# Patient Record
Sex: MALE | Race: BLACK OR AFRICAN AMERICAN | NOT HISPANIC OR LATINO | Employment: OTHER | URBAN - METROPOLITAN AREA
[De-identification: names, ages, dates, MRNs, and addresses within clinical notes are randomized per-mention and may not be internally consistent; named-entity substitution may affect disease eponyms.]

---

## 2017-01-08 ENCOUNTER — HOSPITAL ENCOUNTER (OUTPATIENT)
Dept: SLEEP MEDICINE | Facility: HOSPITAL | Age: 67
Discharge: HOME | End: 2017-01-08
Attending: INTERNAL MEDICINE
Payer: MEDICARE

## 2017-01-08 PROCEDURE — 95811 POLYSOM 6/>YRS CPAP 4/> PARM: CPT

## 2017-05-20 PROBLEM — J10.1 INFLUENZA A: Status: ACTIVE | Noted: 2017-05-20

## 2017-05-21 PROBLEM — J10.1 INFLUENZA A: Status: RESOLVED | Noted: 2017-05-20 | Resolved: 2017-05-21

## 2017-11-28 PROBLEM — Z00.00 ROUTINE ADULT HEALTH MAINTENANCE: Status: ACTIVE | Noted: 2017-01-01

## 2017-11-30 PROBLEM — R93.89 ABNORMAL CHEST CT: Status: ACTIVE | Noted: 2017-01-01

## 2017-11-30 PROBLEM — G47.33 OSA ON CPAP: Status: ACTIVE | Noted: 2017-01-01

## 2018-01-01 ENCOUNTER — TELEPHONE (OUTPATIENT)
Dept: ADMINISTRATIVE | Facility: HOSPITAL | Age: 68
End: 2018-01-01

## 2018-01-01 ENCOUNTER — HOSPITAL ENCOUNTER (INPATIENT)
Facility: HOSPITAL | Age: 68
LOS: 19 days | Discharge: NURSING, REHAB OR LONG-TERM CARE | DRG: 064 | End: 2018-06-26
Attending: PSYCHIATRY & NEUROLOGY | Admitting: PSYCHIATRY & NEUROLOGY
Payer: MEDICARE

## 2018-01-01 ENCOUNTER — HOSPITAL ENCOUNTER (INPATIENT)
Facility: HOSPITAL | Age: 68
LOS: 8 days | DRG: 064 | End: 2018-07-08
Attending: PSYCHIATRY & NEUROLOGY | Admitting: PSYCHIATRY & NEUROLOGY
Payer: MEDICARE

## 2018-01-01 VITALS
BODY MASS INDEX: 31.75 KG/M2 | SYSTOLIC BLOOD PRESSURE: 98 MMHG | TEMPERATURE: 101 F | OXYGEN SATURATION: 95 % | HEIGHT: 70 IN | DIASTOLIC BLOOD PRESSURE: 57 MMHG | WEIGHT: 221.81 LBS

## 2018-01-01 VITALS
BODY MASS INDEX: 33.82 KG/M2 | TEMPERATURE: 99 F | WEIGHT: 223.13 LBS | RESPIRATION RATE: 17 BRPM | SYSTOLIC BLOOD PRESSURE: 108 MMHG | DIASTOLIC BLOOD PRESSURE: 59 MMHG | HEIGHT: 68 IN | HEART RATE: 87 BPM | OXYGEN SATURATION: 95 %

## 2018-01-01 DIAGNOSIS — I63.411 EMBOLIC STROKE INVOLVING RIGHT MIDDLE CEREBRAL ARTERY: ICD-10-CM

## 2018-01-01 DIAGNOSIS — J96.21 ACUTE ON CHRONIC RESPIRATORY FAILURE WITH HYPOXIA: ICD-10-CM

## 2018-01-01 DIAGNOSIS — I25.810 CORONARY ARTERY DISEASE INVOLVING CORONARY BYPASS GRAFT OF NATIVE HEART WITHOUT ANGINA PECTORIS: ICD-10-CM

## 2018-01-01 DIAGNOSIS — I61.2 NONTRAUMATIC HEMORRHAGE OF RIGHT CEREBRAL HEMISPHERE: ICD-10-CM

## 2018-01-01 DIAGNOSIS — Z85.118 HISTORY OF LUNG CANCER: ICD-10-CM

## 2018-01-01 DIAGNOSIS — I63.9 CEREBROVASCULAR ACCIDENT (CVA), UNSPECIFIED MECHANISM: ICD-10-CM

## 2018-01-01 DIAGNOSIS — T45.515A WARFARIN-INDUCED COAGULOPATHY: ICD-10-CM

## 2018-01-01 DIAGNOSIS — J69.0 ASPIRATION PNEUMONIA OF RIGHT LOWER LOBE, UNSPECIFIED ASPIRATION PNEUMONIA TYPE: ICD-10-CM

## 2018-01-01 DIAGNOSIS — G81.14 LEFT SPASTIC HEMIPARESIS: ICD-10-CM

## 2018-01-01 DIAGNOSIS — R47.1 DYSARTHRIA: ICD-10-CM

## 2018-01-01 DIAGNOSIS — N17.9 AKI (ACUTE KIDNEY INJURY): ICD-10-CM

## 2018-01-01 DIAGNOSIS — I82.A12 DVT OF AXILLARY VEIN, ACUTE LEFT: ICD-10-CM

## 2018-01-01 DIAGNOSIS — D68.32 WARFARIN-INDUCED COAGULOPATHY: ICD-10-CM

## 2018-01-01 DIAGNOSIS — I63.512 ACUTE ISCHEMIC LEFT MCA STROKE: ICD-10-CM

## 2018-01-01 DIAGNOSIS — E87.1 HYPONATREMIA: ICD-10-CM

## 2018-01-01 DIAGNOSIS — E11.9 TYPE 2 DIABETES MELLITUS WITHOUT COMPLICATION, WITH LONG-TERM CURRENT USE OF INSULIN: ICD-10-CM

## 2018-01-01 DIAGNOSIS — G93.40 ACUTE ENCEPHALOPATHY: ICD-10-CM

## 2018-01-01 DIAGNOSIS — E11.65 TYPE 2 DIABETES MELLITUS WITH HYPERGLYCEMIA, WITH LONG-TERM CURRENT USE OF INSULIN: ICD-10-CM

## 2018-01-01 DIAGNOSIS — I25.10 CORONARY ARTERY DISEASE INVOLVING NATIVE CORONARY ARTERY OF NATIVE HEART WITHOUT ANGINA PECTORIS: ICD-10-CM

## 2018-01-01 DIAGNOSIS — R93.89 ABNORMAL CHEST CT: ICD-10-CM

## 2018-01-01 DIAGNOSIS — A41.9 SEPSIS, DUE TO UNSPECIFIED ORGANISM: ICD-10-CM

## 2018-01-01 DIAGNOSIS — I61.9 RIGHT-SIDED NONTRAUMATIC INTRACEREBRAL HEMORRHAGE: ICD-10-CM

## 2018-01-01 DIAGNOSIS — I50.22 CHRONIC SYSTOLIC (CONGESTIVE) HEART FAILURE: ICD-10-CM

## 2018-01-01 DIAGNOSIS — I63.9 STROKE: ICD-10-CM

## 2018-01-01 DIAGNOSIS — I10 ESSENTIAL HYPERTENSION: ICD-10-CM

## 2018-01-01 DIAGNOSIS — E11.9 TYPE 2 DIABETES MELLITUS WITHOUT COMPLICATION, WITHOUT LONG-TERM CURRENT USE OF INSULIN: ICD-10-CM

## 2018-01-01 DIAGNOSIS — I61.1 NONTRAUMATIC CORTICAL HEMORRHAGE OF RIGHT CEREBRAL HEMISPHERE: ICD-10-CM

## 2018-01-01 DIAGNOSIS — R40.2433 GLASGOW COMA SCALE TOTAL SCORE 3-8, AT HOSPITAL ADMISSION: ICD-10-CM

## 2018-01-01 DIAGNOSIS — R94.31 QT PROLONGATION: ICD-10-CM

## 2018-01-01 DIAGNOSIS — G93.5 BRAIN COMPRESSION: ICD-10-CM

## 2018-01-01 DIAGNOSIS — R93.1 DECREASED CARDIAC EJECTION FRACTION: ICD-10-CM

## 2018-01-01 DIAGNOSIS — I61.9: ICD-10-CM

## 2018-01-01 DIAGNOSIS — J44.9 CHRONIC OBSTRUCTIVE PULMONARY DISEASE, UNSPECIFIED COPD TYPE: ICD-10-CM

## 2018-01-01 DIAGNOSIS — Z79.4 TYPE 2 DIABETES MELLITUS WITHOUT COMPLICATION, WITH LONG-TERM CURRENT USE OF INSULIN: ICD-10-CM

## 2018-01-01 DIAGNOSIS — G93.5 BRAIN HERNIATION: ICD-10-CM

## 2018-01-01 DIAGNOSIS — E78.2 MIXED HYPERLIPIDEMIA: ICD-10-CM

## 2018-01-01 DIAGNOSIS — R50.9 FEVER OF UNKNOWN ORIGIN: ICD-10-CM

## 2018-01-01 DIAGNOSIS — I50.22 CHRONIC SYSTOLIC HEART FAILURE: ICD-10-CM

## 2018-01-01 DIAGNOSIS — G47.33 OSA ON CPAP: ICD-10-CM

## 2018-01-01 DIAGNOSIS — I10 HTN (HYPERTENSION): ICD-10-CM

## 2018-01-01 DIAGNOSIS — Z79.4 TYPE 2 DIABETES MELLITUS WITH HYPERGLYCEMIA, WITH LONG-TERM CURRENT USE OF INSULIN: ICD-10-CM

## 2018-01-01 DIAGNOSIS — F05 DELIRIUM DUE TO ANOTHER MEDICAL CONDITION: ICD-10-CM

## 2018-01-01 DIAGNOSIS — R57.8 NEUROGENIC SHOCK: ICD-10-CM

## 2018-01-01 LAB
ABO + RH BLD: NORMAL
ALBUMIN SERPL BCP-MCNC: 2 G/DL
ALBUMIN SERPL BCP-MCNC: 2.1 G/DL
ALBUMIN SERPL BCP-MCNC: 2.2 G/DL
ALBUMIN SERPL BCP-MCNC: 2.3 G/DL
ALBUMIN SERPL BCP-MCNC: 2.4 G/DL
ALBUMIN SERPL BCP-MCNC: 2.5 G/DL
ALBUMIN SERPL BCP-MCNC: 2.6 G/DL
ALBUMIN SERPL BCP-MCNC: 2.8 G/DL
ALBUMIN SERPL BCP-MCNC: 3 G/DL
ALLENS TEST: ABNORMAL
ALP SERPL-CCNC: 102 U/L
ALP SERPL-CCNC: 52 U/L
ALP SERPL-CCNC: 57 U/L
ALP SERPL-CCNC: 63 U/L
ALP SERPL-CCNC: 64 U/L
ALP SERPL-CCNC: 66 U/L
ALP SERPL-CCNC: 66 U/L
ALP SERPL-CCNC: 67 U/L
ALP SERPL-CCNC: 69 U/L
ALP SERPL-CCNC: 69 U/L
ALP SERPL-CCNC: 77 U/L
ALP SERPL-CCNC: 79 U/L
ALP SERPL-CCNC: 80 U/L
ALP SERPL-CCNC: 81 U/L
ALP SERPL-CCNC: 81 U/L
ALP SERPL-CCNC: 82 U/L
ALP SERPL-CCNC: 83 U/L
ALP SERPL-CCNC: 84 U/L
ALP SERPL-CCNC: 84 U/L
ALP SERPL-CCNC: 85 U/L
ALP SERPL-CCNC: 85 U/L
ALP SERPL-CCNC: 86 U/L
ALP SERPL-CCNC: 89 U/L
ALP SERPL-CCNC: 91 U/L
ALP SERPL-CCNC: 94 U/L
ALP SERPL-CCNC: 94 U/L
ALP SERPL-CCNC: 97 U/L
ALP SERPL-CCNC: 99 U/L
ALP SERPL-CCNC: 99 U/L
ALT SERPL W/O P-5'-P-CCNC: 11 U/L
ALT SERPL W/O P-5'-P-CCNC: 12 U/L
ALT SERPL W/O P-5'-P-CCNC: 17 U/L
ALT SERPL W/O P-5'-P-CCNC: 17 U/L
ALT SERPL W/O P-5'-P-CCNC: 18 U/L
ALT SERPL W/O P-5'-P-CCNC: 20 U/L
ALT SERPL W/O P-5'-P-CCNC: 21 U/L
ALT SERPL W/O P-5'-P-CCNC: 22 U/L
ALT SERPL W/O P-5'-P-CCNC: 23 U/L
ALT SERPL W/O P-5'-P-CCNC: 24 U/L
ALT SERPL W/O P-5'-P-CCNC: 24 U/L
ALT SERPL W/O P-5'-P-CCNC: 30 U/L
ALT SERPL W/O P-5'-P-CCNC: 30 U/L
ALT SERPL W/O P-5'-P-CCNC: 31 U/L
ALT SERPL W/O P-5'-P-CCNC: 32 U/L
ALT SERPL W/O P-5'-P-CCNC: 33 U/L
ALT SERPL W/O P-5'-P-CCNC: 33 U/L
ALT SERPL W/O P-5'-P-CCNC: 37 U/L
ALT SERPL W/O P-5'-P-CCNC: 37 U/L
ALT SERPL W/O P-5'-P-CCNC: 38 U/L
ALT SERPL W/O P-5'-P-CCNC: 38 U/L
ALT SERPL W/O P-5'-P-CCNC: 44 U/L
ALT SERPL W/O P-5'-P-CCNC: 44 U/L
ALT SERPL W/O P-5'-P-CCNC: 47 U/L
ALT SERPL W/O P-5'-P-CCNC: 53 U/L
ALT SERPL W/O P-5'-P-CCNC: 55 U/L
ALT SERPL W/O P-5'-P-CCNC: 59 U/L
ALT SERPL W/O P-5'-P-CCNC: 60 U/L
ALT SERPL W/O P-5'-P-CCNC: 9 U/L
ANION GAP SERPL CALC-SCNC: 10 MMOL/L
ANION GAP SERPL CALC-SCNC: 11 MMOL/L
ANION GAP SERPL CALC-SCNC: 13 MMOL/L
ANION GAP SERPL CALC-SCNC: 13 MMOL/L
ANION GAP SERPL CALC-SCNC: 6 MMOL/L
ANION GAP SERPL CALC-SCNC: 7 MMOL/L
ANION GAP SERPL CALC-SCNC: 8 MMOL/L
ANION GAP SERPL CALC-SCNC: 9 MMOL/L
AORTIC VALVE STENOSIS: ABNORMAL
APTT BLDCRRT: 21.4 SEC
APTT BLDCRRT: 23.1 SEC
AST SERPL-CCNC: 10 U/L
AST SERPL-CCNC: 15 U/L
AST SERPL-CCNC: 20 U/L
AST SERPL-CCNC: 21 U/L
AST SERPL-CCNC: 22 U/L
AST SERPL-CCNC: 22 U/L
AST SERPL-CCNC: 23 U/L
AST SERPL-CCNC: 24 U/L
AST SERPL-CCNC: 24 U/L
AST SERPL-CCNC: 26 U/L
AST SERPL-CCNC: 27 U/L
AST SERPL-CCNC: 30 U/L
AST SERPL-CCNC: 33 U/L
AST SERPL-CCNC: 36 U/L
AST SERPL-CCNC: 37 U/L
AST SERPL-CCNC: 41 U/L
AST SERPL-CCNC: 42 U/L
AST SERPL-CCNC: 44 U/L
AST SERPL-CCNC: 54 U/L
AST SERPL-CCNC: 54 U/L
AST SERPL-CCNC: 57 U/L
AST SERPL-CCNC: 60 U/L
AST SERPL-CCNC: 70 U/L
AST SERPL-CCNC: 73 U/L
AST SERPL-CCNC: 84 U/L
AST SERPL-CCNC: 9 U/L
AST SERPL-CCNC: 97 U/L
BACTERIA #/AREA URNS AUTO: NORMAL /HPF
BACTERIA BLD CULT: NORMAL
BACTERIA SPEC AEROBE CULT: NORMAL
BACTERIA UR CULT: NO GROWTH
BASOPHILS # BLD AUTO: 0.01 K/UL
BASOPHILS # BLD AUTO: 0.02 K/UL
BASOPHILS # BLD AUTO: 0.03 K/UL
BASOPHILS # BLD AUTO: 0.04 K/UL
BASOPHILS # BLD AUTO: 0.05 K/UL
BASOPHILS # BLD AUTO: 0.06 K/UL
BASOPHILS # BLD AUTO: 0.06 K/UL
BASOPHILS # BLD AUTO: 0.07 K/UL
BASOPHILS NFR BLD: 0.2 %
BASOPHILS NFR BLD: 0.3 %
BASOPHILS NFR BLD: 0.4 %
BASOPHILS NFR BLD: 0.5 %
BASOPHILS NFR BLD: 0.6 %
BASOPHILS NFR BLD: 0.6 %
BASOPHILS NFR BLD: 0.7 %
BASOPHILS NFR BLD: 0.8 %
BASOPHILS NFR BLD: 0.8 %
BASOPHILS NFR BLD: 0.9 %
BILIRUB SERPL-MCNC: 0.2 MG/DL
BILIRUB SERPL-MCNC: 0.3 MG/DL
BILIRUB SERPL-MCNC: 0.4 MG/DL
BILIRUB SERPL-MCNC: 0.5 MG/DL
BILIRUB SERPL-MCNC: 0.6 MG/DL
BILIRUB SERPL-MCNC: 0.6 MG/DL
BILIRUB UR QL STRIP: NEGATIVE
BLD GP AB SCN CELLS X3 SERPL QL: NORMAL
BNP SERPL-MCNC: 320 PG/ML
BNP SERPL-MCNC: 332 PG/ML
BUN SERPL-MCNC: 11 MG/DL
BUN SERPL-MCNC: 14 MG/DL
BUN SERPL-MCNC: 15 MG/DL
BUN SERPL-MCNC: 15 MG/DL
BUN SERPL-MCNC: 16 MG/DL
BUN SERPL-MCNC: 17 MG/DL
BUN SERPL-MCNC: 18 MG/DL
BUN SERPL-MCNC: 18 MG/DL
BUN SERPL-MCNC: 19 MG/DL
BUN SERPL-MCNC: 21 MG/DL
BUN SERPL-MCNC: 22 MG/DL
BUN SERPL-MCNC: 23 MG/DL
BUN SERPL-MCNC: 25 MG/DL
BUN SERPL-MCNC: 26 MG/DL
BUN SERPL-MCNC: 45 MG/DL
BUN SERPL-MCNC: 50 MG/DL
BUN SERPL-MCNC: 8 MG/DL
CALCIUM SERPL-MCNC: 10 MG/DL
CALCIUM SERPL-MCNC: 10.5 MG/DL
CALCIUM SERPL-MCNC: 8.4 MG/DL
CALCIUM SERPL-MCNC: 8.6 MG/DL
CALCIUM SERPL-MCNC: 8.9 MG/DL
CALCIUM SERPL-MCNC: 9 MG/DL
CALCIUM SERPL-MCNC: 9.1 MG/DL
CALCIUM SERPL-MCNC: 9.1 MG/DL
CALCIUM SERPL-MCNC: 9.2 MG/DL
CALCIUM SERPL-MCNC: 9.3 MG/DL
CALCIUM SERPL-MCNC: 9.4 MG/DL
CALCIUM SERPL-MCNC: 9.5 MG/DL
CALCIUM SERPL-MCNC: 9.5 MG/DL
CALCIUM SERPL-MCNC: 9.6 MG/DL
CALCIUM SERPL-MCNC: 9.7 MG/DL
CALCIUM SERPL-MCNC: 9.8 MG/DL
CALCIUM SERPL-MCNC: 9.9 MG/DL
CHLORIDE SERPL-SCNC: 105 MMOL/L
CHLORIDE SERPL-SCNC: 117 MMOL/L
CHLORIDE SERPL-SCNC: 117 MMOL/L
CHLORIDE SERPL-SCNC: 118 MMOL/L
CHLORIDE SERPL-SCNC: 122 MMOL/L
CHLORIDE SERPL-SCNC: 124 MMOL/L
CHLORIDE SERPL-SCNC: 125 MMOL/L
CHLORIDE SERPL-SCNC: 127 MMOL/L
CHLORIDE SERPL-SCNC: 86 MMOL/L
CHLORIDE SERPL-SCNC: 86 MMOL/L
CHLORIDE SERPL-SCNC: 87 MMOL/L
CHLORIDE SERPL-SCNC: 88 MMOL/L
CHLORIDE SERPL-SCNC: 89 MMOL/L
CHLORIDE SERPL-SCNC: 93 MMOL/L
CHLORIDE SERPL-SCNC: 94 MMOL/L
CHLORIDE SERPL-SCNC: 95 MMOL/L
CHLORIDE SERPL-SCNC: 96 MMOL/L
CHLORIDE SERPL-SCNC: 97 MMOL/L
CHOLEST SERPL-MCNC: 127 MG/DL
CHOLEST/HDLC SERPL: 2.4 {RATIO}
CK MB SERPL-MCNC: 0.8 NG/ML
CK MB SERPL-MCNC: 3.2 NG/ML
CK MB SERPL-RTO: 1.1 %
CK MB SERPL-RTO: 1.8 %
CK SERPL-CCNC: 180 U/L
CK SERPL-CCNC: 71 U/L
CLARITY UR REFRACT.AUTO: ABNORMAL
CLARITY UR REFRACT.AUTO: ABNORMAL
CLARITY UR REFRACT.AUTO: CLEAR
CLARITY UR REFRACT.AUTO: CLEAR
CO2 SERPL-SCNC: 21 MMOL/L
CO2 SERPL-SCNC: 23 MMOL/L
CO2 SERPL-SCNC: 24 MMOL/L
CO2 SERPL-SCNC: 25 MMOL/L
CO2 SERPL-SCNC: 26 MMOL/L
CO2 SERPL-SCNC: 27 MMOL/L
CO2 SERPL-SCNC: 29 MMOL/L
CO2 SERPL-SCNC: 29 MMOL/L
CO2 SERPL-SCNC: 30 MMOL/L
CO2 SERPL-SCNC: 31 MMOL/L
CO2 SERPL-SCNC: 31 MMOL/L
CO2 SERPL-SCNC: 32 MMOL/L
CO2 SERPL-SCNC: 33 MMOL/L
CO2 SERPL-SCNC: 34 MMOL/L
CO2 SERPL-SCNC: 35 MMOL/L
COLOR UR AUTO: YELLOW
CORTIS SERPL-MCNC: 14.3 UG/DL
CREAT SERPL-MCNC: 0.7 MG/DL
CREAT SERPL-MCNC: 0.8 MG/DL
CREAT SERPL-MCNC: 0.9 MG/DL
CREAT SERPL-MCNC: 1 MG/DL
CREAT SERPL-MCNC: 1.1 MG/DL
CREAT SERPL-MCNC: 1.3 MG/DL
CREAT SERPL-MCNC: 1.6 MG/DL
CREAT UR-MCNC: 146 MG/DL
DELSYS: ABNORMAL
DIASTOLIC DYSFUNCTION: YES
DIFFERENTIAL METHOD: ABNORMAL
EOSINOPHIL # BLD AUTO: 0 K/UL
EOSINOPHIL # BLD AUTO: 0.1 K/UL
EOSINOPHIL # BLD AUTO: 0.2 K/UL
EOSINOPHIL # BLD AUTO: 0.3 K/UL
EOSINOPHIL # BLD AUTO: 0.4 K/UL
EOSINOPHIL NFR BLD: 0.2 %
EOSINOPHIL NFR BLD: 0.3 %
EOSINOPHIL NFR BLD: 0.4 %
EOSINOPHIL NFR BLD: 0.4 %
EOSINOPHIL NFR BLD: 0.8 %
EOSINOPHIL NFR BLD: 1.2 %
EOSINOPHIL NFR BLD: 1.4 %
EOSINOPHIL NFR BLD: 1.5 %
EOSINOPHIL NFR BLD: 1.6 %
EOSINOPHIL NFR BLD: 1.7 %
EOSINOPHIL NFR BLD: 1.8 %
EOSINOPHIL NFR BLD: 1.9 %
EOSINOPHIL NFR BLD: 2 %
EOSINOPHIL NFR BLD: 2.1 %
EOSINOPHIL NFR BLD: 2.2 %
EOSINOPHIL NFR BLD: 2.2 %
EOSINOPHIL NFR BLD: 2.3 %
EOSINOPHIL NFR BLD: 2.5 %
EOSINOPHIL NFR BLD: 2.7 %
EOSINOPHIL NFR BLD: 2.7 %
EOSINOPHIL NFR BLD: 2.8 %
EOSINOPHIL NFR BLD: 3.5 %
EOSINOPHIL NFR BLD: 4.8 %
EOSINOPHIL NFR BLD: 6.7 %
ERYTHROCYTE [DISTWIDTH] IN BLOOD BY AUTOMATED COUNT: 12.2 %
ERYTHROCYTE [DISTWIDTH] IN BLOOD BY AUTOMATED COUNT: 12.3 %
ERYTHROCYTE [DISTWIDTH] IN BLOOD BY AUTOMATED COUNT: 12.3 %
ERYTHROCYTE [DISTWIDTH] IN BLOOD BY AUTOMATED COUNT: 12.4 %
ERYTHROCYTE [DISTWIDTH] IN BLOOD BY AUTOMATED COUNT: 12.5 %
ERYTHROCYTE [DISTWIDTH] IN BLOOD BY AUTOMATED COUNT: 12.6 %
ERYTHROCYTE [DISTWIDTH] IN BLOOD BY AUTOMATED COUNT: 12.7 %
ERYTHROCYTE [DISTWIDTH] IN BLOOD BY AUTOMATED COUNT: 12.8 %
ERYTHROCYTE [DISTWIDTH] IN BLOOD BY AUTOMATED COUNT: 12.9 %
ERYTHROCYTE [DISTWIDTH] IN BLOOD BY AUTOMATED COUNT: 13.3 %
ERYTHROCYTE [DISTWIDTH] IN BLOOD BY AUTOMATED COUNT: 13.4 %
ERYTHROCYTE [DISTWIDTH] IN BLOOD BY AUTOMATED COUNT: 13.4 %
ERYTHROCYTE [DISTWIDTH] IN BLOOD BY AUTOMATED COUNT: 13.5 %
ERYTHROCYTE [DISTWIDTH] IN BLOOD BY AUTOMATED COUNT: 13.7 %
ERYTHROCYTE [SEDIMENTATION RATE] IN BLOOD BY WESTERGREN METHOD: 14 MM/H
ERYTHROCYTE [SEDIMENTATION RATE] IN BLOOD BY WESTERGREN METHOD: 14 MM/H
ERYTHROCYTE [SEDIMENTATION RATE] IN BLOOD BY WESTERGREN METHOD: 18 MM/H
EST. GFR  (AFRICAN AMERICAN): 50.8 ML/MIN/1.73 M^2
EST. GFR  (AFRICAN AMERICAN): >60 ML/MIN/1.73 M^2
EST. GFR  (NON AFRICAN AMERICAN): 43.9 ML/MIN/1.73 M^2
EST. GFR  (NON AFRICAN AMERICAN): 56.5 ML/MIN/1.73 M^2
EST. GFR  (NON AFRICAN AMERICAN): >60 ML/MIN/1.73 M^2
ESTIMATED AVG GLUCOSE: 220 MG/DL
ESTIMATED PA SYSTOLIC PRESSURE: 23.23
FACT X PPP CHRO-ACNC: 0.17 IU/ML
FACT X PPP CHRO-ACNC: 0.17 IU/ML
FACT X PPP CHRO-ACNC: 0.29 IU/ML
FACT X PPP CHRO-ACNC: 0.42 IU/ML
FACT X PPP CHRO-ACNC: 0.55 IU/ML
FACT X PPP CHRO-ACNC: 0.6 IU/ML
FACT X PPP CHRO-ACNC: 0.67 IU/ML
FACT X PPP CHRO-ACNC: <0.1 IU/ML
FIO2: 40
FLOW: 2
GLUCOSE SERPL-MCNC: 104 MG/DL
GLUCOSE SERPL-MCNC: 111 MG/DL
GLUCOSE SERPL-MCNC: 117 MG/DL
GLUCOSE SERPL-MCNC: 132 MG/DL
GLUCOSE SERPL-MCNC: 143 MG/DL
GLUCOSE SERPL-MCNC: 164 MG/DL
GLUCOSE SERPL-MCNC: 166 MG/DL
GLUCOSE SERPL-MCNC: 180 MG/DL
GLUCOSE SERPL-MCNC: 183 MG/DL
GLUCOSE SERPL-MCNC: 186 MG/DL
GLUCOSE SERPL-MCNC: 192 MG/DL
GLUCOSE SERPL-MCNC: 200 MG/DL
GLUCOSE SERPL-MCNC: 201 MG/DL
GLUCOSE SERPL-MCNC: 201 MG/DL
GLUCOSE SERPL-MCNC: 208 MG/DL
GLUCOSE SERPL-MCNC: 220 MG/DL
GLUCOSE SERPL-MCNC: 228 MG/DL
GLUCOSE SERPL-MCNC: 231 MG/DL
GLUCOSE SERPL-MCNC: 232 MG/DL
GLUCOSE SERPL-MCNC: 234 MG/DL
GLUCOSE SERPL-MCNC: 242 MG/DL
GLUCOSE SERPL-MCNC: 246 MG/DL
GLUCOSE SERPL-MCNC: 250 MG/DL
GLUCOSE SERPL-MCNC: 251 MG/DL
GLUCOSE SERPL-MCNC: 253 MG/DL
GLUCOSE SERPL-MCNC: 255 MG/DL
GLUCOSE SERPL-MCNC: 263 MG/DL
GLUCOSE SERPL-MCNC: 278 MG/DL
GLUCOSE SERPL-MCNC: 286 MG/DL
GLUCOSE UR QL STRIP: ABNORMAL
GLUCOSE UR QL STRIP: ABNORMAL
GLUCOSE UR QL STRIP: NEGATIVE
GLUCOSE UR QL STRIP: NEGATIVE
GRAM STN SPEC: NORMAL
HBA1C MFR BLD HPLC: 9.3 %
HCO3 UR-SCNC: 26.5 MMOL/L (ref 24–28)
HCO3 UR-SCNC: 27.1 MMOL/L (ref 24–28)
HCO3 UR-SCNC: 29 MMOL/L (ref 24–28)
HCO3 UR-SCNC: 29.1 MMOL/L (ref 24–28)
HCO3 UR-SCNC: 29.1 MMOL/L (ref 24–28)
HCO3 UR-SCNC: 31 MMOL/L (ref 24–28)
HCO3 UR-SCNC: 31.3 MMOL/L (ref 24–28)
HCO3 UR-SCNC: 33 MMOL/L (ref 24–28)
HCO3 UR-SCNC: 34.2 MMOL/L (ref 24–28)
HCO3 UR-SCNC: 37.2 MMOL/L (ref 24–28)
HCO3 UR-SCNC: 38.6 MMOL/L (ref 24–28)
HCO3 UR-SCNC: 41 MMOL/L (ref 24–28)
HCT VFR BLD AUTO: 30 %
HCT VFR BLD AUTO: 30 %
HCT VFR BLD AUTO: 30.3 %
HCT VFR BLD AUTO: 30.5 %
HCT VFR BLD AUTO: 31.1 %
HCT VFR BLD AUTO: 31.5 %
HCT VFR BLD AUTO: 31.5 %
HCT VFR BLD AUTO: 32.1 %
HCT VFR BLD AUTO: 32.5 %
HCT VFR BLD AUTO: 32.5 %
HCT VFR BLD AUTO: 32.6 %
HCT VFR BLD AUTO: 33.5 %
HCT VFR BLD AUTO: 33.8 %
HCT VFR BLD AUTO: 33.9 %
HCT VFR BLD AUTO: 34.1 %
HCT VFR BLD AUTO: 34.3 %
HCT VFR BLD AUTO: 34.4 %
HCT VFR BLD AUTO: 34.4 %
HCT VFR BLD AUTO: 34.6 %
HCT VFR BLD AUTO: 34.9 %
HCT VFR BLD AUTO: 35 %
HCT VFR BLD AUTO: 35.1 %
HCT VFR BLD AUTO: 35.4 %
HCT VFR BLD AUTO: 35.7 %
HCT VFR BLD AUTO: 36 %
HCT VFR BLD AUTO: 36.4 %
HCT VFR BLD AUTO: 36.5 %
HCT VFR BLD AUTO: 37.1 %
HDLC SERPL-MCNC: 53 MG/DL
HDLC SERPL: 41.7 %
HGB BLD-MCNC: 10 G/DL
HGB BLD-MCNC: 10.2 G/DL
HGB BLD-MCNC: 10.3 G/DL
HGB BLD-MCNC: 10.4 G/DL
HGB BLD-MCNC: 10.5 G/DL
HGB BLD-MCNC: 10.8 G/DL
HGB BLD-MCNC: 10.8 G/DL
HGB BLD-MCNC: 11 G/DL
HGB BLD-MCNC: 11 G/DL
HGB BLD-MCNC: 11.1 G/DL
HGB BLD-MCNC: 11.2 G/DL
HGB BLD-MCNC: 11.2 G/DL
HGB BLD-MCNC: 11.3 G/DL
HGB BLD-MCNC: 11.4 G/DL
HGB BLD-MCNC: 11.4 G/DL
HGB BLD-MCNC: 11.8 G/DL
HGB BLD-MCNC: 11.9 G/DL
HGB BLD-MCNC: 11.9 G/DL
HGB BLD-MCNC: 12.1 G/DL
HGB BLD-MCNC: 8.8 G/DL
HGB BLD-MCNC: 9 G/DL
HGB BLD-MCNC: 9.1 G/DL
HGB BLD-MCNC: 9.2 G/DL
HGB BLD-MCNC: 9.7 G/DL
HGB BLD-MCNC: 9.8 G/DL
HGB BLD-MCNC: 9.9 G/DL
HGB UR QL STRIP: ABNORMAL
HGB UR QL STRIP: NEGATIVE
IMM GRANULOCYTES # BLD AUTO: 0.02 K/UL
IMM GRANULOCYTES # BLD AUTO: 0.03 K/UL
IMM GRANULOCYTES # BLD AUTO: 0.04 K/UL
IMM GRANULOCYTES # BLD AUTO: 0.05 K/UL
IMM GRANULOCYTES # BLD AUTO: 0.06 K/UL
IMM GRANULOCYTES # BLD AUTO: 0.07 K/UL
IMM GRANULOCYTES # BLD AUTO: 0.07 K/UL
IMM GRANULOCYTES # BLD AUTO: 0.09 K/UL
IMM GRANULOCYTES # BLD AUTO: 0.1 K/UL
IMM GRANULOCYTES # BLD AUTO: 0.15 K/UL
IMM GRANULOCYTES # BLD AUTO: 0.15 K/UL
IMM GRANULOCYTES # BLD AUTO: 0.16 K/UL
IMM GRANULOCYTES # BLD AUTO: 0.18 K/UL
IMM GRANULOCYTES # BLD AUTO: 0.19 K/UL
IMM GRANULOCYTES # BLD AUTO: 0.22 K/UL
IMM GRANULOCYTES NFR BLD AUTO: 0.3 %
IMM GRANULOCYTES NFR BLD AUTO: 0.4 %
IMM GRANULOCYTES NFR BLD AUTO: 0.5 %
IMM GRANULOCYTES NFR BLD AUTO: 0.6 %
IMM GRANULOCYTES NFR BLD AUTO: 0.7 %
IMM GRANULOCYTES NFR BLD AUTO: 0.8 %
IMM GRANULOCYTES NFR BLD AUTO: 0.9 %
IMM GRANULOCYTES NFR BLD AUTO: 1.3 %
IMM GRANULOCYTES NFR BLD AUTO: 1.4 %
IMM GRANULOCYTES NFR BLD AUTO: 1.7 %
IMM GRANULOCYTES NFR BLD AUTO: 1.8 %
IMM GRANULOCYTES NFR BLD AUTO: 1.9 %
IMM GRANULOCYTES NFR BLD AUTO: 2.3 %
IMM GRANULOCYTES NFR BLD AUTO: 2.6 %
IMM GRANULOCYTES NFR BLD AUTO: 2.6 %
INR PPP: 1
INR PPP: 1.1
INR PPP: 1.2
INR PPP: 1.4
INR PPP: 2.1
INR PPP: 2.3
KETONES UR QL STRIP: NEGATIVE
LDLC SERPL CALC-MCNC: 59.2 MG/DL
LEUKOCYTE ESTERASE UR QL STRIP: ABNORMAL
LEUKOCYTE ESTERASE UR QL STRIP: NEGATIVE
LYMPHOCYTES # BLD AUTO: 0.6 K/UL
LYMPHOCYTES # BLD AUTO: 0.8 K/UL
LYMPHOCYTES # BLD AUTO: 0.8 K/UL
LYMPHOCYTES # BLD AUTO: 0.9 K/UL
LYMPHOCYTES # BLD AUTO: 0.9 K/UL
LYMPHOCYTES # BLD AUTO: 1 K/UL
LYMPHOCYTES # BLD AUTO: 1.1 K/UL
LYMPHOCYTES # BLD AUTO: 1.2 K/UL
LYMPHOCYTES # BLD AUTO: 1.3 K/UL
LYMPHOCYTES # BLD AUTO: 1.3 K/UL
LYMPHOCYTES # BLD AUTO: 1.4 K/UL
LYMPHOCYTES # BLD AUTO: 1.5 K/UL
LYMPHOCYTES # BLD AUTO: 1.5 K/UL
LYMPHOCYTES # BLD AUTO: 1.7 K/UL
LYMPHOCYTES # BLD AUTO: 1.7 K/UL
LYMPHOCYTES # BLD AUTO: 1.8 K/UL
LYMPHOCYTES # BLD AUTO: 1.8 K/UL
LYMPHOCYTES NFR BLD: 11 %
LYMPHOCYTES NFR BLD: 11.3 %
LYMPHOCYTES NFR BLD: 11.8 %
LYMPHOCYTES NFR BLD: 11.9 %
LYMPHOCYTES NFR BLD: 12.5 %
LYMPHOCYTES NFR BLD: 13.2 %
LYMPHOCYTES NFR BLD: 13.9 %
LYMPHOCYTES NFR BLD: 14 %
LYMPHOCYTES NFR BLD: 14.6 %
LYMPHOCYTES NFR BLD: 14.8 %
LYMPHOCYTES NFR BLD: 15.5 %
LYMPHOCYTES NFR BLD: 16.1 %
LYMPHOCYTES NFR BLD: 16.2 %
LYMPHOCYTES NFR BLD: 16.9 %
LYMPHOCYTES NFR BLD: 17.2 %
LYMPHOCYTES NFR BLD: 17.7 %
LYMPHOCYTES NFR BLD: 18.9 %
LYMPHOCYTES NFR BLD: 19 %
LYMPHOCYTES NFR BLD: 20.1 %
LYMPHOCYTES NFR BLD: 20.5 %
LYMPHOCYTES NFR BLD: 20.7 %
LYMPHOCYTES NFR BLD: 21 %
LYMPHOCYTES NFR BLD: 21.3 %
LYMPHOCYTES NFR BLD: 24.7 %
LYMPHOCYTES NFR BLD: 7.5 %
LYMPHOCYTES NFR BLD: 9.4 %
LYMPHOCYTES NFR BLD: 9.4 %
LYMPHOCYTES NFR BLD: 9.8 %
MAGNESIUM SERPL-MCNC: 1.4 MG/DL
MAGNESIUM SERPL-MCNC: 1.6 MG/DL
MAGNESIUM SERPL-MCNC: 1.7 MG/DL
MAGNESIUM SERPL-MCNC: 1.8 MG/DL
MAGNESIUM SERPL-MCNC: 1.9 MG/DL
MAGNESIUM SERPL-MCNC: 2 MG/DL
MAGNESIUM SERPL-MCNC: 2.2 MG/DL
MCH RBC QN AUTO: 29.1 PG
MCH RBC QN AUTO: 29.3 PG
MCH RBC QN AUTO: 29.3 PG
MCH RBC QN AUTO: 29.5 PG
MCH RBC QN AUTO: 29.5 PG
MCH RBC QN AUTO: 29.6 PG
MCH RBC QN AUTO: 29.7 PG
MCH RBC QN AUTO: 29.8 PG
MCH RBC QN AUTO: 29.9 PG
MCH RBC QN AUTO: 30 PG
MCH RBC QN AUTO: 30 PG
MCH RBC QN AUTO: 30.1 PG
MCH RBC QN AUTO: 30.1 PG
MCH RBC QN AUTO: 30.2 PG
MCH RBC QN AUTO: 30.3 PG
MCH RBC QN AUTO: 30.4 PG
MCH RBC QN AUTO: 30.6 PG
MCH RBC QN AUTO: 30.6 PG
MCHC RBC AUTO-ENTMCNC: 29 G/DL
MCHC RBC AUTO-ENTMCNC: 29.8 G/DL
MCHC RBC AUTO-ENTMCNC: 30 G/DL
MCHC RBC AUTO-ENTMCNC: 30.2 G/DL
MCHC RBC AUTO-ENTMCNC: 30.3 G/DL
MCHC RBC AUTO-ENTMCNC: 30.8 G/DL
MCHC RBC AUTO-ENTMCNC: 31 G/DL
MCHC RBC AUTO-ENTMCNC: 31.1 G/DL
MCHC RBC AUTO-ENTMCNC: 31.1 G/DL
MCHC RBC AUTO-ENTMCNC: 31.4 G/DL
MCHC RBC AUTO-ENTMCNC: 31.4 G/DL
MCHC RBC AUTO-ENTMCNC: 31.5 G/DL
MCHC RBC AUTO-ENTMCNC: 31.8 G/DL
MCHC RBC AUTO-ENTMCNC: 31.9 G/DL
MCHC RBC AUTO-ENTMCNC: 32 G/DL
MCHC RBC AUTO-ENTMCNC: 32.1 G/DL
MCHC RBC AUTO-ENTMCNC: 32.1 G/DL
MCHC RBC AUTO-ENTMCNC: 32.3 G/DL
MCHC RBC AUTO-ENTMCNC: 32.3 G/DL
MCHC RBC AUTO-ENTMCNC: 32.5 G/DL
MCHC RBC AUTO-ENTMCNC: 32.6 G/DL
MCHC RBC AUTO-ENTMCNC: 32.8 G/DL
MCHC RBC AUTO-ENTMCNC: 32.8 G/DL
MCHC RBC AUTO-ENTMCNC: 33 G/DL
MCHC RBC AUTO-ENTMCNC: 33.2 G/DL
MCHC RBC AUTO-ENTMCNC: 33.4 G/DL
MCV RBC AUTO: 100 FL
MCV RBC AUTO: 91 FL
MCV RBC AUTO: 92 FL
MCV RBC AUTO: 93 FL
MCV RBC AUTO: 94 FL
MCV RBC AUTO: 95 FL
MCV RBC AUTO: 96 FL
MCV RBC AUTO: 98 FL
MCV RBC AUTO: 99 FL
MICROSCOPIC COMMENT: ABNORMAL
MICROSCOPIC COMMENT: NORMAL
MICROSCOPIC COMMENT: NORMAL
MIN VOL: 9.32
MIN VOL: 9.45
MITRAL VALVE REGURGITATION: ABNORMAL
MODE: ABNORMAL
MONOCYTES # BLD AUTO: 0.6 K/UL
MONOCYTES # BLD AUTO: 0.6 K/UL
MONOCYTES # BLD AUTO: 0.7 K/UL
MONOCYTES # BLD AUTO: 0.8 K/UL
MONOCYTES # BLD AUTO: 0.9 K/UL
MONOCYTES # BLD AUTO: 1 K/UL
MONOCYTES # BLD AUTO: 1.1 K/UL
MONOCYTES # BLD AUTO: 1.2 K/UL
MONOCYTES # BLD AUTO: 1.4 K/UL
MONOCYTES # BLD AUTO: 1.5 K/UL
MONOCYTES NFR BLD: 10.2 %
MONOCYTES NFR BLD: 10.2 %
MONOCYTES NFR BLD: 10.3 %
MONOCYTES NFR BLD: 10.3 %
MONOCYTES NFR BLD: 10.4 %
MONOCYTES NFR BLD: 10.6 %
MONOCYTES NFR BLD: 10.8 %
MONOCYTES NFR BLD: 10.8 %
MONOCYTES NFR BLD: 11.3 %
MONOCYTES NFR BLD: 11.6 %
MONOCYTES NFR BLD: 11.7 %
MONOCYTES NFR BLD: 12.4 %
MONOCYTES NFR BLD: 13.1 %
MONOCYTES NFR BLD: 13.4 %
MONOCYTES NFR BLD: 13.5 %
MONOCYTES NFR BLD: 14 %
MONOCYTES NFR BLD: 14.1 %
MONOCYTES NFR BLD: 14.3 %
MONOCYTES NFR BLD: 14.5 %
MONOCYTES NFR BLD: 16.4 %
MONOCYTES NFR BLD: 8 %
MONOCYTES NFR BLD: 8.3 %
MONOCYTES NFR BLD: 8.6 %
MONOCYTES NFR BLD: 8.8 %
MONOCYTES NFR BLD: 9.2 %
MONOCYTES NFR BLD: 9.5 %
MONOCYTES NFR BLD: 9.5 %
MONOCYTES NFR BLD: 9.6 %
MONOCYTES NFR BLD: 9.6 %
MONOCYTES NFR BLD: 9.9 %
NEUTROPHILS # BLD AUTO: 3.9 K/UL
NEUTROPHILS # BLD AUTO: 4.1 K/UL
NEUTROPHILS # BLD AUTO: 4.3 K/UL
NEUTROPHILS # BLD AUTO: 4.4 K/UL
NEUTROPHILS # BLD AUTO: 4.4 K/UL
NEUTROPHILS # BLD AUTO: 4.5 K/UL
NEUTROPHILS # BLD AUTO: 4.7 K/UL
NEUTROPHILS # BLD AUTO: 5 K/UL
NEUTROPHILS # BLD AUTO: 5 K/UL
NEUTROPHILS # BLD AUTO: 5.2 K/UL
NEUTROPHILS # BLD AUTO: 5.2 K/UL
NEUTROPHILS # BLD AUTO: 5.4 K/UL
NEUTROPHILS # BLD AUTO: 5.6 K/UL
NEUTROPHILS # BLD AUTO: 5.7 K/UL
NEUTROPHILS # BLD AUTO: 5.8 K/UL
NEUTROPHILS # BLD AUTO: 6 K/UL
NEUTROPHILS # BLD AUTO: 6 K/UL
NEUTROPHILS # BLD AUTO: 6.1 K/UL
NEUTROPHILS # BLD AUTO: 6.2 K/UL
NEUTROPHILS # BLD AUTO: 6.2 K/UL
NEUTROPHILS # BLD AUTO: 6.3 K/UL
NEUTROPHILS # BLD AUTO: 6.6 K/UL
NEUTROPHILS # BLD AUTO: 6.8 K/UL
NEUTROPHILS # BLD AUTO: 6.8 K/UL
NEUTROPHILS # BLD AUTO: 7 K/UL
NEUTROPHILS # BLD AUTO: 7.2 K/UL
NEUTROPHILS # BLD AUTO: 7.2 K/UL
NEUTROPHILS # BLD AUTO: 8.1 K/UL
NEUTROPHILS NFR BLD: 59.3 %
NEUTROPHILS NFR BLD: 60.1 %
NEUTROPHILS NFR BLD: 64.5 %
NEUTROPHILS NFR BLD: 64.6 %
NEUTROPHILS NFR BLD: 65.2 %
NEUTROPHILS NFR BLD: 65.8 %
NEUTROPHILS NFR BLD: 66.8 %
NEUTROPHILS NFR BLD: 67.3 %
NEUTROPHILS NFR BLD: 67.3 %
NEUTROPHILS NFR BLD: 67.8 %
NEUTROPHILS NFR BLD: 67.9 %
NEUTROPHILS NFR BLD: 68.5 %
NEUTROPHILS NFR BLD: 69.5 %
NEUTROPHILS NFR BLD: 70.2 %
NEUTROPHILS NFR BLD: 70.9 %
NEUTROPHILS NFR BLD: 70.9 %
NEUTROPHILS NFR BLD: 71 %
NEUTROPHILS NFR BLD: 71.5 %
NEUTROPHILS NFR BLD: 72 %
NEUTROPHILS NFR BLD: 72.1 %
NEUTROPHILS NFR BLD: 73 %
NEUTROPHILS NFR BLD: 73.5 %
NEUTROPHILS NFR BLD: 74.2 %
NEUTROPHILS NFR BLD: 74.2 %
NEUTROPHILS NFR BLD: 74.5 %
NEUTROPHILS NFR BLD: 75 %
NEUTROPHILS NFR BLD: 75.9 %
NEUTROPHILS NFR BLD: 76.5 %
NEUTROPHILS NFR BLD: 76.6 %
NEUTROPHILS NFR BLD: 77.3 %
NITRITE UR QL STRIP: NEGATIVE
NONHDLC SERPL-MCNC: 74 MG/DL
NRBC BLD-RTO: 0 /100 WBC
OSMOLALITY UR: 540 MOSM/KG
OSMOLALITY UR: 572 MOSM/KG
PCO2 BLDA: 37.2 MMHG (ref 35–45)
PCO2 BLDA: 38 MMHG (ref 35–45)
PCO2 BLDA: 39.7 MMHG (ref 35–45)
PCO2 BLDA: 40.4 MMHG (ref 35–45)
PCO2 BLDA: 40.7 MMHG (ref 35–45)
PCO2 BLDA: 41.9 MMHG (ref 35–45)
PCO2 BLDA: 42.1 MMHG (ref 35–45)
PCO2 BLDA: 43.2 MMHG (ref 35–45)
PCO2 BLDA: 43.5 MMHG (ref 35–45)
PCO2 BLDA: 47.1 MMHG (ref 35–45)
PCO2 BLDA: 47.4 MMHG (ref 35–45)
PCO2 BLDA: 61.4 MMHG (ref 35–45)
PEEP: 5
PEEP: 8
PH SMN: 7.43 [PH] (ref 7.35–7.45)
PH SMN: 7.44 [PH] (ref 7.35–7.45)
PH SMN: 7.46 [PH] (ref 7.35–7.45)
PH SMN: 7.46 [PH] (ref 7.35–7.45)
PH SMN: 7.47 [PH] (ref 7.35–7.45)
PH SMN: 7.48 [PH] (ref 7.35–7.45)
PH SMN: 7.48 [PH] (ref 7.35–7.45)
PH SMN: 7.49 [PH] (ref 7.35–7.45)
PH SMN: 7.49 [PH] (ref 7.35–7.45)
PH SMN: 7.51 [PH] (ref 7.35–7.45)
PH SMN: 7.51 [PH] (ref 7.35–7.45)
PH SMN: 7.52 [PH] (ref 7.35–7.45)
PH UR STRIP: 5 [PH] (ref 5–8)
PH UR STRIP: 6 [PH] (ref 5–8)
PH UR STRIP: 7 [PH] (ref 5–8)
PH UR STRIP: 7 [PH] (ref 5–8)
PHOSPHATE SERPL-MCNC: 1.7 MG/DL
PHOSPHATE SERPL-MCNC: 2.6 MG/DL
PHOSPHATE SERPL-MCNC: 2.7 MG/DL
PHOSPHATE SERPL-MCNC: 2.9 MG/DL
PHOSPHATE SERPL-MCNC: 3 MG/DL
PHOSPHATE SERPL-MCNC: 3 MG/DL
PHOSPHATE SERPL-MCNC: 3.1 MG/DL
PHOSPHATE SERPL-MCNC: 3.1 MG/DL
PHOSPHATE SERPL-MCNC: 3.2 MG/DL
PHOSPHATE SERPL-MCNC: 3.3 MG/DL
PHOSPHATE SERPL-MCNC: 3.4 MG/DL
PHOSPHATE SERPL-MCNC: 3.5 MG/DL
PHOSPHATE SERPL-MCNC: 3.6 MG/DL
PHOSPHATE SERPL-MCNC: 3.6 MG/DL
PHOSPHATE SERPL-MCNC: 3.7 MG/DL
PHOSPHATE SERPL-MCNC: 4.1 MG/DL
PHOSPHATE SERPL-MCNC: 4.1 MG/DL
PIP: 32
PIP: 35
PLATELET # BLD AUTO: 153 K/UL
PLATELET # BLD AUTO: 154 K/UL
PLATELET # BLD AUTO: 157 K/UL
PLATELET # BLD AUTO: 193 K/UL
PLATELET # BLD AUTO: 194 K/UL
PLATELET # BLD AUTO: 196 K/UL
PLATELET # BLD AUTO: 199 K/UL
PLATELET # BLD AUTO: 208 K/UL
PLATELET # BLD AUTO: 213 K/UL
PLATELET # BLD AUTO: 217 K/UL
PLATELET # BLD AUTO: 225 K/UL
PLATELET # BLD AUTO: 254 K/UL
PLATELET # BLD AUTO: 268 K/UL
PLATELET # BLD AUTO: 294 K/UL
PLATELET # BLD AUTO: 302 K/UL
PLATELET # BLD AUTO: 319 K/UL
PLATELET # BLD AUTO: 333 K/UL
PLATELET # BLD AUTO: 337 K/UL
PLATELET # BLD AUTO: 341 K/UL
PLATELET # BLD AUTO: 379 K/UL
PLATELET # BLD AUTO: 400 K/UL
PLATELET # BLD AUTO: 401 K/UL
PLATELET # BLD AUTO: 406 K/UL
PLATELET # BLD AUTO: 414 K/UL
PLATELET # BLD AUTO: 416 K/UL
PLATELET # BLD AUTO: 436 K/UL
PLATELET # BLD AUTO: 456 K/UL
PLATELET # BLD AUTO: 461 K/UL
PLATELET # BLD AUTO: 483 K/UL
PLATELET # BLD AUTO: 508 K/UL
PMV BLD AUTO: 10 FL
PMV BLD AUTO: 10.1 FL
PMV BLD AUTO: 10.4 FL
PMV BLD AUTO: 11 FL
PMV BLD AUTO: 8.2 FL
PMV BLD AUTO: 8.4 FL
PMV BLD AUTO: 8.4 FL
PMV BLD AUTO: 8.5 FL
PMV BLD AUTO: 8.5 FL
PMV BLD AUTO: 8.6 FL
PMV BLD AUTO: 8.6 FL
PMV BLD AUTO: 8.7 FL
PMV BLD AUTO: 8.8 FL
PMV BLD AUTO: 8.8 FL
PMV BLD AUTO: 8.9 FL
PMV BLD AUTO: 8.9 FL
PMV BLD AUTO: 9 FL
PMV BLD AUTO: 9 FL
PMV BLD AUTO: 9.1 FL
PMV BLD AUTO: 9.2 FL
PMV BLD AUTO: 9.3 FL
PMV BLD AUTO: 9.4 FL
PMV BLD AUTO: 9.4 FL
PMV BLD AUTO: 9.5 FL
PMV BLD AUTO: 9.5 FL
PMV BLD AUTO: 9.6 FL
PMV BLD AUTO: 9.8 FL
PMV BLD AUTO: 9.8 FL
PO2 BLDA: 101 MMHG (ref 80–100)
PO2 BLDA: 133 MMHG (ref 80–100)
PO2 BLDA: 135 MMHG (ref 80–100)
PO2 BLDA: 144 MMHG (ref 80–100)
PO2 BLDA: 157 MMHG (ref 80–100)
PO2 BLDA: 160 MMHG (ref 80–100)
PO2 BLDA: 162 MMHG (ref 80–100)
PO2 BLDA: 164 MMHG (ref 80–100)
PO2 BLDA: 58 MMHG (ref 80–100)
PO2 BLDA: 81 MMHG (ref 80–100)
PO2 BLDA: 86 MMHG (ref 80–100)
PO2 BLDA: 93 MMHG (ref 80–100)
POC BE: 10 MMOL/L
POC BE: 11 MMOL/L
POC BE: 14 MMOL/L
POC BE: 16 MMOL/L
POC BE: 17 MMOL/L
POC BE: 3 MMOL/L
POC BE: 3 MMOL/L
POC BE: 5 MMOL/L
POC BE: 5 MMOL/L
POC BE: 6 MMOL/L
POC BE: 7 MMOL/L
POC BE: 8 MMOL/L
POC SATURATED O2: 100 % (ref 95–100)
POC SATURATED O2: 91 % (ref 95–100)
POC SATURATED O2: 96 % (ref 95–100)
POC SATURATED O2: 97 % (ref 95–100)
POC SATURATED O2: 98 % (ref 95–100)
POC SATURATED O2: 98 % (ref 95–100)
POC SATURATED O2: 99 % (ref 95–100)
POC TCO2: 28 MMOL/L (ref 23–27)
POC TCO2: 28 MMOL/L (ref 23–27)
POC TCO2: 30 MMOL/L (ref 23–27)
POC TCO2: 32 MMOL/L (ref 23–27)
POC TCO2: 33 MMOL/L (ref 23–27)
POC TCO2: 34 MMOL/L (ref 23–27)
POC TCO2: 35 MMOL/L (ref 23–27)
POC TCO2: 39 MMOL/L (ref 23–27)
POC TCO2: 40 MMOL/L (ref 23–27)
POC TCO2: 43 MMOL/L (ref 23–27)
POCT GLUCOSE: 102 MG/DL (ref 70–110)
POCT GLUCOSE: 113 MG/DL (ref 70–110)
POCT GLUCOSE: 120 MG/DL (ref 70–110)
POCT GLUCOSE: 121 MG/DL (ref 70–110)
POCT GLUCOSE: 122 MG/DL (ref 70–110)
POCT GLUCOSE: 125 MG/DL (ref 70–110)
POCT GLUCOSE: 128 MG/DL (ref 70–110)
POCT GLUCOSE: 130 MG/DL (ref 70–110)
POCT GLUCOSE: 132 MG/DL (ref 70–110)
POCT GLUCOSE: 132 MG/DL (ref 70–110)
POCT GLUCOSE: 135 MG/DL (ref 70–110)
POCT GLUCOSE: 138 MG/DL (ref 70–110)
POCT GLUCOSE: 143 MG/DL (ref 70–110)
POCT GLUCOSE: 149 MG/DL (ref 70–110)
POCT GLUCOSE: 160 MG/DL (ref 70–110)
POCT GLUCOSE: 162 MG/DL (ref 70–110)
POCT GLUCOSE: 162 MG/DL (ref 70–110)
POCT GLUCOSE: 165 MG/DL (ref 70–110)
POCT GLUCOSE: 169 MG/DL (ref 70–110)
POCT GLUCOSE: 169 MG/DL (ref 70–110)
POCT GLUCOSE: 171 MG/DL (ref 70–110)
POCT GLUCOSE: 172 MG/DL (ref 70–110)
POCT GLUCOSE: 173 MG/DL (ref 70–110)
POCT GLUCOSE: 175 MG/DL (ref 70–110)
POCT GLUCOSE: 175 MG/DL (ref 70–110)
POCT GLUCOSE: 179 MG/DL (ref 70–110)
POCT GLUCOSE: 180 MG/DL (ref 70–110)
POCT GLUCOSE: 182 MG/DL (ref 70–110)
POCT GLUCOSE: 183 MG/DL (ref 70–110)
POCT GLUCOSE: 186 MG/DL (ref 70–110)
POCT GLUCOSE: 188 MG/DL (ref 70–110)
POCT GLUCOSE: 188 MG/DL (ref 70–110)
POCT GLUCOSE: 189 MG/DL (ref 70–110)
POCT GLUCOSE: 190 MG/DL (ref 70–110)
POCT GLUCOSE: 191 MG/DL (ref 70–110)
POCT GLUCOSE: 194 MG/DL (ref 70–110)
POCT GLUCOSE: 195 MG/DL (ref 70–110)
POCT GLUCOSE: 198 MG/DL (ref 70–110)
POCT GLUCOSE: 199 MG/DL (ref 70–110)
POCT GLUCOSE: 200 MG/DL (ref 70–110)
POCT GLUCOSE: 202 MG/DL (ref 70–110)
POCT GLUCOSE: 202 MG/DL (ref 70–110)
POCT GLUCOSE: 203 MG/DL (ref 70–110)
POCT GLUCOSE: 203 MG/DL (ref 70–110)
POCT GLUCOSE: 204 MG/DL (ref 70–110)
POCT GLUCOSE: 204 MG/DL (ref 70–110)
POCT GLUCOSE: 205 MG/DL (ref 70–110)
POCT GLUCOSE: 205 MG/DL (ref 70–110)
POCT GLUCOSE: 208 MG/DL (ref 70–110)
POCT GLUCOSE: 209 MG/DL (ref 70–110)
POCT GLUCOSE: 211 MG/DL (ref 70–110)
POCT GLUCOSE: 214 MG/DL (ref 70–110)
POCT GLUCOSE: 215 MG/DL (ref 70–110)
POCT GLUCOSE: 217 MG/DL (ref 70–110)
POCT GLUCOSE: 218 MG/DL (ref 70–110)
POCT GLUCOSE: 220 MG/DL (ref 70–110)
POCT GLUCOSE: 221 MG/DL (ref 70–110)
POCT GLUCOSE: 223 MG/DL (ref 70–110)
POCT GLUCOSE: 224 MG/DL (ref 70–110)
POCT GLUCOSE: 227 MG/DL (ref 70–110)
POCT GLUCOSE: 228 MG/DL (ref 70–110)
POCT GLUCOSE: 231 MG/DL (ref 70–110)
POCT GLUCOSE: 232 MG/DL (ref 70–110)
POCT GLUCOSE: 232 MG/DL (ref 70–110)
POCT GLUCOSE: 233 MG/DL (ref 70–110)
POCT GLUCOSE: 235 MG/DL (ref 70–110)
POCT GLUCOSE: 238 MG/DL (ref 70–110)
POCT GLUCOSE: 238 MG/DL (ref 70–110)
POCT GLUCOSE: 239 MG/DL (ref 70–110)
POCT GLUCOSE: 239 MG/DL (ref 70–110)
POCT GLUCOSE: 240 MG/DL (ref 70–110)
POCT GLUCOSE: 243 MG/DL (ref 70–110)
POCT GLUCOSE: 243 MG/DL (ref 70–110)
POCT GLUCOSE: 245 MG/DL (ref 70–110)
POCT GLUCOSE: 246 MG/DL (ref 70–110)
POCT GLUCOSE: 248 MG/DL (ref 70–110)
POCT GLUCOSE: 251 MG/DL (ref 70–110)
POCT GLUCOSE: 252 MG/DL (ref 70–110)
POCT GLUCOSE: 252 MG/DL (ref 70–110)
POCT GLUCOSE: 253 MG/DL (ref 70–110)
POCT GLUCOSE: 254 MG/DL (ref 70–110)
POCT GLUCOSE: 255 MG/DL (ref 70–110)
POCT GLUCOSE: 255 MG/DL (ref 70–110)
POCT GLUCOSE: 257 MG/DL (ref 70–110)
POCT GLUCOSE: 257 MG/DL (ref 70–110)
POCT GLUCOSE: 258 MG/DL (ref 70–110)
POCT GLUCOSE: 258 MG/DL (ref 70–110)
POCT GLUCOSE: 259 MG/DL (ref 70–110)
POCT GLUCOSE: 259 MG/DL (ref 70–110)
POCT GLUCOSE: 260 MG/DL (ref 70–110)
POCT GLUCOSE: 271 MG/DL (ref 70–110)
POCT GLUCOSE: 272 MG/DL (ref 70–110)
POCT GLUCOSE: 273 MG/DL (ref 70–110)
POCT GLUCOSE: 273 MG/DL (ref 70–110)
POCT GLUCOSE: 275 MG/DL (ref 70–110)
POCT GLUCOSE: 276 MG/DL (ref 70–110)
POCT GLUCOSE: 276 MG/DL (ref 70–110)
POCT GLUCOSE: 283 MG/DL (ref 70–110)
POCT GLUCOSE: 296 MG/DL (ref 70–110)
POCT GLUCOSE: 300 MG/DL (ref 70–110)
POCT GLUCOSE: 308 MG/DL (ref 70–110)
POCT GLUCOSE: 309 MG/DL (ref 70–110)
POCT GLUCOSE: 323 MG/DL (ref 70–110)
POCT GLUCOSE: 329 MG/DL (ref 70–110)
POCT GLUCOSE: 339 MG/DL (ref 70–110)
POCT GLUCOSE: 366 MG/DL (ref 70–110)
POCT GLUCOSE: 98 MG/DL (ref 70–110)
POTASSIUM SERPL-SCNC: 3.4 MMOL/L
POTASSIUM SERPL-SCNC: 3.7 MMOL/L
POTASSIUM SERPL-SCNC: 3.8 MMOL/L
POTASSIUM SERPL-SCNC: 3.9 MMOL/L
POTASSIUM SERPL-SCNC: 4 MMOL/L
POTASSIUM SERPL-SCNC: 4.1 MMOL/L
POTASSIUM SERPL-SCNC: 4.2 MMOL/L
POTASSIUM SERPL-SCNC: 4.3 MMOL/L
POTASSIUM SERPL-SCNC: 4.4 MMOL/L
POTASSIUM SERPL-SCNC: 4.6 MMOL/L
POTASSIUM SERPL-SCNC: 5 MMOL/L
PROCALCITONIN SERPL IA-MCNC: 0.08 NG/ML
PROCALCITONIN SERPL IA-MCNC: 3.03 NG/ML
PROCALCITONIN SERPL IA-MCNC: 3.03 NG/ML
PROT SERPL-MCNC: 5.8 G/DL
PROT SERPL-MCNC: 5.9 G/DL
PROT SERPL-MCNC: 6 G/DL
PROT SERPL-MCNC: 6.1 G/DL
PROT SERPL-MCNC: 6.1 G/DL
PROT SERPL-MCNC: 6.2 G/DL
PROT SERPL-MCNC: 6.3 G/DL
PROT SERPL-MCNC: 6.3 G/DL
PROT SERPL-MCNC: 6.4 G/DL
PROT SERPL-MCNC: 6.5 G/DL
PROT SERPL-MCNC: 6.5 G/DL
PROT SERPL-MCNC: 6.6 G/DL
PROT SERPL-MCNC: 6.6 G/DL
PROT SERPL-MCNC: 6.7 G/DL
PROT SERPL-MCNC: 6.7 G/DL
PROT SERPL-MCNC: 6.8 G/DL
PROT SERPL-MCNC: 6.8 G/DL
PROT SERPL-MCNC: 6.9 G/DL
PROT SERPL-MCNC: 6.9 G/DL
PROT SERPL-MCNC: 7 G/DL
PROT SERPL-MCNC: 7 G/DL
PROT SERPL-MCNC: 7.1 G/DL
PROT SERPL-MCNC: 7.1 G/DL
PROT SERPL-MCNC: 7.6 G/DL
PROT UR QL STRIP: NEGATIVE
PROTHROMBIN TIME: 10.3 SEC
PROTHROMBIN TIME: 10.5 SEC
PROTHROMBIN TIME: 10.6 SEC
PROTHROMBIN TIME: 11 SEC
PROTHROMBIN TIME: 11 SEC
PROTHROMBIN TIME: 11.2 SEC
PROTHROMBIN TIME: 11.5 SEC
PROTHROMBIN TIME: 11.5 SEC
PROTHROMBIN TIME: 11.8 SEC
PROTHROMBIN TIME: 12.2 SEC
PROTHROMBIN TIME: 12.6 SEC
PROTHROMBIN TIME: 13.9 SEC
PROTHROMBIN TIME: 20.5 SEC
PROTHROMBIN TIME: 21.9 SEC
PROVIDER CREDENTIALS: ABNORMAL
PROVIDER NOTIFIED: ABNORMAL
RBC # BLD AUTO: 3.02 M/UL
RBC # BLD AUTO: 3.02 M/UL
RBC # BLD AUTO: 3.06 M/UL
RBC # BLD AUTO: 3.08 M/UL
RBC # BLD AUTO: 3.28 M/UL
RBC # BLD AUTO: 3.28 M/UL
RBC # BLD AUTO: 3.32 M/UL
RBC # BLD AUTO: 3.34 M/UL
RBC # BLD AUTO: 3.34 M/UL
RBC # BLD AUTO: 3.41 M/UL
RBC # BLD AUTO: 3.43 M/UL
RBC # BLD AUTO: 3.46 M/UL
RBC # BLD AUTO: 3.48 M/UL
RBC # BLD AUTO: 3.52 M/UL
RBC # BLD AUTO: 3.6 M/UL
RBC # BLD AUTO: 3.65 M/UL
RBC # BLD AUTO: 3.66 M/UL
RBC # BLD AUTO: 3.67 M/UL
RBC # BLD AUTO: 3.69 M/UL
RBC # BLD AUTO: 3.69 M/UL
RBC # BLD AUTO: 3.71 M/UL
RBC # BLD AUTO: 3.72 M/UL
RBC # BLD AUTO: 3.77 M/UL
RBC # BLD AUTO: 3.79 M/UL
RBC # BLD AUTO: 3.79 M/UL
RBC # BLD AUTO: 3.82 M/UL
RBC # BLD AUTO: 3.95 M/UL
RBC # BLD AUTO: 3.99 M/UL
RBC # BLD AUTO: 4 M/UL
RBC # BLD AUTO: 4.01 M/UL
RBC #/AREA URNS AUTO: 1 /HPF (ref 0–4)
RBC #/AREA URNS AUTO: 4 /HPF (ref 0–4)
RBC #/AREA URNS AUTO: 6 /HPF (ref 0–4)
RETIRED EF AND QEF - SEE NOTES: 15 (ref 55–65)
SAMPLE: ABNORMAL
SITE: ABNORMAL
SODIUM SERPL-SCNC: 128 MMOL/L
SODIUM SERPL-SCNC: 129 MMOL/L
SODIUM SERPL-SCNC: 130 MMOL/L
SODIUM SERPL-SCNC: 131 MMOL/L
SODIUM SERPL-SCNC: 132 MMOL/L
SODIUM SERPL-SCNC: 133 MMOL/L
SODIUM SERPL-SCNC: 133 MMOL/L
SODIUM SERPL-SCNC: 134 MMOL/L
SODIUM SERPL-SCNC: 134 MMOL/L
SODIUM SERPL-SCNC: 135 MMOL/L
SODIUM SERPL-SCNC: 136 MMOL/L
SODIUM SERPL-SCNC: 136 MMOL/L
SODIUM SERPL-SCNC: 138 MMOL/L
SODIUM SERPL-SCNC: 141 MMOL/L
SODIUM SERPL-SCNC: 144 MMOL/L
SODIUM SERPL-SCNC: 148 MMOL/L
SODIUM SERPL-SCNC: 151 MMOL/L
SODIUM SERPL-SCNC: 152 MMOL/L
SODIUM SERPL-SCNC: 152 MMOL/L
SODIUM SERPL-SCNC: 153 MMOL/L
SODIUM SERPL-SCNC: 154 MMOL/L
SODIUM SERPL-SCNC: 155 MMOL/L
SODIUM SERPL-SCNC: 156 MMOL/L
SODIUM SERPL-SCNC: 157 MMOL/L
SODIUM SERPL-SCNC: 158 MMOL/L
SODIUM SERPL-SCNC: 160 MMOL/L
SODIUM SERPL-SCNC: 161 MMOL/L
SODIUM SERPL-SCNC: 162 MMOL/L
SODIUM UR-SCNC: 104 MMOL/L
SODIUM UR-SCNC: 36 MMOL/L
SODIUM UR-SCNC: 97 MMOL/L
SP GR UR STRIP: 1 (ref 1–1.03)
SP GR UR STRIP: 1.02 (ref 1–1.03)
SP02: 100
SP02: 100
SP02: 92
SP02: 99
SQUAMOUS #/AREA URNS AUTO: 0 /HPF
T4 FREE SERPL-MCNC: 0.75 NG/DL
TB INDURATION 48 - 72 HR READ: 0 MM
TIME NOTIFIED: 1001
TRIGL SERPL-MCNC: 74 MG/DL
TROPONIN I SERPL DL<=0.01 NG/ML-MCNC: 0.02 NG/ML
TROPONIN I SERPL DL<=0.01 NG/ML-MCNC: 0.02 NG/ML
TROPONIN I SERPL DL<=0.01 NG/ML-MCNC: 0.03 NG/ML
TROPONIN I SERPL DL<=0.01 NG/ML-MCNC: 0.03 NG/ML
TROPONIN I SERPL DL<=0.01 NG/ML-MCNC: 0.04 NG/ML
TSH SERPL DL<=0.005 MIU/L-ACNC: 0.96 UIU/ML
URN SPEC COLLECT METH UR: ABNORMAL
URN SPEC COLLECT METH UR: NORMAL
UROBILINOGEN UR STRIP-ACNC: NEGATIVE EU/DL
UUN UR-MCNC: >1200 MG/DL
VANCOMYCIN TROUGH SERPL-MCNC: 18.9 UG/ML
VT: 450
VT: 500
WBC # BLD AUTO: 11.42 K/UL
WBC # BLD AUTO: 6.28 K/UL
WBC # BLD AUTO: 6.54 K/UL
WBC # BLD AUTO: 6.62 K/UL
WBC # BLD AUTO: 6.69 K/UL
WBC # BLD AUTO: 6.87 K/UL
WBC # BLD AUTO: 7.12 K/UL
WBC # BLD AUTO: 7.24 K/UL
WBC # BLD AUTO: 7.26 K/UL
WBC # BLD AUTO: 7.28 K/UL
WBC # BLD AUTO: 7.32 K/UL
WBC # BLD AUTO: 7.47 K/UL
WBC # BLD AUTO: 7.61 K/UL
WBC # BLD AUTO: 7.91 K/UL
WBC # BLD AUTO: 8 K/UL
WBC # BLD AUTO: 8.01 K/UL
WBC # BLD AUTO: 8.03 K/UL
WBC # BLD AUTO: 8.03 K/UL
WBC # BLD AUTO: 8.05 K/UL
WBC # BLD AUTO: 8.27 K/UL
WBC # BLD AUTO: 8.36 K/UL
WBC # BLD AUTO: 8.36 K/UL
WBC # BLD AUTO: 8.6 K/UL
WBC # BLD AUTO: 8.79 K/UL
WBC # BLD AUTO: 8.89 K/UL
WBC # BLD AUTO: 9.02 K/UL
WBC # BLD AUTO: 9.08 K/UL
WBC # BLD AUTO: 9.17 K/UL
WBC # BLD AUTO: 9.36 K/UL
WBC # BLD AUTO: 9.83 K/UL
WBC #/AREA URNS AUTO: 1 /HPF (ref 0–5)
WBC #/AREA URNS AUTO: 4 /HPF (ref 0–5)
WBC #/AREA URNS AUTO: >100 /HPF (ref 0–5)
WBC CLUMPS UR QL AUTO: ABNORMAL
YEAST UR QL AUTO: NORMAL

## 2018-01-01 PROCEDURE — 94640 AIRWAY INHALATION TREATMENT: CPT

## 2018-01-01 PROCEDURE — A4216 STERILE WATER/SALINE, 10 ML: HCPCS | Performed by: NURSE PRACTITIONER

## 2018-01-01 PROCEDURE — 85025 COMPLETE CBC W/AUTO DIFF WBC: CPT

## 2018-01-01 PROCEDURE — 84484 ASSAY OF TROPONIN QUANT: CPT

## 2018-01-01 PROCEDURE — 97112 NEUROMUSCULAR REEDUCATION: CPT

## 2018-01-01 PROCEDURE — 92507 TX SP LANG VOICE COMM INDIV: CPT

## 2018-01-01 PROCEDURE — C9132 KCENTRA, PER I.U.: HCPCS | Mod: JG | Performed by: NURSE PRACTITIONER

## 2018-01-01 PROCEDURE — 99238 HOSP IP/OBS DSCHRG MGMT 30/<: CPT | Mod: ,,, | Performed by: NURSE PRACTITIONER

## 2018-01-01 PROCEDURE — 80053 COMPREHEN METABOLIC PANEL: CPT

## 2018-01-01 PROCEDURE — 25000003 PHARM REV CODE 250: Performed by: NURSE PRACTITIONER

## 2018-01-01 PROCEDURE — 99900035 HC TECH TIME PER 15 MIN (STAT)

## 2018-01-01 PROCEDURE — S0077 INJECTION, CLINDAMYCIN PHOSP: HCPCS | Performed by: NURSE PRACTITIONER

## 2018-01-01 PROCEDURE — 36415 COLL VENOUS BLD VENIPUNCTURE: CPT

## 2018-01-01 PROCEDURE — 25000003 PHARM REV CODE 250: Performed by: INTERNAL MEDICINE

## 2018-01-01 PROCEDURE — 94761 N-INVAS EAR/PLS OXIMETRY MLT: CPT

## 2018-01-01 PROCEDURE — 20000000 HC ICU ROOM

## 2018-01-01 PROCEDURE — 20600001 HC STEP DOWN PRIVATE ROOM

## 2018-01-01 PROCEDURE — 92610 EVALUATE SWALLOWING FUNCTION: CPT

## 2018-01-01 PROCEDURE — 25000242 PHARM REV CODE 250 ALT 637 W/ HCPCS: Performed by: NURSE PRACTITIONER

## 2018-01-01 PROCEDURE — 63600175 PHARM REV CODE 636 W HCPCS: Performed by: NURSE PRACTITIONER

## 2018-01-01 PROCEDURE — 25000003 PHARM REV CODE 250: Performed by: PHYSICIAN ASSISTANT

## 2018-01-01 PROCEDURE — 25000003 PHARM REV CODE 250

## 2018-01-01 PROCEDURE — 83036 HEMOGLOBIN GLYCOSYLATED A1C: CPT

## 2018-01-01 PROCEDURE — 83735 ASSAY OF MAGNESIUM: CPT

## 2018-01-01 PROCEDURE — 94760 N-INVAS EAR/PLS OXIMETRY 1: CPT

## 2018-01-01 PROCEDURE — 63600175 PHARM REV CODE 636 W HCPCS: Performed by: RADIOLOGY

## 2018-01-01 PROCEDURE — 63600175 PHARM REV CODE 636 W HCPCS: Performed by: PSYCHIATRY & NEUROLOGY

## 2018-01-01 PROCEDURE — 85610 PROTHROMBIN TIME: CPT

## 2018-01-01 PROCEDURE — 84295 ASSAY OF SERUM SODIUM: CPT

## 2018-01-01 PROCEDURE — 27000221 HC OXYGEN, UP TO 24 HOURS

## 2018-01-01 PROCEDURE — 25000003 PHARM REV CODE 250: Performed by: PSYCHIATRY & NEUROLOGY

## 2018-01-01 PROCEDURE — 85610 PROTHROMBIN TIME: CPT | Mod: 91

## 2018-01-01 PROCEDURE — 81001 URINALYSIS AUTO W/SCOPE: CPT

## 2018-01-01 PROCEDURE — 93306 TTE W/DOPPLER COMPLETE: CPT | Mod: 26,,, | Performed by: INTERNAL MEDICINE

## 2018-01-01 PROCEDURE — 36600 WITHDRAWAL OF ARTERIAL BLOOD: CPT

## 2018-01-01 PROCEDURE — 97110 THERAPEUTIC EXERCISES: CPT

## 2018-01-01 PROCEDURE — 37799 UNLISTED PX VASCULAR SURGERY: CPT

## 2018-01-01 PROCEDURE — 94003 VENT MGMT INPAT SUBQ DAY: CPT

## 2018-01-01 PROCEDURE — G8997 SWALLOW GOAL STATUS: HCPCS | Mod: CM

## 2018-01-01 PROCEDURE — 94664 DEMO&/EVAL PT USE INHALER: CPT

## 2018-01-01 PROCEDURE — 99233 SBSQ HOSP IP/OBS HIGH 50: CPT | Mod: GC,,, | Performed by: PSYCHIATRY & NEUROLOGY

## 2018-01-01 PROCEDURE — 82553 CREATINE MB FRACTION: CPT

## 2018-01-01 PROCEDURE — 99232 SBSQ HOSP IP/OBS MODERATE 35: CPT | Mod: GC,,, | Performed by: INTERNAL MEDICINE

## 2018-01-01 PROCEDURE — 93005 ELECTROCARDIOGRAM TRACING: CPT

## 2018-01-01 PROCEDURE — 99233 SBSQ HOSP IP/OBS HIGH 50: CPT | Mod: ,,, | Performed by: HOSPITALIST

## 2018-01-01 PROCEDURE — 99233 SBSQ HOSP IP/OBS HIGH 50: CPT | Mod: ,,, | Performed by: PSYCHIATRY & NEUROLOGY

## 2018-01-01 PROCEDURE — 84100 ASSAY OF PHOSPHORUS: CPT

## 2018-01-01 PROCEDURE — 82803 BLOOD GASES ANY COMBINATION: CPT

## 2018-01-01 PROCEDURE — 97530 THERAPEUTIC ACTIVITIES: CPT

## 2018-01-01 PROCEDURE — 99291 CRITICAL CARE FIRST HOUR: CPT | Mod: GC,,, | Performed by: PSYCHIATRY & NEUROLOGY

## 2018-01-01 PROCEDURE — 84132 ASSAY OF SERUM POTASSIUM: CPT

## 2018-01-01 PROCEDURE — 94668 MNPJ CHEST WALL SBSQ: CPT

## 2018-01-01 PROCEDURE — 99291 CRITICAL CARE FIRST HOUR: CPT | Mod: ,,, | Performed by: PSYCHIATRY & NEUROLOGY

## 2018-01-01 PROCEDURE — 85520 HEPARIN ASSAY: CPT

## 2018-01-01 PROCEDURE — 27100171 HC OXYGEN HIGH FLOW UP TO 24 HOURS

## 2018-01-01 PROCEDURE — 63600175 PHARM REV CODE 636 W HCPCS: Performed by: PHYSICIAN ASSISTANT

## 2018-01-01 PROCEDURE — 99232 SBSQ HOSP IP/OBS MODERATE 35: CPT | Mod: ,,, | Performed by: NURSE PRACTITIONER

## 2018-01-01 PROCEDURE — 97162 PT EVAL MOD COMPLEX 30 MIN: CPT

## 2018-01-01 PROCEDURE — 99900026 HC AIRWAY MAINTENANCE (STAT)

## 2018-01-01 PROCEDURE — 0D20XUZ CHANGE FEEDING DEVICE IN UPPER INTESTINAL TRACT, EXTERNAL APPROACH: ICD-10-PCS | Performed by: RADIOLOGY

## 2018-01-01 PROCEDURE — 87205 SMEAR GRAM STAIN: CPT

## 2018-01-01 PROCEDURE — 97167 OT EVAL HIGH COMPLEX 60 MIN: CPT

## 2018-01-01 PROCEDURE — 99233 SBSQ HOSP IP/OBS HIGH 50: CPT | Mod: ,,, | Performed by: NURSE PRACTITIONER

## 2018-01-01 PROCEDURE — 95813 EEG EXTND MNTR 61-119 MIN: CPT | Mod: 26,,, | Performed by: PSYCHIATRY & NEUROLOGY

## 2018-01-01 PROCEDURE — 99223 1ST HOSP IP/OBS HIGH 75: CPT | Mod: GC,,, | Performed by: NEUROLOGICAL SURGERY

## 2018-01-01 PROCEDURE — 63600175 PHARM REV CODE 636 W HCPCS: Performed by: INTERNAL MEDICINE

## 2018-01-01 PROCEDURE — 99239 HOSP IP/OBS DSCHRG MGMT >30: CPT | Mod: ,,, | Performed by: PSYCHIATRY & NEUROLOGY

## 2018-01-01 PROCEDURE — 27200966 HC CLOSED SUCTION SYSTEM

## 2018-01-01 PROCEDURE — 99232 SBSQ HOSP IP/OBS MODERATE 35: CPT | Mod: ,,, | Performed by: PSYCHIATRY & NEUROLOGY

## 2018-01-01 PROCEDURE — 87040 BLOOD CULTURE FOR BACTERIA: CPT

## 2018-01-01 PROCEDURE — 36620 INSERTION CATHETER ARTERY: CPT | Mod: ,,, | Performed by: NURSE PRACTITIONER

## 2018-01-01 PROCEDURE — 63600175 PHARM REV CODE 636 W HCPCS: Performed by: STUDENT IN AN ORGANIZED HEALTH CARE EDUCATION/TRAINING PROGRAM

## 2018-01-01 PROCEDURE — 85520 HEPARIN ASSAY: CPT | Mod: 91

## 2018-01-01 PROCEDURE — 84300 ASSAY OF URINE SODIUM: CPT

## 2018-01-01 PROCEDURE — 99222 1ST HOSP IP/OBS MODERATE 55: CPT | Mod: ,,, | Performed by: NURSE PRACTITIONER

## 2018-01-01 PROCEDURE — G8989 SELF CARE D/C STATUS: HCPCS | Mod: CN

## 2018-01-01 PROCEDURE — 99232 SBSQ HOSP IP/OBS MODERATE 35: CPT | Mod: GC,,, | Performed by: NEUROLOGICAL SURGERY

## 2018-01-01 PROCEDURE — 84100 ASSAY OF PHOSPHORUS: CPT | Mod: 91

## 2018-01-01 PROCEDURE — A4217 STERILE WATER/SALINE, 500 ML: HCPCS | Performed by: NURSE PRACTITIONER

## 2018-01-01 PROCEDURE — 25500020 PHARM REV CODE 255: Performed by: PSYCHIATRY & NEUROLOGY

## 2018-01-01 PROCEDURE — 63600175 PHARM REV CODE 636 W HCPCS

## 2018-01-01 PROCEDURE — G8988 SELF CARE GOAL STATUS: HCPCS | Mod: CM

## 2018-01-01 PROCEDURE — 87086 URINE CULTURE/COLONY COUNT: CPT

## 2018-01-01 PROCEDURE — 85730 THROMBOPLASTIN TIME PARTIAL: CPT

## 2018-01-01 PROCEDURE — G8996 SWALLOW CURRENT STATUS: HCPCS | Mod: CN

## 2018-01-01 PROCEDURE — C8929 TTE W OR WO FOL WCON,DOPPLER: HCPCS

## 2018-01-01 PROCEDURE — 86580 TB INTRADERMAL TEST: CPT | Performed by: PSYCHIATRY & NEUROLOGY

## 2018-01-01 PROCEDURE — 94002 VENT MGMT INPAT INIT DAY: CPT

## 2018-01-01 PROCEDURE — 83935 ASSAY OF URINE OSMOLALITY: CPT

## 2018-01-01 PROCEDURE — 92526 ORAL FUNCTION THERAPY: CPT

## 2018-01-01 PROCEDURE — 99291 CRITICAL CARE FIRST HOUR: CPT | Mod: 25,,, | Performed by: NURSE PRACTITIONER

## 2018-01-01 PROCEDURE — 87040 BLOOD CULTURE FOR BACTERIA: CPT | Mod: 59

## 2018-01-01 PROCEDURE — 81003 URINALYSIS AUTO W/O SCOPE: CPT

## 2018-01-01 PROCEDURE — 99223 1ST HOSP IP/OBS HIGH 75: CPT | Mod: AI,,, | Performed by: PSYCHIATRY & NEUROLOGY

## 2018-01-01 PROCEDURE — 63600531 PHARM REV CODE 636 NO ALT 250 W HCPCS: Mod: JG | Performed by: NURSE PRACTITIONER

## 2018-01-01 PROCEDURE — 93010 ELECTROCARDIOGRAM REPORT: CPT | Mod: ,,, | Performed by: INTERNAL MEDICINE

## 2018-01-01 PROCEDURE — 27000646 HC AEROBIKA DEVICE

## 2018-01-01 PROCEDURE — 84295 ASSAY OF SERUM SODIUM: CPT | Mod: 91

## 2018-01-01 PROCEDURE — 99291 CRITICAL CARE FIRST HOUR: CPT | Mod: ,,, | Performed by: NURSE PRACTITIONER

## 2018-01-01 PROCEDURE — 99292 CRITICAL CARE ADDL 30 MIN: CPT | Mod: ,,, | Performed by: PSYCHIATRY & NEUROLOGY

## 2018-01-01 PROCEDURE — 83880 ASSAY OF NATRIURETIC PEPTIDE: CPT

## 2018-01-01 PROCEDURE — 80202 ASSAY OF VANCOMYCIN: CPT

## 2018-01-01 PROCEDURE — 82570 ASSAY OF URINE CREATININE: CPT

## 2018-01-01 PROCEDURE — 5A1955Z RESPIRATORY VENTILATION, GREATER THAN 96 CONSECUTIVE HOURS: ICD-10-PCS | Performed by: NEUROLOGICAL SURGERY

## 2018-01-01 PROCEDURE — 80053 COMPREHEN METABOLIC PANEL: CPT | Mod: 91

## 2018-01-01 PROCEDURE — 36620 INSERTION CATHETER ARTERY: CPT

## 2018-01-01 PROCEDURE — 97803 MED NUTRITION INDIV SUBSEQ: CPT | Performed by: DIETITIAN, REGISTERED

## 2018-01-01 PROCEDURE — 36556 INSERT NON-TUNNEL CV CATH: CPT

## 2018-01-01 PROCEDURE — 0DH63UZ INSERTION OF FEEDING DEVICE INTO STOMACH, PERCUTANEOUS APPROACH: ICD-10-PCS | Performed by: RADIOLOGY

## 2018-01-01 PROCEDURE — 02HV33Z INSERTION OF INFUSION DEVICE INTO SUPERIOR VENA CAVA, PERCUTANEOUS APPROACH: ICD-10-PCS | Performed by: NEUROLOGICAL SURGERY

## 2018-01-01 PROCEDURE — 87070 CULTURE OTHR SPECIMN AEROBIC: CPT

## 2018-01-01 PROCEDURE — 83735 ASSAY OF MAGNESIUM: CPT | Mod: 91

## 2018-01-01 PROCEDURE — 84540 ASSAY OF URINE/UREA-N: CPT

## 2018-01-01 PROCEDURE — 86901 BLOOD TYPING SEROLOGIC RH(D): CPT

## 2018-01-01 PROCEDURE — 97802 MEDICAL NUTRITION INDIV IN: CPT

## 2018-01-01 PROCEDURE — G8987 SELF CARE CURRENT STATUS: HCPCS | Mod: CN

## 2018-01-01 PROCEDURE — 84439 ASSAY OF FREE THYROXINE: CPT

## 2018-01-01 PROCEDURE — 84484 ASSAY OF TROPONIN QUANT: CPT | Mod: 91

## 2018-01-01 PROCEDURE — 82533 TOTAL CORTISOL: CPT

## 2018-01-01 PROCEDURE — 84443 ASSAY THYROID STIM HORMONE: CPT

## 2018-01-01 PROCEDURE — 97166 OT EVAL MOD COMPLEX 45 MIN: CPT

## 2018-01-01 PROCEDURE — 97535 SELF CARE MNGMENT TRAINING: CPT

## 2018-01-01 PROCEDURE — 92523 SPEECH SOUND LANG COMPREHEN: CPT

## 2018-01-01 PROCEDURE — 95813 EEG EXTND MNTR 61-119 MIN: CPT

## 2018-01-01 PROCEDURE — 84145 PROCALCITONIN (PCT): CPT

## 2018-01-01 PROCEDURE — 85025 COMPLETE CBC W/AUTO DIFF WBC: CPT | Mod: 91

## 2018-01-01 PROCEDURE — 25000003 PHARM REV CODE 250: Performed by: STUDENT IN AN ORGANIZED HEALTH CARE EDUCATION/TRAINING PROGRAM

## 2018-01-01 PROCEDURE — 80061 LIPID PANEL: CPT

## 2018-01-01 PROCEDURE — 82550 ASSAY OF CK (CPK): CPT

## 2018-01-01 PROCEDURE — 99222 1ST HOSP IP/OBS MODERATE 55: CPT | Mod: ,,, | Performed by: PSYCHIATRY & NEUROLOGY

## 2018-01-01 PROCEDURE — 99223 1ST HOSP IP/OBS HIGH 75: CPT | Mod: GC,,, | Performed by: INTERNAL MEDICINE

## 2018-01-01 PROCEDURE — 99223 1ST HOSP IP/OBS HIGH 75: CPT | Mod: ,,, | Performed by: PSYCHIATRY & NEUROLOGY

## 2018-01-01 PROCEDURE — 97803 MED NUTRITION INDIV SUBSEQ: CPT

## 2018-01-01 RX ORDER — HALOPERIDOL 5 MG/ML
2 INJECTION INTRAMUSCULAR EVERY 6 HOURS PRN
Status: DISCONTINUED | OUTPATIENT
Start: 2018-01-01 | End: 2018-01-01 | Stop reason: HOSPADM

## 2018-01-01 RX ORDER — MICONAZOLE NITRATE 2 %
POWDER (GRAM) TOPICAL 2 TIMES DAILY
Status: DISCONTINUED | OUTPATIENT
Start: 2018-01-01 | End: 2018-01-01

## 2018-01-01 RX ORDER — NOREPINEPHRINE BITARTRATE/D5W 4MG/250ML
0.02 PLASTIC BAG, INJECTION (ML) INTRAVENOUS CONTINUOUS
Status: DISCONTINUED | OUTPATIENT
Start: 2018-01-01 | End: 2018-01-01

## 2018-01-01 RX ORDER — ACETAMINOPHEN 325 MG/1
650 TABLET ORAL EVERY 6 HOURS PRN
Status: DISCONTINUED | OUTPATIENT
Start: 2018-01-01 | End: 2018-01-01 | Stop reason: HOSPADM

## 2018-01-01 RX ORDER — LOSARTAN POTASSIUM 50 MG/1
50 TABLET ORAL DAILY
Status: DISCONTINUED | OUTPATIENT
Start: 2018-01-01 | End: 2018-01-01

## 2018-01-01 RX ORDER — 3% SODIUM CHLORIDE 3 G/100ML
50 INJECTION, SOLUTION INTRAVENOUS CONTINUOUS
Status: DISCONTINUED | OUTPATIENT
Start: 2018-01-01 | End: 2018-01-01

## 2018-01-01 RX ORDER — CEFAZOLIN SODIUM 1 G/50ML
SOLUTION INTRAVENOUS
Status: COMPLETED | OUTPATIENT
Start: 2018-01-01 | End: 2018-01-01

## 2018-01-01 RX ORDER — RAMELTEON 8 MG/1
8 TABLET ORAL NIGHTLY PRN
Status: DISCONTINUED | OUTPATIENT
Start: 2018-01-01 | End: 2018-01-01

## 2018-01-01 RX ORDER — CARVEDILOL 12.5 MG/1
12.5 TABLET ORAL 2 TIMES DAILY WITH MEALS
Status: DISCONTINUED | OUTPATIENT
Start: 2018-01-01 | End: 2018-01-01

## 2018-01-01 RX ORDER — POTASSIUM CHLORIDE 20 MEQ/15ML
40 SOLUTION ORAL
Status: DISCONTINUED | OUTPATIENT
Start: 2018-01-01 | End: 2018-01-01

## 2018-01-01 RX ORDER — GLUCAGON 1 MG
KIT INJECTION CODE/TRAUMA/SEDATION MEDICATION
Status: COMPLETED | OUTPATIENT
Start: 2018-01-01 | End: 2018-01-01

## 2018-01-01 RX ORDER — VANCOMYCIN 2 GRAM/500 ML IN 0.9 % SODIUM CHLORIDE INTRAVENOUS
20
Status: DISCONTINUED | OUTPATIENT
Start: 2018-01-01 | End: 2018-01-01

## 2018-01-01 RX ORDER — VANCOMYCIN HYDROCHLORIDE
15
Status: DISCONTINUED | OUTPATIENT
Start: 2018-01-01 | End: 2018-01-01

## 2018-01-01 RX ORDER — INSULIN ASPART 100 [IU]/ML
5 INJECTION, SOLUTION INTRAVENOUS; SUBCUTANEOUS EVERY 4 HOURS
Status: DISCONTINUED | OUTPATIENT
Start: 2018-01-01 | End: 2018-01-01

## 2018-01-01 RX ORDER — PHENYLEPHRINE HCL IN 0.9% NACL 1 MG/10 ML
SYRINGE (ML) INTRAVENOUS
Status: COMPLETED
Start: 2018-01-01 | End: 2018-01-01

## 2018-01-01 RX ORDER — POLYETHYLENE GLYCOL 3350 17 G/17G
17 POWDER, FOR SOLUTION ORAL DAILY PRN
Status: DISCONTINUED | OUTPATIENT
Start: 2018-01-01 | End: 2018-01-01

## 2018-01-01 RX ORDER — ROSUVASTATIN CALCIUM 20 MG/1
40 TABLET, COATED ORAL NIGHTLY
Status: DISCONTINUED | OUTPATIENT
Start: 2018-01-01 | End: 2018-01-01

## 2018-01-01 RX ORDER — RISPERIDONE 1 MG/ML
0.5 SOLUTION ORAL ONCE
Status: DISCONTINUED | OUTPATIENT
Start: 2018-01-01 | End: 2018-01-01

## 2018-01-01 RX ORDER — LANOLIN ALCOHOL/MO/W.PET/CERES
800 CREAM (GRAM) TOPICAL
Status: DISCONTINUED | OUTPATIENT
Start: 2018-01-01 | End: 2018-01-01

## 2018-01-01 RX ORDER — MIDODRINE HYDROCHLORIDE 5 MG/1
10 TABLET ORAL EVERY 6 HOURS
Status: DISCONTINUED | OUTPATIENT
Start: 2018-01-01 | End: 2018-01-01

## 2018-01-01 RX ORDER — GLYCERIN 1 G/1
1 SUPPOSITORY RECTAL DAILY
Status: DISCONTINUED | OUTPATIENT
Start: 2018-01-01 | End: 2018-01-01

## 2018-01-01 RX ORDER — SODIUM,POTASSIUM PHOSPHATES 280-250MG
2 POWDER IN PACKET (EA) ORAL
Status: DISCONTINUED | OUTPATIENT
Start: 2018-01-01 | End: 2018-01-01

## 2018-01-01 RX ORDER — HYDROCODONE BITARTRATE AND ACETAMINOPHEN 5; 325 MG/1; MG/1
1 TABLET ORAL EVERY 4 HOURS PRN
Status: DISCONTINUED | OUTPATIENT
Start: 2018-01-01 | End: 2018-01-01

## 2018-01-01 RX ORDER — CEFTRIAXONE 1 G/1
1 INJECTION, POWDER, FOR SOLUTION INTRAMUSCULAR; INTRAVENOUS
Status: DISCONTINUED | OUTPATIENT
Start: 2018-01-01 | End: 2018-01-01

## 2018-01-01 RX ORDER — FENTANYL CITRATE 50 UG/ML
INJECTION, SOLUTION INTRAMUSCULAR; INTRAVENOUS
Status: COMPLETED
Start: 2018-01-01 | End: 2018-01-01

## 2018-01-01 RX ORDER — FUROSEMIDE 40 MG/1
40 TABLET ORAL DAILY
Status: DISCONTINUED | OUTPATIENT
Start: 2018-01-01 | End: 2018-01-01

## 2018-01-01 RX ORDER — CITALOPRAM 10 MG/1
10 TABLET ORAL DAILY
Status: DISCONTINUED | OUTPATIENT
Start: 2018-01-01 | End: 2018-01-01 | Stop reason: HOSPADM

## 2018-01-01 RX ORDER — ACETAMINOPHEN 10 MG/ML
1000 INJECTION, SOLUTION INTRAVENOUS ONCE
Status: COMPLETED | OUTPATIENT
Start: 2018-01-01 | End: 2018-01-01

## 2018-01-01 RX ORDER — MODAFINIL 100 MG/1
100 TABLET ORAL DAILY
Status: DISCONTINUED | OUTPATIENT
Start: 2018-01-01 | End: 2018-01-01

## 2018-01-01 RX ORDER — ACETAMINOPHEN 650 MG/1
650 SUPPOSITORY RECTAL ONCE
Status: COMPLETED | OUTPATIENT
Start: 2018-01-01 | End: 2018-01-01

## 2018-01-01 RX ORDER — RISPERIDONE 1 MG/ML
0.5 SOLUTION ORAL NIGHTLY PRN
Qty: 30 ML | Refills: 11
Start: 2018-01-01 | End: 2019-06-26

## 2018-01-01 RX ORDER — RISPERIDONE 1 MG/ML
1 SOLUTION ORAL NIGHTLY
Qty: 30 ML | Refills: 11
Start: 2018-01-01 | End: 2018-01-01

## 2018-01-01 RX ORDER — LABETALOL HYDROCHLORIDE 5 MG/ML
10 INJECTION, SOLUTION INTRAVENOUS EVERY 4 HOURS PRN
Status: DISCONTINUED | OUTPATIENT
Start: 2018-01-01 | End: 2018-01-01

## 2018-01-01 RX ORDER — TIOTROPIUM BROMIDE 18 UG/1
1 CAPSULE ORAL; RESPIRATORY (INHALATION) DAILY
Status: DISCONTINUED | OUTPATIENT
Start: 2018-01-01 | End: 2018-01-01

## 2018-01-01 RX ORDER — MANNITOL 250 MG/ML
100 INJECTION, SOLUTION INTRAVENOUS ONCE
Status: COMPLETED | OUTPATIENT
Start: 2018-01-01 | End: 2018-01-01

## 2018-01-01 RX ORDER — NAPROXEN SODIUM 220 MG/1
81 TABLET, FILM COATED ORAL DAILY
Status: DISCONTINUED | OUTPATIENT
Start: 2018-01-01 | End: 2018-01-01

## 2018-01-01 RX ORDER — NAPROXEN SODIUM 220 MG/1
81 TABLET, FILM COATED ORAL DAILY
Refills: 0
Start: 2018-01-01

## 2018-01-01 RX ORDER — DOPAMINE HYDROCHLORIDE 160 MG/100ML
5 INJECTION, SOLUTION INTRAVENOUS CONTINUOUS
Status: DISCONTINUED | OUTPATIENT
Start: 2018-01-01 | End: 2018-01-01

## 2018-01-01 RX ORDER — ACETAMINOPHEN 650 MG/20.3ML
650 LIQUID ORAL EVERY 6 HOURS PRN
Status: DISCONTINUED | OUTPATIENT
Start: 2018-01-01 | End: 2018-01-01

## 2018-01-01 RX ORDER — ENOXAPARIN SODIUM 100 MG/ML
1 INJECTION SUBCUTANEOUS EVERY 12 HOURS
Start: 2018-01-01

## 2018-01-01 RX ORDER — ONDANSETRON 8 MG/1
8 TABLET, ORALLY DISINTEGRATING ORAL EVERY 8 HOURS PRN
Status: DISCONTINUED | OUTPATIENT
Start: 2018-01-01 | End: 2018-01-01 | Stop reason: HOSPADM

## 2018-01-01 RX ORDER — INSULIN ASPART 100 [IU]/ML
5 INJECTION, SOLUTION INTRAVENOUS; SUBCUTANEOUS
Status: DISCONTINUED | OUTPATIENT
Start: 2018-01-01 | End: 2018-01-01

## 2018-01-01 RX ORDER — CLINDAMYCIN PHOSPHATE 600 MG/50ML
600 INJECTION, SOLUTION INTRAVENOUS
Status: DISCONTINUED | OUTPATIENT
Start: 2018-01-01 | End: 2018-01-01

## 2018-01-01 RX ORDER — ACETAMINOPHEN 650 MG/1
650 SUPPOSITORY RECTAL EVERY 6 HOURS PRN
Status: DISCONTINUED | OUTPATIENT
Start: 2018-01-01 | End: 2018-01-01

## 2018-01-01 RX ORDER — CITALOPRAM 10 MG/1
10 TABLET ORAL DAILY
Status: DISCONTINUED | OUTPATIENT
Start: 2018-01-01 | End: 2018-01-01

## 2018-01-01 RX ORDER — SENNOSIDES 8.6 MG/1
8.6 TABLET ORAL DAILY
Status: DISCONTINUED | OUTPATIENT
Start: 2018-01-01 | End: 2018-01-01

## 2018-01-01 RX ORDER — ENOXAPARIN SODIUM 100 MG/ML
40 INJECTION SUBCUTANEOUS EVERY 24 HOURS
Status: DISCONTINUED | OUTPATIENT
Start: 2018-01-01 | End: 2018-01-01

## 2018-01-01 RX ORDER — PHENYLEPHRINE HCL IN 0.9% NACL 1 MG/10 ML
SYRINGE (ML) INTRAVENOUS
Status: DISPENSED
Start: 2018-01-01 | End: 2018-01-01

## 2018-01-01 RX ORDER — FAMOTIDINE 20 MG/1
20 TABLET, FILM COATED ORAL 2 TIMES DAILY
Status: DISCONTINUED | OUTPATIENT
Start: 2018-01-01 | End: 2018-01-01

## 2018-01-01 RX ORDER — NICARDIPINE HYDROCHLORIDE 0.2 MG/ML
INJECTION INTRAVENOUS
Status: DISPENSED
Start: 2018-01-01 | End: 2018-01-01

## 2018-01-01 RX ORDER — PROPOFOL 10 MG/ML
INJECTION, EMULSION INTRAVENOUS
Status: DISPENSED
Start: 2018-01-01 | End: 2018-01-01

## 2018-01-01 RX ORDER — INSULIN ASPART 100 [IU]/ML
1-10 INJECTION, SOLUTION INTRAVENOUS; SUBCUTANEOUS EVERY 6 HOURS PRN
Status: DISCONTINUED | OUTPATIENT
Start: 2018-01-01 | End: 2018-01-01 | Stop reason: HOSPADM

## 2018-01-01 RX ORDER — 3% SODIUM CHLORIDE 3 G/100ML
300 INJECTION, SOLUTION INTRAVENOUS EVERY 6 HOURS PRN
Status: DISCONTINUED | OUTPATIENT
Start: 2018-01-01 | End: 2018-01-01

## 2018-01-01 RX ORDER — ASPIRIN 81 MG/1
81 TABLET ORAL DAILY
Status: DISCONTINUED | OUTPATIENT
Start: 2018-01-01 | End: 2018-01-01

## 2018-01-01 RX ORDER — METOPROLOL TARTRATE 25 MG/1
25 TABLET, FILM COATED ORAL 2 TIMES DAILY
Qty: 60 TABLET | Refills: 11
Start: 2018-01-01 | End: 2019-06-26

## 2018-01-01 RX ORDER — HEPARIN SODIUM,PORCINE/D5W 25000/250
17 INTRAVENOUS SOLUTION INTRAVENOUS CONTINUOUS
Status: DISPENSED | OUTPATIENT
Start: 2018-01-01 | End: 2018-01-01

## 2018-01-01 RX ORDER — SODIUM CHLORIDE 9 MG/ML
INJECTION, SOLUTION INTRAVENOUS CONTINUOUS
Status: DISCONTINUED | OUTPATIENT
Start: 2018-01-01 | End: 2018-01-01

## 2018-01-01 RX ORDER — MODAFINIL 100 MG/1
200 TABLET ORAL DAILY
Status: DISCONTINUED | OUTPATIENT
Start: 2018-01-01 | End: 2018-01-01

## 2018-01-01 RX ORDER — ROSUVASTATIN CALCIUM 10 MG/1
40 TABLET, COATED ORAL NIGHTLY
Status: DISCONTINUED | OUTPATIENT
Start: 2018-01-01 | End: 2018-01-01 | Stop reason: HOSPADM

## 2018-01-01 RX ORDER — NICARDIPINE HYDROCHLORIDE 0.2 MG/ML
2.5 INJECTION INTRAVENOUS CONTINUOUS
Status: DISCONTINUED | OUTPATIENT
Start: 2018-01-01 | End: 2018-01-01

## 2018-01-01 RX ORDER — ROSUVASTATIN CALCIUM 40 MG/1
40 TABLET, COATED ORAL NIGHTLY
Qty: 90 TABLET | Refills: 3
Start: 2018-01-01 | End: 2019-06-26

## 2018-01-01 RX ORDER — INSULIN ASPART 100 [IU]/ML
10 INJECTION, SOLUTION INTRAVENOUS; SUBCUTANEOUS EVERY 6 HOURS
Status: DISCONTINUED | OUTPATIENT
Start: 2018-01-01 | End: 2018-01-01 | Stop reason: HOSPADM

## 2018-01-01 RX ORDER — GLUCAGON 1 MG
1 KIT INJECTION
Status: DISCONTINUED | OUTPATIENT
Start: 2018-01-01 | End: 2018-01-01 | Stop reason: HOSPADM

## 2018-01-01 RX ORDER — HYDROCODONE BITARTRATE AND ACETAMINOPHEN 5; 325 MG/1; MG/1
1 TABLET ORAL EVERY 8 HOURS PRN
Status: DISCONTINUED | OUTPATIENT
Start: 2018-01-01 | End: 2018-01-01 | Stop reason: HOSPADM

## 2018-01-01 RX ORDER — SODIUM CHLORIDE 0.9 % (FLUSH) 0.9 %
3 SYRINGE (ML) INJECTION EVERY 8 HOURS
Status: DISCONTINUED | OUTPATIENT
Start: 2018-01-01 | End: 2018-01-01 | Stop reason: HOSPADM

## 2018-01-01 RX ORDER — 3% SODIUM CHLORIDE 3 G/100ML
300 INJECTION, SOLUTION INTRAVENOUS
Status: DISCONTINUED | OUTPATIENT
Start: 2018-01-01 | End: 2018-01-01

## 2018-01-01 RX ORDER — HEPARIN SODIUM 5000 [USP'U]/ML
5000 INJECTION, SOLUTION INTRAVENOUS; SUBCUTANEOUS EVERY 8 HOURS
Status: DISCONTINUED | OUTPATIENT
Start: 2018-01-01 | End: 2018-01-01

## 2018-01-01 RX ORDER — MANNITOL 250 MG/ML
50 INJECTION, SOLUTION INTRAVENOUS ONCE
Status: DISCONTINUED | OUTPATIENT
Start: 2018-01-01 | End: 2018-01-01

## 2018-01-01 RX ORDER — GLUCAGON 1 MG
1 KIT INJECTION
Status: DISCONTINUED | OUTPATIENT
Start: 2018-01-01 | End: 2018-01-01

## 2018-01-01 RX ORDER — POLYETHYLENE GLYCOL 3350 17 G/17G
17 POWDER, FOR SOLUTION ORAL DAILY
Status: DISCONTINUED | OUTPATIENT
Start: 2018-01-01 | End: 2018-01-01

## 2018-01-01 RX ORDER — INSULIN ASPART 100 [IU]/ML
2 INJECTION, SOLUTION INTRAVENOUS; SUBCUTANEOUS EVERY 4 HOURS
Status: DISCONTINUED | OUTPATIENT
Start: 2018-01-01 | End: 2018-01-01

## 2018-01-01 RX ORDER — IPRATROPIUM BROMIDE AND ALBUTEROL SULFATE 2.5; .5 MG/3ML; MG/3ML
3 SOLUTION RESPIRATORY (INHALATION) EVERY 6 HOURS
Status: DISCONTINUED | OUTPATIENT
Start: 2018-01-01 | End: 2018-01-01 | Stop reason: HOSPADM

## 2018-01-01 RX ORDER — FUROSEMIDE 10 MG/ML
40 INJECTION INTRAMUSCULAR; INTRAVENOUS ONCE
Status: COMPLETED | OUTPATIENT
Start: 2018-01-01 | End: 2018-01-01

## 2018-01-01 RX ORDER — WARFARIN SODIUM 5 MG/1
5 TABLET ORAL DAILY
Status: DISCONTINUED | OUTPATIENT
Start: 2018-01-01 | End: 2018-01-01

## 2018-01-01 RX ORDER — ONDANSETRON 2 MG/ML
4 INJECTION INTRAMUSCULAR; INTRAVENOUS EVERY 6 HOURS PRN
Status: DISCONTINUED | OUTPATIENT
Start: 2018-01-01 | End: 2018-01-01

## 2018-01-01 RX ORDER — HEPARIN SODIUM,PORCINE/D5W 25000/250
17 INTRAVENOUS SOLUTION INTRAVENOUS CONTINUOUS
Status: DISCONTINUED | OUTPATIENT
Start: 2018-01-01 | End: 2018-01-01

## 2018-01-01 RX ORDER — MICONAZOLE NITRATE 2 %
POWDER (GRAM) TOPICAL 2 TIMES DAILY PRN
Refills: 0
Start: 2018-01-01

## 2018-01-01 RX ORDER — LORAZEPAM 2 MG/ML
0.5 INJECTION INTRAMUSCULAR EVERY 30 MIN PRN
Status: DISCONTINUED | OUTPATIENT
Start: 2018-01-01 | End: 2018-01-01

## 2018-01-01 RX ORDER — POTASSIUM CHLORIDE 20 MEQ/15ML
60 SOLUTION ORAL
Status: DISCONTINUED | OUTPATIENT
Start: 2018-01-01 | End: 2018-01-01

## 2018-01-01 RX ORDER — IPRATROPIUM BROMIDE AND ALBUTEROL SULFATE 2.5; .5 MG/3ML; MG/3ML
3 SOLUTION RESPIRATORY (INHALATION) EVERY 6 HOURS PRN
Status: DISCONTINUED | OUTPATIENT
Start: 2018-01-01 | End: 2018-01-01

## 2018-01-01 RX ORDER — HALOPERIDOL 5 MG/ML
2 INJECTION INTRAMUSCULAR EVERY 6 HOURS PRN
Start: 2018-01-01 | End: 2018-01-01 | Stop reason: HOSPADM

## 2018-01-01 RX ORDER — MANNITOL 250 MG/ML
INJECTION, SOLUTION INTRAVENOUS
Status: COMPLETED
Start: 2018-01-01 | End: 2018-01-01

## 2018-01-01 RX ORDER — LABETALOL HYDROCHLORIDE 5 MG/ML
10 INJECTION, SOLUTION INTRAVENOUS EVERY 6 HOURS PRN
Status: DISCONTINUED | OUTPATIENT
Start: 2018-01-01 | End: 2018-01-01 | Stop reason: HOSPADM

## 2018-01-01 RX ORDER — RISPERIDONE 1 MG/ML
1 SOLUTION ORAL NIGHTLY
Status: DISCONTINUED | OUTPATIENT
Start: 2018-01-01 | End: 2018-01-01

## 2018-01-01 RX ORDER — INSULIN ASPART 100 [IU]/ML
3 INJECTION, SOLUTION INTRAVENOUS; SUBCUTANEOUS EVERY 4 HOURS
Status: DISCONTINUED | OUTPATIENT
Start: 2018-01-01 | End: 2018-01-01

## 2018-01-01 RX ORDER — MICONAZOLE NITRATE 2 %
POWDER (GRAM) TOPICAL 2 TIMES DAILY PRN
Status: DISCONTINUED | OUTPATIENT
Start: 2018-01-01 | End: 2018-01-01 | Stop reason: HOSPADM

## 2018-01-01 RX ORDER — SCOLOPAMINE TRANSDERMAL SYSTEM 1 MG/1
1 PATCH, EXTENDED RELEASE TRANSDERMAL
Status: DISCONTINUED | OUTPATIENT
Start: 2018-01-01 | End: 2018-01-01

## 2018-01-01 RX ORDER — INSULIN ASPART 100 [IU]/ML
1-10 INJECTION, SOLUTION INTRAVENOUS; SUBCUTANEOUS EVERY 4 HOURS PRN
Status: DISCONTINUED | OUTPATIENT
Start: 2018-01-01 | End: 2018-01-01

## 2018-01-01 RX ORDER — INSULIN ASPART 100 [IU]/ML
1-10 INJECTION, SOLUTION INTRAVENOUS; SUBCUTANEOUS EVERY 6 HOURS PRN
Status: DISCONTINUED | OUTPATIENT
Start: 2018-01-01 | End: 2018-01-01

## 2018-01-01 RX ORDER — AMOXICILLIN 250 MG
2 CAPSULE ORAL 2 TIMES DAILY
Status: DISCONTINUED | OUTPATIENT
Start: 2018-01-01 | End: 2018-01-01 | Stop reason: HOSPADM

## 2018-01-01 RX ORDER — METOPROLOL TARTRATE 25 MG/1
25 TABLET, FILM COATED ORAL 2 TIMES DAILY
Status: DISCONTINUED | OUTPATIENT
Start: 2018-01-01 | End: 2018-01-01 | Stop reason: HOSPADM

## 2018-01-01 RX ORDER — AMOXICILLIN 250 MG
1 CAPSULE ORAL DAILY
Status: DISCONTINUED | OUTPATIENT
Start: 2018-01-01 | End: 2018-01-01

## 2018-01-01 RX ORDER — ERGOCALCIFEROL 1.25 MG/1
50000 CAPSULE ORAL
Qty: 8 CAPSULE | Refills: 3 | Status: SHIPPED | OUTPATIENT
Start: 2018-01-01

## 2018-01-01 RX ORDER — NOREPINEPHRINE BITARTRATE/D5W 4MG/250ML
0.02 PLASTIC BAG, INJECTION (ML) INTRAVENOUS CONTINUOUS
Status: DISCONTINUED | OUTPATIENT
Start: 2018-01-01 | End: 2018-01-01 | Stop reason: SDUPTHER

## 2018-01-01 RX ORDER — WARFARIN 7.5 MG/1
7.5 TABLET ORAL DAILY
Qty: 30 TABLET | Refills: 11
Start: 2018-01-01 | End: 2019-06-26

## 2018-01-01 RX ORDER — ACETAMINOPHEN 325 MG/1
650 TABLET ORAL EVERY 6 HOURS PRN
Status: DISCONTINUED | OUTPATIENT
Start: 2018-01-01 | End: 2018-01-01

## 2018-01-01 RX ORDER — LEVETIRACETAM 100 MG/ML
500 SOLUTION ORAL 2 TIMES DAILY
Status: DISCONTINUED | OUTPATIENT
Start: 2018-01-01 | End: 2018-01-01

## 2018-01-01 RX ORDER — LOSARTAN POTASSIUM 25 MG/1
12.5 TABLET ORAL DAILY
Qty: 45 TABLET | Refills: 3
Start: 2018-01-01 | End: 2019-06-27

## 2018-01-01 RX ORDER — ASPIRIN 81 MG/1
81 TABLET ORAL DAILY
Status: DISCONTINUED | OUTPATIENT
Start: 2018-01-01 | End: 2018-01-01 | Stop reason: HOSPADM

## 2018-01-01 RX ORDER — HALOPERIDOL 2 MG/ML
1 SOLUTION ORAL NIGHTLY
Status: DISCONTINUED | OUTPATIENT
Start: 2018-01-01 | End: 2018-01-01

## 2018-01-01 RX ORDER — MANNITOL 250 MG/ML
50 INJECTION, SOLUTION INTRAVENOUS ONCE
Status: COMPLETED | OUTPATIENT
Start: 2018-01-01 | End: 2018-01-01

## 2018-01-01 RX ORDER — BISACODYL 10 MG
10 SUPPOSITORY, RECTAL RECTAL ONCE
Status: DISCONTINUED | OUTPATIENT
Start: 2018-01-01 | End: 2018-01-01

## 2018-01-01 RX ORDER — LACTULOSE 10 G/15ML
20 SOLUTION ORAL EVERY 6 HOURS PRN
Status: DISCONTINUED | OUTPATIENT
Start: 2018-01-01 | End: 2018-01-01 | Stop reason: HOSPADM

## 2018-01-01 RX ORDER — RISPERIDONE 1 MG/ML
1 SOLUTION ORAL NIGHTLY
Status: DISCONTINUED | OUTPATIENT
Start: 2018-01-01 | End: 2018-01-01 | Stop reason: HOSPADM

## 2018-01-01 RX ORDER — INSULIN ASPART 100 [IU]/ML
5 INJECTION, SOLUTION INTRAVENOUS; SUBCUTANEOUS EVERY 6 HOURS
Status: DISCONTINUED | OUTPATIENT
Start: 2018-01-01 | End: 2018-01-01

## 2018-01-01 RX ORDER — IPRATROPIUM BROMIDE AND ALBUTEROL SULFATE 2.5; .5 MG/3ML; MG/3ML
3 SOLUTION RESPIRATORY (INHALATION) EVERY 6 HOURS
Qty: 1 BOX | Refills: 0
Start: 2018-01-01 | End: 2019-06-26

## 2018-01-01 RX ORDER — AMOXICILLIN 250 MG
2 CAPSULE ORAL 2 TIMES DAILY
Start: 2018-01-01

## 2018-01-01 RX ORDER — IPRATROPIUM BROMIDE AND ALBUTEROL SULFATE 2.5; .5 MG/3ML; MG/3ML
3 SOLUTION RESPIRATORY (INHALATION) EVERY 6 HOURS
Status: DISCONTINUED | OUTPATIENT
Start: 2018-01-01 | End: 2018-01-01

## 2018-01-01 RX ORDER — CHLORHEXIDINE GLUCONATE ORAL RINSE 1.2 MG/ML
15 SOLUTION DENTAL 4 TIMES DAILY
Status: DISCONTINUED | OUTPATIENT
Start: 2018-01-01 | End: 2018-01-01

## 2018-01-01 RX ORDER — INSULIN LISPRO 100 [IU]/ML
10 INJECTION, SOLUTION INTRAVENOUS; SUBCUTANEOUS EVERY 6 HOURS
Start: 2018-01-01 | End: 2019-06-26

## 2018-01-01 RX ORDER — CITALOPRAM 10 MG/1
10 TABLET ORAL DAILY
Qty: 30 TABLET | Refills: 11
Start: 2018-01-01 | End: 2019-06-27

## 2018-01-01 RX ORDER — SODIUM CHLORIDE 0.9 % (FLUSH) 0.9 %
3 SYRINGE (ML) INJECTION EVERY 8 HOURS
Status: DISCONTINUED | OUTPATIENT
Start: 2018-01-01 | End: 2018-01-01

## 2018-01-01 RX ORDER — ENOXAPARIN SODIUM 100 MG/ML
1 INJECTION SUBCUTANEOUS
Status: DISCONTINUED | OUTPATIENT
Start: 2018-01-01 | End: 2018-01-01 | Stop reason: HOSPADM

## 2018-01-01 RX ORDER — FLUTICASONE FUROATE AND VILANTEROL 100; 25 UG/1; UG/1
1 POWDER RESPIRATORY (INHALATION) DAILY
Status: DISCONTINUED | OUTPATIENT
Start: 2018-01-01 | End: 2018-01-01

## 2018-01-01 RX ORDER — ROSUVASTATIN CALCIUM 10 MG/1
40 TABLET, COATED ORAL NIGHTLY
Status: DISCONTINUED | OUTPATIENT
Start: 2018-01-01 | End: 2018-01-01

## 2018-01-01 RX ORDER — CLOPIDOGREL BISULFATE 75 MG/1
75 TABLET ORAL DAILY
Status: DISCONTINUED | OUTPATIENT
Start: 2018-01-01 | End: 2018-01-01

## 2018-01-01 RX ADMIN — IPRATROPIUM BROMIDE AND ALBUTEROL SULFATE 3 ML: .5; 3 SOLUTION RESPIRATORY (INHALATION) at 12:06

## 2018-01-01 RX ADMIN — INSULIN ASPART 2 UNITS: 100 INJECTION, SOLUTION INTRAVENOUS; SUBCUTANEOUS at 05:07

## 2018-01-01 RX ADMIN — HYDROCODONE BITARTRATE AND ACETAMINOPHEN 1 TABLET: 5; 325 TABLET ORAL at 12:06

## 2018-01-01 RX ADMIN — Medication 3 ML: at 05:07

## 2018-01-01 RX ADMIN — ACETAMINOPHEN 650 MG: 325 TABLET, FILM COATED ORAL at 01:06

## 2018-01-01 RX ADMIN — LOSARTAN POTASSIUM 50 MG: 50 TABLET, FILM COATED ORAL at 09:06

## 2018-01-01 RX ADMIN — MANNITOL 50 G: 250 INJECTION, SOLUTION INTRAVENOUS at 07:07

## 2018-01-01 RX ADMIN — APIXABAN 5 MG: 5 TABLET, FILM COATED ORAL at 12:06

## 2018-01-01 RX ADMIN — METOPROLOL TARTRATE 25 MG: 25 TABLET ORAL at 10:06

## 2018-01-01 RX ADMIN — INSULIN DETEMIR 13 UNITS: 100 INJECTION, SOLUTION SUBCUTANEOUS at 09:06

## 2018-01-01 RX ADMIN — CHLORHEXIDINE GLUCONATE 0.12% ORAL RINSE 15 ML: 1.2 LIQUID ORAL at 12:07

## 2018-01-01 RX ADMIN — PIPERACILLIN SODIUM AND TAZOBACTAM SODIUM 4.5 G: 4; .5 INJECTION, POWDER, LYOPHILIZED, FOR SOLUTION INTRAVENOUS at 02:06

## 2018-01-01 RX ADMIN — LEVETIRACETAM 500 MG: 500 SOLUTION ORAL at 09:07

## 2018-01-01 RX ADMIN — Medication 3 ML: at 09:06

## 2018-01-01 RX ADMIN — INSULIN ASPART 2 UNITS: 100 INJECTION, SOLUTION INTRAVENOUS; SUBCUTANEOUS at 11:06

## 2018-01-01 RX ADMIN — SODIUM CHLORIDE 75 ML/HR: 3 INJECTION, SOLUTION INTRAVENOUS at 05:07

## 2018-01-01 RX ADMIN — ACETAMINOPHEN 650 MG: 325 TABLET, FILM COATED ORAL at 06:06

## 2018-01-01 RX ADMIN — IPRATROPIUM BROMIDE AND ALBUTEROL SULFATE 3 ML: .5; 3 SOLUTION RESPIRATORY (INHALATION) at 08:06

## 2018-01-01 RX ADMIN — VANCOMYCIN HYDROCHLORIDE 1500 MG: 10 INJECTION, POWDER, LYOPHILIZED, FOR SOLUTION INTRAVENOUS at 12:06

## 2018-01-01 RX ADMIN — INSULIN DETEMIR 5 UNITS: 100 INJECTION, SOLUTION SUBCUTANEOUS at 09:06

## 2018-01-01 RX ADMIN — POTASSIUM CHLORIDE 40 MEQ: 20 SOLUTION ORAL at 05:07

## 2018-01-01 RX ADMIN — APIXABAN 5 MG: 5 TABLET, FILM COATED ORAL at 09:06

## 2018-01-01 RX ADMIN — INSULIN ASPART 4 UNITS: 100 INJECTION, SOLUTION INTRAVENOUS; SUBCUTANEOUS at 02:07

## 2018-01-01 RX ADMIN — Medication 3 ML: at 09:07

## 2018-01-01 RX ADMIN — IPRATROPIUM BROMIDE AND ALBUTEROL SULFATE 3 ML: .5; 3 SOLUTION RESPIRATORY (INHALATION) at 06:06

## 2018-01-01 RX ADMIN — CITALOPRAM HYDROBROMIDE 10 MG: 10 TABLET ORAL at 08:06

## 2018-01-01 RX ADMIN — INSULIN ASPART 5 UNITS: 100 INJECTION, SOLUTION INTRAVENOUS; SUBCUTANEOUS at 06:06

## 2018-01-01 RX ADMIN — Medication 3 ML: at 02:06

## 2018-01-01 RX ADMIN — ROSUVASTATIN CALCIUM 40 MG: 10 TABLET, FILM COATED ORAL at 09:06

## 2018-01-01 RX ADMIN — SODIUM CHLORIDE 75 ML/HR: 3 INJECTION, SOLUTION INTRAVENOUS at 08:06

## 2018-01-01 RX ADMIN — CLINDAMYCIN IN 5 PERCENT DEXTROSE 600 MG: 12 INJECTION, SOLUTION INTRAVENOUS at 09:06

## 2018-01-01 RX ADMIN — Medication 3 ML: at 06:06

## 2018-01-01 RX ADMIN — Medication 0.06 MCG/KG/MIN: at 10:07

## 2018-01-01 RX ADMIN — IPRATROPIUM BROMIDE AND ALBUTEROL SULFATE 3 ML: .5; 3 SOLUTION RESPIRATORY (INHALATION) at 01:06

## 2018-01-01 RX ADMIN — MICONAZOLE NITRATE: 20 POWDER TOPICAL at 08:07

## 2018-01-01 RX ADMIN — POTASSIUM CHLORIDE 40 MEQ: 20 SOLUTION ORAL at 12:07

## 2018-01-01 RX ADMIN — POTASSIUM CHLORIDE 40 MEQ: 20 SOLUTION ORAL at 06:07

## 2018-01-01 RX ADMIN — FAMOTIDINE 20 MG: 20 TABLET ORAL at 08:07

## 2018-01-01 RX ADMIN — Medication 3 ML: at 04:06

## 2018-01-01 RX ADMIN — INSULIN ASPART 2 UNITS: 100 INJECTION, SOLUTION INTRAVENOUS; SUBCUTANEOUS at 01:07

## 2018-01-01 RX ADMIN — INSULIN ASPART 5 UNITS: 100 INJECTION, SOLUTION INTRAVENOUS; SUBCUTANEOUS at 11:07

## 2018-01-01 RX ADMIN — SENNOSIDES AND DOCUSATE SODIUM 2 TABLET: 8.6; 5 TABLET ORAL at 10:06

## 2018-01-01 RX ADMIN — ENOXAPARIN SODIUM 40 MG: 100 INJECTION SUBCUTANEOUS at 04:07

## 2018-01-01 RX ADMIN — INSULIN ASPART 2 UNITS: 100 INJECTION, SOLUTION INTRAVENOUS; SUBCUTANEOUS at 02:07

## 2018-01-01 RX ADMIN — SENNOSIDES AND DOCUSATE SODIUM 2 TABLET: 8.6; 5 TABLET ORAL at 09:06

## 2018-01-01 RX ADMIN — LEVETIRACETAM 500 MG: 500 SOLUTION ORAL at 08:07

## 2018-01-01 RX ADMIN — FUROSEMIDE 40 MG: 10 INJECTION, SOLUTION INTRAMUSCULAR; INTRAVENOUS at 12:07

## 2018-01-01 RX ADMIN — INSULIN ASPART 10 UNITS: 100 INJECTION, SOLUTION INTRAVENOUS; SUBCUTANEOUS at 06:06

## 2018-01-01 RX ADMIN — INSULIN ASPART 10 UNITS: 100 INJECTION, SOLUTION INTRAVENOUS; SUBCUTANEOUS at 05:06

## 2018-01-01 RX ADMIN — MICONAZOLE NITRATE: 20 POWDER TOPICAL at 09:07

## 2018-01-01 RX ADMIN — INSULIN ASPART 2 UNITS: 100 INJECTION, SOLUTION INTRAVENOUS; SUBCUTANEOUS at 11:07

## 2018-01-01 RX ADMIN — IPRATROPIUM BROMIDE AND ALBUTEROL SULFATE 3 ML: .5; 3 SOLUTION RESPIRATORY (INHALATION) at 08:07

## 2018-01-01 RX ADMIN — RISPERIDONE 1 MG: 1 SOLUTION ORAL at 09:06

## 2018-01-01 RX ADMIN — PHYTONADIONE 10 MG: 10 INJECTION, EMULSION INTRAMUSCULAR; INTRAVENOUS; SUBCUTANEOUS at 12:07

## 2018-01-01 RX ADMIN — CHLORHEXIDINE GLUCONATE 0.12% ORAL RINSE 15 ML: 1.2 LIQUID ORAL at 09:07

## 2018-01-01 RX ADMIN — CLOPIDOGREL 75 MG: 75 TABLET, FILM COATED ORAL at 09:06

## 2018-01-01 RX ADMIN — ASPIRIN 81 MG CHEWABLE TABLET 81 MG: 81 TABLET CHEWABLE at 09:07

## 2018-01-01 RX ADMIN — ACETAMINOPHEN 650 MG: 325 TABLET, FILM COATED ORAL at 09:07

## 2018-01-01 RX ADMIN — Medication 3 ML: at 05:06

## 2018-01-01 RX ADMIN — SENNA 8.6 MG: 8.6 TABLET, COATED ORAL at 09:06

## 2018-01-01 RX ADMIN — INSULIN ASPART 8 UNITS: 100 INJECTION, SOLUTION INTRAVENOUS; SUBCUTANEOUS at 05:07

## 2018-01-01 RX ADMIN — CEFTRIAXONE SODIUM 1 G: 1 INJECTION, POWDER, FOR SOLUTION INTRAMUSCULAR; INTRAVENOUS at 05:06

## 2018-01-01 RX ADMIN — CHLORHEXIDINE GLUCONATE 0.12% ORAL RINSE 15 ML: 1.2 LIQUID ORAL at 04:07

## 2018-01-01 RX ADMIN — INSULIN ASPART 2 UNITS: 100 INJECTION, SOLUTION INTRAVENOUS; SUBCUTANEOUS at 10:06

## 2018-01-01 RX ADMIN — FAMOTIDINE 20 MG: 20 TABLET ORAL at 09:07

## 2018-01-01 RX ADMIN — IPRATROPIUM BROMIDE AND ALBUTEROL SULFATE 3 ML: .5; 3 SOLUTION RESPIRATORY (INHALATION) at 07:06

## 2018-01-01 RX ADMIN — INSULIN ASPART 6 UNITS: 100 INJECTION, SOLUTION INTRAVENOUS; SUBCUTANEOUS at 01:06

## 2018-01-01 RX ADMIN — Medication 3 ML: at 07:06

## 2018-01-01 RX ADMIN — CITALOPRAM HYDROBROMIDE 10 MG: 10 TABLET ORAL at 09:06

## 2018-01-01 RX ADMIN — INSULIN ASPART 2 UNITS: 100 INJECTION, SOLUTION INTRAVENOUS; SUBCUTANEOUS at 09:07

## 2018-01-01 RX ADMIN — PIPERACILLIN SODIUM AND TAZOBACTAM SODIUM 4.5 G: 4; .5 INJECTION, POWDER, LYOPHILIZED, FOR SOLUTION INTRAVENOUS at 06:06

## 2018-01-01 RX ADMIN — INSULIN DETEMIR 10 UNITS: 100 INJECTION, SOLUTION SUBCUTANEOUS at 08:07

## 2018-01-01 RX ADMIN — INSULIN ASPART 3 UNITS: 100 INJECTION, SOLUTION INTRAVENOUS; SUBCUTANEOUS at 02:07

## 2018-01-01 RX ADMIN — HYDROCODONE BITARTRATE AND ACETAMINOPHEN 1 TABLET: 5; 325 TABLET ORAL at 11:06

## 2018-01-01 RX ADMIN — INSULIN ASPART 4 UNITS: 100 INJECTION, SOLUTION INTRAVENOUS; SUBCUTANEOUS at 05:06

## 2018-01-01 RX ADMIN — ROSUVASTATIN CALCIUM 40 MG: 20 TABLET, FILM COATED ORAL at 10:06

## 2018-01-01 RX ADMIN — Medication 3 ML: at 10:06

## 2018-01-01 RX ADMIN — INSULIN ASPART 6 UNITS: 100 INJECTION, SOLUTION INTRAVENOUS; SUBCUTANEOUS at 05:06

## 2018-01-01 RX ADMIN — ACETAMINOPHEN 650 MG: 325 TABLET, FILM COATED ORAL at 03:07

## 2018-01-01 RX ADMIN — ASPIRIN 81 MG CHEWABLE TABLET 81 MG: 81 TABLET CHEWABLE at 10:07

## 2018-01-01 RX ADMIN — INSULIN ASPART 10 UNITS: 100 INJECTION, SOLUTION INTRAVENOUS; SUBCUTANEOUS at 12:06

## 2018-01-01 RX ADMIN — CHLORHEXIDINE GLUCONATE 0.12% ORAL RINSE 15 ML: 1.2 LIQUID ORAL at 08:07

## 2018-01-01 RX ADMIN — HEPARIN SODIUM 17 UNITS/KG/HR: 10000 INJECTION, SOLUTION INTRAVENOUS at 06:06

## 2018-01-01 RX ADMIN — CARVEDILOL 12.5 MG: 12.5 TABLET, FILM COATED ORAL at 04:06

## 2018-01-01 RX ADMIN — INSULIN ASPART 4 UNITS: 100 INJECTION, SOLUTION INTRAVENOUS; SUBCUTANEOUS at 05:07

## 2018-01-01 RX ADMIN — AMPICILLIN AND SULBACTAM 3 G: 2; 1 INJECTION, POWDER, FOR SOLUTION INTRAVENOUS at 03:06

## 2018-01-01 RX ADMIN — HYDROCODONE BITARTRATE AND ACETAMINOPHEN 1 TABLET: 5; 325 TABLET ORAL at 01:06

## 2018-01-01 RX ADMIN — INSULIN ASPART 2 UNITS: 100 INJECTION, SOLUTION INTRAVENOUS; SUBCUTANEOUS at 06:06

## 2018-01-01 RX ADMIN — POLYETHYLENE GLYCOL 3350 17 G: 17 POWDER, FOR SOLUTION ORAL at 01:06

## 2018-01-01 RX ADMIN — AMPICILLIN AND SULBACTAM 3 G: 2; 1 INJECTION, POWDER, FOR SOLUTION INTRAVENOUS at 10:06

## 2018-01-01 RX ADMIN — Medication 5 UNITS: at 12:06

## 2018-01-01 RX ADMIN — MICONAZOLE NITRATE: 20 POWDER TOPICAL at 04:06

## 2018-01-01 RX ADMIN — CHLORHEXIDINE GLUCONATE 0.12% ORAL RINSE 15 ML: 1.2 LIQUID ORAL at 01:07

## 2018-01-01 RX ADMIN — ASPIRIN 81 MG: 81 TABLET, COATED ORAL at 09:06

## 2018-01-01 RX ADMIN — CEFAZOLIN SODIUM 1 G: 1 SOLUTION INTRAVENOUS at 02:06

## 2018-01-01 RX ADMIN — POTASSIUM CHLORIDE 40 MEQ: 20 SOLUTION ORAL at 03:07

## 2018-01-01 RX ADMIN — INSULIN ASPART 10 UNITS: 100 INJECTION, SOLUTION INTRAVENOUS; SUBCUTANEOUS at 07:07

## 2018-01-01 RX ADMIN — MODAFINIL 200 MG: 100 TABLET ORAL at 08:06

## 2018-01-01 RX ADMIN — VANCOMYCIN HYDROCHLORIDE 1500 MG: 10 INJECTION, POWDER, LYOPHILIZED, FOR SOLUTION INTRAVENOUS at 11:06

## 2018-01-01 RX ADMIN — POTASSIUM & SODIUM PHOSPHATES POWDER PACK 280-160-250 MG 2 PACKET: 280-160-250 PACK at 08:07

## 2018-01-01 RX ADMIN — HEPARIN SODIUM 5000 UNITS: 5000 INJECTION, SOLUTION INTRAVENOUS; SUBCUTANEOUS at 10:06

## 2018-01-01 RX ADMIN — INSULIN DETEMIR 10 UNITS: 100 INJECTION, SOLUTION SUBCUTANEOUS at 09:07

## 2018-01-01 RX ADMIN — ASPIRIN 81 MG CHEWABLE TABLET 81 MG: 81 TABLET CHEWABLE at 08:06

## 2018-01-01 RX ADMIN — Medication 3 ML: at 02:07

## 2018-01-01 RX ADMIN — STANDARDIZED SENNA CONCENTRATE AND DOCUSATE SODIUM 1 TABLET: 8.6; 5 TABLET, FILM COATED ORAL at 08:07

## 2018-01-01 RX ADMIN — FUROSEMIDE 40 MG: 40 TABLET ORAL at 08:06

## 2018-01-01 RX ADMIN — Medication 0.16 MCG/KG/MIN: at 08:07

## 2018-01-01 RX ADMIN — ACETAMINOPHEN 650 MG: 325 TABLET, FILM COATED ORAL at 07:06

## 2018-01-01 RX ADMIN — LEVETIRACETAM 500 MG: 5 INJECTION INTRAVENOUS at 08:07

## 2018-01-01 RX ADMIN — PIPERACILLIN SODIUM AND TAZOBACTAM SODIUM 4.5 G: 4; .5 INJECTION, POWDER, LYOPHILIZED, FOR SOLUTION INTRAVENOUS at 08:06

## 2018-01-01 RX ADMIN — INSULIN ASPART 2 UNITS: 100 INJECTION, SOLUTION INTRAVENOUS; SUBCUTANEOUS at 05:06

## 2018-01-01 RX ADMIN — ASPIRIN 81 MG CHEWABLE TABLET 81 MG: 81 TABLET CHEWABLE at 09:06

## 2018-01-01 RX ADMIN — Medication 0.07 MCG/KG/MIN: at 02:07

## 2018-01-01 RX ADMIN — RAMELTEON 8 MG: 8 TABLET, FILM COATED ORAL at 10:06

## 2018-01-01 RX ADMIN — Medication 3 ML: at 01:07

## 2018-01-01 RX ADMIN — SODIUM CHLORIDE: 234 INJECTION INTRAMUSCULAR; INTRAVENOUS; SUBCUTANEOUS at 08:07

## 2018-01-01 RX ADMIN — MANNITOL 50 G: 12.5 INJECTION, SOLUTION INTRAVENOUS at 07:07

## 2018-01-01 RX ADMIN — NOREPINEPHRINE BITARTRATE 0.02 MCG/KG/MIN: 1 INJECTION, SOLUTION, CONCENTRATE INTRAVENOUS at 11:07

## 2018-01-01 RX ADMIN — INSULIN ASPART 3 UNITS: 100 INJECTION, SOLUTION INTRAVENOUS; SUBCUTANEOUS at 12:07

## 2018-01-01 RX ADMIN — SODIUM CHLORIDE: 0.9 INJECTION, SOLUTION INTRAVENOUS at 09:07

## 2018-01-01 RX ADMIN — INSULIN ASPART 3 UNITS: 100 INJECTION, SOLUTION INTRAVENOUS; SUBCUTANEOUS at 05:07

## 2018-01-01 RX ADMIN — INSULIN ASPART 6 UNITS: 100 INJECTION, SOLUTION INTRAVENOUS; SUBCUTANEOUS at 12:06

## 2018-01-01 RX ADMIN — ACETAMINOPHEN 650 MG: 325 TABLET, FILM COATED ORAL at 08:07

## 2018-01-01 RX ADMIN — Medication 800 MG: at 06:07

## 2018-01-01 RX ADMIN — Medication 0.08 MCG/KG/MIN: at 05:07

## 2018-01-01 RX ADMIN — LOSARTAN POTASSIUM 50 MG: 50 TABLET, FILM COATED ORAL at 08:06

## 2018-01-01 RX ADMIN — HYDROCODONE BITARTRATE AND ACETAMINOPHEN 1 TABLET: 5; 325 TABLET ORAL at 10:06

## 2018-01-01 RX ADMIN — CARVEDILOL 12.5 MG: 12.5 TABLET, FILM COATED ORAL at 09:06

## 2018-01-01 RX ADMIN — INSULIN DETEMIR 13 UNITS: 100 INJECTION, SOLUTION SUBCUTANEOUS at 08:06

## 2018-01-01 RX ADMIN — ACETAMINOPHEN 650 MG: 650 SUPPOSITORY RECTAL at 10:06

## 2018-01-01 RX ADMIN — POTASSIUM & SODIUM PHOSPHATES POWDER PACK 280-160-250 MG 2 PACKET: 280-160-250 PACK at 02:07

## 2018-01-01 RX ADMIN — INSULIN ASPART 4 UNITS: 100 INJECTION, SOLUTION INTRAVENOUS; SUBCUTANEOUS at 09:06

## 2018-01-01 RX ADMIN — INSULIN ASPART 4 UNITS: 100 INJECTION, SOLUTION INTRAVENOUS; SUBCUTANEOUS at 06:06

## 2018-01-01 RX ADMIN — LORAZEPAM 0.5 MG: 2 INJECTION INTRAMUSCULAR; INTRAVENOUS at 02:07

## 2018-01-01 RX ADMIN — IPRATROPIUM BROMIDE AND ALBUTEROL SULFATE 3 ML: .5; 3 SOLUTION RESPIRATORY (INHALATION) at 07:07

## 2018-01-01 RX ADMIN — LEVETIRACETAM 500 MG: 500 SOLUTION ORAL at 10:07

## 2018-01-01 RX ADMIN — ACETAMINOPHEN 650 MG: 650 SUPPOSITORY RECTAL at 11:06

## 2018-01-01 RX ADMIN — SENNA 8.6 MG: 8.6 TABLET, COATED ORAL at 08:06

## 2018-01-01 RX ADMIN — ROSUVASTATIN CALCIUM 40 MG: 20 TABLET, FILM COATED ORAL at 09:07

## 2018-01-01 RX ADMIN — INSULIN ASPART 3 UNITS: 100 INJECTION, SOLUTION INTRAVENOUS; SUBCUTANEOUS at 04:06

## 2018-01-01 RX ADMIN — IPRATROPIUM BROMIDE AND ALBUTEROL SULFATE 3 ML: .5; 3 SOLUTION RESPIRATORY (INHALATION) at 12:07

## 2018-01-01 RX ADMIN — ACETAMINOPHEN 650 MG: 325 TABLET, FILM COATED ORAL at 02:07

## 2018-01-01 RX ADMIN — MODAFINIL 200 MG: 100 TABLET ORAL at 09:06

## 2018-01-01 RX ADMIN — Medication 0.1 MCG/KG/MIN: at 05:07

## 2018-01-01 RX ADMIN — AMPICILLIN AND SULBACTAM 3 G: 2; 1 INJECTION, POWDER, FOR SOLUTION INTRAVENOUS at 12:06

## 2018-01-01 RX ADMIN — CHLORHEXIDINE GLUCONATE 0.12% ORAL RINSE 15 ML: 1.2 LIQUID ORAL at 05:07

## 2018-01-01 RX ADMIN — INSULIN ASPART 2 UNITS: 100 INJECTION, SOLUTION INTRAVENOUS; SUBCUTANEOUS at 10:07

## 2018-01-01 RX ADMIN — IPRATROPIUM BROMIDE AND ALBUTEROL SULFATE 3 ML: .5; 3 SOLUTION RESPIRATORY (INHALATION) at 01:07

## 2018-01-01 RX ADMIN — INSULIN ASPART 10 UNITS: 100 INJECTION, SOLUTION INTRAVENOUS; SUBCUTANEOUS at 10:06

## 2018-01-01 RX ADMIN — ACETAMINOPHEN 650 MG: 325 TABLET, FILM COATED ORAL at 07:07

## 2018-01-01 RX ADMIN — SCOPOLAMINE 1 PATCH: 1 PATCH, EXTENDED RELEASE TRANSDERMAL at 02:07

## 2018-01-01 RX ADMIN — IPRATROPIUM BROMIDE AND ALBUTEROL SULFATE 3 ML: .5; 3 SOLUTION RESPIRATORY (INHALATION) at 11:06

## 2018-01-01 RX ADMIN — ROSUVASTATIN CALCIUM 40 MG: 20 TABLET, FILM COATED ORAL at 12:06

## 2018-01-01 RX ADMIN — CEFTRIAXONE SODIUM 1 G: 1 INJECTION, POWDER, FOR SOLUTION INTRAMUSCULAR; INTRAVENOUS at 06:06

## 2018-01-01 RX ADMIN — APIXABAN 5 MG: 5 TABLET, FILM COATED ORAL at 07:06

## 2018-01-01 RX ADMIN — MAGNESIUM SULFATE HEPTAHYDRATE 1 G: 500 INJECTION, SOLUTION INTRAMUSCULAR; INTRAVENOUS at 10:06

## 2018-01-01 RX ADMIN — SODIUM CHLORIDE 75 ML/HR: 3 INJECTION, SOLUTION INTRAVENOUS at 10:07

## 2018-01-01 RX ADMIN — ENOXAPARIN SODIUM 40 MG: 100 INJECTION SUBCUTANEOUS at 05:07

## 2018-01-01 RX ADMIN — Medication 0.2 MCG/KG/MIN: at 09:07

## 2018-01-01 RX ADMIN — FUROSEMIDE 40 MG: 40 TABLET ORAL at 09:06

## 2018-01-01 RX ADMIN — CHLORHEXIDINE GLUCONATE 0.12% ORAL RINSE 15 ML: 1.2 LIQUID ORAL at 02:07

## 2018-01-01 RX ADMIN — Medication 10 ML: at 10:06

## 2018-01-01 RX ADMIN — INSULIN ASPART 3 UNITS: 100 INJECTION, SOLUTION INTRAVENOUS; SUBCUTANEOUS at 11:06

## 2018-01-01 RX ADMIN — ROSUVASTATIN CALCIUM 40 MG: 10 TABLET, FILM COATED ORAL at 10:06

## 2018-01-01 RX ADMIN — CLINDAMYCIN IN 5 PERCENT DEXTROSE 600 MG: 12 INJECTION, SOLUTION INTRAVENOUS at 06:06

## 2018-01-01 RX ADMIN — DOPAMINE HYDROCHLORIDE 5 MCG/KG/MIN: 160 INJECTION, SOLUTION INTRAVENOUS at 04:07

## 2018-01-01 RX ADMIN — POLYETHYLENE GLYCOL 3350 17 G: 17 POWDER, FOR SOLUTION ORAL at 11:07

## 2018-01-01 RX ADMIN — STANDARDIZED SENNA CONCENTRATE AND DOCUSATE SODIUM 1 TABLET: 8.6; 5 TABLET, FILM COATED ORAL at 10:07

## 2018-01-01 RX ADMIN — POTASSIUM CHLORIDE 40 MEQ: 20 SOLUTION ORAL at 11:07

## 2018-01-01 RX ADMIN — SODIUM CHLORIDE 1000 ML: 0.9 INJECTION, SOLUTION INTRAVENOUS at 09:06

## 2018-01-01 RX ADMIN — CARVEDILOL 12.5 MG: 12.5 TABLET, FILM COATED ORAL at 05:06

## 2018-01-01 RX ADMIN — INSULIN ASPART 3 UNITS: 100 INJECTION, SOLUTION INTRAVENOUS; SUBCUTANEOUS at 09:06

## 2018-01-01 RX ADMIN — APIXABAN 5 MG: 5 TABLET, FILM COATED ORAL at 10:06

## 2018-01-01 RX ADMIN — IPRATROPIUM BROMIDE AND ALBUTEROL SULFATE 3 ML: .5; 3 SOLUTION RESPIRATORY (INHALATION) at 05:06

## 2018-01-01 RX ADMIN — Medication 3 ML: at 10:07

## 2018-01-01 RX ADMIN — LACTULOSE 20 G: 20 SOLUTION ORAL at 04:06

## 2018-01-01 RX ADMIN — INSULIN ASPART 6 UNITS: 100 INJECTION, SOLUTION INTRAVENOUS; SUBCUTANEOUS at 06:06

## 2018-01-01 RX ADMIN — HEPARIN SODIUM 17 UNITS/KG/HR: 10000 INJECTION, SOLUTION INTRAVENOUS at 09:06

## 2018-01-01 RX ADMIN — INSULIN ASPART 1 UNITS: 100 INJECTION, SOLUTION INTRAVENOUS; SUBCUTANEOUS at 12:06

## 2018-01-01 RX ADMIN — INSULIN ASPART 5 UNITS: 100 INJECTION, SOLUTION INTRAVENOUS; SUBCUTANEOUS at 10:06

## 2018-01-01 RX ADMIN — Medication 1 MG: at 11:07

## 2018-01-01 RX ADMIN — POLYETHYLENE GLYCOL 3350 17 G: 17 POWDER, FOR SOLUTION ORAL at 09:07

## 2018-01-01 RX ADMIN — Medication 3 ML: at 06:07

## 2018-01-01 RX ADMIN — INSULIN ASPART 4 UNITS: 100 INJECTION, SOLUTION INTRAVENOUS; SUBCUTANEOUS at 12:06

## 2018-01-01 RX ADMIN — AMPICILLIN AND SULBACTAM 3 G: 2; 1 INJECTION, POWDER, FOR SOLUTION INTRAVENOUS at 05:06

## 2018-01-01 RX ADMIN — FENTANYL CITRATE 50 MCG: 50 INJECTION INTRAMUSCULAR; INTRAVENOUS at 09:06

## 2018-01-01 RX ADMIN — INSULIN ASPART 3 UNITS: 100 INJECTION, SOLUTION INTRAVENOUS; SUBCUTANEOUS at 03:07

## 2018-01-01 RX ADMIN — SODIUM CHLORIDE 250 ML: 9 INJECTION, SOLUTION INTRAVENOUS at 11:06

## 2018-01-01 RX ADMIN — IPRATROPIUM BROMIDE AND ALBUTEROL SULFATE 3 ML: .5; 3 SOLUTION RESPIRATORY (INHALATION) at 02:06

## 2018-01-01 RX ADMIN — HEPARIN SODIUM 17 UNITS/KG/HR: 10000 INJECTION, SOLUTION INTRAVENOUS at 12:06

## 2018-01-01 RX ADMIN — IOHEXOL 100 ML: 350 INJECTION, SOLUTION INTRAVENOUS at 03:06

## 2018-01-01 RX ADMIN — ASPIRIN 81 MG CHEWABLE TABLET 81 MG: 81 TABLET CHEWABLE at 08:07

## 2018-01-01 RX ADMIN — HYDROCODONE BITARTRATE AND ACETAMINOPHEN 1 TABLET: 5; 325 TABLET ORAL at 03:06

## 2018-01-01 RX ADMIN — PIPERACILLIN SODIUM AND TAZOBACTAM SODIUM 4.5 G: 4; .5 INJECTION, POWDER, LYOPHILIZED, FOR SOLUTION INTRAVENOUS at 04:06

## 2018-01-01 RX ADMIN — INSULIN DETEMIR 10 UNITS: 100 INJECTION, SOLUTION SUBCUTANEOUS at 12:07

## 2018-01-01 RX ADMIN — Medication 800 MG: at 04:07

## 2018-01-01 RX ADMIN — Medication 3 ML: at 03:06

## 2018-01-01 RX ADMIN — INSULIN ASPART 4 UNITS: 100 INJECTION, SOLUTION INTRAVENOUS; SUBCUTANEOUS at 02:06

## 2018-01-01 RX ADMIN — SENNOSIDES AND DOCUSATE SODIUM 2 TABLET: 8.6; 5 TABLET ORAL at 08:06

## 2018-01-01 RX ADMIN — INSULIN ASPART 1 UNITS: 100 INJECTION, SOLUTION INTRAVENOUS; SUBCUTANEOUS at 11:06

## 2018-01-01 RX ADMIN — LOSARTAN POTASSIUM 12.5 MG: 25 TABLET, FILM COATED ORAL at 10:06

## 2018-01-01 RX ADMIN — INSULIN ASPART 8 UNITS: 100 INJECTION, SOLUTION INTRAVENOUS; SUBCUTANEOUS at 11:07

## 2018-01-01 RX ADMIN — ACETAMINOPHEN 650 MG: 325 TABLET, FILM COATED ORAL at 10:07

## 2018-01-01 RX ADMIN — INSULIN ASPART 8 UNITS: 100 INJECTION, SOLUTION INTRAVENOUS; SUBCUTANEOUS at 01:06

## 2018-01-01 RX ADMIN — CEFTRIAXONE SODIUM 1 G: 1 INJECTION, POWDER, FOR SOLUTION INTRAMUSCULAR; INTRAVENOUS at 04:06

## 2018-01-01 RX ADMIN — MORPHINE SULFATE 0.5 MG/HR: 10 INJECTION INTRAMUSCULAR; INTRAVENOUS; SUBCUTANEOUS at 01:07

## 2018-01-01 RX ADMIN — SODIUM PHOSPHATE, DIBASIC AND SODIUM PHOSPHATE, MONOBASIC 1 ENEMA: 7; 19 ENEMA RECTAL at 06:06

## 2018-01-01 RX ADMIN — ENOXAPARIN SODIUM 100 MG: 100 INJECTION SUBCUTANEOUS at 06:06

## 2018-01-01 RX ADMIN — INSULIN ASPART 5 UNITS: 100 INJECTION, SOLUTION INTRAVENOUS; SUBCUTANEOUS at 12:06

## 2018-01-01 RX ADMIN — HEPARIN SODIUM 5000 UNITS: 5000 INJECTION, SOLUTION INTRAVENOUS; SUBCUTANEOUS at 06:06

## 2018-01-01 RX ADMIN — POTASSIUM & SODIUM PHOSPHATES POWDER PACK 280-160-250 MG 2 PACKET: 280-160-250 PACK at 05:07

## 2018-01-01 RX ADMIN — LEVETIRACETAM 500 MG: 5 INJECTION INTRAVENOUS at 09:07

## 2018-01-01 RX ADMIN — POLYETHYLENE GLYCOL 3350 17 G: 17 POWDER, FOR SOLUTION ORAL at 08:07

## 2018-01-01 RX ADMIN — INSULIN ASPART 6 UNITS: 100 INJECTION, SOLUTION INTRAVENOUS; SUBCUTANEOUS at 12:07

## 2018-01-01 RX ADMIN — Medication 3 ML: at 01:06

## 2018-01-01 RX ADMIN — INSULIN ASPART 3 UNITS: 100 INJECTION, SOLUTION INTRAVENOUS; SUBCUTANEOUS at 06:07

## 2018-01-01 RX ADMIN — MANNITOL 100 G: 12.5 INJECTION, SOLUTION INTRAVENOUS at 08:07

## 2018-01-01 RX ADMIN — INSULIN ASPART 2 UNITS: 100 INJECTION, SOLUTION INTRAVENOUS; SUBCUTANEOUS at 04:07

## 2018-01-01 RX ADMIN — AMPICILLIN AND SULBACTAM 3 G: 2; 1 INJECTION, POWDER, FOR SOLUTION INTRAVENOUS at 09:06

## 2018-01-01 RX ADMIN — INSULIN DETEMIR 5 UNITS: 100 INJECTION, SOLUTION SUBCUTANEOUS at 11:06

## 2018-01-01 RX ADMIN — CARVEDILOL 12.5 MG: 12.5 TABLET, FILM COATED ORAL at 06:06

## 2018-01-01 RX ADMIN — ACETAMINOPHEN 650 MG: 650 SOLUTION ORAL at 03:06

## 2018-01-01 RX ADMIN — GLUCAGON HYDROCHLORIDE 1 MG: KIT at 02:06

## 2018-01-01 RX ADMIN — INSULIN ASPART 2 UNITS: 100 INJECTION, SOLUTION INTRAVENOUS; SUBCUTANEOUS at 06:07

## 2018-01-01 RX ADMIN — CARVEDILOL 12.5 MG: 12.5 TABLET, FILM COATED ORAL at 07:06

## 2018-01-01 RX ADMIN — Medication 0.15 MCG/KG/MIN: at 03:07

## 2018-01-01 RX ADMIN — ASPIRIN 81 MG: 81 TABLET, COATED ORAL at 10:06

## 2018-01-01 RX ADMIN — IPRATROPIUM BROMIDE AND ALBUTEROL SULFATE 3 ML: .5; 3 SOLUTION RESPIRATORY (INHALATION) at 10:06

## 2018-01-01 RX ADMIN — HYDROCODONE BITARTRATE AND ACETAMINOPHEN 1 TABLET: 5; 325 TABLET ORAL at 06:06

## 2018-01-01 RX ADMIN — INSULIN ASPART 4 UNITS: 100 INJECTION, SOLUTION INTRAVENOUS; SUBCUTANEOUS at 10:07

## 2018-01-01 RX ADMIN — CLINDAMYCIN IN 5 PERCENT DEXTROSE 600 MG: 12 INJECTION, SOLUTION INTRAVENOUS at 01:06

## 2018-01-01 RX ADMIN — ACETAMINOPHEN 650 MG: 650 SOLUTION ORAL at 11:06

## 2018-01-01 RX ADMIN — HYDROCODONE BITARTRATE AND ACETAMINOPHEN 1 TABLET: 5; 325 TABLET ORAL at 02:06

## 2018-01-01 RX ADMIN — HEPARIN SODIUM 5000 UNITS: 5000 INJECTION, SOLUTION INTRAVENOUS; SUBCUTANEOUS at 02:06

## 2018-01-01 RX ADMIN — AMPICILLIN AND SULBACTAM 3 G: 2; 1 INJECTION, POWDER, FOR SOLUTION INTRAVENOUS at 04:06

## 2018-01-01 RX ADMIN — INSULIN ASPART 3 UNITS: 100 INJECTION, SOLUTION INTRAVENOUS; SUBCUTANEOUS at 10:07

## 2018-01-01 RX ADMIN — FUROSEMIDE 40 MG: 40 TABLET ORAL at 10:06

## 2018-01-01 RX ADMIN — INSULIN ASPART 6 UNITS: 100 INJECTION, SOLUTION INTRAVENOUS; SUBCUTANEOUS at 06:07

## 2018-01-01 RX ADMIN — FAMOTIDINE 20 MG: 20 TABLET ORAL at 09:06

## 2018-01-01 RX ADMIN — INSULIN ASPART 8 UNITS: 100 INJECTION, SOLUTION INTRAVENOUS; SUBCUTANEOUS at 05:06

## 2018-01-01 RX ADMIN — PIPERACILLIN SODIUM AND TAZOBACTAM SODIUM 4.5 G: 4; .5 INJECTION, POWDER, LYOPHILIZED, FOR SOLUTION INTRAVENOUS at 01:06

## 2018-01-01 RX ADMIN — MICONAZOLE NITRATE: 20 POWDER TOPICAL at 12:07

## 2018-01-01 RX ADMIN — INSULIN ASPART 6 UNITS: 100 INJECTION, SOLUTION INTRAVENOUS; SUBCUTANEOUS at 09:07

## 2018-01-01 RX ADMIN — CHLORHEXIDINE GLUCONATE 0.12% ORAL RINSE 15 ML: 1.2 LIQUID ORAL at 09:06

## 2018-01-01 RX ADMIN — FAMOTIDINE 20 MG: 20 TABLET ORAL at 10:07

## 2018-01-01 RX ADMIN — INSULIN ASPART 2 UNITS: 100 INJECTION, SOLUTION INTRAVENOUS; SUBCUTANEOUS at 12:06

## 2018-01-01 RX ADMIN — SODIUM CHLORIDE 75 ML/HR: 3 INJECTION, SOLUTION INTRAVENOUS at 12:07

## 2018-01-01 RX ADMIN — POLYETHYLENE GLYCOL 3350 17 G: 17 POWDER, FOR SOLUTION ORAL at 10:07

## 2018-01-01 RX ADMIN — CITALOPRAM HYDROBROMIDE 10 MG: 10 TABLET ORAL at 07:06

## 2018-01-01 RX ADMIN — MANNITOL 50 G: 12.5 INJECTION, SOLUTION INTRAVENOUS at 06:07

## 2018-01-01 RX ADMIN — ACETAMINOPHEN 650 MG: 325 TABLET, FILM COATED ORAL at 10:06

## 2018-01-01 RX ADMIN — SODIUM CHLORIDE: 234 INJECTION INTRAMUSCULAR; INTRAVENOUS; SUBCUTANEOUS at 03:07

## 2018-01-01 RX ADMIN — POTASSIUM & SODIUM PHOSPHATES POWDER PACK 280-160-250 MG 2 PACKET: 280-160-250 PACK at 09:07

## 2018-01-01 RX ADMIN — ACETAMINOPHEN 650 MG: 650 SOLUTION ORAL at 12:06

## 2018-01-01 RX ADMIN — ACETAMINOPHEN 650 MG: 325 TABLET, FILM COATED ORAL at 03:06

## 2018-01-01 RX ADMIN — IPRATROPIUM BROMIDE AND ALBUTEROL SULFATE 3 ML: .5; 3 SOLUTION RESPIRATORY (INHALATION) at 09:06

## 2018-01-01 RX ADMIN — CLINDAMYCIN IN 5 PERCENT DEXTROSE 600 MG: 12 INJECTION, SOLUTION INTRAVENOUS at 04:06

## 2018-01-01 RX ADMIN — ACETAMINOPHEN 650 MG: 650 SOLUTION ORAL at 09:06

## 2018-01-01 RX ADMIN — APIXABAN 5 MG: 5 TABLET, FILM COATED ORAL at 08:06

## 2018-01-01 RX ADMIN — SENNOSIDES AND DOCUSATE SODIUM 1 TABLET: 8.6; 5 TABLET ORAL at 12:07

## 2018-01-01 RX ADMIN — LOSARTAN POTASSIUM 12.5 MG: 25 TABLET, FILM COATED ORAL at 12:06

## 2018-01-01 RX ADMIN — HYDROCODONE BITARTRATE AND ACETAMINOPHEN 1 TABLET: 5; 325 TABLET ORAL at 09:06

## 2018-01-01 RX ADMIN — INSULIN DETEMIR 13 UNITS: 100 INJECTION, SOLUTION SUBCUTANEOUS at 10:06

## 2018-01-01 RX ADMIN — WARFARIN SODIUM 7.5 MG: 2.5 TABLET ORAL at 05:06

## 2018-01-01 RX ADMIN — HYDROCODONE BITARTRATE AND ACETAMINOPHEN 1 TABLET: 5; 325 TABLET ORAL at 08:06

## 2018-01-01 RX ADMIN — INSULIN ASPART 5 UNITS: 100 INJECTION, SOLUTION INTRAVENOUS; SUBCUTANEOUS at 05:06

## 2018-01-01 RX ADMIN — SENNOSIDES AND DOCUSATE SODIUM 2 TABLET: 8.6; 5 TABLET ORAL at 12:06

## 2018-01-01 RX ADMIN — HEPARIN SODIUM 5000 UNITS: 5000 INJECTION, SOLUTION INTRAVENOUS; SUBCUTANEOUS at 05:06

## 2018-01-01 RX ADMIN — SENNA 8.6 MG: 8.6 TABLET, COATED ORAL at 12:06

## 2018-01-01 RX ADMIN — INSULIN ASPART 1 UNITS: 100 INJECTION, SOLUTION INTRAVENOUS; SUBCUTANEOUS at 11:07

## 2018-01-01 RX ADMIN — INSULIN ASPART 6 UNITS: 100 INJECTION, SOLUTION INTRAVENOUS; SUBCUTANEOUS at 05:07

## 2018-01-01 RX ADMIN — RISPERIDONE 1 MG: 1 SOLUTION ORAL at 10:06

## 2018-01-01 RX ADMIN — ACETAMINOPHEN 650 MG: 650 SOLUTION ORAL at 01:06

## 2018-01-01 RX ADMIN — Medication 3 ML: at 11:06

## 2018-01-01 RX ADMIN — IOHEXOL 20 ML: 300 INJECTION, SOLUTION INTRAVENOUS at 03:06

## 2018-01-01 RX ADMIN — CITALOPRAM HYDROBROMIDE 10 MG: 10 TABLET ORAL at 10:06

## 2018-01-01 RX ADMIN — ACETAMINOPHEN 650 MG: 650 SUPPOSITORY RECTAL at 04:06

## 2018-01-01 RX ADMIN — Medication 0.02 MCG/KG/MIN: at 12:07

## 2018-01-01 RX ADMIN — CLOPIDOGREL 75 MG: 75 TABLET, FILM COATED ORAL at 05:06

## 2018-01-01 RX ADMIN — CHLORHEXIDINE GLUCONATE 0.12% ORAL RINSE 15 ML: 1.2 LIQUID ORAL at 10:07

## 2018-01-01 RX ADMIN — MAGNESIUM OXIDE TAB 400 MG (241.3 MG ELEMENTAL MG) 800 MG: 400 (241.3 MG) TAB at 06:07

## 2018-01-01 RX ADMIN — SODIUM CHLORIDE: 234 INJECTION INTRAMUSCULAR; INTRAVENOUS; SUBCUTANEOUS at 10:07

## 2018-01-01 RX ADMIN — SODIUM CHLORIDE 300 ML/HR: 3 INJECTION, SOLUTION INTRAVENOUS at 02:07

## 2018-01-01 RX ADMIN — INSULIN ASPART 1 UNITS: 100 INJECTION, SOLUTION INTRAVENOUS; SUBCUTANEOUS at 12:07

## 2018-01-01 RX ADMIN — SENNOSIDES AND DOCUSATE SODIUM 1 TABLET: 8.6; 5 TABLET ORAL at 08:07

## 2018-01-01 RX ADMIN — CLINDAMYCIN IN 5 PERCENT DEXTROSE 600 MG: 12 INJECTION, SOLUTION INTRAVENOUS at 03:06

## 2018-01-01 RX ADMIN — SODIUM CHLORIDE: 234 INJECTION INTRAMUSCULAR; INTRAVENOUS; SUBCUTANEOUS at 01:07

## 2018-01-01 RX ADMIN — ACETAMINOPHEN 1000 MG: 10 INJECTION, SOLUTION INTRAVENOUS at 08:06

## 2018-01-01 RX ADMIN — ASPIRIN 81 MG CHEWABLE TABLET 81 MG: 81 TABLET CHEWABLE at 12:06

## 2018-01-01 RX ADMIN — GLYCERIN 1 SUPPOSITORY: 2 SUPPOSITORY RECTAL at 06:06

## 2018-01-01 RX ADMIN — ROSUVASTATIN CALCIUM 40 MG: 10 TABLET, FILM COATED ORAL at 08:06

## 2018-01-01 RX ADMIN — INSULIN ASPART 3 UNITS: 100 INJECTION, SOLUTION INTRAVENOUS; SUBCUTANEOUS at 09:07

## 2018-01-01 RX ADMIN — INSULIN ASPART 6 UNITS: 100 INJECTION, SOLUTION INTRAVENOUS; SUBCUTANEOUS at 11:06

## 2018-01-01 RX ADMIN — INSULIN DETEMIR 7 UNITS: 100 INJECTION, SOLUTION SUBCUTANEOUS at 11:06

## 2018-01-01 RX ADMIN — INSULIN ASPART 4 UNITS: 100 INJECTION, SOLUTION INTRAVENOUS; SUBCUTANEOUS at 11:06

## 2018-01-01 RX ADMIN — Medication 0.04 MCG/KG/MIN: at 02:07

## 2018-01-01 RX ADMIN — ASPIRIN 81 MG CHEWABLE TABLET 81 MG: 81 TABLET CHEWABLE at 04:07

## 2018-01-01 RX ADMIN — Medication 0.08 MCG/KG/MIN: at 12:07

## 2018-01-01 RX ADMIN — INSULIN DETEMIR 13 UNITS: 100 INJECTION, SOLUTION SUBCUTANEOUS at 12:06

## 2018-01-01 RX ADMIN — SODIUM CHLORIDE: 0.9 INJECTION, SOLUTION INTRAVENOUS at 05:06

## 2018-01-01 RX ADMIN — VANCOMYCIN HYDROCHLORIDE 1500 MG: 10 INJECTION, POWDER, LYOPHILIZED, FOR SOLUTION INTRAVENOUS at 10:06

## 2018-01-01 RX ADMIN — HYDROCODONE BITARTRATE AND ACETAMINOPHEN 1 TABLET: 5; 325 TABLET ORAL at 04:06

## 2018-01-01 RX ADMIN — PIPERACILLIN SODIUM AND TAZOBACTAM SODIUM 4.5 G: 4; .5 INJECTION, POWDER, LYOPHILIZED, FOR SOLUTION INTRAVENOUS at 09:06

## 2018-01-01 RX ADMIN — INSULIN ASPART 4 UNITS: 100 INJECTION, SOLUTION INTRAVENOUS; SUBCUTANEOUS at 07:06

## 2018-01-01 RX ADMIN — ACETAMINOPHEN 650 MG: 325 TABLET ORAL at 04:06

## 2018-01-01 RX ADMIN — STANDARDIZED SENNA CONCENTRATE AND DOCUSATE SODIUM 1 TABLET: 8.6; 5 TABLET, FILM COATED ORAL at 09:07

## 2018-01-01 RX ADMIN — ONDANSETRON 8 MG: 8 TABLET, ORALLY DISINTEGRATING ORAL at 09:06

## 2018-01-01 RX ADMIN — SENNOSIDES AND DOCUSATE SODIUM 2 TABLET: 8.6; 5 TABLET ORAL at 07:06

## 2018-01-01 RX ADMIN — LEVETIRACETAM 500 MG: 5 INJECTION INTRAVENOUS at 06:06

## 2018-01-01 RX ADMIN — SODIUM CHLORIDE 75 ML/HR: 3 INJECTION, SOLUTION INTRAVENOUS at 03:07

## 2018-01-01 RX ADMIN — Medication 2250 UNITS: at 12:07

## 2018-01-01 RX ADMIN — HEPARIN SODIUM 17 UNITS/KG/HR: 10000 INJECTION, SOLUTION INTRAVENOUS at 03:06

## 2018-01-01 RX ADMIN — ACETAMINOPHEN 650 MG: 325 TABLET ORAL at 05:06

## 2018-01-01 RX ADMIN — RAMELTEON 8 MG: 8 TABLET, FILM COATED ORAL at 01:06

## 2018-01-01 RX ADMIN — MODAFINIL 100 MG: 100 TABLET ORAL at 09:06

## 2018-01-01 RX ADMIN — HEPARIN SODIUM AND DEXTROSE 21 UNITS/KG/HR: 10000; 5 INJECTION INTRAVENOUS at 05:06

## 2018-01-01 RX ADMIN — IOHEXOL 10 ML: 300 INJECTION, SOLUTION INTRAVENOUS at 09:06

## 2018-01-01 RX ADMIN — HEPARIN SODIUM AND DEXTROSE 17 UNITS/KG/HR: 10000; 5 INJECTION INTRAVENOUS at 05:06

## 2018-03-05 PROBLEM — Z00.00 ROUTINE ADULT HEALTH MAINTENANCE: Status: RESOLVED | Noted: 2017-01-01 | Resolved: 2018-01-01

## 2018-06-04 PROBLEM — G45.9 TRANSIENT CEREBRAL ISCHEMIA: Status: ACTIVE | Noted: 2018-01-01

## 2018-06-04 PROBLEM — R29.90 EPISODE OF TRANSIENT NEUROLOGIC SYMPTOMS: Status: ACTIVE | Noted: 2018-01-01

## 2018-06-05 PROBLEM — E83.42 HYPOMAGNESEMIA: Status: ACTIVE | Noted: 2018-01-01

## 2018-06-05 PROBLEM — I63.9 CVA (CEREBRAL VASCULAR ACCIDENT): Status: ACTIVE | Noted: 2018-01-01

## 2018-06-06 PROBLEM — I63.9 STROKE: Status: ACTIVE | Noted: 2018-01-01

## 2018-06-06 PROBLEM — E83.39 HYPOPHOSPHATEMIA: Status: ACTIVE | Noted: 2018-01-01

## 2018-06-06 PROBLEM — E83.42 HYPOMAGNESEMIA: Status: RESOLVED | Noted: 2018-01-01 | Resolved: 2018-01-01

## 2018-06-06 PROBLEM — R41.82 ALTERED MENTAL STATUS: Status: ACTIVE | Noted: 2018-01-01

## 2018-06-07 PROBLEM — I63.511 ACUTE RIGHT ARTERIAL ISCHEMIC STROKE, MCA (MIDDLE CEREBRAL ARTERY): Status: ACTIVE | Noted: 2018-01-01

## 2018-06-07 PROBLEM — G81.14 LEFT SPASTIC HEMIPARESIS: Status: ACTIVE | Noted: 2018-01-01

## 2018-06-07 PROBLEM — I63.411 EMBOLIC STROKE INVOLVING RIGHT MIDDLE CEREBRAL ARTERY: Status: ACTIVE | Noted: 2018-01-01

## 2018-06-07 PROBLEM — I63.9 STROKE: Status: ACTIVE | Noted: 2018-01-01

## 2018-06-07 PROBLEM — R29.90 EPISODE OF TRANSIENT NEUROLOGIC SYMPTOMS: Status: RESOLVED | Noted: 2018-01-01 | Resolved: 2018-01-01

## 2018-06-07 PROBLEM — G93.6 CYTOTOXIC CEREBRAL EDEMA: Status: ACTIVE | Noted: 2018-01-01

## 2018-06-07 NOTE — PLAN OF CARE
Cm gave the patient's wife a list of rehab facilities. Cm asked for two to three choices. Cm will continue to follow.

## 2018-06-07 NOTE — PT/OT/SLP EVAL
Speech Language Pathology Evaluation  Cognitive/Bedside Swallow    Patient Name:  Mendez Guthrie   MRN:  [id number]  Admitting Diagnosis: Embolic stroke involving right middle cerebral artery    Recommendations:                  General Recommendations:  Dysphagia therapy, Speech/language therapy and Cognitive-linguistic therapy  Diet recommendations:  NPO, NPO   Aspiration Precautions: Alternate means of nutrition/hydration and Strict aspiration precautions   General Precautions: Standard, aspiration, fall, NPO  Communication strategies:  provide increased time to answer and loud vocal intensity, pt is [place]    History:     Past Medical History:   Diagnosis Date    Arthritis     Asthma     COPD (chronic obstructive pulmonary disease)     Coronary artery disease     Diabetes mellitus     Embolic stroke involving right middle cerebral artery 6/6/2018    High cholesterol     Hypertension     Long term current use of antithrombotics/antiplatelets     Lung cancer     Stroke 6/6/2018       Past Surgical History:   Procedure Laterality Date    cardiac bypass      CARDIAC SURGERY  05/04/2016    PORTACATH PLACEMENT  2012    cancer treatment       Social History: Patient lives with spouse, retired, driving, indpt.    Prior Intubation HX:  None this admission    Modified Barium Swallow: none this admission    Chest X-Rays: 6/7: Continued abnormal chest radiograph as described above, with an extensive pulmonary parenchymal opacity in the right upper lung zone.  However, there has been no significant detrimental interval change in the appearance of the chest since 06/05/2018, and in fact the appearance of the chest radiograph is stable when compared to examinations dating back over 2 years.    Prior diet: reg/thin, puree/thin following admit to [facility].      Subjective     Pt seen bedside with family present.  Family reports pt verbal and tolerating puree/thin prior to onset of new symptoms yesterday (facial droop,  "lethargy, confusion, L arm weakness per fam report).  Family reports pt has not spoken true words since onset of symptoms.       Pain/Comfort:  · Pain Rating 1: 0/10 (No response to pain questions, NAD)  · Pain Rating Post-Intervention 1: 0/10    Objective:     Cognitive Status:    Arousal/Alertness Inconsistent responses    Perseveration Present-verbal bilabial CV syllables        Receptive Language:   Comprehension:   impaired  Questions Simple yes/no 0%  Commands  One step x3 in function  Object identifications 0% in Fo 2     Pragmatics:    poor eye contact, reaching out with R arm    Expressive Language:  Verbal:    impaired, pt with repetitive bilabial CV syllables in resposne to all stim  Automatic Speech  Counting 0%, Pt counting on fingers though generalized vocalizations only, "wa,wa,wa..."  Repetition Words 0%, vowels "ah" only x2  Naming Confrontation 0%   Pt demonstrated use of object on 2/2 trials, did not identify when named, did not name verbally when prompted despite max cues.      Motor Speech:  cont to assess, limited verbalizations    Voice:   WFL    Visual-Spatial:  Max cues to open eyes, localization to SLP on L noted x1, cont to assess    Reading:   tba     Written Expression:   tba, right handed    Oral Musculature Evaluation  · Oral Musculature: unable to assess due to poor participation/comprehension, facial asymmetry present  · Dentition: present and adequate  · Secretion Management: problems swallowing secretions, left corner drooling, oral holding  · Mucosal Quality: adequate  · Oral Labial Strength and Mobility: impaired retraction  · Volitional Cough: max cues, fair   · Volitional Swallow: not elicited  · Voice Prior to PO Intake: clear, crackling/wet breath sounds    Bedside Swallow Eval:   Consistencies Assessed:  · Thin liquids ice chip x1  · Puree 1/2 tspn x1, 3/4 tspn x2     Oral Phase:   · Anterior loss  · Decreased closure around utensil  · Drooling  · Pocketing   Left puree, " entire final presentation suctioned from oral cavity  · Lingual residue  · Slow oral transit time   · Ice chip removed from lingual surface    Pharyngeal Phase:   · decreased hyolaryngeal excursion to palpation  · delayed swallow initation  · multiple spontaneous swallows    Compensatory Strategies  · None    Treatment: Education provided re: role of SLP, results of evaluation, cont npo, aspiration risk and SLP POC.  Family verbalized understanding and agreement.  No response by pt.  Pt's nurse alerted re: results and recs. White board updated.      Assessment:     Mendez Guthrie is a 68 y.o. male with an SLP diagnosis of Aphasia, Dysphagia and Cognitive-Linguistic Impairment.    Goals:    SLP Goals        Problem: SLP Goal    Goal Priority Disciplines Outcome   SLP Goal     SLP Ongoing (interventions implemented as appropriate)   Description:  Speech Language Pathology Goals  Goals expected to be met by 6/14  1. Pt will participate in ongoing assessment of swallow.   2. Pt will answer simple y/n questions with 60% accy given repeats.  3. Pt will follow simple commands with 60% accy given mod A.  4. Pt will complete auto speech tasks with 50% accy with max A.                          Plan:     · Patient to be seen:  5 x/week   · Plan of Care expires:  07/07/18  · Plan of Care reviewed with:  patient, spouse, family   · SLP Follow-Up:  Yes       Discharge recommendations:  Discharge Facility/Level Of Care Needs: rehabilitation facility (pending PT/OT )   Barriers to Discharge:  Level of Skilled Assistance Needed      Time Tracking:     SLP Treatment Date:   06/07/18  Speech Start Time:  0735  Speech Stop Time:  0757     Speech Total Time (min):  22 min    Billable Minutes: Eval 10  and Eval Swallow and Oral Function 12    MADDI Webb, CCC-SLP  06/07/2018

## 2018-06-07 NOTE — SUBJECTIVE & OBJECTIVE
Past Medical History:   Diagnosis Date    Arthritis     Asthma     COPD (chronic obstructive pulmonary disease)     Coronary artery disease     Diabetes mellitus     High cholesterol     Hypertension     Long term current use of antithrombotics/antiplatelets     Lung cancer     Stroke 6/6/2018     Past Surgical History:   Procedure Laterality Date    cardiac bypass      CARDIAC SURGERY  05/04/2016    PORTACATH PLACEMENT  2012    cancer treatment     Family History   Problem Relation Age of Onset    Cancer Father     Diabetes Father     Diabetes Mother     Pneumonia Sister     No Known Problems Brother     Pneumonia Sister     No Known Problems Sister     No Known Problems Brother     Diabetes Brother     No Known Problems Daughter     No Known Problems Daughter     No Known Problems Daughter     No Known Problems Daughter     Diabetes Son     No Known Problems Son     No Known Problems Son     No Known Problems Son      Social History   Substance Use Topics    Smoking status: Former Smoker     Packs/day: 1.00     Years: 39.00     Types: Cigarettes     Start date: 1/23/1972     Quit date: 5/23/2011    Smokeless tobacco: Never Used    Alcohol use 0.0 oz/week      Comment: occasionally     Review of patient's allergies indicates:   Allergen Reactions    Lisinopril Swelling    Norvasc [amlodipine] Swelling       Medications: I have reviewed the current medication administration record.    Prescriptions Prior to Admission   Medication Sig Dispense Refill Last Dose    aspirin 81 MG Chew Take 81 mg by mouth once daily.   Unknown    blood sugar diagnostic Strp Inject 1 strip as directed 3 (three) times daily with meals. Dispense One Touch Ultra Strips Dx:E11.9 100 strip 3 Unknown    carvedilol (COREG) 12.5 MG tablet Take 12.5 mg by mouth 2 (two) times daily with meals.   Unknown    chlorthalidone (HYGROTEN) 25 MG Tab Take 25 mg by mouth once daily.   Unknown    clopidogrel  (PLAVIX) 75 mg tablet Take 75 mg by mouth once daily.   Unknown    diazePAM (VALIUM) 5 MG tablet Take 1 tablet (5 mg total) by mouth every 6 (six) hours as needed for Anxiety. 1 tablet 0 Taking    docusate sodium (COLACE) 100 MG capsule Take 100 mg by mouth 2 (two) times daily as needed.    Unknown    doxazosin (CARDURA) 2 MG tablet Take 1 tablet (2 mg total) by mouth every evening. 30 tablet 11 Taking    fluticasone-salmeterol 250-50 mcg/dose (ADVAIR DISKUS) 250-50 mcg/dose diskus inhaler Inhale 1 puff into the lungs 2 (two) times daily. Controller 1 each 3 Unknown    hydrocodone-acetaminophen 5-325mg (NORCO) 5-325 mg per tablet Take 1 tablet by mouth every 4 (four) hours as needed for Pain. 18 tablet 0 Unknown    insulin aspart U-100 (NOVOLOG) 100 unit/mL InPn pen Inject 10 Units into the skin before dinner. 9 mL 3 Unknown    insulin detemir U-100 (LEVEMIR FLEXTOUCH U-100 INSULN) 100 unit/mL (3 mL) SubQ InPn pen Inject 15 Units into the skin every evening. Dispense with insulin pen needles 13.5 mL 3 Unknown    linagliptin (TRADJENTA) 5 mg Tab tablet Take 1 tablet (5 mg total) by mouth once daily. 90 tablet 3 Unknown    losartan (COZAAR) 50 MG tablet Take 50 mg by mouth once daily.   1 Unknown    metFORMIN (GLUCOPHAGE) 1000 MG tablet 'TAKE 1 TABLET BY MOUTH TWICE A DAY WITH MEALS 60 tablet 11 Unknown    nitroGLYCERIN (NITROSTAT) 0.4 MG SL tablet Place 1 tablet (0.4 mg total) under the tongue every 5 (five) minutes as needed for Chest pain. 10 tablet 1 Taking    PROAIR HFA 90 mcg/actuation inhaler    Unknown    rosuvastatin (CRESTOR) 40 MG Tab Take 1 tablet (40 mg total) by mouth every evening. 30 tablet 6 Unknown    SPIRIVA WITH HANDIHALER 18 mcg inhalation capsule inhale the contents of one capsule in the handihaler once daily 90 capsule 3 Unknown    traMADol (ULTRAM) 50 mg tablet take 1 tablet by mouth every 6 hours if needed 90 tablet 2 Unknown    VITAMIN D2 50,000 unit capsule TAKE 1 CAPSULE BY  MOUTH EVERY 7 DAYS 8 capsule 3 Unknown       Review of Systems   Constitutional: Positive for fever. Negative for chills.   HENT: Negative for ear discharge and ear pain.    Eyes: Negative for pain and itching.   Respiratory: Positive for cough. Negative for shortness of breath.    Gastrointestinal: Negative for abdominal distention and abdominal pain.   Genitourinary: Negative for difficulty urinating and dysuria.   Musculoskeletal: Positive for arthralgias. Negative for back pain.   Skin: Negative for rash and wound.   Neurological: Positive for facial asymmetry, speech difficulty and weakness.   Psychiatric/Behavioral: Positive for confusion.     Objective:     Vital Signs (Most Recent):  Temp: 99.1 °F (37.3 °C) (06/07/18 0330)  Pulse: 107 (06/07/18 0330)  Resp: 18 (06/07/18 0330)  BP: 115/70 (06/07/18 0330)  SpO2: (!) 92 % (06/07/18 0330)    Vital Signs Range (Last 24H):  Temp:  [99.1 °F (37.3 °C)-101.5 °F (38.6 °C)]   Pulse:  [105-118]   Resp:  [14-22]   BP: (106-168)/(59-86)   SpO2:  [92 %-97 %]     Physical Exam   Constitutional: He appears well-developed and well-nourished.   HENT:   Head: Normocephalic and atraumatic.   Eyes: EOM are normal. Pupils are equal, round, and reactive to light.   Neck: Normal range of motion.   Cardiovascular: Normal rate.    Pulmonary/Chest: Effort normal.   Abdominal: Soft.   Neurological:   Left sided weakness  Facial droop   Skin: Skin is warm and dry.   Nursing note and vitals reviewed.      Neurological Exam:   LOC: drowsy  Attention Span: poor  Language: No aphasia  Articulation: Dysarthria  Orientation: Not oriented to place, Not oriented to time  Visual Fields: Full  EOM (CN III, IV, VI): Full/intact  Pupils (CN II, III): PERRL  Facial Sensation (CN V): Normal  Facial Movement (CN VII): Lower facial weakness on the Left  Gag Reflex: present  Reflexes: flexor plantar responses bilaterally  Motor: Arm left  Plegia 0/5  Leg left  Paresis: 2/5  Arm right  Normal 5/5  Leg  right Normal 5/5  Cebellar: No evidence of appendicular or axial ataxia  Sensation: decrease sensation on the left  Tone: Flaccid  LUE  and LLE      Laboratory:  CMP: No results for input(s): GLUCOSE, CALCIUM, ALBUMIN, PROT, NA, K, CO2, CL, BUN, CREATININE, ALKPHOS, ALT, AST, BILITOT in the last 24 hours.  CBC:   Recent Labs  Lab 06/06/18  0508   WBC 6.42   RBC 4.07*   HGB 12.2*   HCT 37.5*      MCV 92   MCH 30.0   MCHC 32.5     Lipid Panel:   Recent Labs  Lab 06/04/18  2106   CHOL 178   LDLCALC 81.2   HDL 52   TRIG 224*     Coagulation:   Recent Labs  Lab 06/04/18 2106   INR 1.0   APTT 26.1     Hgb A1C:   Recent Labs  Lab 06/05/18  0042   HGBA1C 9.4*     TSH:   Recent Labs  Lab 06/04/18 2106   TSH 2.274       Diagnostic Results:      Brain imaging:  CT head w/o contrast 6-4-18 results:  Probable lacunar infarcts deep white matter adjacent to left frontal horn and adjacent to the left head of caudate likely chronic,     CT head w/o contrast 6-6-18 results:  Large right MCA distribution infarct measuring approximately 7 x 3 cm including the right perisylvian region and extending to the frontal and parietal operculum with associated localized mass effect.    Vessel Imaging:  MANJINDER Carotid US 6-6-18 results:  Homogeneous plaque is noted throughout the common carotid and internal carotid arteries bilaterally.  No definite visible focal stenosis is detected although visibility and velocity assessment is limited due to the patient's inability to cooperate during scanning.    Cardiac Evaluation:   EKG 6-4-18 results:  Sinus tachycardia with occasional Premature ventricular complexes  Biatrial enlargement  Rightward axis  Voltage criteria for left ventricular hypertrophy  T wave abnormality, consider inferolateral ischemia  When compared with ECG of 08-MAY-2018 14:21,  Premature ventricular complexes are now Present  Premature atrial complexes are no longer Present  Confirmed by Lemuel Barnhart MD ([id number]) on 6/5/2018  9:18:03 AM

## 2018-06-07 NOTE — SUBJECTIVE & OBJECTIVE
Past Medical History:   Diagnosis Date    Arthritis     Asthma     COPD (chronic obstructive pulmonary disease)     Coronary artery disease     Diabetes mellitus     Embolic stroke involving right middle cerebral artery 6/6/2018    High cholesterol     Hypertension     Long term current use of antithrombotics/antiplatelets     Lung cancer     Stroke 6/6/2018     Past Surgical History:   Procedure Laterality Date    cardiac bypass      CARDIAC SURGERY  05/04/2016    PORTACATH PLACEMENT  2012    cancer treatment     Review of patient's allergies indicates:   Allergen Reactions    Lisinopril Swelling    Norvasc [amlodipine] Swelling       Scheduled Medications:    [START ON 6/8/2018] aspirin  81 mg Per NG tube Daily    carvedilol  12.5 mg Per NG tube BID WM    [START ON 6/8/2018] citalopram  10 mg Per NG tube Daily    clopidogrel  75 mg Oral Daily    fluticasone-vilanterol  1 puff Inhalation Daily    heparin (porcine)  5,000 Units Subcutaneous Q8H    [START ON 6/8/2018] losartan  50 mg Per NG tube Daily    rosuvastatin  40 mg Per NG tube QHS    sodium chloride 0.9%  3 mL Intravenous Q8H    tiotropium  1 capsule Inhalation Daily       PRN Medications: acetaminophen, albuterol-ipratropium, dextrose 50%, glucagon (human recombinant), insulin aspart U-100, labetalol, ondansetron, polyethylene glycol, sodium chloride 0.9%    Family History     Problem Relation (Age of Onset)    Cancer Father    Diabetes Father, Mother, Brother, Son    No Known Problems Brother, Sister, Brother, Daughter, Daughter, Daughter, Daughter, Son, Son, Son    Pneumonia Sister, Sister        Social History Main Topics    Smoking status: Former Smoker     Packs/day: 1.00     Years: 39.00     Types: Cigarettes     Start date: 1/23/1972     Quit date: 5/23/2011    Smokeless tobacco: Never Used    Alcohol use 0.0 oz/week      Comment: occasionally    Drug use: No    Sexual activity: Not Currently     Review of Systems    Reason unable to perform ROS: aphasia.     Objective:     Vital Signs (Most Recent):  Temp: 100.2 °F (37.9 °C) (06/07/18 1350)  Pulse: 109 (06/07/18 1350)  Resp: 17 (06/07/18 1350)  BP: 122/80 (06/07/18 1350)  SpO2: (!) 94 % (06/07/18 1350)    Vital Signs (24h Range):  Temp:  [98.9 °F (37.2 °C)-100.2 °F (37.9 °C)] 100.2 °F (37.9 °C)  Pulse:  [100-118] 109  Resp:  [16-22] 17  SpO2:  [92 %-96 %] 94 %  BP: (106-135)/(59-89) 122/80     Body mass index is 34.53 kg/m².    Physical Exam   Constitutional: He appears well-developed and well-nourished.   obese   HENT:   Head: Normocephalic and atraumatic.   Eyes: EOM are normal. Pupils are equal, round, and reactive to light.   Neck: Normal range of motion.   Cardiovascular: Normal rate and regular rhythm.    Pulmonary/Chest: Effort normal. No respiratory distress.   Abdominal: Soft. There is no tenderness.   Musculoskeletal: Normal range of motion. He exhibits no deformity.   Neurological: He is alert.   + aphasia  -  Follows commands partially  - spontaneously move RUE/RLE   0/5 for LUE/LLE muscle strength    Skin: Skin is warm and dry.   Psychiatric: Cognition and memory are impaired. He is noncommunicative.     NEUROLOGICAL EXAMINATION:     CRANIAL NERVES     CN III, IV, VI   Pupils are equal, round, and reactive to light.  Extraocular motions are normal.       Diagnostic Results:   Labs: Reviewed  ECG: Reviewed  CT: Reviewed

## 2018-06-07 NOTE — CONSULTS
Inpatient consult to Physical Medicine Rehab  Consult performed by: PARUL KHAN  Consult ordered by: URVASHI SCRUGGS  Reason for consult: assess rehab needs        Reviewed patient history and current admission.  Rehab team following.  Full consult to follow.    AUREA Amezquita, FNP-C  Physical Medicine & Rehabilitation   06/07/2018  Spectralink: [id number]

## 2018-06-07 NOTE — H&P
[facility]  Vascular Neurology  Comprehensive Stroke Center  History & Physical    Consults  Assessment/Plan:     Patient is a 68 y.o. year old male with:    * Acute right arterial ischemic stroke, MCA (middle cerebral artery)    69 y/o male with R MCA infarct with left sided weakness, facial droop, dysarthria and confusion    Antithrombotics: DAPT    Statins: Crestor 40 mg daily    Aggressive risk factor modification: HTN, DM, HLD, Diet, Exercise, Obesity, CAD     Rehab efforts: PT/OT/SLP to evaluate and treat, PM&R consult     Diagnostics ordered/pending: MRA head to assess vasculature, MRA neck/arch to assess vasculature, MRI head without contrast to assess brain parenchyma, TTE to assess cardiac function/status     VTE prophylaxis: Heparin 5000 units SQ every 8 hours, SCD's    BP parameters: Infarct: No intervention, SBP <220            T2DM (type 2 diabetes mellitus)    Stroke risk factor  HgB A1C 9.4  SSI        COPD (chronic obstructive pulmonary disease)    Stroke risk factor  Home meds        HTN (hypertension)    Stroke risk factor  SBP <220  Home meds        CAD (coronary artery disease)    Stoke risk factor  Home meds        Left spastic hemiparesis    Due to stroke  Aggressive therapy        Cytotoxic cerebral edema    As seen on cerebral imaging            STROKE DOCUMENTATION          NIH Scale:  1a. Level Of Consciousness: 2-->Not alert: requires repeated stimulation to attend, or is obtunded and requires strong or painful stimulation to make movements (not stereotyped)  1b. LOC Questions: 2-->Answers neither question correctly  1c. LOC Commands: 2-->Performs neither task correctly  2. Best Gaze: 0-->Normal  3. Visual: 0-->No visual loss  4. Facial Palsy: 1-->Minor paralysis (flattened nasolabial fold, asymmetry on smiling)  5a. Motor Arm, Left: 4-->No movement  5b. Motor Arm, Right: 0-->No drift: limb holds 90 (or 45) degrees for full 10 secs  6a. Motor Leg, Left: 4-->No  "movement  6b. Motor Leg, Right: 3-->No effort against gravity: leg falls to bed immediately  7. Limb Ataxia: 0-->Absent  8. Sensory: 1-->Mild-to-moderate sensory loss: patient feels pinprick is less sharp or is dull on the affected side: or there is a loss of superficial pain with pinprick, but patient is aware of being touched  9. Best Language: 1-->Mild-to-moderate aphasia: some obvious loss of fluency or facility of comprehension, without significant limitation on ideas expressed or form of expression. Reduction of speech and/or comprehension, however, makes conversation. . . (see row details)  10. Dysarthria: 1-->Mild-to-moderate dysarthria: patient slurs at least some words and, at worst, can be understood with some difficulty  11. Extinction and Inattention (formerly Neglect): 0-->No abnormality  Total (NIH Stroke Scale): 21     Modified [place] Score: 0  [place] Coma Scale:10   ABCD2 Score:    [id number] Score:   HAS -BLED Score:   ICH Score:   Hunt & De Paz Classification:      Thrombolysis Candidate? No, transfer form [facility]       Interventional Revascularization Candidate?   Is the patient eligible for mechanical endovascular reperfusion (SERAFIN)?  No transfer form [facility]    Hemorrhagic change of an Ischemic Stroke: Does this patient have an ischemic stroke with hemorrhagic changes? No         Subjective:     History of Present Illness:  69 y/o male who Family reports at 8:15 PM on 6-4-18,  he became confused, had right sided weakness in both upper and lower extremity where he almost fell, drooling out of right side of mouth, and right sided facial droop.  On arrival to ED at [facility], pt. Is oriented only to person and place, has difficulty following commands, repetitive speech, refusing all medical care or interventions, trying to leave hospital.  He is unable to give any history and continually says "I'm fine."  Family states he drank beer last night, and one beer this morning.  He stated he felt bad this " morning, but otherwise was his normal self. Denies any recent illness, head trauma, h/o CVA or ICH, seizure activity, recent hospitalizations.  Family reports complete compliance with all medications including ASA and Plavix.  While in ED, patient's mentation slowly improved, but later worsened to where he was only AAO x 1.  Family did report that patient has no history of dementia, but did have cough and fever on 6/1/18 as well as HA's for 1 week prior to admission.  Telestroke consulted and suspicion of TIA; will be admitted for further evaluation and treatment.  CT with old microvascular ischemic changes. Attempted carotid US and MRI, however patient could not tolerate. Unable to perform LP, low suspicion for meningitis as patient was initially stable without antibiotics. Patient developed a fever 6/6/18 and was started on vanc/rocephin/ampicillin/acyclovir. Patient had some dysphagia at lunch 6/6/18, and a repeat CT was ordered after he was reported to have worsening facial droop. Repeat CT showed large infarct in R MCA division with possible mass effect. Telestroke was contacted and on re-review of the case, given his evolving neurological symptoms and presence of new massive stroke over two days, patient was transferred for higher level of care  Risk factors are HTN, HLP, CAD, DM, COPD on Plavix        Past Medical History:   Diagnosis Date    Arthritis     Asthma     COPD (chronic obstructive pulmonary disease)     Coronary artery disease     Diabetes mellitus     High cholesterol     Hypertension     Long term current use of antithrombotics/antiplatelets     Lung cancer     Stroke 6/6/2018     Past Surgical History:   Procedure Laterality Date    cardiac bypass      CARDIAC SURGERY  05/04/2016    PORTACATH PLACEMENT  2012    cancer treatment     Family History   Problem Relation Age of Onset    Cancer Father     Diabetes Father     Diabetes Mother     Pneumonia Sister     No Known Problems  Brother     Pneumonia Sister     No Known Problems Sister     No Known Problems Brother     Diabetes Brother     No Known Problems Daughter     No Known Problems Daughter     No Known Problems Daughter     No Known Problems Daughter     Diabetes Son     No Known Problems Son     No Known Problems Son     No Known Problems Son      Social History   Substance Use Topics    Smoking status: Former Smoker     Packs/day: 1.00     Years: 39.00     Types: Cigarettes     Start date: 1/23/1972     Quit date: 5/23/2011    Smokeless tobacco: Never Used    Alcohol use 0.0 oz/week      Comment: occasionally     Review of patient's allergies indicates:   Allergen Reactions    Lisinopril Swelling    Norvasc [amlodipine] Swelling       Medications: I have reviewed the current medication administration record.    Prescriptions Prior to Admission   Medication Sig Dispense Refill Last Dose    aspirin 81 MG Chew Take 81 mg by mouth once daily.   Unknown    blood sugar diagnostic Strp Inject 1 strip as directed 3 (three) times daily with meals. Dispense One Touch Ultra Strips Dx:E11.9 100 strip 3 Unknown    carvedilol (COREG) 12.5 MG tablet Take 12.5 mg by mouth 2 (two) times daily with meals.   Unknown    chlorthalidone (HYGROTEN) 25 MG Tab Take 25 mg by mouth once daily.   Unknown    clopidogrel (PLAVIX) 75 mg tablet Take 75 mg by mouth once daily.   Unknown    diazePAM (VALIUM) 5 MG tablet Take 1 tablet (5 mg total) by mouth every 6 (six) hours as needed for Anxiety. 1 tablet 0 Taking    docusate sodium (COLACE) 100 MG capsule Take 100 mg by mouth 2 (two) times daily as needed.    Unknown    doxazosin (CARDURA) 2 MG tablet Take 1 tablet (2 mg total) by mouth every evening. 30 tablet 11 Taking    fluticasone-salmeterol 250-50 mcg/dose (ADVAIR DISKUS) 250-50 mcg/dose diskus inhaler Inhale 1 puff into the lungs 2 (two) times daily. Controller 1 each 3 Unknown    hydrocodone-acetaminophen 5-325mg (NORCO) 5-325  mg per tablet Take 1 tablet by mouth every 4 (four) hours as needed for Pain. 18 tablet 0 Unknown    insulin aspart U-100 (NOVOLOG) 100 unit/mL InPn pen Inject 10 Units into the skin before dinner. 9 mL 3 Unknown    insulin detemir U-100 (LEVEMIR FLEXTOUCH U-100 INSULN) 100 unit/mL (3 mL) SubQ InPn pen Inject 15 Units into the skin every evening. Dispense with insulin pen needles 13.5 mL 3 Unknown    linagliptin (TRADJENTA) 5 mg Tab tablet Take 1 tablet (5 mg total) by mouth once daily. 90 tablet 3 Unknown    losartan (COZAAR) 50 MG tablet Take 50 mg by mouth once daily.   1 Unknown    metFORMIN (GLUCOPHAGE) 1000 MG tablet 'TAKE 1 TABLET BY MOUTH TWICE A DAY WITH MEALS 60 tablet 11 Unknown    nitroGLYCERIN (NITROSTAT) 0.4 MG SL tablet Place 1 tablet (0.4 mg total) under the tongue every 5 (five) minutes as needed for Chest pain. 10 tablet 1 Taking    PROAIR HFA 90 mcg/actuation inhaler    Unknown    rosuvastatin (CRESTOR) 40 MG Tab Take 1 tablet (40 mg total) by mouth every evening. 30 tablet 6 Unknown    SPIRIVA WITH HANDIHALER 18 mcg inhalation capsule inhale the contents of one capsule in the handihaler once daily 90 capsule 3 Unknown    traMADol (ULTRAM) 50 mg tablet take 1 tablet by mouth every 6 hours if needed 90 tablet 2 Unknown    VITAMIN D2 50,000 unit capsule TAKE 1 CAPSULE BY MOUTH EVERY 7 DAYS 8 capsule 3 Unknown       Review of Systems   Constitutional: Positive for fever. Negative for chills.   HENT: Negative for ear discharge and ear pain.    Eyes: Negative for pain and itching.   Respiratory: Positive for cough. Negative for shortness of breath.    Gastrointestinal: Negative for abdominal distention and abdominal pain.   Genitourinary: Negative for difficulty urinating and dysuria.   Musculoskeletal: Positive for arthralgias. Negative for back pain.   Skin: Negative for rash and wound.   Neurological: Positive for facial asymmetry, speech difficulty and weakness.   Psychiatric/Behavioral:  Positive for confusion.     Objective:     Vital Signs (Most Recent):  Temp: 99.1 °F (37.3 °C) (06/07/18 0330)  Pulse: 107 (06/07/18 0330)  Resp: 18 (06/07/18 0330)  BP: 115/70 (06/07/18 0330)  SpO2: (!) 92 % (06/07/18 0330)    Vital Signs Range (Last 24H):  Temp:  [99.1 °F (37.3 °C)-101.5 °F (38.6 °C)]   Pulse:  [105-118]   Resp:  [14-22]   BP: (106-168)/(59-86)   SpO2:  [92 %-97 %]     Physical Exam   Constitutional: He appears well-developed and well-nourished.   HENT:   Head: Normocephalic and atraumatic.   Eyes: EOM are normal. Pupils are equal, round, and reactive to light.   Neck: Normal range of motion.   Cardiovascular: Normal rate.    Pulmonary/Chest: Effort normal.   Abdominal: Soft.   Neurological:   Left sided weakness  Facial droop   Skin: Skin is warm and dry.   Nursing note and vitals reviewed.      Neurological Exam:   LOC: drowsy  Attention Span: poor  Language: No aphasia  Articulation: Dysarthria  Orientation: Not oriented to place, Not oriented to time  Visual Fields: Full  EOM (CN III, IV, VI): Full/intact  Pupils (CN II, III): PERRL  Facial Sensation (CN V): Normal  Facial Movement (CN VII): Lower facial weakness on the Left  Gag Reflex: present  Reflexes: flexor plantar responses bilaterally  Motor: Arm left  Plegia 0/5  Leg left  Paresis: 2/5  Arm right  Normal 5/5  Leg right Normal 5/5  Cebellar: No evidence of appendicular or axial ataxia  Sensation: decrease sensation on the left  Tone: Flaccid  LUE  and LLE      Laboratory:  CMP: No results for input(s): GLUCOSE, CALCIUM, ALBUMIN, PROT, NA, K, CO2, CL, BUN, CREATININE, ALKPHOS, ALT, AST, BILITOT in the last 24 hours.  CBC:   Recent Labs  Lab 06/06/18  0508   WBC 6.42   RBC 4.07*   HGB 12.2*   HCT 37.5*      MCV 92   MCH 30.0   MCHC 32.5     Lipid Panel:   Recent Labs  Lab 06/04/18  2106   CHOL 178   LDLCALC 81.2   HDL 52   TRIG 224*     Coagulation:   Recent Labs  Lab 06/04/18 2106   INR 1.0   APTT 26.1     Hgb A1C:   Recent  Labs  Lab 06/05/18  0042   HGBA1C 9.4*     TSH:   Recent Labs  Lab 06/04/18  2106   TSH 2.274       Diagnostic Results:      Brain imaging:  CT head w/o contrast 6-4-18 results:  Probable lacunar infarcts deep white matter adjacent to left frontal horn and adjacent to the left head of caudate likely chronic,     CT head w/o contrast 6-6-18 results:  Large right MCA distribution infarct measuring approximately 7 x 3 cm including the right perisylvian region and extending to the frontal and parietal operculum with associated localized mass effect.    Vessel Imaging:  MANJINDER Carotid US 6-6-18 results:  Homogeneous plaque is noted throughout the common carotid and internal carotid arteries bilaterally.  No definite visible focal stenosis is detected although visibility and velocity assessment is limited due to the patient's inability to cooperate during scanning.    Cardiac Evaluation:   EKG 6-4-18 results:  Sinus tachycardia with occasional Premature ventricular complexes  Biatrial enlargement  Rightward axis  Voltage criteria for left ventricular hypertrophy  T wave abnormality, consider inferolateral ischemia  When compared with ECG of 08-MAY-2018 14:21,  Premature ventricular complexes are now Present  Premature atrial complexes are no longer Present  Confirmed by Obey RAMESH, Lemuel ([id number]) on 6/5/2018 9:18:03 CHARLES Byrd, NP  [facility]  Department of Vascular Neurology   [facility]

## 2018-06-07 NOTE — ASSESSMENT & PLAN NOTE
See hospital course for functional, cognitive/speech/language, and nutrition/swallow status.      Recommendations  -  Encourage mobility, OOB in chair at least 3 hours per day, and early ambulation as appropriate   -  PT/OT evaluate and treat  -  SLP speech and cognitive evaluate and treat  -  Monitor sleep disturbances and establish consistent sleep-wake cycle  -  Monitor for bowel and bladder dysfunction  -  Monitor for shoulder pain and subluxation  -  Monitor for spasticity  -  Monitor for and prevent skin breakdown and pressure ulcers  · Early mobility, repositioning/weight shifting every 20-30 minutes when sitting, turn patient every 2 hours, proper mattress/overlay and chair cushioning, pressure relief/heel protector boots  -  DVT prophylaxis:  [facility]  -  Reviewed discharge options (IP rehab, SNF, HH therapy, and OP therapy)

## 2018-06-07 NOTE — CONSULTS
[facility]  Physical Medicine & Rehab  Consult Note    Patient Name: Mendez Guthrie  MRN: [id number]  Admission Date: 6/7/2018  Hospital Length of Stay: 0 days  Attending Physician: Jordan Barry MD     Inpatient consult to Physical Medicine & Rehabilitation  Consult performed by: Kassie Arzola NP  Consult requested by:  Jordan Barry MD    Reason for Consult:  assess rehabilitation needs    Consults  Subjective:     Principal Problem: Embolic stroke involving right middle cerebral artery    HPI: Mendez Guthrie is a 68-year-old male with PMHx of HTN, HLP, CAD, COPD, lung CA, & DM.  Patient presented to [facility] ED on 6/4 with aphasia and RSW .  CTH revealed no acute pathology.  A telemedicine consult was placed and completed.  Not a tPA candidate 2/2 resolution of symptoms and suspected TIA.  Hospital course complicated by left sided weakness, facial droop, dysarthria, and confusion.  Transferred to [facility] on 6/7 for further evaluation and management.  Upon admission, CTA pending.  VN following.  Hospital course complicated by L hemiparesis, dysphagia, and aphasia.     Functional History: Per chart review, patient lives in [place] with wife and daughters in a single story home with 1 step to enter.  Prior to admission, (I) with ADLs and mobility.  DME: none.    Hospital Course: 6/7/18: Evaluated by PT.  Bed mobility totalA. NPO with SLP with aphasia and Cognitive-Linguistic Impairment.       Past Medical History:   Diagnosis Date    Arthritis     Asthma     COPD (chronic obstructive pulmonary disease)     Coronary artery disease     Diabetes mellitus     Embolic stroke involving right middle cerebral artery 6/6/2018    High cholesterol     Hypertension     Long term current use of antithrombotics/antiplatelets     Lung cancer     Stroke 6/6/2018     Past Surgical History:   Procedure Laterality Date    cardiac bypass      CARDIAC SURGERY  05/04/2016    PORTACATH PLACEMENT  2012     cancer treatment     Review of patient's allergies indicates:   Allergen Reactions    Lisinopril Swelling    Norvasc [amlodipine] Swelling       Scheduled Medications:    [START ON 6/8/2018] aspirin  81 mg Per NG tube Daily    carvedilol  12.5 mg Per NG tube BID WM    [START ON 6/8/2018] citalopram  10 mg Per NG tube Daily    clopidogrel  75 mg Oral Daily    fluticasone-vilanterol  1 puff Inhalation Daily    heparin (porcine)  5,000 Units Subcutaneous Q8H    [START ON 6/8/2018] losartan  50 mg Per NG tube Daily    rosuvastatin  40 mg Per NG tube QHS    sodium chloride 0.9%  3 mL Intravenous Q8H    tiotropium  1 capsule Inhalation Daily       PRN Medications: acetaminophen, albuterol-ipratropium, dextrose 50%, glucagon (human recombinant), insulin aspart U-100, labetalol, ondansetron, polyethylene glycol, sodium chloride 0.9%    Family History     Problem Relation (Age of Onset)    Cancer Father    Diabetes Father, Mother, Brother, Son    No Known Problems Brother, Sister, Brother, Daughter, Daughter, Daughter, Daughter, Son, Son, Son    Pneumonia Sister, Sister        Social History Main Topics    Smoking status: Former Smoker     Packs/day: 1.00     Years: 39.00     Types: Cigarettes     Start date: 1/23/1972     Quit date: 5/23/2011    Smokeless tobacco: Never Used    Alcohol use 0.0 oz/week      Comment: occasionally    Drug use: No    Sexual activity: Not Currently     Review of Systems   Reason unable to perform ROS: aphasia.     Objective:     Vital Signs (Most Recent):  Temp: 100.2 °F (37.9 °C) (06/07/18 1350)  Pulse: 109 (06/07/18 1350)  Resp: 17 (06/07/18 1350)  BP: 122/80 (06/07/18 1350)  SpO2: (!) 94 % (06/07/18 1350)    Vital Signs (24h Range):  Temp:  [98.9 °F (37.2 °C)-100.2 °F (37.9 °C)] 100.2 °F (37.9 °C)  Pulse:  [100-118] 109  Resp:  [16-22] 17  SpO2:  [92 %-96 %] 94 %  BP: (106-135)/(59-89) 122/80     Body mass index is 34.53 kg/m².    Physical Exam   Constitutional: He appears  well-developed and well-nourished.   obese   HENT:   Head: Normocephalic and atraumatic.   Eyes: EOM are normal. Pupils are equal, round, and reactive to light.   Neck: Normal range of motion.   Cardiovascular: Normal rate and regular rhythm.    Pulmonary/Chest: Effort normal. No respiratory distress.   Abdominal: Soft. There is no tenderness.   Musculoskeletal: Normal range of motion. He exhibits no deformity.   Neurological: He is alert.   + aphasia  -  Follows commands partially  - spontaneously move RUE/RLE   0/5 for LUE/LLE muscle strength    Skin: Skin is warm and dry.   Psychiatric: Cognition and memory are impaired. He is noncommunicative.       Diagnostic Results:   Labs: Reviewed  ECG: Reviewed  CT: Reviewed    Assessment/Plan:     * Embolic stroke involving right middle cerebral artery    See hospital course for functional, cognitive/speech/language, and nutrition/swallow status.      Recommendations  -  Encourage mobility, OOB in chair at least 3 hours per day, and early ambulation as appropriate   -  PT/OT evaluate and treat  -  SLP speech and cognitive evaluate and treat  -  Monitor sleep disturbances and establish consistent sleep-wake cycle  -  Monitor for bowel and bladder dysfunction  -  Monitor for shoulder pain and subluxation  -  Monitor for spasticity  -  Monitor for and prevent skin breakdown and pressure ulcers  · Early mobility, repositioning/weight shifting every 20-30 minutes when sitting, turn patient every 2 hours, proper mattress/overlay and chair cushioning, pressure relief/heel protector boots  -  DVT prophylaxis:  [facility]  -  Reviewed discharge options (IP rehab, SNF, HH therapy, and OP therapy)          Left spastic hemiparesis    -2/2 stroke     Recommendations  -  PT/OT evaluate and treat  -  Initiate stretching program  -  Use splints/casting/bracing to help preserve ROM    -  Functional electrical stimulation  -  Consider pharmaceutical management- (baclofen, diazepam, clonidine,  tizanidine)  -  Follow up in PM&R clinic 2-4 weeks after discharge      CAD (coronary artery disease)    -stroke risk factor        HTN (hypertension)    -stroke risk factor        COPD (chronic obstructive pulmonary disease)    -stroke risk factor        T2DM (type 2 diabetes mellitus)    -stroke risk factor        OT evaluation pending.  Will follow progress and discuss with rehab team for rehab recommendation.      Thank you for your consult.     Kassie Arzola NP  Department of Physical Medicine & Rehab  [facility]

## 2018-06-07 NOTE — PLAN OF CARE
Problem: Physical Therapy Goal  Goal: Physical Therapy Goal  Goals to be met by: 2018     Patient will increase functional independence with mobility by performin. Supine to sit with Moderate Assistance  2. Sit to supine with Moderate Assistance  3. Sit to stand transfer with Maximum Assistance  4. Bed to chair transfer with Maximum Assistance  5. Gait  x 10 feet with Maximum Assistance with or without appropriate AD   6. Sitting at edge of bed x10 minutes with Minimal Assistance  7. Lower extremity exercise program x15 reps per handout, with assistance as needed    Outcome: Ongoing (interventions implemented as appropriate)  Pt evaluation complete; pt goals set.    JOSUÉ COOL, PT  2018

## 2018-06-07 NOTE — PLAN OF CARE
PCP:Valeria Mayen MD    Extended Emergency Contact Information  Primary Emergency Contact: Jeniffer Guthrie  Address: [address]           [place] [address] [facility]  Home Phone: [phone]  Relation: Spouse  Secondary Emergency Contact: [place] F fitz[place]   [place]  Mobile Phone: [phone]  Relation: Daughter    [facility] - [place], [place] - [id number]  [id number]  [place] [address]  Phone: [phone] Fax: [phone]    RONNI ELLIOTT [facility] [place], [place] - [address]  [address]  [place] [address]  Phone: [phone] Fax: [phone]    Payor: MEDICARE / Plan: MEDICARE PART A & B / Product Type: Government /     Patient was independent living with his spouse and two adult daughters prior to hospitalization. Cm awaiting PT/OT/SLP recommendations. Cm will continue to follow.     06/07/18 1000   Discharge Assessment   Assessment Type Discharge Planning Assessment   Confirmed/corrected address and phone number on facesheet? Yes   Assessment information obtained from? Patient;Caregiver   Expected Length of Stay (days) 4   Communicated expected length of stay with patient/caregiver yes   Prior to hospitilization cognitive status: Alert/Oriented   Prior to hospitalization functional status: Independent   Current cognitive status: Unable to Assess   Current Functional Status: Needs Assistance   Fac[facility] From: [facility]   Lives With spouse;child(opal), adult   Able to Return to Prior Arrangements no   Is patient able to care for self after discharge? No   Who are your caregiver(s) and their phone number(s)? Jeniffer Guthrie (spouse) [phone]   Patient's perception of discharge disposition rehab facility   Readmission Within The Last 30 Days no previous admission in last 30 days   Patient currently being followed by outpatient case management? No   Patient currently receives any other outside agency  services? No   Equipment Currently Used at Home none   Do you have any problems affording any of your prescribed medications? No   Is the patient taking medications as prescribed? yes   Does the patient have transportation home? Yes   Transportation Available car;family or friend will provide   Does the patient receive services at the [facility]? No   Discharge Plan A Rehab   Discharge Plan B Skilled Nursing Facility   Patient/Family In Agreement With Plan yes

## 2018-06-07 NOTE — PT/OT/SLP EVAL
Occupational Therapy   Evaluation    Name: Mendez Guthrie  MRN: [id number]  Admitting Diagnosis:  Embolic stroke involving right middle cerebral artery      Recommendations:     Discharge Recommendations: rehabilitation facility  Discharge Equipment Recommendations:   (TBD pending progress)  Barriers to discharge:   None    History:     Occupational Profile:  Living Environment: Pt lives with wife and children in [facility], 2 [place];  Bathroom contains tub/shower combination.  Previous level of function: Pt was (I) with all ADLs or mobility.  Roles and Routines: Pt is retired, enjoys cooking, drives, father, , enjoys being active  Equipment Owned:  none  Assistance upon Discharge: Family able to provide assist upon d/c.     Past Medical History:   Diagnosis Date    Arthritis     Asthma     COPD (chronic obstructive pulmonary disease)     Coronary artery disease     Diabetes mellitus     Embolic stroke involving right middle cerebral artery 6/6/2018    High cholesterol     Hypertension     Long term current use of antithrombotics/antiplatelets     Lung cancer     Stroke 6/6/2018       Past Surgical History:   Procedure Laterality Date    cardiac bypass      CARDIAC SURGERY  05/04/2016    PORTACATH PLACEMENT  2012    cancer treatment       Subjective     Chief Complaint: Weakness in L side of body  Patient/Family stated goals: Resume PLOF  Communicated with: RN prior to session.  Pain/Comfort:  · Pain Rating 1: 0/10  · Pain Rating Post-Intervention 2: 0/10    Patients cultural, spiritual, Christianity conflicts given the current situation:      Objective:     Patient found with: telemetry, Condom Catheter, peripheral IV    General Precautions: Standard, aspiration, fall, NPO   Orthopedic Precautions:N/A   Braces: N/A     Occupational Performance:    Bed Mobility:    · Patient completed Rolling/Turning to Left with  total assistance  · Patient completed Scooting/Bridging with total assistance  · Patient  completed Supine to Sit with total assistance  · Patient completed Sit to Supine with total assistance    Functional Mobility/Transfers:  · Not assessed this date 2* pt with decreased postural control and difficulty following commands.    Activities of Daily Living:  · Grooming: maximal assistance to maintain upright position.  Once balance maintained with assist OT placed cloth in pt's hand and pt immediately elevated to face and performed task on R side.  OT instructed pt to try and perform task again and wash other side too.  With cues pt able to touch L side.      Cognitive/Visual Perceptual:  Cognitive/Psychosocial Skills:    -       Oriented to: Unable to provide responses to questions.   -       Follows Commands/attention: Followed 1-2 commands; not consistent  -       Communication: Expressive aphasia; pt made sounds but no words spoken  -       Memory: Possible deficits  -       Safety awareness/insight to disability: Pt aware of weakness in L side of body  -       Mood/Affect/Coping skills/emotional control: Tearful    Physical Exam:  Postural examination/scapula alignment:   -       Rounded shoulders  -       Head Forward  Skin integrity: Visible skin intact  Edema:  None noted  Sensation: Unable to formally assess  Motor Planning: Deficits present  Dominant hand: Right  Upper Extremity Range of Motion:    -       Right Upper Extremity: WFL; mostly spontaneous movement observed.  Pt did not follow commands consistently  -       Left Upper Extremity: Unable to initiate movement  Upper Extremity Strength:   -       Right Upper Extremity: WFL as seen through observation; unable to perform formal MMT  -       Left Upper Extremity: Deficits: Unable to initiate movement; 0/5   Strength: 4/5 right hand; 0/5 left hand  Fine Motor Coordination: -       Intact for R hand; impaired L hand  Gross motor coordination: Deficits noted  Balance: Sitting- Max A; Unable to assess standing this date    Patient left  supine with all lines intact, call button in reach and PT and family present    [facility] 6 Click:  [facility] Total Score: 8    Treatment & Education:  *Pt sat EOB for ~10 minutes with Max A required to maintain upright position.  *PROM performed on LUE: 1 set x 10 reps; pt tolerated well  *Pt and family educated on role of OT and POC discussed  *Whiteboard updated  Education:    Other:  *Purposeful movement noted below:   *Pt reached for wife with RUE and leaned over to right hug her several times during session while seated EOB.  *Pt lifted LUE with RUE to allow for pillow to be placed under LLE.  *Pt appeared aware of L sided deficits.  He pointed and held his LLE several times during session and became tearful.      Assessment:     Mendez Guthrie is a 68 y.o. male with a medical diagnosis of Embolic stroke involving right middle cerebral artery.  He presents with the following performance deficits affecting function: weakness, gait instability, impaired endurance, impaired balance, impaired self care skills, decreased lower extremity function, decreased upper extremity function, impaired functional mobilty, abnormal tone, impaired cognition, impaired sensation, decreased safety awareness, decreased coordination, impaired fine motor.  Pt emotional during evaluation crying and reaching out for wife with RUE throughout session so encouragement and reassurance provided to help soothe him.  Pt able to initiate movement in RUE; however, no movement detected in LUE this date.  While seated EOB Max A required to maintain upright position.  With that assist level for sitting balance pt performed grooming task utilizing RUE, and with cues pt able to clean L side of face too.  PTA family reports pt was (I) with all ADLs and mobility.  Pt is currently not at [facility] and would benefit from skilled OT services to address problems listed below and increase independence with ADLs.  Rehab is recommended upon d/c from acute care to further  "address deficits and help pt improve overall functional independence.     Rehab Prognosis:  Good; patient would benefit from acute skilled OT services to address these deficits and reach maximum level of function.         Clinical Decision Makin.  OT Mod:  "Pt evaluation falls under moderate complexity for evaluation coding due to identification of 3-5 performance deficits noted as stated above. Eval required Min/Mod assistance to complete on this date and detailed assessment(s) were utilized. Moreover, an expanded review of history and occupational profile obtained with additional review of cognitive, physical and psychosocial hx."     Plan:     Patient to be seen   to address the above listed problems via self-care/home management, therapeutic activities, therapeutic exercises, neuromuscular re-education, cognitive retraining  · Plan of Care Expires:    · Plan of Care Reviewed with: patient, spouse, daughter    This Plan of care has been discussed with the patient who was involved in its development and understands and is in agreement with the identified goals and treatment plan    GOALS:    Occupational Therapy Goals        Problem: Occupational Therapy Goal    Goal Priority Disciplines Outcome Interventions   Occupational Therapy Goal     OT, PT/OT     Description:  Goals to be met by: 2018     Patient will increase functional independence with ADLs by performing:    UE Dressing with Moderate Assistance.  LE Dressing with Maximum Assistance.  Grooming while seated with Contact Guard Assistance.  Toileting from bedside commode with Moderate Assistance for hygiene and clothing management.   Sitting at edge of bed x 15 minutes with Minimal Assistance.  Supine to sit with Moderate Assistance.  Stand pivot transfers with Moderate Assistance.  Toilet transfer to bedside commode with Moderate Assistance.  Pt will initiate movement in LUE.  Pt will follow 50% of one step commands.  Pt will keep eyes open " 100% of session.                      Time Tracking:     OT Date of Treatment: 06/07/18  OT Start Time: 1110  OT Stop Time: 1128  OT Total Time (min): 18 min    Billable Minutes:Evaluation 18    YOSEPH Magana  6/7/2018

## 2018-06-07 NOTE — ASSESSMENT & PLAN NOTE
-2/2 stroke     Recommendations  -  PT/OT evaluate and treat  -  Initiate stretching program  -  Use splints/casting/bracing to help preserve ROM    -  Functional electrical stimulation  -  Consider pharmaceutical management- (baclofen, diazepam, clonidine, tizanidine)  -  Follow up in PM&R clinic 2-4 weeks after discharge

## 2018-06-07 NOTE — PLAN OF CARE
Problem: Occupational Therapy Goal  Goal: Occupational Therapy Goal  Goals to be met by: 6/17/2018     Patient will increase functional independence with ADLs by performing:    UE Dressing with Moderate Assistance.  LE Dressing with Maximum Assistance.  Grooming while seated with Contact Guard Assistance.  Toileting from bedside commode with Moderate Assistance for hygiene and clothing management.   Sitting at edge of bed x 15 minutes with Minimal Assistance.  Supine to sit with Moderate Assistance.  Stand pivot transfers with Moderate Assistance.  Toilet transfer to bedside commode with Moderate Assistance.  Pt will initiate movement in LUE.  Pt will follow 50% of one step commands.  Pt will keep eyes open 100% of session.      OT evaluation complete and POC established.  Rehab is recommended upon d/c from acute care to further address deficits and help pt improve overall functional independence.     YOSEPH Magana  6/7/2018

## 2018-06-07 NOTE — HOSPITAL COURSE
6/7/18: Evaluated by therapy.  Bed mobility totalA and grooming MaxA. NPO with SLP with aphasia and Cognitive-Linguistic Impairment.   6/8/18: SLP notes Aphasia, Dysphagia and Cognitive-Linguistic Impairment.  NGT in place.   6/11/18: Limited participated with therapy 2/2 lethargy.  Bed mobility dependent. NPO with NGT.

## 2018-06-07 NOTE — HPI
Mendez Guthrie is a 68-year-old male with PMHx of HTN, HLP, CAD, COPD, lung CA, & DM.  Patient presented to [facility] ED on 6/4 with aphasia and RSW .  CTH revealed no acute pathology.  A telemedicine consult was placed and completed.  Not a tPA candidate 2/2 resolution of symptoms and suspected TIA.  Hospital course complicated by left sided weakness, facial droop, dysarthria, & confusion.  Transferred [place] [facility] [place]7 for further evaluation and management.  Upon admission, CTA performed with pending results.  Hospital course complicated by L hemiparesis, dysphagia (NGT in place), aphasia, hyponatremia, fever (101.9 with last 24 hours-BCX NGTD and urine cx pending. CXR-opacity in the right upper lung zone unchanged since last xray on 6/4). Woking up fever-Patient unable to sit still for MRI, bubble study, &  LP. No nucchal rigidity-not concerned for meningitis     Functional History: Per chart review, patient lives in [place] with wife and daughters in a single story home with 1 step to enter.  Prior to admission, (I) with ADLs and mobility.  DME: none.

## 2018-06-07 NOTE — PT/OT/SLP EVAL
Physical Therapy Evaluation    Patient Name:  Mendez Guthrie   MRN:  [id number]    Recommendations:     Discharge Recommendations:  rehabilitation facility   Discharge Equipment Recommendations: other (see comments) (TBD)   Barriers to discharge: Decreased caregiver support    Assessment:     Mendez Guthrie is a 68 y.o. male admitted with a medical diagnosis of Embolic stroke involving right middle cerebral artery.  He presents with the following impairments/functional limitations:  weakness, gait instability, impaired endurance, impaired balance, decreased lower extremity function, impaired functional mobilty, decreased coordination, impaired cognition, impaired sensation, decreased safety awareness, decreased upper extremity function, impaired coordination, abnormal tone, impaired self care skills, impaired fine motor. Pt performed bed mobility total A (assist x2) and sat EOB with max A. Pt will benefit from skilled PT to improve deficits and increase overall functional mobility. Pt highly motivated and will benefit from IP Rehab to return to PLOF, decrease caregiver burden and ensure safety upon d/c.     Rehab Prognosis:  Good; patient would benefit from acute skilled PT services to address these deficits and reach maximum level of function.      Recent Surgery: * No surgery found *      Plan:     During this hospitalization, patient to be seen 4 x/week to address the above listed problems via gait training, therapeutic activities, therapeutic exercises, neuromuscular re-education, wheelchair management/training  · Plan of Care Expires:  07/07/18   Plan of Care Reviewed with: patient, spouse, daughter    Subjective     Communicated with RN prior to session.  Patient found supine in bed and family present upon PT entry to room, agreeable to evaluation.      Family comments/goals: pt return to PLOF  Pain/Comfort:  Pain Rating 1: 0/10  Pain Rating Post-Intervention 1: 0/10    Living Environment:  Pt aphasic and  unable to report history, pt wife and daughters reported history. Pt lives with wife and daughters in 1-story house with OWEN. Pt was (I) with ADLs and amb.   Prior to admission, patients level of function was (I).  Patient has the following equipment: none.  DME owned (not currently used): none.  Upon discharge, patient will have assistance from family.    Objective:     Patient found with: telemetry, Condom Catheter, peripheral IV     General Precautions: Standard, aspiration, fall, NPO   Orthopedic Precautions:N/A   Braces: N/A     Exams:  · Cognitive Exam:  Patient is oriented to self, limited command follow  · Gross Motor Coordination:  impaired  · Postural Exam:  Patient presented with the following abnormalities:    · -       Rounded shoulders  · -       Forward head  · Sensation:    · -       Impaired  light/touch L LE  · Skin Integrity/Edema:      · -       Skin integrity: Visible skin intact  · RLE ROM: WFL  · RLE Strength: spontaneous mvt but unable to follow commands for MMT  · LLE ROM: WFL  · LLE Strength: limited mvt; unable to follow commands for MMT    Functional Mobility:  Bed Mobility:     · Scooting: total assistance and of 2 persons  · Supine to Sit: total assistance and of 2 persons  · Sit to Supine: total assistance and of 2 persons    AM-PAC 6 CLICK MOBILITY  Total Score:6     Therapeutic Activities and Exercises:  Pt aphasic, limited command follow, and emotional throughout session.  Pt able to perform automatic task such as reaching and hugging his wife while seated EOB.  Pt sat EOB with max A for post lean, pt able to activate core musculature to lean to R to hug his wife.   Pt positioned with bed in chair position and pt able to cross midline with R UE to grab his L UE.   Pt and family educated on:  -role of PT/POC  -bed positioning and propping L UE    Patient left with bed in chair position with all lines intact, call button in reach, RN notified and family present.    GOALS:    Physical  Therapy Goals        Problem: Physical Therapy Goal    Goal Priority Disciplines Outcome Goal Variances Interventions   Physical Therapy Goal     PT/OT, PT Ongoing (interventions implemented as appropriate)     Description:  Goals to be met by: 2018     Patient will increase functional independence with mobility by performin. Supine to sit with Moderate Assistance  2. Sit to supine with Moderate Assistance  3. Sit to stand transfer with Maximum Assistance  4. Bed to chair transfer with Maximum Assistance  5. Gait  x 10 feet with Maximum Assistance with or without appropriate AD   6. Sitting at edge of bed x10 minutes with Minimal Assistance  7. Lower extremity exercise program x15 reps per handout, with assistance as needed                      History:     Past Medical History:   Diagnosis Date    Arthritis     Asthma     COPD (chronic obstructive pulmonary disease)     Coronary artery disease     Diabetes mellitus     Embolic stroke involving right middle cerebral artery 2018    High cholesterol     Hypertension     Long term current use of antithrombotics/antiplatelets     Lung cancer     Stroke 2018       Past Surgical History:   Procedure Laterality Date    cardiac bypass      CARDIAC SURGERY  2016    PORTACATH PLACEMENT  2012    cancer treatment       Clinical Decision Making:     Decision Making/ Complexity Score   On examination of body system using standardized tests and measures patient presents with 3 or more elements from any of the following: body structures and functions, activity limitations, and/or participation restrictions.  Leading to a clinical presentation that is considered evolving with changing characteristics                              Clinical Decision Making  (Eval Complexity):  Moderate - 08778     Time Tracking:     PT Received On: 18  PT Start Time: 1110     PT Stop Time: 1129  PT Total Time (min): 19 min     Billable Minutes: Evaluation  19      JOSUÉ COOL, PT  06/07/2018

## 2018-06-07 NOTE — HOSPITAL COURSE
6/8 - Urine studies ordered for hyponatremia. EEG results pending. Bilateral upper/lower extremities US ordered for fever. Procal WNL. Infectious workup negative so far. Cardiology consulted for EF 15-20%.   6-9-18 will give lasix 40 mg NG today as per cardiology recs. Discussion with family regarding anticoagulation due to low EF and DVT left axillary vein and brachial vein, discussion regarding feeding as well and likely bernard of patient swallowing is minimal so will need PEG next week as opposed to waiting and family is in agreement so will start heparin drip with no bolus parameters  6-10-18 once EEG read and no seizure activity may start Modafinil to help with wakefulness  6/11 - EEG to be read today and consider Modafinil. IR consulted for PEG placement tomorrow. Hospital medicine ordering urine studies for hyponatremia.   6/12 - PEG to be placed today. Heparin gtt currently held. Will resume once PEG is placed. AC to start tomorrow once PEG is able to be used. Hyponatremia slightly improving. Continue to hold fluids due to hyponatremia thought to be due to SIADH. Modafinil to be started tomorrow when PEG can be used.  6/13 - PEG placed yesterday. Heparin gtt dc'ed and eliquis started. Modafinil started today. Continuing to restrict fluids (enteral water boluses) due to SIADH/hyponatremia and holding off on lasix that cardiology recommended.  6/14/18 - concern for sepsis today - medicine consulted - hypotensive, febrile, procal elevated.   6/15: Hypotensive to 88/36 overnight; good response to 250cc bolus. Hospital medicine adjusting insulin and antibiotics regimen. Wakefulness much improved since starting Modafinil. Mild bleeding noted at NC site.  6/16: Pt hypotensive, requiring another bolus yesterday; BP so far stable since. Modafinil held today as family felt made pt more aggressive; plan for decreased dose tomorrow. Added Lactulose PRN to bowel regimen.  6/17: Pt clinically improved from  infection/respiratory perspective; medicine de-escalated abx regimen. No further hypotension episodes. Reduced-dose Modafinil today; will monitor response.  6/18: Pt pulled out PEG yesterday evening; ford placed to maintain patency. Attempted to place NGT but pt agitated, family refused. PO meds held for now, including Eliquis (per IR request). D/c'd scheduled suppository, pt on Senna.  6/19 - pulled ford from peg site last night - see note. Replaced in IR today. Patient in wrist restraint with abdominal binder. Family at bedside, educated on importance of protection of peg site. Dc modafinil   6/20 - Meds and TF resumed through PEG tube. Restraint still applied to right arm and abdominal binder applied. Unasyn completed.   6/21 - Patient continues to be agitated at times. Right arm restraint continues so patient will not pull out PEG. Psych consulted. They will complete full consult tomorrow. Suggested risperidone 1 mg qhs and decrease narcotic use.   6/22 - Psych to complete consult today. Appreciate recs. Sodium 132, enteral water bolus decreased to 800 mg four times daily. Continue to monitor. Goal is to dc restraint today after psych makes recommendations.  6/23 - agitation improved, hyponatremia noted on labs but normal when corrected for glucose. It was noted that cardiology recommended aspirin and anticoagulation. Discussed with Dr. Gerardo and started patient on asa 81  6/24 - family reports patient had LLE pain, US LE pending, increased insulin, may need nutrition consult for change in tube feed formula if hyperglycemia persists  6/25: Psych adjusted med regimen. Pt to remain on Diabetisource, increased detemir. D/c eliquis, start coumadin for cost barriers. US LE negative.  6/26: INR 1.0; INR-adjusted target Lovenox bridge to Coumadin to continue at SNF. Increased Detemir dosing. Corrected Na WNL. D/c to SNF today.      Please see above for detailed hospital course.     Patient seen by the following  consultants during stay - Cardiology (HFrF, CAD), Pulmonology (Right endobronchial lesion on CT Chest), Interventional Radiology (PEG placement), Psychiatry (acute delerium) and hospital medicine co-management for co-morbidities, infection sources/treatment, and discharge recommendations.     Patient discharged to [facility]; family by phone amenable to plan.     Inpatient acute stroke work-up completed and patient is stable for discharge. Please see all appropriate medication changes, imaging results, and necessary follow-up below.

## 2018-06-07 NOTE — ASSESSMENT & PLAN NOTE
69 y/o male with R MCA infarct with left sided weakness, facial droop, dysarthria and confusion    Antithrombotics: DAPT    Statins: Crestor 40 mg daily    Aggressive risk factor modification: HTN, DM, HLD, Diet, Exercise, Obesity, CAD     Rehab efforts: PT/OT/SLP to evaluate and treat, PM&R consult     Diagnostics ordered/pending: MRA head to assess vasculature, MRA neck/arch to assess vasculature, MRI head without contrast to assess brain parenchyma, TTE to assess cardiac function/status     VTE prophylaxis: Heparin 5000 units SQ every 8 hours, SCD's    BP parameters: Infarct: No intervention, SBP <220

## 2018-06-07 NOTE — PLAN OF CARE
Problem: SLP Goal  Goal: SLP Goal  Speech Language Pathology Goals  Goals expected to be met by 6/14  1. Pt will participate in ongoing assessment of swallow.   2. Pt will answer simple y/n questions with 60% accy given repeats.  3. Pt will follow simple commands with 60% accy given mod A.  4. Pt will complete auto speech tasks with 50% accy with max A.        Outcome: Ongoing (interventions implemented as appropriate)  Evaluation completed.  Rec cont npo with strict aspiration precautions and temporary alternative means of nutrition, hydration and medications. AMDDI Webb, CCC/SLP  6/7/2018

## 2018-06-07 NOTE — HPI
"69 y/o male who Family reports at 8:15 PM on 6-4-18,  he became confused, had right sided weakness in both upper and lower extremity where he almost fell, drooling out of right side of mouth, and right sided facial droop.  On arrival to ED at [facility], pt. Is oriented only to person and place, has difficulty following commands, repetitive speech, refusing all medical care or interventions, trying to leave hospital.  He is unable to give any history and continually says "I'm fine."  Family states he drank beer last night, and one beer this morning.  He stated he felt bad this morning, but otherwise was his normal self. Denies any recent illness, head trauma, h/o CVA or ICH, seizure activity, recent hospitalizations.  Family reports complete compliance with all medications including ASA and Plavix.  While in ED, patient's mentation slowly improved, but later worsened to where he was only AAO x 1.  Family did report that patient has no history of dementia, but did have cough and fever on 6/1/18 as well as HA's for 1 week prior to admission.  Telestroke consulted and suspicion of TIA; will be admitted for further evaluation and treatment.  CT with old microvascular ischemic changes. Attempted carotid US and MRI, however patient could not tolerate. Unable to perform LP, low suspicion for meningitis as patient was initially stable without antibiotics. Patient developed a fever 6/6/18 and was started on vanc/rocephin/ampicillin/acyclovir. Patient had some dysphagia at lunch 6/6/18, and a repeat CT was ordered after he was reported to have worsening facial droop. Repeat CT showed large infarct in R MCA division with possible mass effect. Telestroke was contacted and on re-review of the case, given his evolving neurological symptoms and presence of new massive stroke over two days, patient was transferred for higher level of care  Risk factors are HTN, HLP, CAD, DM, COPD on Plavix  "

## 2018-06-07 NOTE — PROGRESS NOTES
Patient identified via spelling of name and date of birth. Patient denies blood transfusion reaction. Consent obtained for use of contrast. Optison 3ml IVP karen Ford. Saline lock flushed pre and post use under aseptic technique. Optison 3ml IVP used as contrast for 2 d imaging.  Tolerated procedure well.

## 2018-06-08 PROBLEM — R47.1 DYSARTHRIA: Status: ACTIVE | Noted: 2018-01-01

## 2018-06-08 PROBLEM — I63.9 STROKE: Status: RESOLVED | Noted: 2018-01-01 | Resolved: 2018-01-01

## 2018-06-08 PROBLEM — R93.1 DECREASED CARDIAC EJECTION FRACTION: Status: ACTIVE | Noted: 2018-01-01

## 2018-06-08 PROBLEM — E87.1 HYPONATREMIA: Status: ACTIVE | Noted: 2018-01-01

## 2018-06-08 PROBLEM — R50.9 FEVER OF UNKNOWN ORIGIN: Status: ACTIVE | Noted: 2018-01-01

## 2018-06-08 PROBLEM — I50.43 ACUTE ON CHRONIC SYSTOLIC AND DIASTOLIC HEART FAILURE, NYHA CLASS 3: Status: ACTIVE | Noted: 2018-01-01

## 2018-06-08 PROBLEM — I63.411 EMBOLIC STROKE INVOLVING RIGHT MIDDLE CEREBRAL ARTERY: Status: ACTIVE | Noted: 2018-01-01

## 2018-06-08 NOTE — ASSESSMENT & PLAN NOTE
- Pt likely with depressed EF for some time now given akinetic apex and hx of underlying CAD along with progressive symptoms per wife.   - It seems his stroke could have very well been from embolization of an LV thrombus as no other clear etiology. Would rec coumadin and ASA. Can stop plavix  - Cont GDMT with coreg and losartan  - Cont crestor  - Would begin lasix 40mg PO QD to maintain euvolemia  - Will need [facility] as an outpt if he recovers from his stroke and decides he wants to proceed as seems he has been hesitant in the past.

## 2018-06-08 NOTE — PLAN OF CARE
Problem: Physical Therapy Goal  Goal: Physical Therapy Goal  Goals to be met by: 2018     Patient will increase functional independence with mobility by performin. Supine to sit with Moderate Assistance  2. Sit to supine with Moderate Assistance  3. Sit to stand transfer with Maximum Assistance  4. Bed to chair transfer with Maximum Assistance  5. Gait  x 10 feet with Maximum Assistance with or without appropriate AD   6. Sitting at edge of bed x10 minutes with Minimal Assistance  7. Lower extremity exercise program x15 reps per handout, with assistance as needed     Outcome: Ongoing (interventions implemented as appropriate)    Pt would benefit from Rehab upon discharge.  Appropriate transfer level with nursing staff: Bed <> Chair:  Drawsheet to cardiac chair with Total Assistance with 2 people.    Nimisha Andrews PT, DPT  2018  Pager: [phone]

## 2018-06-08 NOTE — ASSESSMENT & PLAN NOTE
Max temp past 24 hours 101.4  WBC WNL  Infectious workup negative so far - blood cultures no growth to date   Procal WNL - 0.08   US upper/lower extremities ordered to rule out DVT

## 2018-06-08 NOTE — PT/OT/SLP PROGRESS
Physical Therapy Treatment    Patient Name:  Mendez Guthrie   MRN:  [id number]  Admitting Diagnosis: Embolic stroke involving right middle cerebral artery  Recent Surgery: * No surgery found *      Recommendations:     Discharge Recommendations:  rehabilitation facility   Discharge Equipment Recommendations: wheelchair   Barriers to discharge: Decreased caregiver support    Plan:     During this hospitalization, patient to be seen 4 x/week to address the listed problems via gait training, therapeutic activities, therapeutic exercises, neuromuscular re-education, wheelchair management/training  · Plan of Care Expires:  07/07/18   Plan of Care Reviewed with: patient, spouse, daughter    This Plan of care has been discussed with the patient who was involved in its development and understands and is in agreement with the identified goals and treatment plan    Subjective     Communicated with RN prior to session.  Patient found supine upon PT entry to room. Pt's wife and daughter present during session.  Pt nonverbal, sleeping throughout session.    Pain/Comfort:  · Pain Rating 1: 0/10  · Pain Rating Post-Intervention 1: 0/10    Objective:     Patient found with: telemetry, peripheral IV, oxygen, NG tube, restraints (RUE wrist restraint)   Mental Status: [place] orientation due to lethargy and follows 0% of verbal commands. Patient is Lethargic during session.    General Precautions: Standard, Cardiac aphasia, aspiration, fall, NPO   Orthopedic Precautions:N/A   Braces: N/A   Respiratory Status: nasal cannula 2L  Vital Signs (Most Recent):    Temp: 99.3 °F (37.4 °C) (06/08/18 0828)  Pulse: 89 (06/08/18 1349)  Resp: 18 (06/08/18 1349)  BP: 113/63 (06/08/18 0828)  SpO2: 98 % (06/08/18 1349)    Functional Mobility:  Bed Mobility:   · Rolling/Turning to Left: total assistance  · Rolling/Turning to Right: total assistance  · Scooting to HOB via supine bridge: dependent via drawsheet x2 people to HOB  · deferred EOB mobility due to  lethargy    Therapeutic Activities & Exercises:   -pt found with soiled linens, tech present to assist with linen change.  Supine: LLE PROM x 10 reps in all planes through available ROM. PT provided scapula support at LUE to facilitate proper rotation and upward movement of scapula to maintain scapulo-humeral rhythm. Exercises performed to develop and maintain pt's  strength, endurance, ROM and flexibility.     Education:  Patient provided with daily orientation and goals of this PT session. Patient and family agreed to participate in session. Patient and family aware of patient's deficits and therapy progression. They were educated to transfer with RN/PCT only; drawsheet to cardiac chair of 2 person. They were provided and educated on proper positioning in supine and in sitting with support of affected LUE extremities in order to increase awareness of extremity and to decrease the effects of immobility, specifically edema and pain. Encouraged patient to perform daily exercises & mobility to increase endurance and decrease effects of bedrest.Time provided for therapeutic counseling and discussion of health disposition. All questions answered to patient's and family's satisfaction, within scope of PT practice; voiced no other concerns. White board updated in patient's room, RN notified of session.    Patient left with bed in chair position, with LUE propped on pillow for scapular support and edema management, head in midline, neutral pelvis & heels floated for skin protection with all lines intact, call button in reach, bed alarm on, RN notified and family present    AM-PAC 6 CLICK MOBILITY  Turning over in bed (including adjusting bedclothes, sheets and blankets)?: 1  Sitting down on and standing up from a chair with arms (e.g., wheelchair, bedside commode, etc.): 1  Moving from lying on back to sitting on the side of the bed?: 1  Moving to and from a bed to a chair (including a wheelchair)?: 1  Need to walk in  hospital room?: 1  Climbing 3-5 steps with a railing?: 1  Total Score: 6     Assessment:     Mendez Guthrie is a 68 y.o. male admitted with a medical diagnosis of Embolic stroke involving right middle cerebral artery.   Patient is making progress towards goals, participating well in this session. Session limited by lethargy. Mendez Napiers deficits effect their ability to safely and independently participate in self-care tasks and functional mobility which increases their caregiver burden. Due to his physical therapy diagnosis of debility and deconditioning, they continue to benefit from acute PT services to address the following limitations: high fall risk, weakness, instability, the need for supervised instruction of exercise. Mendez Napiers deficits effect their roles and responsibilities in which they were able to complete prior to admit. Education was provided to patient & family regarding importance of continued participation in therapy, patient's progress and discharge disposition. Pt would benefit from Rehab upon discharge to improve quality of life and focus on recovery of impairments.     Problem List: weakness, impaired endurance, impaired self care skills, impaired functional mobilty, gait instability, impaired balance, decreased lower extremity function, decreased upper extremity function, decreased safety awareness, abnormal tone, edema. weakness, impaired endurance, impaired self care skills, impaired functional mobilty, gait instability, impaired balance, decreased lower extremity function, decreased upper extremity function, decreased safety awareness, abnormal tone, edema  Rehab Prognosis: good; patient would benefit from acute skilled PT services to address these deficits and reach maximum level of function.      GOALS:    Physical Therapy Goals        Problem: Physical Therapy Goal    Goal Priority Disciplines Outcome Goal Variances Interventions   Physical Therapy Goal     PT/OT, PT  Ongoing (interventions implemented as appropriate)     Description:  Goals to be met by: 2018     Patient will increase functional independence with mobility by performin. Supine to sit with Moderate Assistance  2. Sit to supine with Moderate Assistance  3. Sit to stand transfer with Maximum Assistance  4. Bed to chair transfer with Maximum Assistance  5. Gait  x 10 feet with Maximum Assistance with or without appropriate AD   6. Sitting at edge of bed x10 minutes with Minimal Assistance  7. Lower extremity exercise program x15 reps per handout, with assistance as needed                      Time Tracking:     PT Received On: 18  PT Start Time: 1403     PT Stop Time: 1426  PT Total Time (min): 23 min     Billable Minutes:   · Therapeutic Exercise 23    Treatment Type: Treatment  PT/PTA: PT       Nimisha Andrews PT, DPT  2018  Pager: [phone]

## 2018-06-08 NOTE — ASSESSMENT & PLAN NOTE
-Occlusion right M2 segment of the MCA with heavy atherosclerotic plaque  -febrile with no s/s of meningitis  -patient can not sit still for MRI, bubble study, or LP  -work up in progress    See hospital course for functional, cognitive/speech/language, and nutrition/swallow status.      Recommendations  -  Encourage mobility, OOB in chair at least 3 hours per day, and early ambulation as appropriate   -  PT/OT evaluate and treat  -  SLP speech and cognitive evaluate and treat  -  Monitor sleep disturbances and establish consistent sleep-wake cycle  -  Monitor for bowel and bladder dysfunction  -  Monitor for shoulder pain and subluxation  -  Monitor for spasticity  -  Monitor for and prevent skin breakdown and pressure ulcers  · Early mobility, repositioning/weight shifting every 20-30 minutes when sitting, turn patient every 2 hours, proper mattress/overlay and chair cushioning, pressure relief/heel protector boots  -  DVT prophylaxis:  [facility]  -  Reviewed discharge options (IP rehab, SNF, HH therapy, and OP therapy)

## 2018-06-08 NOTE — PLAN OF CARE
Problem: Patient Care Overview  Goal: Plan of Care Review  Outcome: Ongoing (interventions implemented as appropriate)  POC reviewed with patient and family. Patient has suction at bedside for secretions. Tube feeds were initiated at 0400, after CTA was conducted. Right arm remains in restraint; patient pulled at NG during trial. Patient had low grade fever however thermostat in room was broken and room remained warm throughout the night. Removed blankets, applied cool rag, and gave tylenol with some relief. Educated patient family on proper oral care, and aspiration precautions. Patient remains aphasic but nods appropriately at times. Bed in lowest position, HOB >35, bed alarm on.

## 2018-06-08 NOTE — PLAN OF CARE
SW sent rehab referrals to [facility] and [facility] for review.     ZOIE will follow up.    Estela Cagle LMSW  [facility]  Ext. [phone]

## 2018-06-08 NOTE — ASSESSMENT & PLAN NOTE
Large area of cytotoxic cerebral edema identified when reviewing brain imaging in the territory of the R middle cerebral artery. There is not mass effect associated with it. We will continue to monitor the patients clinical exam for any worsening of symptoms which may indicate expansion of the stroke or the area of the edema resulting in the clinical change. The pattern is suggestive of embolic etiology.

## 2018-06-08 NOTE — ASSESSMENT & PLAN NOTE
67 y/o male with R MCA infarct with left sided weakness, facial droop, dysarthria and confusion. Right sided movement is minimal. Etiology likely atheroembolic. EEG completed and results pending due to patients intermittent drowsiness throughout the day.     Antithrombotics:  DAPT    Statins: Crestor 40 mg daily    Aggressive risk factor modification: HTN, DM, HLD, Diet, Exercise, Obesity, CAD     Rehab efforts: PT/OT/SLP to evaluate and treat, PM&R consult     Diagnostics ordered/pending: EEG    VTE prophylaxis: Heparin 5000 units SQ every 8 hours, SCD's    BP parameters: Infarct: No intervention, SBP <180

## 2018-06-08 NOTE — ASSESSMENT & PLAN NOTE
EF 15-20% - seen on echo completed 6/7  Cardiac history of CABG in 2016  Cardiology consulted - appreciate recs

## 2018-06-08 NOTE — HPI
69 y/o AAM with hx of non small cell lung cancer s/p chemo/radiation 2011, HTN, HLD, COPD, DM, CAD s/p CABGx2 2016 admitted with acute AMS and Left sided weakness found to have R MCA embolic stroke and newly depressed EF 15% with apical akinesis. Family reports he was told last year at [facility] his heart function was weak and he likely needed another angiogram but pt did not want to follow up with cardiology at that time. Last echo we have on file in 2014 with EF 45% and hypokinetic apex. Wife reports he did not worsening SOB with performing ADL's over last year. +orthopena and PND. No Chest pain or palpitations.

## 2018-06-08 NOTE — ASSESSMENT & PLAN NOTE
Stroke risk factor  Duonebs Q6 due to patient not being able to follow commands and RT not being able to administer inhalers

## 2018-06-08 NOTE — PLAN OF CARE
Problem: Patient Care Overview  Goal: Plan of Care Review  Recommendations    1. Rec to increase Diabetisource to 70mL/hr to meet energy & protein needs.   2. Once medically able, rec 2000cal Diabetic, Cardiac diet with texture per SLP.   3. HgA1c 9.4%- RD will educate if/ when appropriate.     RD to monitor   Goals: Meet > 90% EEN/EPN from TFs  Nutrition Goal Status: new

## 2018-06-08 NOTE — PT/OT/SLP PROGRESS
Occupational Therapy   Treatment    Name: Mendez Guthrie  MRN: [id number]  Admitting Diagnosis:  Embolic stroke involving right middle cerebral artery       Recommendations:     Discharge Recommendations:  Rehab  Discharge Equipment Recommendations:   (TBD pending progress)  Barriers to discharge:  None    Subjective     Communicated with: RN prior to session.  Pt agreeable to participate with therapy this date. Wife and daughter present throughout session.   Pain/Comfort:  · Pain Rating 1: 0/10  · Pain Rating Post-Intervention 1: 0/10    Objective:     Patient found with: telemetry, Condom Catheter, peripheral IV    General Precautions: Standard, aphasia, aspiration, fall, NPO   Orthopedic Precautions:N/A   Braces: N/A     Occupational Performance:    Bed Mobility:    · Patient completed Rolling/Turning to Left with  maximal assistance  · Patient completed Supine to Sit with maximal assistance  · Patient completed Sit to Supine with maximal assistance     Sitting EOB:   Pt sat EOB ~15 minutes with first CGA for ~2 minutes however declined to mod A for balance throughout sitting while weight bearing through LUE; Challenging core control, static/dynamic sitting balance, and midline orientation.    Activities of Daily Living:  · Grooming: moderate assistance to wash face using RUE  · UB Dressing: maximal assistance to sal gown like jacket while seated EOB  · LB Dressing: total assistance to sal B socks while supine    Patient left with bed in chair position with all lines intact, call button in reach and RN notified    [facility] 6 Click:  AMPA Total Score: 8    Treatment & Education:  -Pt edu on OT role/POC  -Importance of OOB activity with therapy assistance  -Safety during functional t/f and mobility ( Protecting LUE)  - White board updated  - Multiple self care tasks-- as noted above   - Provided LUE PROM exercises while seated EOB: 1x15 reps in shoulder flexion, elbow flexion/extension, shoulder IR/ER,  supination/pronation, wrist and digit flexion/extensionEducation:  .  - While seated EOB -- mirror in front of patient providing visual feedback; pt demo improvement with overall posture  - Edu family on BUE HEP to complete 3x daily-- family verbalized understanding     Cognition:   Followed 0% of simple one-step commands  Aphasic/ Tearful during session   Eyes closed 75% of session    Assessment:     Mendez Guthrie is a 68 y.o. male with a medical diagnosis of Embolic stroke involving right middle cerebral artery.  He presents with LUE weakness, L visual inattention, aphasia, and cognitive deficits; requiring increased level of skilled assistance with ADL and functional mobility.  Performance deficits affecting function are weakness, impaired endurance, impaired functional mobilty, decreased upper extremity function, impaired self care skills, decreased lower extremity function, impaired balance, decreased safety awareness, abnormal tone, visual deficits, impaired fine motor, edema.  Pt would benefit from rehab following d/c to continue to progress towards goals and improve quality of life.     Rehab Prognosis:  Good; patient would benefit from acute skilled OT services to address these deficits and reach maximum level of function.       Plan:     Patient to be seen 5 x/week to address the above listed problems via self-care/home management, therapeutic activities, therapeutic exercises, neuromuscular re-education  · Plan of Care Expires: 07/06/18  · Plan of Care Reviewed with: patient, spouse, daughter    This Plan of care has been discussed with the patient who was involved in its development and understands and is in agreement with the identified goals and treatment plan    GOALS:    Occupational Therapy Goals        Problem: Occupational Therapy Goal    Goal Priority Disciplines Outcome Interventions   Occupational Therapy Goal     OT, PT/OT Ongoing (interventions implemented as appropriate)    Description:   Goals to be met by: 6/17/2018     Patient will increase functional independence with ADLs by performing:    UE Dressing with Moderate Assistance.  LE Dressing with Maximum Assistance.  Grooming while seated with Contact Guard Assistance.  Toileting from bedside commode with Moderate Assistance for hygiene and clothing management.   Sitting at edge of bed x 15 minutes with Minimal Assistance.  Supine to sit with Moderate Assistance.  Stand pivot transfers with Moderate Assistance.  Toilet transfer to bedside commode with Moderate Assistance.  Pt will initiate movement in LUE.  Pt will follow 50% of one step commands.  Pt will keep eyes open 100% of session.                      Time Tracking:     OT Date of Treatment: 06/08/18  OT Start Time: 0940  OT Stop Time: 1018  OT Total Time (min): 38 min    Billable Minutes:Therapeutic Activity 10  Therapeutic Exercise 12  Neuromuscular Re-education 16    Anita vaughan, YASIR  6/8/2018

## 2018-06-08 NOTE — PT/OT/SLP PROGRESS
"Speech Language Pathology Treatment    Patient Name:  Mendez Guthrie   MRN:  [id number]  Admitting Diagnosis: Stroke    Recommendations:                 General Recommendations:  Dysphagia therapy, Speech/language therapy and Cognitive-linguistic therapy  Diet recommendations:  NPO, Liquid Diet Level: NPO   Aspiration Precautions: Continue alternate means of nutrition and Strict aspiration precautions   General Precautions: Standard, aphasia, aspiration, fall, NPO  Communication strategies:  provide increased time to answer and go to room if call light pushed    Subjective     "He doesn't like that tube." NG tube and R wrist restraint in place    Pain/Comfort:  · Pain Rating 1: 0/10  · Pain Rating Post-Intervention 1: 0/10    Objective:     Has the patient been evaluated by SLP for swallowing?   Yes  Keep patient NPO? Yes   Current Respiratory Status: room air      Pt seen bedside with family present.  Family reports some verbalizations of single words and syllables.  Speech remains repetitive such as, "baby, baby, baby" and " pa, pa, pa".  Pt roused easily with slightly increased eye opening and eye gaze to L today.  Pt shaking his head "no" in response to majority of prompts and questions.  Head nod given model x1 only.  Pt did not repeat simple vowels or bilabials despite max cues.  Pt perseverating on rolled "r" in response to all stimuli.  Weak cough elicited in response to model x2.  No volitional swallow elicited.  Vocal quality was clear.  Pt tolerated oral care well.  Ice chip presented x1.  Max A for appropriate labial seal on spoon.  Increased oral manipulation time with delayed swallow initiation, decreased hyolaryngeal rise, multiple swallows, and immediate overt cough response.  Pt crying and moaning with wet vocal quality following cough response.  Honey thick liquid presented.  Delayed swallow initiated with multiple swallows.  Mild oral stasis.  No overt s/s aspiration.  Poor labial acceptance of 2nd " trial with oral hold of bolus.  No a-p movement, bolus removed via yaunker suction.  Wet vocal quality again appreciated.  Ongoing education on aspiration risk, s/s aspiration, dysphagia and SLP POC provided to pt and family.  No response by pt.  Family verbalized understanding.       Assessment:     Mendez Guthrie is a 68 y.o. male with an SLP diagnosis of Aphasia, Dysphagia and Cognitive-Linguistic Impairment.      Goals:    SLP Goals        Problem: SLP Goal    Goal Priority Disciplines Outcome   SLP Goal     SLP Ongoing (interventions implemented as appropriate)   Description:  Speech Language Pathology Goals  Goals expected to be met by 6/14  1. Pt will participate in ongoing assessment of swallow.   2. Pt will answer simple y/n questions with 60% accy given repeats.  3. Pt will follow simple commands with 60% accy given mod A.  4. Pt will complete auto speech tasks with 50% accy with max A.                          Plan:     · Patient to be seen:  5 x/week   · Plan of Care expires:  07/07/18  · Plan of Care reviewed with:  patient, spouse, daughter   · SLP Follow-Up:  Yes       Discharge recommendations:  rehabilitation facility   Barriers to Discharge:  Level of Skilled Assistance Needed   and pt not safe for po at this time    Time Tracking:     SLP Treatment Date:   06/08/18  Speech Start Time:  0820  Speech Stop Time:  0843     Speech Total Time (min):  23 min    Billable Minutes: Speech Therapy Individual 8 and Treatment Swallowing Dysfunction 15    MADDI Webb, CCC-SLP  06/08/2018

## 2018-06-08 NOTE — SUBJECTIVE & OBJECTIVE
Past Medical History:   Diagnosis Date    Arthritis     Asthma     COPD (chronic obstructive pulmonary disease)     Coronary artery disease     Diabetes mellitus     Embolic stroke involving right middle cerebral artery 6/6/2018    High cholesterol     Hypertension     Long term current use of antithrombotics/antiplatelets     Lung cancer     Stroke 6/6/2018       Past Surgical History:   Procedure Laterality Date    cardiac bypass      CARDIAC SURGERY  05/04/2016    PORTACATH PLACEMENT  2012    cancer treatment       Review of patient's allergies indicates:   Allergen Reactions    Lisinopril Swelling    Norvasc [amlodipine] Swelling       No current facility-administered medications on file prior to encounter.      Current Outpatient Prescriptions on File Prior to Encounter   Medication Sig    aspirin 81 MG Chew Take 81 mg by mouth once daily.    blood sugar diagnostic Strp Inject 1 strip as directed 3 (three) times daily with meals. Dispense One Touch Ultra Strips Dx:E11.9    carvedilol (COREG) 12.5 MG tablet Take 12.5 mg by mouth 2 (two) times daily with meals.    chlorthalidone (HYGROTEN) 25 MG Tab Take 25 mg by mouth once daily.    clopidogrel (PLAVIX) 75 mg tablet Take 75 mg by mouth once daily.    diazePAM (VALIUM) 5 MG tablet Take 1 tablet (5 mg total) by mouth every 6 (six) hours as needed for Anxiety.    docusate sodium (COLACE) 100 MG capsule Take 100 mg by mouth 2 (two) times daily as needed.     doxazosin (CARDURA) 2 MG tablet Take 1 tablet (2 mg total) by mouth every evening.    fluticasone-salmeterol 250-50 mcg/dose (ADVAIR DISKUS) 250-50 mcg/dose diskus inhaler Inhale 1 puff into the lungs 2 (two) times daily. Controller    hydrocodone-acetaminophen 5-325mg (NORCO) 5-325 mg per tablet Take 1 tablet by mouth every 4 (four) hours as needed for Pain.    insulin aspart U-100 (NOVOLOG) 100 unit/mL InPn pen Inject 10 Units into the skin before dinner.    insulin detemir U-100  (LEVEMIR FLEXTOUCH U-100 INSULN) 100 unit/mL (3 mL) SubQ InPn pen Inject 15 Units into the skin every evening. Dispense with insulin pen needles    linagliptin (TRADJENTA) 5 mg Tab tablet Take 1 tablet (5 mg total) by mouth once daily.    losartan (COZAAR) 50 MG tablet Take 50 mg by mouth once daily.     metFORMIN (GLUCOPHAGE) 1000 MG tablet 'TAKE 1 TABLET BY MOUTH TWICE A DAY WITH MEALS    nitroGLYCERIN (NITROSTAT) 0.4 MG SL tablet Place 1 tablet (0.4 mg total) under the tongue every 5 (five) minutes as needed for Chest pain.    PROAIR HFA 90 mcg/actuation inhaler     rosuvastatin (CRESTOR) 40 MG Tab Take 1 tablet (40 mg total) by mouth every evening.    SPIRIVA WITH HANDIHALER 18 mcg inhalation capsule inhale the contents of one capsule in the handihaler once daily    traMADol (ULTRAM) 50 mg tablet take 1 tablet by mouth every 6 hours if needed    VITAMIN D2 50,000 unit capsule TAKE 1 CAPSULE BY MOUTH EVERY 7 DAYS     Family History     Problem Relation (Age of Onset)    Cancer Father    Diabetes Father, Mother, Brother, Son    No Known Problems Brother, Sister, Brother, Daughter, Daughter, Daughter, Daughter, Son, Son, Son    Pneumonia Sister, Sister        Social History Main Topics    Smoking status: Former Smoker     Packs/day: 1.00     Years: 39.00     Types: Cigarettes     Start date: 1/23/1972     Quit date: 5/23/2011    Smokeless tobacco: Never Used    Alcohol use 0.0 oz/week      Comment: occasionally    Drug use: No    Sexual activity: Not Currently     Review of Systems   Unable to perform ROS: mental status change     Objective:     Vital Signs (Most Recent):  Temp: 99.3 °F (37.4 °C) (06/08/18 0828)  Pulse: 89 (06/08/18 1349)  Resp: 18 (06/08/18 1349)  BP: 113/63 (06/08/18 0828)  SpO2: 98 % (06/08/18 1349) Vital Signs (24h Range):  Temp:  [98.9 °F (37.2 °C)-101.9 °F (38.8 °C)] 99.3 °F (37.4 °C)  Pulse:  [] 89  Resp:  [16-18] 18  SpO2:  [91 %-98 %] 98 %  BP: (105-149)/(58-73) 113/63      Weight: 103 kg (227 lb 1.2 oz)  Body mass index is 34.53 kg/m².    SpO2: 98 %  O2 Device (Oxygen Therapy): nasal cannula      Intake/Output Summary (Last 24 hours) at 06/08/18 1453  Last data filed at 06/08/18 0700   Gross per 24 hour   Intake              250 ml   Output              250 ml   Net                0 ml       Lines/Drains/Airways     Drain                 NG/OG Tube 06/07/18 1303 Left nostril 1 day    Male External Urinary Catheter 06/07/18 2213 Medium less than 1 day          Peripheral Intravenous Line                 Peripheral IV - Single Lumen 06/04/18 2105 Left Antecubital 3 days                Physical Exam   Constitutional: He appears well-developed and well-nourished.   HENT:   Head: Normocephalic and atraumatic.   Mouth/Throat: No oropharyngeal exudate.   Eyes: EOM are normal. Pupils are equal, round, and reactive to light.   Neck: Normal range of motion. Neck supple. No JVD present.   Cardiovascular: Normal rate and regular rhythm.  Exam reveals no friction rub.    No murmur heard.  Pulmonary/Chest: Effort normal and breath sounds normal. No respiratory distress.   Abdominal: Soft. Bowel sounds are normal. He exhibits no distension.   Musculoskeletal: He exhibits edema.   Neurological:   Left upper ext an LE weakness. Aphasic   Skin: No rash noted.       Significant Labs: All pertinent lab results from the last 24 hours have been reviewed.    Significant Imaging: EKG: sinus tach with PAC's and LVH, RAD

## 2018-06-08 NOTE — PROGRESS NOTES
[facility]  Physical Medicine & Rehab  Progress Note    Patient Name: Mendez Guthrie  MRN: [id number]  Admission Date: 6/7/2018  Length of Stay: 1 days  Attending Physician: Jordan Barry MD    Subjective:     Principal Problem:Stroke    Hospital Course:   6/7/18: Evaluated by therapy.  Bed mobility totalA and grooming MaxA. NPO with SLP with aphasia and Cognitive-Linguistic Impairment.   6/8/18: SLP notes Aphasia, Dysphagia and Cognitive-Linguistic Impairment.   NGT in place.     Interval History 6/8/2018:  Patient is seen for follow-up rehab evaluation and recommendations: Participating with therapy.     HPI, Past Medical, Family, and Social History remains the same as documented in the initial encounter.    Scheduled Medications:    albuterol-ipratropium  3 mL Nebulization Q6H    aspirin  81 mg Per NG tube Daily    carvedilol  12.5 mg Per NG tube BID WM    citalopram  10 mg Per NG tube Daily    [START ON 6/9/2018] clopidogrel  75 mg Per NG tube Daily    fluticasone-vilanterol  1 puff Inhalation Daily    heparin (porcine)  5,000 Units Subcutaneous Q8H    losartan  50 mg Per NG tube Daily    rosuvastatin  40 mg Per NG tube QHS    senna  8.6 mg Per NG tube Daily    sodium chloride 0.9%  3 mL Intravenous Q8H       Diagnostic Results:   Labs: Reviewed  CT: Reviewed    PRN Medications: acetaminophen, dextrose 50%, glucagon (human recombinant), HYDROcodone-acetaminophen, insulin aspart U-100, labetalol, ondansetron, polyethylene glycol, ramelteon, sodium chloride 0.9%    Review of Systems   Reason unable to perform ROS: aphasia.     Objective:     Vital Signs (Most Recent):  Temp: 99.3 °F (37.4 °C) (06/08/18 0828)  Pulse: 84 (06/08/18 0828)  Resp: 16 (06/08/18 0828)  BP: 113/63 (06/08/18 0828)  SpO2: 95 % (06/08/18 0828)    Vital Signs (24h Range):  Temp:  [98.9 °F (37.2 °C)-101.9 °F (38.8 °C)] 99.3 °F (37.4 °C)  Pulse:  [] 84  Resp:  [16-18] 16  SpO2:  [91 %-98 %] 95 %  BP:  (105-149)/(58-80) 113/63     Physical Exam   Constitutional: He appears well-developed and well-nourished.   Obese  Family at bedside  Restraints in place   HENT:   Head: Normocephalic and atraumatic.   Eyes: EOM are normal. Pupils are equal, round, and reactive to light.   Neck: Normal range of motion.   Cardiovascular: Normal rate and regular rhythm.    Pulmonary/Chest: Effort normal. No respiratory distress.   Abdominal: Soft. There is no tenderness.   NGT in place   Musculoskeletal: Normal range of motion. He exhibits no deformity.   Neurological: He is alert.   + aphasia  -  Follows commands partially  - spontaneously move RUE/RLE   0/5 for LUE/LLE muscle strength    Skin: Skin is warm and dry.   Psychiatric: Cognition and memory are impaired. He is noncommunicative (moans).     Assessment/Plan:      * Stroke    -Occlusion right M2 segment of the MCA with heavy atherosclerotic plaque  -febrile with no s/s of meningitis  -patient can not sit still for MRI, bubble study, or LP  -work up in progress    See hospital course for functional, cognitive/speech/language, and nutrition/swallow status.      Recommendations  -  Encourage mobility, OOB in chair at least 3 hours per day, and early ambulation as appropriate   -  PT/OT evaluate and treat  -  SLP speech and cognitive evaluate and treat  -  Monitor sleep disturbances and establish consistent sleep-wake cycle  -  Monitor for bowel and bladder dysfunction  -  Monitor for shoulder pain and subluxation  -  Monitor for spasticity  -  Monitor for and prevent skin breakdown and pressure ulcers  · Early mobility, repositioning/weight shifting every 20-30 minutes when sitting, turn patient every 2 hours, proper mattress/overlay and chair cushioning, pressure relief/heel protector boots  -  DVT prophylaxis:  [facility]  -  Reviewed discharge options (IP rehab, SNF, HH therapy, and OP therapy)          Left spastic hemiparesis    -2/2 stroke     Recommendations  -  PT/OT evaluate  and treat  -  Initiate stretching program  -  Use splints/casting/bracing to help preserve ROM    -  Functional electrical stimulation  -  Consider pharmaceutical management- (baclofen, diazepam, clonidine, tizanidine)  -  Follow up in PM&R clinic 2-4 weeks after discharge        CAD (coronary artery disease)    -stroke risk factor        HTN (hypertension)    -stroke risk factor        COPD (chronic obstructive pulmonary disease)    -stroke risk factor        T2DM (type 2 diabetes mellitus)    -stroke risk factor        Work up in progress. NGT in place. Participating with therapy and is very low level. Will follow progress over weekend and discuss with rehab team for rehab recommendation.      Kassie Arzola NP  Department of Physical Medicine & Rehab   [facility]

## 2018-06-08 NOTE — CONSULTS
[facility]  Cardiology  Consult Note    Patient Name: Mendez Guthrie  MRN: [id number]  Admission Date: 6/7/2018  Hospital Length of Stay: 1 days  Code Status: Full Code   Attending Provider: Jordan Barry MD   Consulting Provider: Larry Santiago MD  Primary Care Physician: Valeria Mayen MD  Principal Problem:Embolic stroke involving right middle cerebral artery    Patient information was obtained from patient and ER records.     Inpatient consult to Cardiology  Consult performed by: LARRY SANTIAGO  Consult ordered by: YASSINE MUJICA        Subjective:     Chief Complaint:  Depressed EF     HPI:   69 y/o AAM with hx of non small cell lung cancer s/p chemo/radiation 2011, HTN, HLD, COPD, DM, CAD s/p CABGx2 2016 admitted with acute AMS and Left sided weakness found to have R MCA embolic stroke and newly depressed EF 15% with apical akinesis. Family reports he was told last year at [facility] his heart function was weak and he likely needed another angiogram but pt did not want to follow up with cardiology at that time. Last echo we have on file in 2014 with EF 45% and hypokinetic apex. Wife reports he did not worsening SOB with performing ADL's over last year. +orthopena and PND. No Chest pain or palpitations.     Past Medical History:   Diagnosis Date    Arthritis     Asthma     COPD (chronic obstructive pulmonary disease)     Coronary artery disease     Diabetes mellitus     Embolic stroke involving right middle cerebral artery 6/6/2018    High cholesterol     Hypertension     Long term current use of antithrombotics/antiplatelets     Lung cancer     Stroke 6/6/2018       Past Surgical History:   Procedure Laterality Date    cardiac bypass      CARDIAC SURGERY  05/04/2016    PORTACATH PLACEMENT  2012    cancer treatment       Review of patient's allergies indicates:   Allergen Reactions    Lisinopril Swelling    Norvasc [amlodipine] Swelling       No current  facility-administered medications on file prior to encounter.      Current Outpatient Prescriptions on File Prior to Encounter   Medication Sig    aspirin 81 MG Chew Take 81 mg by mouth once daily.    blood sugar diagnostic Strp Inject 1 strip as directed 3 (three) times daily with meals. Dispense One Touch Ultra Strips Dx:E11.9    carvedilol (COREG) 12.5 MG tablet Take 12.5 mg by mouth 2 (two) times daily with meals.    chlorthalidone (HYGROTEN) 25 MG Tab Take 25 mg by mouth once daily.    clopidogrel (PLAVIX) 75 mg tablet Take 75 mg by mouth once daily.    diazePAM (VALIUM) 5 MG tablet Take 1 tablet (5 mg total) by mouth every 6 (six) hours as needed for Anxiety.    docusate sodium (COLACE) 100 MG capsule Take 100 mg by mouth 2 (two) times daily as needed.     doxazosin (CARDURA) 2 MG tablet Take 1 tablet (2 mg total) by mouth every evening.    fluticasone-salmeterol 250-50 mcg/dose (ADVAIR DISKUS) 250-50 mcg/dose diskus inhaler Inhale 1 puff into the lungs 2 (two) times daily. Controller    hydrocodone-acetaminophen 5-325mg (NORCO) 5-325 mg per tablet Take 1 tablet by mouth every 4 (four) hours as needed for Pain.    insulin aspart U-100 (NOVOLOG) 100 unit/mL InPn pen Inject 10 Units into the skin before dinner.    insulin detemir U-100 (LEVEMIR FLEXTOUCH U-100 INSULN) 100 unit/mL (3 mL) SubQ InPn pen Inject 15 Units into the skin every evening. Dispense with insulin pen needles    linagliptin (TRADJENTA) 5 mg Tab tablet Take 1 tablet (5 mg total) by mouth once daily.    losartan (COZAAR) 50 MG tablet Take 50 mg by mouth once daily.     metFORMIN (GLUCOPHAGE) 1000 MG tablet 'TAKE 1 TABLET BY MOUTH TWICE A DAY WITH MEALS    nitroGLYCERIN (NITROSTAT) 0.4 MG SL tablet Place 1 tablet (0.4 mg total) under the tongue every 5 (five) minutes as needed for Chest pain.    PROAIR HFA 90 mcg/actuation inhaler     rosuvastatin (CRESTOR) 40 MG Tab Take 1 tablet (40 mg total) by mouth every evening.     SPIRIVA WITH HANDIHALER 18 mcg inhalation capsule inhale the contents of one capsule in the handihaler once daily    traMADol (ULTRAM) 50 mg tablet take 1 tablet by mouth every 6 hours if needed    VITAMIN D2 50,000 unit capsule TAKE 1 CAPSULE BY MOUTH EVERY 7 DAYS     Family History     Problem Relation (Age of Onset)    Cancer Father    Diabetes Father, Mother, Brother, Son    No Known Problems Brother, Sister, Brother, Daughter, Daughter, Daughter, Daughter, Son, Son, Son    Pneumonia Sister, Sister        Social History Main Topics    Smoking status: Former Smoker     Packs/day: 1.00     Years: 39.00     Types: Cigarettes     Start date: 1/23/1972     Quit date: 5/23/2011    Smokeless tobacco: Never Used    Alcohol use 0.0 oz/week      Comment: occasionally    Drug use: No    Sexual activity: Not Currently     Review of Systems   Unable to perform ROS: mental status change     Objective:     Vital Signs (Most Recent):  Temp: 99.3 °F (37.4 °C) (06/08/18 0828)  Pulse: 89 (06/08/18 1349)  Resp: 18 (06/08/18 1349)  BP: 113/63 (06/08/18 0828)  SpO2: 98 % (06/08/18 1349) Vital Signs (24h Range):  Temp:  [98.9 °F (37.2 °C)-101.9 °F (38.8 °C)] 99.3 °F (37.4 °C)  Pulse:  [] 89  Resp:  [16-18] 18  SpO2:  [91 %-98 %] 98 %  BP: (105-149)/(58-73) 113/63     Weight: 103 kg (227 lb 1.2 oz)  Body mass index is 34.53 kg/m².    SpO2: 98 %  O2 Device (Oxygen Therapy): nasal cannula      Intake/Output Summary (Last 24 hours) at 06/08/18 1453  Last data filed at 06/08/18 0700   Gross per 24 hour   Intake              250 ml   Output              250 ml   Net                0 ml       Lines/Drains/Airways     Drain                 NG/OG Tube 06/07/18 1303 Left nostril 1 day    Male External Urinary Catheter 06/07/18 2213 Medium less than 1 day          Peripheral Intravenous Line                 Peripheral IV - Single Lumen 06/04/18 2105 Left Antecubital 3 days                Physical Exam   Constitutional: He appears  well-developed and well-nourished.   HENT:   Head: Normocephalic and atraumatic.   Mouth/Throat: No oropharyngeal exudate.   Eyes: EOM are normal. Pupils are equal, round, and reactive to light.   Neck: Normal range of motion. Neck supple. No JVD present.   Cardiovascular: Normal rate and regular rhythm.  Exam reveals no friction rub.    No murmur heard.  Pulmonary/Chest: Effort normal and breath sounds normal. No respiratory distress.   Abdominal: Soft. Bowel sounds are normal. He exhibits no distension.   Musculoskeletal: He exhibits edema.   Neurological:   Left upper ext an LE weakness. Aphasic   Skin: No rash noted.       Significant Labs: All pertinent lab results from the last 24 hours have been reviewed.    Significant Imaging: EKG: sinus tach with PAC's and LVH, RAD    Assessment and Plan:     Acute on chronic systolic and diastolic heart failure, NYHA class 3    - Pt likely with depressed EF for some time now given akinetic apex and hx of underlying CAD along with progressive symptoms per wife.   - It seems his stroke could have very well been from embolization of an LV thrombus as no other clear etiology. Would rec coumadin and ASA. Can stop plavix  - Cont GDMT with coreg and losartan  - Cont crestor  - Would begin lasix 40mg PO QD to maintain euvolemia  - Will need [facility] as an outpt if he recovers from his stroke and decides he wants to proceed as seems he has been hesitant in the past.            VTE Risk Mitigation         Ordered     heparin (porcine) injection 5,000 Units  Every 8 hours      06/07/18 0421     IP VTE HIGH RISK PATIENT  Once      06/07/18 0421     Place sequential compression device  Until discontinued      06/07/18 0421          Thank you for your consult. I will sign off. Please contact us if you have any additional questions.    Larry Santiago MD  Cardiology   [facility]

## 2018-06-08 NOTE — PLAN OF CARE
Problem: Patient Care Overview  Goal: Plan of Care Review  Outcome: Ongoing (interventions implemented as appropriate)  Patient will remain free of falls during hospital. Will continue to monitor.

## 2018-06-08 NOTE — SUBJECTIVE & OBJECTIVE
Interval History 6/8/2018:  Patient is seen for follow-up rehab evaluation and recommendations: Participating with therapy.     HPI, Past Medical, Family, and Social History remains the same as documented in the initial encounter.    Scheduled Medications:    albuterol-ipratropium  3 mL Nebulization Q6H    aspirin  81 mg Per NG tube Daily    carvedilol  12.5 mg Per NG tube BID WM    citalopram  10 mg Per NG tube Daily    [START ON 6/9/2018] clopidogrel  75 mg Per NG tube Daily    fluticasone-vilanterol  1 puff Inhalation Daily    heparin (porcine)  5,000 Units Subcutaneous Q8H    losartan  50 mg Per NG tube Daily    rosuvastatin  40 mg Per NG tube QHS    senna  8.6 mg Per NG tube Daily    sodium chloride 0.9%  3 mL Intravenous Q8H       Diagnostic Results:   Labs: Reviewed  CT: Reviewed    PRN Medications: acetaminophen, dextrose 50%, glucagon (human recombinant), HYDROcodone-acetaminophen, insulin aspart U-100, labetalol, ondansetron, polyethylene glycol, ramelteon, sodium chloride 0.9%    Review of Systems   Reason unable to perform ROS: aphasia.     Objective:     Vital Signs (Most Recent):  Temp: 99.3 °F (37.4 °C) (06/08/18 0828)  Pulse: 84 (06/08/18 0828)  Resp: 16 (06/08/18 0828)  BP: 113/63 (06/08/18 0828)  SpO2: 95 % (06/08/18 0828)    Vital Signs (24h Range):  Temp:  [98.9 °F (37.2 °C)-101.9 °F (38.8 °C)] 99.3 °F (37.4 °C)  Pulse:  [] 84  Resp:  [16-18] 16  SpO2:  [91 %-98 %] 95 %  BP: (105-149)/(58-80) 113/63     Physical Exam   Constitutional: He appears well-developed and well-nourished.   Obese  Family at bedside  Restraints in place   HENT:   Head: Normocephalic and atraumatic.   Eyes: EOM are normal. Pupils are equal, round, and reactive to light.   Neck: Normal range of motion.   Cardiovascular: Normal rate and regular rhythm.    Pulmonary/Chest: Effort normal. No respiratory distress.   Abdominal: Soft. There is no tenderness.   NGT in place   Musculoskeletal: Normal range of  motion. He exhibits no deformity.   Neurological: He is alert.   + aphasia  -  Follows commands partially  - spontaneously move RUE/RLE   0/5 for LUE/LLE muscle strength    Skin: Skin is warm and dry.   Psychiatric: Cognition and memory are impaired. He is noncommunicative (moans).     NEUROLOGICAL EXAMINATION:     CRANIAL NERVES     CN III, IV, VI   Pupils are equal, round, and reactive to light.  Extraocular motions are normal.

## 2018-06-08 NOTE — PLAN OF CARE
Problem: SLP Goal  Goal: SLP Goal  Speech Language Pathology Goals  Goals expected to be met by 6/14  1. Pt will participate in ongoing assessment of swallow.   2. Pt will answer simple y/n questions with 60% accy given repeats.  3. Pt will follow simple commands with 60% accy given mod A.  4. Pt will complete auto speech tasks with 50% accy with max A.         Outcome: Ongoing (interventions implemented as appropriate)  Rec remain for npo with strict aspiration precautions at this time. MADDI Webb, CCC/SLP  6/8/2018

## 2018-06-08 NOTE — PLAN OF CARE
Problem: Occupational Therapy Goal  Goal: Occupational Therapy Goal  Goals to be met by: 6/17/2018     Patient will increase functional independence with ADLs by performing:    UE Dressing with Moderate Assistance.  LE Dressing with Maximum Assistance.  Grooming while seated with Contact Guard Assistance.  Toileting from bedside commode with Moderate Assistance for hygiene and clothing management.   Sitting at edge of bed x 15 minutes with Minimal Assistance.  Supine to sit with Moderate Assistance.  Stand pivot transfers with Moderate Assistance.  Toilet transfer to bedside commode with Moderate Assistance.  Pt will initiate movement in LUE.  Pt will follow 50% of one step commands.  Pt will keep eyes open 100% of session.     Outcome: Ongoing (interventions implemented as appropriate)  Continue with POC.   Anita vaughan OT   6/8/2018

## 2018-06-08 NOTE — CONSULTS
"  [facility]  Adult Nutrition  Consult Note    SUMMARY     Recommendations    1. Rec to increase Diabetisource to 70mL/hr to meet energy & protein needs.   2. Once medically able, rec 2000cal Diabetic, Cardiac diet with texture per SLP.   3. HgA1c 9.4%- RD will educate if/ when appropriate.     RD to monitor   Goals: Meet > 90% EEN/EPN from TFs  Nutrition Goal Status: new  Communication of RD Recs:  (POC)    Reason for Assessment    Reason for Assessment: consult  Diagnosis: stroke/CVA  Relevant Medical History: DM2, COPD, HTN, CAD, hx lung cancer, CABG    General Information Comments: Consult for stroke pathway. Diet unable to be started, NG inserted and TF started.     Nutrition Discharge Planning: Unable to determine     Nutrition Risk Screen    Nutrition Risk Screen: no indicators present    Nutrition/Diet History    Typical Food/Fluid Intake: [place]  Do you have any cultural, spiritual, Judaism conflicts, given your current situation?: none reported  Food Allergies: NKFA  Factors Affecting Nutritional Intake: NPO, difficulty/impaired swallowing    Anthropometrics    Temp: 99.3 °F (37.4 °C)  Height Method: Stated  Height: 5' 8" (172.7 cm)  Height (inches): 68 in  Weight Method: Bed Scale  Weight: 103 kg (227 lb 1.2 oz)  Weight (lb): 227.08 lb  Ideal Body Weight (IBW), Male: 154 lb  % Ideal Body Weight, Male (lb): 147.45 lb  BMI (Calculated): 34.6  BMI Grade: 30 - 34.9- obesity - grade I     Lab/Procedures/Meds    Pertinent Labs Reviewed: reviewed  Pertinent Labs Comments: Na 131, Alb 2.8, POCT Gluc 159-296, A1c 9.4  Pertinent Medications Reviewed: reviewed  Pertinent Medications Comments: IVF, Mg sulfate, heparin, statin     Physical Findings/Assessment    Overall Physical Appearance: nourished, overweight  Tubes: nasogastric tube  Skin: intact    Estimated/Assessed Needs    Weight Used For Calorie Calculations: 103 kg (227 lb 1.2 oz)  Energy Calorie Requirements (kcal): 2223  Energy Need " Method: [place]-St Jeor (PAL 1.25)  Protein Requirements: 103-123g (1.0-1.2g/kg)  Weight Used For Protein Calculations: 103 kg (227 lb 1.2 oz)  Fluid Need Method: RDA Method  RDA Method (mL): 2223  CHO Requirement: 45-50%      Nutrition Prescription Ordered    Current Diet Order: NPO   Current Nutrition Support Formula Ordered: Diabetisource AC  Current Nutrition Support Rate Ordered: 30 (ml)  Current Nutrition Support Frequency Ordered: mL/hr     Evaluation of Received Nutrient/Fluid Intake    Enteral Calories (kcal): 864  Enteral Protein (gm): 43  Enteral (Free Water) Fluid (mL): 589  % Kcal Needs: 39  % Protein Needs: 42  Energy Calories Required: not meeting needs  Protein Required: not meeting needs  Comments: LBM 6/8  Tolerance: tolerating    % Intake of Estimated Energy Needs: 25 - 50 %  % Meal Intake: NPO    Nutrition Risk    Level of Risk/Frequency of Follow-up:  (f/u 1x week)     Assessment and Plan    Acute ischemic left MCA stroke    Contributing Nutrition Diagnosis  Inadequate energy intake    Related to (etiology):   Inability to consume adequate nutrition     Signs and Symptoms (as evidenced by):   NPO with EN meeting 39% EEN     Interventions/Recommendations (treatment strategy):  See recs in RD note 6/8    Nutrition Diagnosis Status:   New               Monitor and Evaluation    Food and Nutrient Intake: enteral nutrition intake  Food and Nutrient Adminstration: enteral and parenteral nutrition administration  Physical Activity and Function: nutrition-related ADLs and IADLs  Anthropometric Measurements: weight, body mass index, weight change  Biochemical Data, Medical Tests and Procedures: electrolyte and renal panel, glucose/endocrine profile, lipid profile  Nutrition-Focused Physical Findings: overall appearance     Nutrition Follow-Up    RD Follow-up?: Yes

## 2018-06-08 NOTE — PROGRESS NOTES
[facility]  Vascular Neurology  Comprehensive Stroke Center  Progress Note    Assessment/Plan:     * Embolic stroke involving right middle cerebral artery    69 y/o male with R MCA infarct with left sided weakness, facial droop, dysarthria and confusion. Right sided movement is minimal. Etiology likely atheroembolic. EEG completed and results pending due to patients intermittent drowsiness throughout the day.     Antithrombotics:  DAPT    Statins: Crestor 40 mg daily    Aggressive risk factor modification: HTN, DM, HLD, Diet, Exercise, Obesity, CAD     Rehab efforts: PT/OT/SLP to evaluate and treat, PM&R consult     Diagnostics ordered/pending: EEG    VTE prophylaxis: Heparin 5000 units SQ every 8 hours, SCD's    BP parameters: Infarct: No intervention, SBP <180            Dysarthria    NG tube placed  Aggressive SLP         Fever of unknown origin    Max temp past 24 hours 101.4  WBC WNL  Infectious workup negative so far - blood cultures no growth to date   Procal WNL - 0.08   US upper/lower extremities ordered to rule out DVT        Decreased cardiac ejection fraction    EF 15-20% - seen on echo completed 6/7  Cardiac history of CABG in 2016  Cardiology consulted - appreciate recs         Hyponatremia    Sodium 131  Urine studies ordered   Continue to follow         Cytotoxic cerebral edema    Large area of cytotoxic cerebral edema identified when reviewing brain imaging in the territory of the R middle cerebral artery. There is not mass effect associated with it. We will continue to monitor the patients clinical exam for any worsening of symptoms which may indicate expansion of the stroke or the area of the edema resulting in the clinical change. The pattern is suggestive of embolic etiology.            Left spastic hemiparesis    Due to stroke  Aggressive therapy        CAD (coronary artery disease)    Stoke risk factor  ASA 81 mg  Plavix 75 mg         HTN (hypertension)    Stroke risk  factor  SBP <220  Home meds - coreg and losartan   Ax to lisinopril and Norvasc        COPD (chronic obstructive pulmonary disease)    Stroke risk factor  Duonebs Q6 due to patient not being able to follow commands and RT not being able to administer inhalers             T2DM (type 2 diabetes mellitus)    Stroke risk factor  HgB A1C 9.4  SSI Q6             67 y/o male wth R MCA infarct. Had been at [facility] since 6-4-18 for AMS and weakness that improved and then worsened now with facial droop and left sided weakness. 6-6-18 fever so infectious w/u in process could be PNA as he was coughing with puree food at [facility]. He was placed on ABX Amp/Vanc?valcyclovir for possible meningitis this is stopped as he does not have meningitis. He moved around too much and was clausterphobic for MRI so will attempt MRI here.   6/8 - Urine studies ordered for hyponatremia. EEG results pending. Bilateral upper/lower extremities US ordered for fever. Procal WNL. Infectious workup negative so far. Cardiology consulted for EF 15-20%.     STROKE DOCUMENTATION      NIH Scale:  1a. Level Of Consciousness: 0-->Alert: keenly responsive  1b. LOC Questions: 2-->Answers neither question correctly  1c. LOC Commands: 1-->Performs one task correctly  2. Best Gaze: 0-->Normal  3. Visual: 0-->No visual loss  4. Facial Palsy: 1-->Minor paralysis (flattened nasolabial fold, asymmetry on smiling)  5a. Motor Arm, Left: 4-->No movement  5b. Motor Arm, Right: 0-->No drift: limb holds 90 (or 45) degrees for full 10 secs  6a. Motor Leg, Left: 3-->No effort against gravity: leg falls to bed immediately  6b. Motor Leg, Right: 0-->No drift: leg holds 30 degree position for full 5 secs  7. Limb Ataxia: 0-->Absent  8. Sensory: 1-->Mild-to-moderate sensory loss: patient feels pinprick is less sharp or is dull on the affected side: or there is a loss of superficial pain with pinprick, but patient is aware of being touched  9. Best Language: 2-->Severe aphasia: all  communication is through fragmentary expression: great need for inference, questioning, and guessing by the listener. Range of information that can be exchanged is limited: listener carries burden of. . . (see row details)  10. Dysarthria: 2-->Severe dysarthria: patients speech is so slurred as to be unintelligible in the absence of or out of proportion to any dysphasia, or is mute/anarthric  11. Extinction and Inattention (formerly Neglect): 1-->Visual, tactile, auditory, spatial, or personal inattention or extinction to bilateral simultaneous stimulation in one of the sensory modalities  Total (NIH Stroke Scale): 17       Modified Houghton Score: 0  Beni Coma Scale:    ABCD2 Score:    [id number] Score:   HAS -BLED Score:   ICH Score:   Hunt & De Paz Classification:      Hemorrhagic change of an Ischemic Stroke: Does this patient have an ischemic stroke with hemorrhagic changes? No     Neurologic Chief Complaint: R MCA infarct     Subjective:     Interval History: Patient is seen for follow-up neurological assessment and treatment recommendations:     Urine studies ordered for hyponatremia. EEG results pending. Bilateral upper/lower extremities US ordered for fever. Procal WNL. Infectious workup negative so far. Cardiology consulted for EF 15-20%.     HPI, Past Medical, Family, and Social History remains the same as documented in the initial encounter.     Review of Systems   Constitutional: Positive for fever.   HENT: Positive for trouble swallowing.    Respiratory: Positive for wheezing.    Cardiovascular: Negative for chest pain.   Gastrointestinal: Negative for nausea and vomiting.   Genitourinary: Negative for urgency.   Neurological: Positive for facial asymmetry, speech difficulty and weakness.   Psychiatric/Behavioral: Positive for agitation and confusion.     Scheduled Meds:   albuterol-ipratropium  3 mL Nebulization Q6H    aspirin  81 mg Per NG tube Daily    carvedilol  12.5 mg Per NG tube BID WM     citalopram  10 mg Per NG tube Daily    [START ON 6/9/2018] clopidogrel  75 mg Per NG tube Daily    fluticasone-vilanterol  1 puff Inhalation Daily    heparin (porcine)  5,000 Units Subcutaneous Q8H    losartan  50 mg Per NG tube Daily    rosuvastatin  40 mg Per NG tube QHS    senna  8.6 mg Per NG tube Daily    sodium chloride 0.9%  3 mL Intravenous Q8H     Continuous Infusions:   sodium chloride 0.9%       PRN Meds:acetaminophen, dextrose 50%, glucagon (human recombinant), HYDROcodone-acetaminophen, insulin aspart U-100, labetalol, ondansetron, polyethylene glycol, ramelteon, sodium chloride 0.9%    Objective:     Vital Signs (Most Recent):  Temp: 99.3 °F (37.4 °C) (06/08/18 0828)  Pulse: 90 (06/08/18 1058)  Resp: 16 (06/08/18 0828)  BP: 113/63 (06/08/18 0828)  SpO2: 95 % (06/08/18 0828)  BP Location: Right arm    Vital Signs Range (Last 24H):  Temp:  [98.9 °F (37.2 °C)-101.9 °F (38.8 °C)]   Pulse:  []   Resp:  [16-18]   BP: (105-149)/(58-80)   SpO2:  [91 %-98 %]   BP Location: Right arm    Physical Exam   Constitutional: He appears well-developed.   HENT:   Head: Normocephalic and atraumatic.   NG present    Eyes: EOM are normal. Pupils are equal, round, and reactive to light.   Cardiovascular: Normal rate.    Pulmonary/Chest: Effort normal.   Abdominal: Soft. There is no tenderness.   Musculoskeletal:   LSW (LUE>LLE)   Neurological: He is alert.   Skin: Skin is warm.   Psychiatric:   Agitated    Vitals reviewed.      Neurological Exam:   LOC: alert  Attention Span: poor  Language: Global aphasia  Articulation: Dysarthria  Orientation: Untestable due to severe aphasia   Visual Fields: Full  EOM (CN III, IV, VI): Full/intact  Pupils (CN II, III): PERRL  Facial Movement (CN VII): Lower facial weakness on the Left  Motor: Arm left  0/5  Leg left  Paresis: 3/5  Arm right  Normal 5/5  Leg right Paresis: 4/5  Cebellar: No evidence of appendicular or axial ataxia  Sensation: Intact to light touch,  temperature and vibration    Laboratory:  CMP:   Recent Labs  Lab 06/08/18  0440   CALCIUM 8.4*   ALBUMIN 2.8*   PROT 6.0   *   K 4.1   CO2 27   CL 97   BUN 11   CREATININE 1.1   ALKPHOS 52*   ALT 9*   AST 10   BILITOT 0.6     BMP:   Recent Labs  Lab 06/08/18  0440   *   K 4.1   CL 97   CO2 27   BUN 11   CREATININE 1.1   CALCIUM 8.4*     CBC:   Recent Labs  Lab 06/08/18  0440   WBC 6.54   RBC 3.79*   HGB 11.4*   HCT 35.1*      MCV 93   MCH 30.1   MCHC 32.5     Lipid Panel:   Recent Labs  Lab 06/04/18  2106   CHOL 178   LDLCALC 81.2   HDL 52   TRIG 224*     Coagulation:   Recent Labs  Lab 06/07/18  0540   INR 1.0   APTT 21.4     Platelet Aggregation Study: No results for input(s): PLTAGG, PLTAGINTERP, PLTAGREGLACO, ADPPLTAGGREG in the last 168 hours.  Hgb A1C:   Recent Labs  Lab 06/05/18  0042   HGBA1C 9.4*     TSH:   Recent Labs  Lab 06/04/18 2106   TSH 2.274       Diagnostic Results     Brain Imaging   CT 6/6   Large right MCA distribution infarct measuring approximately 7 x 3 cm including the right perisylvian region and extending to the frontal and parietal operculum with associated localized mass effect.    Vessel Imaging   CTA 6/8  CTA head: Occlusion right M2 segment of the MCA within the proximal sylvian fissure..  Heavy atherosclerotic plaquing of the cavernous and supraclinoid ICAs with mild moderate right and mild left cavernous ICA stenosis.  There is diffuse small caliber right M1 segment of the MCA.  Irregularity and narrowing of the right distal vertebral artery concerning for atherosclerotic disease with mild moderate stenosis.  CTA neck: Atherosclerotic plaquing of the carotid bifurcations and proximal ICAs with less than 50% proximal ICA stenosis by NASCET criteria.  CT head: Evolving large right MCA distribution infarction.  Localized mass effect without significant midline shift or hydrocephalus.  No evidence for hemorrhagic conversion.  Clinical correlation and follow-up  advised.      Cardiac Imaging   Echo 6/7  CONCLUSIONS     1 - Technically difficult study.     2 - Severely depressed left ventricular systolic function (EF 15-20%).     3 - Mildly to moderately depressed right ventricular systolic function .     4 - Impaired LV relaxation, normal LAP (grade 1 diastolic dysfunction).     5 - Mild aortic stenosis, WIL = 1.45 cm2, AVAi = 0.67 cm2/m2, peak velocity = 2.8 m/s, mean gradient = 18 mmHg.     6 - Mild mitral regurgitation.       Colleen Hannah NP  [facility]  Department of Vascular Neurology   [facility]

## 2018-06-08 NOTE — SUBJECTIVE & OBJECTIVE
Neurologic Chief Complaint: R MCA infarct     Subjective:     Interval History: Patient is seen for follow-up neurological assessment and treatment recommendations:     Urine studies ordered for hyponatremia. EEG results pending. Bilateral upper/lower extremities US ordered for fever. Procal WNL. Infectious workup negative so far. Cardiology consulted for EF 15-20%.     HPI, Past Medical, Family, and Social History remains the same as documented in the initial encounter.     Review of Systems   Constitutional: Positive for fever.   HENT: Positive for trouble swallowing.    Respiratory: Positive for wheezing.    Cardiovascular: Negative for chest pain.   Gastrointestinal: Negative for nausea and vomiting.   Genitourinary: Negative for urgency.   Neurological: Positive for facial asymmetry, speech difficulty and weakness.   Psychiatric/Behavioral: Positive for agitation and confusion.     Scheduled Meds:   albuterol-ipratropium  3 mL Nebulization Q6H    aspirin  81 mg Per NG tube Daily    carvedilol  12.5 mg Per NG tube BID WM    citalopram  10 mg Per NG tube Daily    [START ON 6/9/2018] clopidogrel  75 mg Per NG tube Daily    fluticasone-vilanterol  1 puff Inhalation Daily    heparin (porcine)  5,000 Units Subcutaneous Q8H    losartan  50 mg Per NG tube Daily    rosuvastatin  40 mg Per NG tube QHS    senna  8.6 mg Per NG tube Daily    sodium chloride 0.9%  3 mL Intravenous Q8H     Continuous Infusions:   sodium chloride 0.9%       PRN Meds:acetaminophen, dextrose 50%, glucagon (human recombinant), HYDROcodone-acetaminophen, insulin aspart U-100, labetalol, ondansetron, polyethylene glycol, ramelteon, sodium chloride 0.9%    Objective:     Vital Signs (Most Recent):  Temp: 99.3 °F (37.4 °C) (06/08/18 0828)  Pulse: 90 (06/08/18 1058)  Resp: 16 (06/08/18 0828)  BP: 113/63 (06/08/18 0828)  SpO2: 95 % (06/08/18 0828)  BP Location: Right arm    Vital Signs Range (Last 24H):  Temp:  [98.9 °F (37.2 °C)-101.9 °F  (38.8 °C)]   Pulse:  []   Resp:  [16-18]   BP: (105-149)/(58-80)   SpO2:  [91 %-98 %]   BP Location: Right arm    Physical Exam   Constitutional: He appears well-developed.   HENT:   Head: Normocephalic and atraumatic.   NG present    Eyes: EOM are normal. Pupils are equal, round, and reactive to light.   Cardiovascular: Normal rate.    Pulmonary/Chest: Effort normal.   Abdominal: Soft. There is no tenderness.   Musculoskeletal:   LSW (LUE>LLE)   Neurological: He is alert.   Skin: Skin is warm.   Psychiatric:   Agitated    Vitals reviewed.      Neurological Exam:   LOC: alert  Attention Span: poor  Language: Global aphasia  Articulation: Dysarthria  Orientation: Untestable due to severe aphasia   Visual Fields: Full  EOM (CN III, IV, VI): Full/intact  Pupils (CN II, III): PERRL  Facial Movement (CN VII): Lower facial weakness on the Left  Motor: Arm left  0/5  Leg left  Paresis: 3/5  Arm right  Normal 5/5  Leg right Paresis: 4/5  Cebellar: No evidence of appendicular or axial ataxia  Sensation: Intact to light touch, temperature and vibration    Laboratory:  CMP:   Recent Labs  Lab 06/08/18  0440   CALCIUM 8.4*   ALBUMIN 2.8*   PROT 6.0   *   K 4.1   CO2 27   CL 97   BUN 11   CREATININE 1.1   ALKPHOS 52*   ALT 9*   AST 10   BILITOT 0.6     BMP:   Recent Labs  Lab 06/08/18  0440   *   K 4.1   CL 97   CO2 27   BUN 11   CREATININE 1.1   CALCIUM 8.4*     CBC:   Recent Labs  Lab 06/08/18  0440   WBC 6.54   RBC 3.79*   HGB 11.4*   HCT 35.1*      MCV 93   MCH 30.1   MCHC 32.5     Lipid Panel:   Recent Labs  Lab 06/04/18  2106   CHOL 178   LDLCALC 81.2   HDL 52   TRIG 224*     Coagulation:   Recent Labs  Lab 06/07/18  0540   INR 1.0   APTT 21.4     Platelet Aggregation Study: No results for input(s): PLTAGG, PLTAGINTERP, PLTAGREGLACO, ADPPLTAGGREG in the last 168 hours.  Hgb A1C:   Recent Labs  Lab 06/05/18  0042   HGBA1C 9.4*     TSH:   Recent Labs  Lab 06/04/18  2106   TSH 2.274       Diagnostic  Results     Brain Imaging   CT 6/6   Large right MCA distribution infarct measuring approximately 7 x 3 cm including the right perisylvian region and extending to the frontal and parietal operculum with associated localized mass effect.    Vessel Imaging   CTA 6/8  CTA head: Occlusion right M2 segment of the MCA within the proximal sylvian fissure..  Heavy atherosclerotic plaquing of the cavernous and supraclinoid ICAs with mild moderate right and mild left cavernous ICA stenosis.  There is diffuse small caliber right M1 segment of the MCA.  Irregularity and narrowing of the right distal vertebral artery concerning for atherosclerotic disease with mild moderate stenosis.  CTA neck: Atherosclerotic plaquing of the carotid bifurcations and proximal ICAs with less than 50% proximal ICA stenosis by NASCET criteria.  CT head: Evolving large right MCA distribution infarction.  Localized mass effect without significant midline shift or hydrocephalus.  No evidence for hemorrhagic conversion.  Clinical correlation and follow-up advised.      Cardiac Imaging   Echo 6/7  CONCLUSIONS     1 - Technically difficult study.     2 - Severely depressed left ventricular systolic function (EF 15-20%).     3 - Mildly to moderately depressed right ventricular systolic function .     4 - Impaired LV relaxation, normal LAP (grade 1 diastolic dysfunction).     5 - Mild aortic stenosis, WIL = 1.45 cm2, AVAi = 0.67 cm2/m2, peak velocity = 2.8 m/s, mean gradient = 18 mmHg.     6 - Mild mitral regurgitation.

## 2018-06-08 NOTE — ASSESSMENT & PLAN NOTE
Contributing Nutrition Diagnosis  Inadequate energy intake    Related to (etiology):   Inability to consume adequate nutrition     Signs and Symptoms (as evidenced by):   NPO with EN meeting 39% EEN     Interventions/Recommendations (treatment strategy):  See recs in RD note 6/8    Nutrition Diagnosis Status:   New

## 2018-06-09 PROBLEM — I82.A12 DVT OF AXILLARY VEIN, ACUTE LEFT: Status: ACTIVE | Noted: 2018-01-01

## 2018-06-09 NOTE — PROGRESS NOTES
[facility]  Vascular Neurology  Comprehensive Stroke Center  Progress Note    Assessment/Plan:     * Embolic stroke involving right middle cerebral artery    67 y/o male with R MCA infarct with left sided weakness, facial droop, dysarthria and confusion. Right sided movement is minimal. Etiology likely atheroembolic. EEG completed and results pending due to patients intermittent drowsiness throughout the day.     Antithrombotics:  Heparin drip no bolus    Statins: Crestor 40 mg daily    Aggressive risk factor modification: HTN, DM, HLD, Diet, Exercise, Obesity, CAD     Rehab efforts: PT/OT/SLP to evaluate and treat, PM&R consult     Diagnostics ordered/pending: EEG    VTE prophylaxis: Heparin drip,  SCD's    BP parameters: Infarct: No intervention, SBP <180            T2DM (type 2 diabetes mellitus)    Stroke risk factor  HgB A1C 9.4  SSI Q6        COPD (chronic obstructive pulmonary disease)    Stroke risk factor  Duonebs Q6 due to patient not being able to follow commands and RT not being able to administer inhalers             HTN (hypertension)    Stroke risk factor  SBP <220  Home meds - coreg and losartan   Ax to lisinopril and Norvasc        CAD (coronary artery disease)    Stoke risk factor          Left spastic hemiparesis    Due to stroke  Aggressive therapy        Cytotoxic cerebral edema    Large area of cytotoxic cerebral edema identified when reviewing brain imaging in the territory of the R middle cerebral artery. There is not mass effect associated with it. We will continue to monitor the patients clinical exam for any worsening of symptoms which may indicate expansion of the stroke or the area of the edema resulting in the clinical change. The pattern is suggestive of embolic etiology.            DVT of axillary vein, acute left    Heparin drip started  Will need long term anticoagulation          Dysarthria    NG tube placed  Aggressive SLP         Fever of unknown origin     Max temp past 24 hours 101.4  WBC WNL  Infectious workup negative so far - blood cultures no growth to date   Procal WNL - 0.08   US upper/lower extremities has DVT left axillary and brachial vein        Decreased cardiac ejection fraction    EF 15-20% - seen on echo completed 6/7  Cardiac history of CABG in 2016  Cardiology consulted - appreciate recs         Hyponatremia    Sodium 131  Urine studies ordered   Continue to follow              69 y/o male wth R MCA infarct. Had been at [facility] since 6-4-18 for AMS and weakness that improved and then worsened now with facial droop and left sided weakness. 6-6-18 fever so infectious w/u in process could be PNA as he was coughing with puree food at [facility]. He was placed on ABX Amp/Vanc/valcyclovir for possible meningitis this is stopped as he does not have meningitis. He moved around too much and was clausterphobic for MRI so will attempt MRI here.   6/8 - Urine studies ordered for hyponatremia. EEG results pending. Bilateral upper/lower extremities US ordered for fever. Procal WNL. Infectious workup negative so far. Cardiology consulted for EF 15-20%.   6-9-18 will give lasix 40 mg NG today as per cardiology recs. Discussion with family regarding anticoagulation due to low EF and DVT left axillary vein and brachial vein, discussion regarding feeding as well and likely bernard of patient swallowing is minimal so will need PEG next week as opposed to waiting and family is in agreement so will start heparin drip with no bolus parameters    STROKE DOCUMENTATION        NIH Scale:  1a. Level Of Consciousness: 0-->Alert: keenly responsive  1b. LOC Questions: 2-->Answers neither question correctly  1c. LOC Commands: 2-->Performs neither task correctly  2. Best Gaze: 0-->Normal  3. Visual: 0-->No visual loss  4. Facial Palsy: 1-->Minor paralysis (flattened nasolabial fold, asymmetry on smiling)  5a. Motor Arm, Left: 4-->No movement  5b. Motor Arm, Right: 0-->No drift: limb holds  90 (or 45) degrees for full 10 secs  6a. Motor Leg, Left: 3-->No effort against gravity: leg falls to bed immediately  6b. Motor Leg, Right: 0-->No drift: leg holds 30 degree position for full 5 secs  7. Limb Ataxia: 0-->Absent  8. Sensory: 1-->Mild-to-moderate sensory loss: patient feels pinprick is less sharp or is dull on the affected side: or there is a loss of superficial pain with pinprick, but patient is aware of being touched  9. Best Language: 2-->Severe aphasia: all communication is through fragmentary expression: great need for inference, questioning, and guessing by the listener. Range of information that can be exchanged is limited: listener carries burden of. . . (see row details)  10. Dysarthria: 2-->Severe dysarthria: patients speech is so slurred as to be unintelligible in the absence of or out of proportion to any dysphasia, or is mute/anarthric  11. Extinction and Inattention (formerly Neglect): 0-->No abnormality  Total (NIH Stroke Scale): 17       Modified [place] Score: 0  [place] Coma Scale:    ABCD2 Score:    [id number] Score:   HAS -BLED Score:   ICH Score:   Hunt & De Paz Classification:      Hemorrhagic change of an Ischemic Stroke: Does this patient have an ischemic stroke with hemorrhagic changes? No     Neurologic Chief Complaint: R MCA infarct     Subjective:     Interval History: Patient is seen for follow-up neurological assessment and treatment recommendations:   No issues overnight, Patient with DVT left axiallary vein and brachia; vein.   Discussion with family regarding anticoagulation due to low EF and DVT left axillary vein and brachial vein, discussion regarding feeding as well and likely bernard of patient swallowing is minimal so will need PEG next week as opposed to waiting and family is in agreement so will start heparin drip with no bolus parameters    HPI, Past Medical, Family, and Social History remains the same as documented in the initial encounter.     Review of Systems    Constitutional: Positive for fever.   HENT: Positive for trouble swallowing.    Respiratory: Positive for wheezing.    Cardiovascular: Negative for chest pain.   Gastrointestinal: Negative for nausea and vomiting.   Neurological: Positive for facial asymmetry, speech difficulty and weakness.   Psychiatric/Behavioral: Positive for agitation and confusion.     Scheduled Meds:   albuterol-ipratropium  3 mL Nebulization Q6H    carvedilol  12.5 mg Per NG tube BID WM    citalopram  10 mg Per NG tube Daily    furosemide  40 mg Oral Daily    losartan  50 mg Per NG tube Daily    rosuvastatin  40 mg Per NG tube QHS    senna  8.6 mg Per NG tube Daily    sodium chloride 0.9%  3 mL Intravenous Q8H     Continuous Infusions:   heparin (porcine) in D5W      sodium chloride 0.9%       PRN Meds:acetaminophen, dextrose 50%, glucagon (human recombinant), HYDROcodone-acetaminophen, insulin aspart U-100, labetalol, ondansetron, polyethylene glycol, ramelteon, sodium chloride 0.9%    Objective:     Vital Signs (Most Recent):  Temp: 99 °F (37.2 °C) (06/09/18 1209)  Pulse: 84 (06/09/18 1209)  Resp: 17 (06/09/18 1209)  BP: (!) 147/79 (06/09/18 1209)  SpO2: 96 % (06/09/18 1209)  BP Location: Right arm    Vital Signs Range (Last 24H):  Temp:  [97.8 °F (36.6 °C)-100.2 °F (37.9 °C)]   Pulse:  []   Resp:  [16-20]   BP: (107-147)/(68-79)   SpO2:  [94 %-98 %]   BP Location: Right arm    Physical Exam   Constitutional: He appears well-developed.   HENT:   Head: Normocephalic and atraumatic.   NG present    Eyes: EOM are normal. Pupils are equal, round, and reactive to light.   Cardiovascular: Normal rate.    Pulmonary/Chest: Effort normal.   Abdominal: Soft. There is no tenderness.   Musculoskeletal:   LSW (LUE>LLE)   Neurological: He is alert.   Skin: Skin is warm.   Psychiatric:   Agitated    Vitals reviewed.      Neurological Exam:   LOC: alert  Attention Span: poor  Language: Global aphasia  Articulation:  Dysarthria  Orientation: Untestable due to severe aphasia   Visual Fields: Full  EOM (CN III, IV, VI): Full/intact  Pupils (CN II, III): PERRL  Facial Movement (CN VII): Lower facial weakness on the Left  Motor: Arm left  0/5  Leg left  Paresis: 3/5  Arm right  Normal 5/5  Leg right Paresis: 4/5  Cebellar: No evidence of appendicular or axial ataxia  Sensation: Intact to light touch, temperature and vibration    Laboratory:  CMP:     Recent Labs  Lab 06/09/18  0414   CALCIUM 9.2   ALBUMIN 2.8*   PROT 6.1   *   K 4.2   CO2 27   CL 95   BUN 17   CREATININE 1.0   ALKPHOS 63   ALT 12   AST 15   BILITOT 0.3     BMP:     Recent Labs  Lab 06/09/18  0414   *   K 4.2   CL 95   CO2 27   BUN 17   CREATININE 1.0   CALCIUM 9.2     CBC:     Recent Labs  Lab 06/09/18  1358   WBC 7.26   RBC 3.72*   HGB 11.4*   HCT 34.1*      MCV 92   MCH 30.6   MCHC 33.4     Lipid Panel:     Recent Labs  Lab 06/04/18  2106   CHOL 178   LDLCALC 81.2   HDL 52   TRIG 224*     Coagulation:     Recent Labs  Lab 06/07/18  0540   INR 1.0   APTT 21.4     Platelet Aggregation Study: No results for input(s): PLTAGG, PLTAGINTERP, PLTAGREGLACO, ADPPLTAGGREG in the last 168 hours.  Hgb A1C:     Recent Labs  Lab 06/05/18  0042   HGBA1C 9.4*     TSH:     Recent Labs  Lab 06/04/18  2106   TSH 2.274       Diagnostic Results     Brain Imaging   CT 6/6   Large right MCA distribution infarct measuring approximately 7 x 3 cm including the right perisylvian region and extending to the frontal and parietal operculum with associated localized mass effect.    Vessel Imaging   CTA 6/8  CTA head: Occlusion right M2 segment of the MCA within the proximal sylvian fissure..  Heavy atherosclerotic plaquing of the cavernous and supraclinoid ICAs with mild moderate right and mild left cavernous ICA stenosis.  There is diffuse small caliber right M1 segment of the MCA.  Irregularity and narrowing of the right distal vertebral artery concerning for atherosclerotic  disease with mild moderate stenosis.  CTA neck: Atherosclerotic plaquing of the carotid bifurcations and proximal ICAs with less than 50% proximal ICA stenosis by NASCET criteria.  CT head: Evolving large right MCA distribution infarction.  Localized mass effect without significant midline shift or hydrocephalus.  No evidence for hemorrhagic conversion.  Clinical correlation and follow-up advised.      Cardiac Imaging   Echo 6/7  CONCLUSIONS     1 - Technically difficult study.     2 - Severely depressed left ventricular systolic function (EF 15-20%).     3 - Mildly to moderately depressed right ventricular systolic function .     4 - Impaired LV relaxation, normal LAP (grade 1 diastolic dysfunction).     5 - Mild aortic stenosis, WIL = 1.45 cm2, AVAi = 0.67 cm2/m2, peak velocity = 2.8 m/s, mean gradient = 18 mmHg.     6 - Mild mitral regurgitation.       Marlene Byrd, NP  Comprehensive Stroke Center  Department of Vascular Neurology   [facility]

## 2018-06-09 NOTE — ASSESSMENT & PLAN NOTE
69 y/o male with R MCA infarct with left sided weakness, facial droop, dysarthria and confusion. Right sided movement is minimal. Etiology likely atheroembolic. EEG completed and results pending due to patients intermittent drowsiness throughout the day.     Antithrombotics:  Heparin drip no bolus    Statins: Crestor 40 mg daily    Aggressive risk factor modification: HTN, DM, HLD, Diet, Exercise, Obesity, CAD     Rehab efforts: PT/OT/SLP to evaluate and treat, PM&R consult     Diagnostics ordered/pending: EEG    VTE prophylaxis: Heparin drip,  SCD's    BP parameters: Infarct: No intervention, SBP <180

## 2018-06-09 NOTE — SUBJECTIVE & OBJECTIVE
Neurologic Chief Complaint: R MCA infarct     Subjective:     Interval History: Patient is seen for follow-up neurological assessment and treatment recommendations:   No issues overnight, Patient with DVT left axiallary vein and brachia; vein.   Discussion with family regarding anticoagulation due to low EF and DVT left axillary vein and brachial vein, discussion regarding feeding as well and likely bernard of patient swallowing is minimal so will need PEG next week as opposed to waiting and family is in agreement so will start heparin drip with no bolus parameters    HPI, Past Medical, Family, and Social History remains the same as documented in the initial encounter.     Review of Systems   Constitutional: Positive for fever.   HENT: Positive for trouble swallowing.    Respiratory: Positive for wheezing.    Cardiovascular: Negative for chest pain.   Gastrointestinal: Negative for nausea and vomiting.   Neurological: Positive for facial asymmetry, speech difficulty and weakness.   Psychiatric/Behavioral: Positive for agitation and confusion.     Scheduled Meds:   albuterol-ipratropium  3 mL Nebulization Q6H    carvedilol  12.5 mg Per NG tube BID WM    citalopram  10 mg Per NG tube Daily    furosemide  40 mg Oral Daily    losartan  50 mg Per NG tube Daily    rosuvastatin  40 mg Per NG tube QHS    senna  8.6 mg Per NG tube Daily    sodium chloride 0.9%  3 mL Intravenous Q8H     Continuous Infusions:   heparin (porcine) in D5W      sodium chloride 0.9%       PRN Meds:acetaminophen, dextrose 50%, glucagon (human recombinant), HYDROcodone-acetaminophen, insulin aspart U-100, labetalol, ondansetron, polyethylene glycol, ramelteon, sodium chloride 0.9%    Objective:     Vital Signs (Most Recent):  Temp: 99 °F (37.2 °C) (06/09/18 1209)  Pulse: 84 (06/09/18 1209)  Resp: 17 (06/09/18 1209)  BP: (!) 147/79 (06/09/18 1209)  SpO2: 96 % (06/09/18 1209)  BP Location: Right arm    Vital Signs Range (Last 24H):  Temp:  [97.8  °F (36.6 °C)-100.2 °F (37.9 °C)]   Pulse:  []   Resp:  [16-20]   BP: (107-147)/(68-79)   SpO2:  [94 %-98 %]   BP Location: Right arm    Physical Exam   Constitutional: He appears well-developed.   HENT:   Head: Normocephalic and atraumatic.   NG present    Eyes: EOM are normal. Pupils are equal, round, and reactive to light.   Cardiovascular: Normal rate.    Pulmonary/Chest: Effort normal.   Abdominal: Soft. There is no tenderness.   Musculoskeletal:   LSW (LUE>LLE)   Neurological: He is alert.   Skin: Skin is warm.   Psychiatric:   Agitated    Vitals reviewed.      Neurological Exam:   LOC: alert  Attention Span: poor  Language: Global aphasia  Articulation: Dysarthria  Orientation: Untestable due to severe aphasia   Visual Fields: Full  EOM (CN III, IV, VI): Full/intact  Pupils (CN II, III): PERRL  Facial Movement (CN VII): Lower facial weakness on the Left  Motor: Arm left  0/5  Leg left  Paresis: 3/5  Arm right  Normal 5/5  Leg right Paresis: 4/5  Cebellar: No evidence of appendicular or axial ataxia  Sensation: Intact to light touch, temperature and vibration    Laboratory:  CMP:     Recent Labs  Lab 06/09/18  0414   CALCIUM 9.2   ALBUMIN 2.8*   PROT 6.1   *   K 4.2   CO2 27   CL 95   BUN 17   CREATININE 1.0   ALKPHOS 63   ALT 12   AST 15   BILITOT 0.3     BMP:     Recent Labs  Lab 06/09/18  0414   *   K 4.2   CL 95   CO2 27   BUN 17   CREATININE 1.0   CALCIUM 9.2     CBC:     Recent Labs  Lab 06/09/18  1358   WBC 7.26   RBC 3.72*   HGB 11.4*   HCT 34.1*      MCV 92   MCH 30.6   MCHC 33.4     Lipid Panel:     Recent Labs  Lab 06/04/18  2106   CHOL 178   LDLCALC 81.2   HDL 52   TRIG 224*     Coagulation:     Recent Labs  Lab 06/07/18  0540   INR 1.0   APTT 21.4     Platelet Aggregation Study: No results for input(s): PLTAGG, PLTAGINTERP, PLTAGREGLACO, ADPPLTAGGREG in the last 168 hours.  Hgb A1C:     Recent Labs  Lab 06/05/18  0042   HGBA1C 9.4*     TSH:     Recent Labs  Lab  06/04/18  2106   [facility] 2.274       Diagnostic Results     Brain Imaging   CT 6/6   Large right MCA distribution infarct measuring approximately 7 x 3 cm including the right perisylvian region and extending to the frontal and parietal operculum with associated localized mass effect.    Vessel Imaging   CTA 6/8  CTA head: Occlusion right M2 segment of the MCA within the proximal sylvian fissure..  Heavy atherosclerotic plaquing of the cavernous and supraclinoid ICAs with mild moderate right and mild left cavernous ICA stenosis.  There is diffuse small caliber right M1 segment of the MCA.  Irregularity and narrowing of the right distal vertebral artery concerning for atherosclerotic disease with mild moderate stenosis.  CTA neck: Atherosclerotic plaquing of the carotid bifurcations and proximal ICAs with less than 50% proximal ICA stenosis by NASCET criteria.  CT head: Evolving large right MCA distribution infarction.  Localized mass effect without significant midline shift or hydrocephalus.  No evidence for hemorrhagic conversion.  Clinical correlation and follow-up advised.      Cardiac Imaging   Echo 6/7  CONCLUSIONS     1 - Technically difficult study.     2 - Severely depressed left ventricular systolic function (EF 15-20%).     3 - Mildly to moderately depressed right ventricular systolic function .     4 - Impaired LV relaxation, normal LAP (grade 1 diastolic dysfunction).     5 - Mild aortic stenosis, WIL = 1.45 cm2, AVAi = 0.67 cm2/m2, peak velocity = 2.8 m/s, mean gradient = 18 mmHg.     6 - Mild mitral regurgitation.

## 2018-06-09 NOTE — ASSESSMENT & PLAN NOTE
Max temp past 24 hours 101.4  WBC WNL  Infectious workup negative so far - blood cultures no growth to date   Procal WNL - 0.08   US upper/lower extremities has DVT left axillary and brachial vein

## 2018-06-10 PROBLEM — R93.89 ABNORMAL VENTRICULAR WALL MOTION: Status: ACTIVE | Noted: 2018-01-01

## 2018-06-10 PROBLEM — R93.89 ABNORMAL CHEST CT: Status: RESOLVED | Noted: 2017-01-01 | Resolved: 2018-01-01

## 2018-06-10 PROBLEM — R41.82 ALTERED MENTAL STATUS: Status: RESOLVED | Noted: 2018-01-01 | Resolved: 2018-01-01

## 2018-06-10 PROBLEM — E83.39 HYPOPHOSPHATEMIA: Status: RESOLVED | Noted: 2018-01-01 | Resolved: 2018-01-01

## 2018-06-10 PROBLEM — I50.22 CHRONIC SYSTOLIC (CONGESTIVE) HEART FAILURE: Status: ACTIVE | Noted: 2018-01-01

## 2018-06-10 NOTE — ASSESSMENT & PLAN NOTE
Max temp past 24 hours 99.4  WBC WNL  Infectious workup negative so far - blood cultures no growth to date   Procal WNL - 0.08   US upper/lower extremities has DVT left axillary and brachial vein

## 2018-06-10 NOTE — PROGRESS NOTES
[facility]  Vascular Neurology  Comprehensive Stroke Center  Progress Note    Assessment/Plan:     * Embolic stroke involving right middle cerebral artery    69 y/o male with R MCA infarct with left sided weakness, facial droop, dysarthria and confusion. Right sided movement is minimal. Etiology likely atheroembolic. EEG completed and results pending due to patients intermittent drowsiness throughout the day.     Antithrombotics:  Heparin drip no bolus    Statins: Crestor 40 mg daily    Aggressive risk factor modification: HTN, DM, HLD, Diet, Exercise, Obesity, CAD     Rehab efforts: PT/OT/SLP to evaluate and treat, PM&R consult     Diagnostics ordered/pending: EEG    VTE prophylaxis: Heparin drip,  SCD's    BP parameters: Infarct: No intervention, SBP <180            T2DM (type 2 diabetes mellitus)    Stroke risk factor  HgB A1C 9.4  SSI Q6        COPD (chronic obstructive pulmonary disease)    Stroke risk factor  Duonebs Q6 due to patient not being able to follow commands and RT not being able to administer inhalers             Essential hypertension    Stroke risk factor  SBP <220  Home meds - coreg and losartan   Ax to lisinopril and Norvasc        CAD (coronary artery disease)    Stoke risk factor          Left spastic hemiparesis    Due to stroke  Aggressive therapy        Cytotoxic cerebral edema    Large area of cytotoxic cerebral edema identified when reviewing brain imaging in the territory of the R middle cerebral artery. There is not mass effect associated with it. We will continue to monitor the patients clinical exam for any worsening of symptoms which may indicate expansion of the stroke or the area of the edema resulting in the clinical change. The pattern is suggestive of embolic etiology.            DVT of axillary vein, acute left    Heparin drip started  Will need long term anticoagulation          Dysarthria    NG tube placed  Aggressive SLP         Fever of unknown origin     Max temp past 24 hours 99.4  WBC WNL  Infectious workup negative so far - blood cultures no growth to date   Procal WNL - 0.08   US upper/lower extremities has DVT left axillary and brachial vein        Decreased cardiac ejection fraction    EF 15-20% - seen on echo completed 6/7  Cardiac history of CABG in 2016  Cardiology consulted - appreciate recs         Hyponatremia    Sodium 131  Urine studies ordered   Continue to follow              69 y/o male wth R MCA infarct. Had been at [facility] since 6-4-18 for AMS and weakness that improved and then worsened now with facial droop and left sided weakness. 6-6-18 fever so infectious w/u in process could be PNA as he was coughing with puree food at [facility]. He was placed on ABX Amp/Vanc/valcyclovir for possible meningitis this is stopped as he does not have meningitis. He moved around too much and was clausterphobic for MRI so will attempt MRI here.   6/8 - Urine studies ordered for hyponatremia. EEG results pending. Bilateral upper/lower extremities US ordered for fever. Procal WNL. Infectious workup negative so far. Cardiology consulted for EF 15-20%.   6-9-18 will give lasix 40 mg NG today as per cardiology recs. Discussion with family regarding anticoagulation due to low EF and DVT left axillary vein and brachial vein, discussion regarding feeding as well and likely bernard of patient swallowing is minimal so will need PEG next week as opposed to waiting and family is in agreement so will start heparin drip with no bolus parameters  6-10-18 once EEG read and no seizure activity may start Modafinil to help with wakefulness    STROKE DOCUMENTATION        NIH Scale:  1a. Level Of Consciousness: 2-->Not alert: requires repeated stimulation to attend, or is obtunded and requires strong or painful stimulation to make movements (not stereotyped)  1b. LOC Questions: 2-->Answers neither question correctly  1c. LOC Commands: 2-->Performs neither task correctly  2. Best Gaze:  0-->Normal  3. Visual: 0-->No visual loss  4. Facial Palsy: 1-->Minor paralysis (flattened nasolabial fold, asymmetry on smiling)  5a. Motor Arm, Left: 4-->No movement  5b. Motor Arm, Right: 0-->No drift: limb holds 90 (or 45) degrees for full 10 secs  6a. Motor Leg, Left: 3-->No effort against gravity: leg falls to bed immediately  6b. Motor Leg, Right: 0-->No drift: leg holds 30 degree position for full 5 secs  7. Limb Ataxia: 0-->Absent  8. Sensory: 1-->Mild-to-moderate sensory loss: patient feels pinprick is less sharp or is dull on the affected side: or there is a loss of superficial pain with pinprick, but patient is aware of being touched  9. Best Language: 2-->Severe aphasia: all communication is through fragmentary expression: great need for inference, questioning, and guessing by the listener. Range of information that can be exchanged is limited: listener carries burden of. . . (see row details)  10. Dysarthria: 2-->Severe dysarthria: patients speech is so slurred as to be unintelligible in the absence of or out of proportion to any dysphasia, or is mute/anarthric  11. Extinction and Inattention (formerly Neglect): 0-->No abnormality  Total (NIH Stroke Scale): 19       Modified [place] Score: 0  [place] Coma Scale:    ABCD2 Score:    [id number] Score:   HAS -BLED Score:   ICH Score:   Hunt & De Paz Classification:      Hemorrhagic change of an Ischemic Stroke: Does this patient have an ischemic stroke with hemorrhagic changes? No     Neurologic Chief Complaint: R MCA infarct     Subjective:     Interval History: Patient is seen for follow-up neurological assessment and treatment recommendations:   No issues overnight, tolerating heparin drip, more lethargic this am, family in agreement for PEG so need to call IR in am    HPI, Past Medical, Family, and Social History remains the same as documented in the initial encounter.     Review of Systems   Constitutional: Positive for fever.   HENT: Positive for  trouble swallowing.    Respiratory: Positive for wheezing.    Cardiovascular: Negative for chest pain.   Gastrointestinal: Negative for nausea and vomiting.   Neurological: Positive for facial asymmetry, speech difficulty and weakness.   Psychiatric/Behavioral: Positive for agitation and confusion.     Scheduled Meds:   albuterol-ipratropium  3 mL Nebulization Q6H    carvedilol  12.5 mg Per NG tube BID WM    citalopram  10 mg Per NG tube Daily    furosemide  40 mg Oral Daily    losartan  50 mg Per NG tube Daily    rosuvastatin  40 mg Per NG tube QHS    senna  8.6 mg Per NG tube Daily    sodium chloride 0.9%  3 mL Intravenous Q8H     Continuous Infusions:   heparin (porcine) in D5W 17 Units/kg/hr (06/10/18 0609)    sodium chloride 0.9%       PRN Meds:acetaminophen, dextrose 50%, glucagon (human recombinant), HYDROcodone-acetaminophen, insulin aspart U-100, labetalol, ondansetron, polyethylene glycol, ramelteon, sodium chloride 0.9%    Objective:     Vital Signs (Most Recent):  Temp: 98.4 °F (36.9 °C) (06/10/18 0750)  Pulse: 75 (06/10/18 1234)  Resp: 20 (06/10/18 1234)  BP: 132/74 (06/10/18 0750)  SpO2: 95 % (06/10/18 1234)  BP Location: Right arm    Vital Signs Range (Last 24H):  Temp:  [97.9 °F (36.6 °C)-99.8 °F (37.7 °C)]   Pulse:  []   Resp:  [16-20]   BP: (120-152)/(63-76)   SpO2:  [93 %-98 %]   BP Location: Right arm    Physical Exam   Constitutional: He appears well-developed.   HENT:   Head: Normocephalic and atraumatic.   NG present    Eyes: EOM are normal. Pupils are equal, round, and reactive to light.   Cardiovascular: Normal rate.    Pulmonary/Chest: Effort normal.   Abdominal: Soft. There is no tenderness.   Musculoskeletal:   LSW (LUE>LLE)   Neurological: He is alert.   Skin: Skin is warm.   Psychiatric:   Agitated    Vitals reviewed.      Neurological Exam:   LOC: drowsy  Attention Span: very poor  Language: Global aphasia  Articulation: Dysarthria  Orientation: Untestable due to severe  aphasia   Facial Movement (CN VII): Lower facial weakness on the Left  Motor: Arm left  0/5  Leg left  Paresis: 3/5  Arm right  Normal 5/5  Leg right Paresis: 4/5  Cebellar: No evidence of appendicular or axial ataxia  Sensation: Intact to light touch, temperature and vibration    Laboratory:  CMP:     Recent Labs  Lab 06/10/18  0431   CALCIUM 9.2   ALBUMIN 2.6*   PROT 6.3   *   K 4.4   CO2 26   CL 93*   BUN 17   CREATININE 1.0   ALKPHOS 66   ALT 17   AST 27   BILITOT 0.3     BMP:     Recent Labs  Lab 06/10/18  0431   *   K 4.4   CL 93*   CO2 26   BUN 17   CREATININE 1.0   CALCIUM 9.2     CBC:     Recent Labs  Lab 06/10/18  0431   WBC 8.36   RBC 3.79*   HGB 11.3*   HCT 35.0*      MCV 92   MCH 29.8   MCHC 32.3     Lipid Panel:     Recent Labs  Lab 06/04/18 2106   CHOL 178   LDLCALC 81.2   HDL 52   TRIG 224*     Coagulation:     Recent Labs  Lab 06/07/18  0540   INR 1.0   APTT 21.4     Platelet Aggregation Study: No results for input(s): PLTAGG, PLTAGINTERP, PLTAGREGLACO, ADPPLTAGGREG in the last 168 hours.  Hgb A1C:     Recent Labs  Lab 06/05/18  0042   HGBA1C 9.4*     TSH:     Recent Labs  Lab 06/04/18 2106   TSH 2.274       Diagnostic Results     Brain Imaging   CT 6/6   Large right MCA distribution infarct measuring approximately 7 x 3 cm including the right perisylvian region and extending to the frontal and parietal operculum with associated localized mass effect.    Vessel Imaging   CTA 6/8  CTA head: Occlusion right M2 segment of the MCA within the proximal sylvian fissure..  Heavy atherosclerotic plaquing of the cavernous and supraclinoid ICAs with mild moderate right and mild left cavernous ICA stenosis.  There is diffuse small caliber right M1 segment of the MCA.  Irregularity and narrowing of the right distal vertebral artery concerning for atherosclerotic disease with mild moderate stenosis.  CTA neck: Atherosclerotic plaquing of the carotid bifurcations and proximal ICAs with less  than 50% proximal ICA stenosis by NASCET criteria.  CT head: Evolving large right MCA distribution infarction.  Localized mass effect without significant midline shift or hydrocephalus.  No evidence for hemorrhagic conversion.  Clinical correlation and follow-up advised.      Cardiac Imaging   Echo 6/7  CONCLUSIONS     1 - Technically difficult study.     2 - Severely depressed left ventricular systolic function (EF 15-20%).     3 - Mildly to moderately depressed right ventricular systolic function .     4 - Impaired LV relaxation, normal LAP (grade 1 diastolic dysfunction).     5 - Mild aortic stenosis, WIL = 1.45 cm2, AVAi = 0.67 cm2/m2, peak velocity = 2.8 m/s, mean gradient = 18 mmHg.     6 - Mild mitral regurgitation.       Marlene Byrd, NP  Comprehensive Stroke Center  Department of Vascular Neurology   [facility]

## 2018-06-10 NOTE — ASSESSMENT & PLAN NOTE
67 y/o male with R MCA infarct with left sided weakness, facial droop, dysarthria and confusion. Right sided movement is minimal. Etiology likely atheroembolic. EEG completed and results pending due to patients intermittent drowsiness throughout the day.     Antithrombotics:  Heparin drip no bolus    Statins: Crestor 40 mg daily    Aggressive risk factor modification: HTN, DM, HLD, Diet, Exercise, Obesity, CAD     Rehab efforts: PT/OT/SLP to evaluate and treat, PM&R consult     Diagnostics ordered/pending: EEG    VTE prophylaxis: Heparin drip,  SCD's    BP parameters: Infarct: No intervention, SBP <180

## 2018-06-11 NOTE — ASSESSMENT & PLAN NOTE
-Occlusion right M2 segment of the MCA with heavy atherosclerotic plaque  -Etiology likely atheroembolic. EEG completed and results pending due to patients intermittent drowsiness throughout the day.  -LUE axillary DVT-Heparin gtt started   -intermittent fevers during course with no s/s meningitis and infectious workup negative thus far    See hospital course for functional, cognitive/speech/language, and nutrition/swallow status.      Recommendations  -  Encourage mobility, OOB in chair at least 3 hours per day, and early ambulation as appropriate   -  PT/OT evaluate and treat  -  SLP speech and cognitive evaluate and treat  -  Monitor sleep disturbances and establish consistent sleep-wake cycle  -  Monitor for bowel and bladder dysfunction  -  Monitor for shoulder pain and subluxation  -  Monitor for spasticity  -  Monitor for and prevent skin breakdown and pressure ulcers  · Early mobility, repositioning/weight shifting every 20-30 minutes when sitting, turn patient every 2 hours, proper mattress/overlay and chair cushioning, pressure relief/heel protector boots  -  DVT prophylaxis:  [facility]  -  Reviewed discharge options (IP rehab, SNF, HH therapy, and OP therapy)

## 2018-06-11 NOTE — PLAN OF CARE
Problem: SLP Goal  Goal: SLP Goal  Speech Language Pathology Goals  Goals expected to be met by 6/14  1. Pt will participate in ongoing assessment of swallow.   2. Pt will answer simple y/n questions with 60% accy given repeats.  3. Pt will follow simple commands with 60% accy given mod A.  4. Pt will complete auto speech tasks with 50% accy with max A.         Outcome: Ongoing (interventions implemented as appropriate)  Pt ongoing with current goals 2/2 increased lethargy. Goals remain appropriate at this time. ST to continue to follow. Thank you.    ZACHARIAH Cuasey., [facility]  Speech-Language Pathology  Pager: [phone]  6/11/2018

## 2018-06-11 NOTE — PROGRESS NOTES
[facility]  Physical Medicine & Rehab  Progress Note    Patient Name: Mendez Guthrie  MRN: [id number]  Admission Date: 6/7/2018  Length of Stay: 4 days  Attending Physician: Jordan Barry MD    Subjective:     Principal Problem:Embolic stroke involving right middle cerebral artery    Hospital Course:   6/7/18: Evaluated by therapy.  Bed mobility totalA and grooming MaxA. NPO with SLP with aphasia and Cognitive-Linguistic Impairment.   6/8/18: SLP notes Aphasia, Dysphagia and Cognitive-Linguistic Impairment.   NGT in place.   6/11/18: No therapy over weekend.     Interval History 6/11/2018:  Patient is seen for follow-up rehab evaluation and recommendations: No therapy since 6/8.  Very lethargic on exam. Heparin gtt infusing.     HPI, Past Medical, Family, and Social History remains the same as documented in the initial encounter.    Scheduled Medications:    albuterol-ipratropium  3 mL Nebulization Q6H    carvedilol  12.5 mg Per NG tube BID WM    citalopram  10 mg Per NG tube Daily    insulin detemir U-100  5 Units Subcutaneous BID    losartan  50 mg Per NG tube Daily    rosuvastatin  40 mg Per NG tube QHS    senna  8.6 mg Per NG tube Daily    sodium chloride 0.9%  3 mL Intravenous Q8H       Diagnostic Results: Labs: Reviewed    PRN Medications: acetaminophen, dextrose 50%, glucagon (human recombinant), HYDROcodone-acetaminophen, insulin aspart U-100, labetalol, ondansetron, polyethylene glycol, ramelteon, sodium chloride 0.9%    Review of Systems   Reason unable to perform ROS: aphasia.     Objective:     Vital Signs (Most Recent):  Temp: 100.1 °F (37.8 °C) (06/11/18 0843)  Pulse: 99 (06/11/18 0843)  Resp: 18 (06/11/18 0843)  BP: (!) 157/79 (06/11/18 0843)  SpO2: (!) 93 % (06/11/18 0843)    Vital Signs (24h Range):  Temp:  [98.1 °F (36.7 °C)-100.4 °F (38 °C)] 100.1 °F (37.8 °C)  Pulse:  [] 99  Resp:  [16-24] 18  SpO2:  [93 %-96 %] 93 %  BP: (120-157)/(56-79) 157/79     Physical  Exam   Constitutional: He appears well-developed and well-nourished. He appears lethargic. He is sleeping.   Obese  Family at bedside     HENT:   Head: Normocephalic and atraumatic.   Eyes: EOM are normal. Pupils are equal, round, and reactive to light.   Neck: Normal range of motion.   Cardiovascular: Normal rate and regular rhythm.    Pulmonary/Chest: Effort normal. No respiratory distress.   Abdominal: Soft. There is no tenderness.   NGT in place   Musculoskeletal: Normal range of motion. He exhibits no deformity.   Neurological: He appears lethargic.   -patient is very somnolent and responses to vigorous sternal rub  -not alert for muscle strength testing     Skin: Skin is warm and dry.   Psychiatric: Cognition and memory are impaired. He is noncommunicative (moans).     Assessment/Plan:      * Embolic stroke involving right middle cerebral artery    -Occlusion right M2 segment of the MCA with heavy atherosclerotic plaque  -Etiology likely atheroembolic. EEG completed and results pending due to patients intermittent drowsiness throughout the day.  -LUE axillary DVT-Heparin gtt started   -intermittent fevers during course with no s/s meningitis and infectious workup negative thus far    See hospital course for functional, cognitive/speech/language, and nutrition/swallow status.      Recommendations  -  Encourage mobility, OOB in chair at least 3 hours per day, and early ambulation as appropriate   -  PT/OT evaluate and treat  -  SLP speech and cognitive evaluate and treat  -  Monitor sleep disturbances and establish consistent sleep-wake cycle  -  Monitor for bowel and bladder dysfunction  -  Monitor for shoulder pain and subluxation  -  Monitor for spasticity  -  Monitor for and prevent skin breakdown and pressure ulcers  · Early mobility, repositioning/weight shifting every 20-30 minutes when sitting, turn patient every 2 hours, proper mattress/overlay and chair cushioning, pressure relief/heel protector  boots  -  DVT prophylaxis:  [facility]  -  Reviewed discharge options (IP rehab, SNF, HH therapy, and OP therapy)          DVT of axillary vein, acute left    -revealed on US  -Heparin gtt infusing         Dysarthria    -SLP eval and treat          Fever of unknown origin    -workup negative so far  -last temperature documented per RN 99 on 6/11        Hyponatremia    -Na 128 on 6/11        Left spastic hemiparesis    -2/2 stroke     Recommendations  -  PT/OT evaluate and treat  -  Initiate stretching program  -  Use splints/casting/bracing to help preserve ROM    -  Functional electrical stimulation  -  Consider pharmaceutical management- (baclofen, diazepam, clonidine, tizanidine)  -  Follow up in PM&R clinic 2-4 weeks after discharge        CAD (coronary artery disease)    -stroke risk factor        Essential hypertension    -stroke risk factor        COPD (chronic obstructive pulmonary disease)    -stroke risk factor        T2DM (type 2 diabetes mellitus)    -stroke risk factor        Patient very lethargic on exam today. Very low level with therapy with last session on 6/8.  Awaiting OT/PT notes from 6/11.  Heparin gtt infusing. Will follow progress and discuss with rehab team for rehab recommendation.      Kassie Arzola NP  Department of Physical Medicine & Rehab   [facility]

## 2018-06-11 NOTE — ASSESSMENT & PLAN NOTE
EF 15-20% - seen on echo completed 6/7  Cardiac history of CABG in 2016  Cardiology consulted - appreciate recs   Patient to be started on AC after PEG placement; holding lasix due to hyponatremia

## 2018-06-11 NOTE — NURSING
Patient's temp is consistently  elevated d/t patients room being hot. Patients wife continues to cover patient with several blankets and increase ambient temp despite frequent attempts to educated and discourage this. Nurse has to remove up to 3 blankets and decrease thermostat consistently for the past 3 nights.

## 2018-06-11 NOTE — PROCEDURES
DATE OF SERVICE:  06/07/2018    EEG NUMBER:  [id number].    REQUESTED BY:  Jordan Barry M.D.    LOCATION OF SERVICE:  729.    ELECTROENCEPHALOGRAM REPORT    Extended Recording    METHODOLOGY:  Electroencephalographic (EEG) is recorded with electrodes placed   according to the International 10-20 placement system.  Thirty two (32) channels   of digital signal (sampling rate of 512/sec), including T1 and T2, were   simultaneously recorded from the scalp and may include EKG, EMG, and/or eye   monitors.  Recording band pass was 0.1 to 512 Hz.  Digital video recording of   the patient is simultaneously recorded with the EEG.  The patient is instructed   to report clinical symptoms which may occur during the recording session.  EEG   and video recording are stored and archived in digital format.  Activation   procedures, which include photic stimulation, hyperventilation and instructing   patients to perform simple tasks, are done in selected patients.    The EEG is displayed on a monitor screen and can be reviewed using different   montages.  Computer-assisted analysis is employed to detect spike and   electrographic seizure activity.  The entire record is submitted for computer   analysis.  The entire recording is visually reviewed, and the times identified   by computer analysis as being spikes or seizures are reviewed again.    Compressed spectral analysis (CSA) is also performed on the activity recorded   from each individual channel.  This is displayed as a power display of   frequencies from 0 to 30 Hz over time.  The CSA is reviewed looking for   asymmetries in power between homologous areas of the scalp, then compared with   the original EEG recording.    Manifest software was also utilized in the review of this study.  This software   suite analyzes the EEG recording in multiple domains.  Coherence and rhythmicity   are computed to identify EEG sections which may contain organized seizures.    Each channel  undergoes analysis to detect the presence of spike and sharp waves   which have special and morphological characteristics of epileptic activity.  The   routine EEG recording is converted from special into frequency domain.  This is   then displayed comparing homologous areas to identify areas of significant   asymmetry.  Algorithm to identify non-cortically generated artifact is used to   separate artifact from the EEG.    RECORDING TIMES:  Start on 06/07/2018 at 12:22 and end at 14:14.  A total of 1 hour and 49 minutes of EEG recording was obtained.    EEG FINDINGS:  Recording was obtained at the patient's bedside in his hospital   room.  He was lying quietly in bed and breathing on his own.  Background over   the 2 hemispheres was quite asymmetric.  On the left side, there was somewhat   irregular 8 to 9 Hz posterior dominant rhythm seen in the occipital, parietal,   posterior temporal and central region.  There is a superimposed irregular beta   frequencies present.  Over the right hemisphere, the background is of theta,   delta mixture.  The patient appeared to be awake throughout the recording.    There was no significant change in the activity over the 2 hemispheres.  There   was no spike or sharp wave activity seen.  There were no rhythmic buildups to   indicate the presence of subclinical or non-convulsive seizures.  Activation   procedures were not carried out.    IMPRESSION:  Markedly abnormal EEG.  There is delta-theta mixture noted over the   right hemisphere indicating a very marked lateralized cortical dysfunction   likely related to a structural lesion such as hemispheric stroke.  On the left   hemisphere, posterior dominant rhythm in a normal frequency, but there is   somewhat irregular times and some intermixed theta activity indicative of very   mild encephalopathy.  Throughout the recording, there was no evidence for an   epileptic process.      RR/IN  dd: 06/11/2018 16:56:23 (CDT)  td: 06/11/2018  17:34:08 (CDT)  Doc ID   #[id number]  Job ID #[id number]    CC:

## 2018-06-11 NOTE — ASSESSMENT & PLAN NOTE
Sodium 131  Urine osmo and urine sodium labs WNL 6/8  Urine studies ordered by \[facility]\"" medicine 6/11  Continue to follow

## 2018-06-11 NOTE — PROGRESS NOTES
[facility]  Vascular Neurology  Comprehensive Stroke Center  Progress Note    Assessment/Plan:     * Embolic stroke involving right middle cerebral artery    67 y/o male with R MCA infarct with left sided weakness, facial droop, dysarthria and confusion. Right sided movement is minimal. Etiology likely atheroembolic. EEG completed and results pending due to patients intermittent drowsiness throughout the day.     IR consulted for PEG placement. Patient continues to be drowsy on exam. Will consider starting Modafinil after  Results of EEG.    Antithrombotics:  Heparin drip no bolus; will need AC after PEG placement     Statins: Crestor 40 mg daily    Aggressive risk factor modification: HTN, DM, HLD, Diet, Exercise, Obesity, CAD     Rehab efforts: PT/OT/SLP to evaluate and treat, PM&R consult     Diagnostics ordered/pending: EEG pending     VTE prophylaxis: Heparin drip,  SCD's    BP parameters: Infarct: No intervention, SBP <180            DVT of axillary vein, acute left    Heparin drip started  Will need long term anticoagulation after PEG placement           Dysarthria    NG tube placed  Aggressive SLP   Plan for PEG 6/12        Fever of unknown origin    Max temp past 24 hours 100.4  WBC WNL  Infectious workup negative so far - blood cultures no growth to date   Procal WNL - 0.08   US upper/lower extremities has DVT left axillary and brachial vein        Decreased cardiac ejection fraction    EF 15-20% - seen on echo completed 6/7  Cardiac history of CABG in 2016  Cardiology consulted - appreciate recs   Patient to be started on AC after PEG placement; holding lasix due to hyponatremia         Hyponatremia    Sodium 131  Urine osmo and urine sodium labs WNL 6/8  Urine studies ordered by [facility] medicine 6/11  Continue to follow         Cytotoxic cerebral edema    Large area of cytotoxic cerebral edema identified when reviewing brain imaging in the territory of the R middle cerebral artery.  There is not mass effect associated with it. We will continue to monitor the patients clinical exam for any worsening of symptoms which may indicate expansion of the stroke or the area of the edema resulting in the clinical change. The pattern is suggestive of embolic etiology.            Left spastic hemiparesis    Due to stroke  Aggressive therapy        CAD (coronary artery disease)    Stoke risk factor  Heparin gtt currently with AC after PEG placement           Essential hypertension    Stroke risk factor  SBP <180  Home meds - coreg and losartan   Ax to lisinopril and Norvasc        COPD (chronic obstructive pulmonary disease)    Stroke risk factor  Duonebs Q6 due to patient not being able to follow commands and RT not being able to administer inhalers   Currently maintaining O2 sats in the 90's on 1L NC          T2DM (type 2 diabetes mellitus)    Stroke risk factor  HgB A1C 9.4  SSI Q6  Detemir 5u BID started 6/11             67 y/o male wth R MCA infarct. Had been at [facility] since 6-4-18 for AMS and weakness that improved and then worsened now with facial droop and left sided weakness. 6-6-18 fever so infectious w/u in process could be PNA as he was coughing with puree food at [facility]. He was placed on ABX Amp/Vanc/valcyclovir for possible meningitis this is stopped as he does not have meningitis. He moved around too much and was clausterphobic for MRI so will attempt MRI here.   6/8 - Urine studies ordered for hyponatremia. EEG results pending. Bilateral upper/lower extremities US ordered for fever. Procal WNL. Infectious workup negative so far. Cardiology consulted for EF 15-20%.   6-9-18 will give lasix 40 mg NG today as per cardiology recs. Discussion with family regarding anticoagulation due to low EF and DVT left axillary vein and brachial vein, discussion regarding feeding as well and likely bernard of patient swallowing is minimal so will need PEG next week as opposed to waiting and family is in  agreement so will start heparin drip with no bolus parameters  6-10-18 once EEG read and no seizure activity may start Modafinil to help with wakefulness  6/11 - EEG to be read today and consider Modafinil. IR consulted for PEG placement tomorrow. [facility] medicine ordering urine studies for hyponatremia.     STROKE DOCUMENTATION      NIH Scale:  1a. Level Of Consciousness: 2-->Not alert: requires repeated stimulation to attend, or is obtunded and requires strong or painful stimulation to make movements (not stereotyped)  1b. LOC Questions: 2-->Answers neither question correctly  1c. LOC Commands: 2-->Performs neither task correctly  2. Best Gaze: 0-->Normal  3. Visual: 0-->No visual loss  4. Facial Palsy: 1-->Minor paralysis (flattened nasolabial fold, asymmetry on smiling)  5a. Motor Arm, Left: 3-->No effort against gravity: limb falls  5b. Motor Arm, Right: 0-->No drift: limb holds 90 (or 45) degrees for full 10 secs  6a. Motor Leg, Left: 3-->No effort against gravity: leg falls to bed immediately  6b. Motor Leg, Right: 0-->No drift: leg holds 30 degree position for full 5 secs  7. Limb Ataxia: 0-->Absent  8. Sensory: 1-->Mild-to-moderate sensory loss: patient feels pinprick is less sharp or is dull on the affected side: or there is a loss of superficial pain with pinprick, but patient is aware of being touched  9. Best Language: 2-->Severe aphasia: all communication is through fragmentary expression: great need for inference, questioning, and guessing by the listener. Range of information that can be exchanged is limited: listener carries burden of. . . (see row details)  10. Dysarthria: 2-->Severe dysarthria: patients speech is so slurred as to be unintelligible in the absence of or out of proportion to any dysphasia, or is mute/anarthric  11. Extinction and Inattention (formerly Neglect): 0-->No abnormality  Total (NIH Stroke Scale): 18       Modified David Score: 0  [place] Coma Scale:    ABCD2 Score:     [id number] Score:   HAS -BLED Score:   ICH Score:   Hunt & De Paz Classification:      Hemorrhagic change of an Ischemic Stroke: Does this patient have an ischemic stroke with hemorrhagic changes? No     Neurologic Chief Complaint: R MCA infarct     Subjective:     Interval History: Patient is seen for follow-up neurological assessment and treatment recommendations:     EEG to be read today and consider Modafinil. IR consulted for PEG placement tomorrow. Hospital medicine ordering urine studies for hyponatremia.    HPI, Past Medical, Family, and Social History remains the same as documented in the initial encounter.     Review of Systems   Constitutional: Positive for fever.   HENT: Positive for trouble swallowing.    Cardiovascular: Negative for chest pain.   Gastrointestinal: Negative for nausea and vomiting.   Neurological: Positive for facial asymmetry, speech difficulty and weakness.   Psychiatric/Behavioral: Positive for confusion.     Scheduled Meds:   albuterol-ipratropium  3 mL Nebulization Q6H    carvedilol  12.5 mg Per NG tube BID WM    citalopram  10 mg Per NG tube Daily    losartan  50 mg Per NG tube Daily    rosuvastatin  40 mg Per NG tube QHS    senna  8.6 mg Per NG tube Daily    sodium chloride 0.9%  3 mL Intravenous Q8H     Continuous Infusions:   heparin (porcine) in D5W 17 Units/kg/hr (06/10/18 2142)    sodium chloride 0.9%       PRN Meds:acetaminophen, dextrose 50%, glucagon (human recombinant), HYDROcodone-acetaminophen, insulin aspart U-100, labetalol, ondansetron, polyethylene glycol, ramelteon, sodium chloride 0.9%    Objective:     Vital Signs (Most Recent):  Temp: 100.1 °F (37.8 °C) (06/11/18 0843)  Pulse: 99 (06/11/18 0843)  Resp: 18 (06/11/18 0843)  BP: (!) 157/79 (06/11/18 0843)  SpO2: (!) 93 % (06/11/18 0843)  BP Location: Right arm    Vital Signs Range (Last 24H):  Temp:  [98.1 °F (36.7 °C)-100.4 °F (38 °C)]   Pulse:  []   Resp:  [16-24]   BP: (120-157)/(56-79)   SpO2:   [93 %-96 %]   BP Location: Right arm    Physical Exam   Constitutional: He appears well-developed.   HENT:   Head: Normocephalic and atraumatic.   NG present    Eyes: EOM are normal. Pupils are equal, round, and reactive to light.   Cardiovascular: Normal rate.    Pulmonary/Chest: Effort normal.   Abdominal: Soft. There is no tenderness.   Musculoskeletal:   LSW (LUE>LLE)   Neurological: He is alert.   Skin: Skin is warm.   Psychiatric:   Confused    Vitals reviewed.      Neurological Exam:   LOC: drowsy  Attention Span: very poor  Language: Global aphasia  Articulation: Dysarthria  Orientation: Untestable due to severe aphasia   Facial Movement (CN VII): Lower facial weakness on the Left  Motor: Arm left  1/5  Leg left  Paresis: 3/5  Arm right  Normal 5/5  Leg right Paresis: 4/5  Cebellar: No evidence of appendicular or axial ataxia  Sensation: Intact to light touch, temperature and vibration  Anisocoria: Left pupil 3 reactive; Right pupil 2 reactive    Laboratory:  CMP:     Recent Labs  Lab 06/11/18  0418   CALCIUM 9.8   ALBUMIN 2.5*   PROT 6.6   *   K 4.4   CO2 30*   CL 89*   BUN 17   CREATININE 1.0   ALKPHOS 69   ALT 24   AST 60*   BILITOT 0.3     BMP:     Recent Labs  Lab 06/11/18  0418   *   K 4.4   CL 89*   CO2 30*   BUN 17   CREATININE 1.0   CALCIUM 9.8     CBC:     Recent Labs  Lab 06/11/18  0418   WBC 9.36   RBC 3.69*   HGB 11.2*   HCT 33.9*      MCV 92   MCH 30.4   MCHC 33.0     Lipid Panel:     Recent Labs  Lab 06/04/18  2106   CHOL 178   LDLCALC 81.2   HDL 52   TRIG 224*     Coagulation:     Recent Labs  Lab 06/07/18  0540   INR 1.0   APTT 21.4     Platelet Aggregation Study: No results for input(s): PLTAGG, PLTAGINTERP, PLTAGREGLACO, ADPPLTAGGREG in the last 168 hours.  Hgb A1C:     Recent Labs  Lab 06/05/18  0042   HGBA1C 9.4*     TSH:     Recent Labs  Lab 06/04/18  2106   TSH 2.274       Diagnostic Results     Brain Imaging   CT 6/6   Large right MCA distribution infarct measuring  approximately 7 x 3 cm including the right perisylvian region and extending to the frontal and parietal operculum with associated localized mass effect.\    EEG pending     Vessel Imaging   CTA 6/8  CTA head: Occlusion right M2 segment of the MCA within the proximal sylvian fissure..  Heavy atherosclerotic plaquing of the cavernous and supraclinoid ICAs with mild moderate right and mild left cavernous ICA stenosis.  There is diffuse small caliber right M1 segment of the MCA.  Irregularity and narrowing of the right distal vertebral artery concerning for atherosclerotic disease with mild moderate stenosis.  CTA neck: Atherosclerotic plaquing of the carotid bifurcations and proximal ICAs with less than 50% proximal ICA stenosis by NASCET criteria.  CT head: Evolving large right MCA distribution infarction.  Localized mass effect without significant midline shift or hydrocephalus.  No evidence for hemorrhagic conversion.  Clinical correlation and follow-up advised.      Cardiac Imaging   Echo 6/7  CONCLUSIONS     1 - Technically difficult study.     2 - Severely depressed left ventricular systolic function (EF 15-20%).     3 - Mildly to moderately depressed right ventricular systolic function .     4 - Impaired LV relaxation, normal LAP (grade 1 diastolic dysfunction).     5 - Mild aortic stenosis, WIL = 1.45 cm2, AVAi = 0.67 cm2/m2, peak velocity = 2.8 m/s, mean gradient = 18 mmHg.     6 - Mild mitral regurgitation.       Colleen Hannah NP  [facility]  Department of Vascular Neurology   [facility]

## 2018-06-11 NOTE — PT/OT/SLP PROGRESS
Occupational Therapy   Treatment/POC Changed    Name: Mendez Guthrie  MRN: [id number]  Admitting Diagnosis:  Embolic stroke involving right middle cerebral artery       Recommendations:     Discharge Recommendations: nursing facility, skilled  Discharge Equipment Recommendations:   (TBD pending progress)  Barriers to discharge:  None    Subjective     Communicated with: RN prior to session.  Pt obtunded; unable to arouse for participation in therapy session this date despite max verbal/tactile stimuli    Pain/Comfort:  · Pain Rating 1: 0/10  · Pain Rating Post-Intervention 1: 0/10      Objective:     Patient found with: telemetry, NG tube, peripheral IV, oxygen, SCD, pressure relief boots    General Precautions: Standard, aspiration, aphasia, fall, NPO   Orthopedic Precautions:N/A   Braces: N/A     Occupational Performance:    Bed Mobility:    · Patient completed Rolling/Turning to Left with  dependent  · Patient completed Rolling/Turning to Right with dependent     * No EOB activity completed this date 2/2 lethargy    Patient left with bed in chair position with all lines intact, call button in reach and RN notified    [facility] 6 Click:  [facility] Total Score: 6    Treatment & Education:  -Pt edu on OT role/POC  - White board updated  - Reviewed BUE HEP and positioning while in bed with family-- family demo'd understanding  - Techniques for family to implement during the day to promote improved level of arousal ( blind open during the day; bed in chair position, provide auditory/tactile stimuli)  - Provided PROM exercises to BUE while supine: 1x15 reps in shoulder flexion, elbow flexion/extension, shoulder IR/ER, supination/pronation, wrist, and digit flexion/extension. Education:    - Positioning of pt in chair position in bed; BUE elevated with pillows; cervical spine/pelvis in neutral position; pressure relief boots donned    Cognition:   Pt lethargic; eyes closed 100% of session; unable to arouse for participation in  therapy session    Assessment:     Mendez Guthrie is a 68 y.o. male with a medical diagnosis of Embolic stroke involving right middle cerebral artery.  He presents with increase lethargy, limiting treatment session this date.  Performance deficits affecting function are weakness, impaired endurance, impaired functional mobilty, impaired self care skills, decreased upper extremity function, impaired balance, decreased safety awareness, impaired sensation, impaired cognition, impaired fine motor, abnormal tone, edema, decreased lower extremity function, visual deficits.  Therapy recommendations changed to SNF 2/2 fair progression with goals and overall participation in session secondary to lethargy.     Rehab Prognosis:  Fair; patient would benefit from acute skilled OT services to address these deficits and reach maximum level of function.       Plan:     Patient to be seen 5 x/week to address the above listed problems via self-care/home management, therapeutic activities, therapeutic exercises, neuromuscular re-education  · Plan of Care Expires: 07/06/18  · Plan of Care Reviewed with: patient    This Plan of care has been discussed with the patient who was involved in its development and understands and is in agreement with the identified goals and treatment plan    GOALS:    Occupational Therapy Goals        Problem: Occupational Therapy Goal    Goal Priority Disciplines Outcome Interventions   Occupational Therapy Goal     OT, PT/OT Ongoing (interventions implemented as appropriate)    Description:  Goals to be met by: 6/17/2018     Patient will increase functional independence with ADLs by performing:    UE Dressing with Moderate Assistance.  LE Dressing with Maximum Assistance.  Grooming while seated with Contact Guard Assistance.  Toileting from bedside commode with Moderate Assistance for hygiene and clothing management.   Sitting at edge of bed x 15 minutes with Minimal Assistance.  Supine to sit with  Moderate Assistance.  Stand pivot transfers with Moderate Assistance.  Toilet transfer to bedside commode with Moderate Assistance.  Pt will initiate movement in LUE.  Pt will follow 50% of one step commands.  Pt will keep eyes open 100% of session.                      Time Tracking:     OT Date of Treatment: 06/11/18  OT Start Time: 1316  OT Stop Time: 1336  OT Total Time (min): 20 min    Billable Minutes:Therapeutic Exercise 20    Anita vaughan, YASIR  6/11/2018

## 2018-06-11 NOTE — SUBJECTIVE & OBJECTIVE
Interval History 6/11/2018:  Patient is seen for follow-up rehab evaluation and recommendations: Participating with therapy.     HPI, Past Medical, Family, and Social History remains the same as documented in the initial encounter.    Scheduled Medications:    albuterol-ipratropium  3 mL Nebulization Q6H    carvedilol  12.5 mg Per NG tube BID WM    citalopram  10 mg Per NG tube Daily    insulin detemir U-100  5 Units Subcutaneous BID    losartan  50 mg Per NG tube Daily    rosuvastatin  40 mg Per NG tube QHS    senna  8.6 mg Per NG tube Daily    sodium chloride 0.9%  3 mL Intravenous Q8H       Diagnostic Results: Labs: Reviewed    PRN Medications: acetaminophen, dextrose 50%, glucagon (human recombinant), HYDROcodone-acetaminophen, insulin aspart U-100, labetalol, ondansetron, polyethylene glycol, ramelteon, sodium chloride 0.9%    Review of Systems   Reason unable to perform ROS: aphasia.     Objective:     Vital Signs (Most Recent):  Temp: 100.1 °F (37.8 °C) (06/11/18 0843)  Pulse: 99 (06/11/18 0843)  Resp: 18 (06/11/18 0843)  BP: (!) 157/79 (06/11/18 0843)  SpO2: (!) 93 % (06/11/18 0843)    Vital Signs (24h Range):  Temp:  [98.1 °F (36.7 °C)-100.4 °F (38 °C)] 100.1 °F (37.8 °C)  Pulse:  [] 99  Resp:  [16-24] 18  SpO2:  [93 %-96 %] 93 %  BP: (120-157)/(56-79) 157/79     Physical Exam   Constitutional: He appears well-developed and well-nourished. He appears lethargic. He is sleeping.   Obese  Family at bedside     HENT:   Head: Normocephalic and atraumatic.   Eyes: EOM are normal. Pupils are equal, round, and reactive to light.   Neck: Normal range of motion.   Cardiovascular: Normal rate and regular rhythm.    Pulmonary/Chest: Effort normal. No respiratory distress.   Abdominal: Soft. There is no tenderness.   NGT in place   Musculoskeletal: Normal range of motion. He exhibits no deformity.   Neurological: He appears lethargic.   -patient is very somnolent and responses to vigorous sternal rub  -not  alert for muscle strength testing     Skin: Skin is warm and dry.   Psychiatric: Cognition and memory are impaired. He is noncommunicative (moans).     NEUROLOGICAL EXAMINATION:     CRANIAL NERVES     CN III, IV, VI   Pupils are equal, round, and reactive to light.  Extraocular motions are normal.

## 2018-06-11 NOTE — CONSULTS
Radiology Consult    Mendez Guthrie is a 68 y.o. male with a history of R MCA stroke and dysphagia requiring percutaneous gastrostomy tube placement. Additional past medical history includes severely depressed systolic function EF 15%, asthma, COPD, HTN, HDL, DM, obesity.  Past Medical History:   Diagnosis Date    Arthritis     Asthma     COPD (chronic obstructive pulmonary disease)     Coronary artery disease     Diabetes mellitus     Embolic stroke involving right middle cerebral artery 6/6/2018    High cholesterol     Hypertension     Long term current use of antithrombotics/antiplatelets     Lung cancer     Stroke 6/6/2018     Past Surgical History:   Procedure Laterality Date    cardiac bypass      CARDIAC SURGERY  05/04/2016    PORTACATH PLACEMENT  2012    cancer treatment       Discussed with primary team, Colleen Hannah NP on Stroke Team.    Imaging reviewed with Radiology staff, Dr. Kern.     Procedure: Image-guided percutaneous gastrostomy tube placement    Scheduled Meds:    albuterol-ipratropium  3 mL Nebulization Q6H    carvedilol  12.5 mg Per NG tube BID WM    citalopram  10 mg Per NG tube Daily    insulin detemir U-100  5 Units Subcutaneous BID    losartan  50 mg Per NG tube Daily    rosuvastatin  40 mg Per NG tube QHS    senna  8.6 mg Per NG tube Daily    sodium chloride 0.9%  3 mL Intravenous Q8H     Continuous Infusions:    heparin (porcine) in D5W 17 Units/kg/hr (06/10/18 0902)    sodium chloride 0.9%       PRN Meds:acetaminophen, dextrose 50%, glucagon (human recombinant), HYDROcodone-acetaminophen, insulin aspart U-100, labetalol, ondansetron, polyethylene glycol, ramelteon, sodium chloride 0.9%    Allergies:   Review of patient's allergies indicates:   Allergen Reactions    Lisinopril Swelling    Norvasc [amlodipine] Swelling       Labs:    Recent Labs  Lab 06/07/18  0540   INR 1.0       Recent Labs  Lab 06/11/18  0418   WBC 9.36   HGB 11.2*   HCT 33.9*   MCV 92   PLT  268      Recent Labs  Lab 06/11/18  0418   *   *   K 4.4   CL 89*   CO2 30*   BUN 17   CREATININE 1.0   CALCIUM 9.8   MG 1.8   ALT 24   AST 60*   ALBUMIN 2.5*   BILITOT 0.3         Vitals (Most Recent):  Temp: 100.1 °F (37.8 °C) (06/11/18 0843)  Pulse: 99 (06/11/18 0843)  Resp: 18 (06/11/18 0843)  BP: (!) 157/79 (06/11/18 0843)  SpO2: (!) 93 % (06/11/18 0843)    Plan:   1. NPO after midnight.  2. Hold anticoagulants. Hold heparin 4 hours prior to procedure.  3. Follow-up CT abdomen.  4.  Keep NG tube.  5. Percutaneous gastrostomy tube placement scheduled for tomorrow.    Ayden Al MD  Resident  Department of Radiology  Pager: [phone]

## 2018-06-11 NOTE — ASSESSMENT & PLAN NOTE
69 y/o male with R MCA infarct with left sided weakness, facial droop, dysarthria and confusion. Right sided movement is minimal. Etiology likely atheroembolic. EEG completed and results pending due to patients intermittent drowsiness throughout the day.     Antithrombotics:  Heparin drip no bolus; will need AC after PEG placement     Statins: Crestor 40 mg daily    Aggressive risk factor modification: HTN, DM, HLD, Diet, Exercise, Obesity, CAD     Rehab efforts: PT/OT/SLP to evaluate and treat, PM&R consult     Diagnostics ordered/pending: EEG pending     VTE prophylaxis: Heparin drip,  SCD's    BP parameters: Infarct: No intervention, SBP <180

## 2018-06-11 NOTE — PLAN OF CARE
Problem: Patient Care Overview  Goal: Plan of Care Review  Outcome: Ongoing (interventions implemented as appropriate)  POC reviewed with patient. Patient rested comfortably throughout the night. Tolerating tube feedings at goal with no residual. BG consistently high; sliding scale insulin given. Condom cath in place. Heparin drip infusing. Temp remains slightly elevated; wife continues to place several covers on patient and increase ambient temp in room despite frequent reinforcement. Left arm and leg move spontaneously but not on command. Patient remains aphasic but follows some simple commands.

## 2018-06-11 NOTE — PT/OT/SLP PROGRESS
"Speech Language Pathology Treatment    Patient Name:  Mendez Guthrie   MRN:  [id number]  Admitting Diagnosis: Embolic stroke involving right middle cerebral artery    Recommendations:                 General Recommendations:  Dysphagia therapy and Speech/language therapy  Diet recommendations:  NPO, Liquid Diet Level: NPO   Aspiration Precautions: Continue alternate means of nutrition and Strict aspiration precautions   General Precautions: Standard, aspiration, aphasia, fall, NPO  Communication strategies:  go to room if call light pushed    Subjective     SLP reviewed Pt with nurse, nurse reports Pt leaving floor for CT upon first attempt, reports PT with increased lethargy  Upon second attempt, Patient lethargic  SLP unable to elicit vocalizations this service day  Spouse explains, "He was waving and nodding to his grandson yesterday"    Pain/Comfort:  · Pain Rating 1: other (see comments) ([place] 2/2 lethargy)    Objective:     Has the patient been evaluated by SLP for swallowing?   Yes  Keep patient NPO? Yes   Current Respiratory Status: nasal cannula      Pt seen for speech & language tx. Pt found asleep in bed, spouse at bedside. Call light in reach t/o session. SLP attempts to wake Patient via maximum sternal and verbal cues; however, pt easily falls back to sleep.  SLP unable to elicit spontaneous speech, eye contact or simple command following tasks 2/2 lethargy today.  Spouse at bedside reports Pt more lethargic this service day. Spouse asking questions about language strategies and SLP educates Patient and spouse on Aphasia, SLP goals, and compensatory straegies for fx communication. Pt easily falling back to sleep and not able to demonstrate understanding of SLP education. Spouse verbalizes understanding of SLP education.  No further questions. Whiteboard current.  Call light in reach and spouse at bedside as SLP exits room.    Assessment:     Mendez Guthrie is a 68 y.o. male with an SLP diagnosis of " Aphasia and Dysphagia.  He presents with increased lethargy this service day. ST to continue to follow.    Goals:    SLP Goals        Problem: SLP Goal    Goal Priority Disciplines Outcome   SLP Goal     SLP Ongoing (interventions implemented as appropriate)   Description:  Speech Language Pathology Goals  Goals expected to be met by 6/14  1. Pt will participate in ongoing assessment of swallow.   2. Pt will answer simple y/n questions with 60% accy given repeats.  3. Pt will follow simple commands with 60% accy given mod A.  4. Pt will complete auto speech tasks with 50% accy with max A.                          Plan:     · Patient to be seen:  5 x/week   · Plan of Care expires:  07/07/18  · Plan of Care reviewed with:  spouse, patient   · SLP Follow-Up:  Yes       Discharge recommendations:  nursing facility, skilled   Barriers to Discharge:  Level of Skilled Assistance Needed     Time Tracking:     SLP Treatment Date:   06/11/18  Speech Start Time:  1440  Speech Stop Time:  1451     Speech Total Time (min):  11 min    Billable Minutes: Speech Therapy Individual ``    LORENZO Causey, [facility]-SLP  Speech-Language Pathology  Pager: [phone]      06/11/2018

## 2018-06-11 NOTE — PLAN OF CARE
Problem: Occupational Therapy Goal  Goal: Occupational Therapy Goal  Goals to be met by: 6/17/2018     Patient will increase functional independence with ADLs by performing:    UE Dressing with Moderate Assistance.  LE Dressing with Maximum Assistance.  Grooming while seated with Contact Guard Assistance.  Toileting from bedside commode with Moderate Assistance for hygiene and clothing management.   Sitting at edge of bed x 15 minutes with Minimal Assistance.  Supine to sit with Moderate Assistance.  Stand pivot transfers with Moderate Assistance.  Toilet transfer to bedside commode with Moderate Assistance.  Pt will initiate movement in LUE.  Pt will follow 50% of one step commands.  Pt will keep eyes open 100% of session.     Outcome: Ongoing (interventions implemented as appropriate)  Continue with POC.   Anita vaughan OT  6/11/2018

## 2018-06-11 NOTE — ASSESSMENT & PLAN NOTE
Max temp past 24 hours 100.4  WBC WNL  Infectious workup negative so far - blood cultures no growth to date   Procal WNL - 0.08   US upper/lower extremities has DVT left axillary and brachial vein

## 2018-06-11 NOTE — ASSESSMENT & PLAN NOTE
Stroke risk factor  Duonebs Q6 due to patient not being able to follow commands and RT not being able to administer inhalers   Currently maintaining O2 sats in the 90's on 1L NC

## 2018-06-11 NOTE — SUBJECTIVE & OBJECTIVE
Neurologic Chief Complaint: R MCA infarct     Subjective:     Interval History: Patient is seen for follow-up neurological assessment and treatment recommendations:     EEG to be read today and consider Modafinil. IR consulted for PEG placement tomorrow. Hospital medicine ordering urine studies for hyponatremia.    HPI, Past Medical, Family, and Social History remains the same as documented in the initial encounter.     Review of Systems   Constitutional: Positive for fever.   HENT: Positive for trouble swallowing.    Cardiovascular: Negative for chest pain.   Gastrointestinal: Negative for nausea and vomiting.   Neurological: Positive for facial asymmetry, speech difficulty and weakness.   Psychiatric/Behavioral: Positive for confusion.     Scheduled Meds:   albuterol-ipratropium  3 mL Nebulization Q6H    carvedilol  12.5 mg Per NG tube BID WM    citalopram  10 mg Per NG tube Daily    losartan  50 mg Per NG tube Daily    rosuvastatin  40 mg Per NG tube QHS    senna  8.6 mg Per NG tube Daily    sodium chloride 0.9%  3 mL Intravenous Q8H     Continuous Infusions:   heparin (porcine) in D5W 17 Units/kg/hr (06/10/18 2142)    sodium chloride 0.9%       PRN Meds:acetaminophen, dextrose 50%, glucagon (human recombinant), HYDROcodone-acetaminophen, insulin aspart U-100, labetalol, ondansetron, polyethylene glycol, ramelteon, sodium chloride 0.9%    Objective:     Vital Signs (Most Recent):  Temp: 100.1 °F (37.8 °C) (06/11/18 0843)  Pulse: 99 (06/11/18 0843)  Resp: 18 (06/11/18 0843)  BP: (!) 157/79 (06/11/18 0843)  SpO2: (!) 93 % (06/11/18 0843)  BP Location: Right arm    Vital Signs Range (Last 24H):  Temp:  [98.1 °F (36.7 °C)-100.4 °F (38 °C)]   Pulse:  []   Resp:  [16-24]   BP: (120-157)/(56-79)   SpO2:  [93 %-96 %]   BP Location: Right arm    Physical Exam   Constitutional: He appears well-developed.   HENT:   Head: Normocephalic and atraumatic.   NG present    Eyes: EOM are normal. Pupils are equal,  round, and reactive to light.   Cardiovascular: Normal rate.    Pulmonary/Chest: Effort normal.   Abdominal: Soft. There is no tenderness.   Musculoskeletal:   LSW (LUE>LLE)   Neurological: He is alert.   Skin: Skin is warm.   Psychiatric:   Agitated    Vitals reviewed.      Neurological Exam:   LOC: drowsy  Attention Span: very poor  Language: Global aphasia  Articulation: Dysarthria  Orientation: Untestable due to severe aphasia   Facial Movement (CN VII): Lower facial weakness on the Left  Motor: Arm left  1/5  Leg left  Paresis: 3/5  Arm right  Normal 5/5  Leg right Paresis: 4/5  Cebellar: No evidence of appendicular or axial ataxia  Sensation: Intact to light touch, temperature and vibration  Anisocoria: Left pupil 3 reactive; Right pupil 2 reactive    Laboratory:  CMP:     Recent Labs  Lab 06/11/18 0418   CALCIUM 9.8   ALBUMIN 2.5*   PROT 6.6   *   K 4.4   CO2 30*   CL 89*   BUN 17   CREATININE 1.0   ALKPHOS 69   ALT 24   AST 60*   BILITOT 0.3     BMP:     Recent Labs  Lab 06/11/18 0418   *   K 4.4   CL 89*   CO2 30*   BUN 17   CREATININE 1.0   CALCIUM 9.8     CBC:     Recent Labs  Lab 06/11/18 0418   WBC 9.36   RBC 3.69*   HGB 11.2*   HCT 33.9*      MCV 92   MCH 30.4   MCHC 33.0     Lipid Panel:     Recent Labs  Lab 06/04/18  2106   CHOL 178   LDLCALC 81.2   HDL 52   TRIG 224*     Coagulation:     Recent Labs  Lab 06/07/18  0540   INR 1.0   APTT 21.4     Platelet Aggregation Study: No results for input(s): PLTAGG, PLTAGINTERP, PLTAGREGLACO, ADPPLTAGGREG in the last 168 hours.  Hgb A1C:     Recent Labs  Lab 06/05/18  0042   HGBA1C 9.4*     TSH:     Recent Labs  Lab 06/04/18  2106   TSH 2.274       Diagnostic Results     Brain Imaging   CT 6/6   Large right MCA distribution infarct measuring approximately 7 x 3 cm including the right perisylvian region and extending to the frontal and parietal operculum with associated localized mass effect.    Vessel Imaging   CTA 6/8  CTA head: Occlusion  right M2 segment of the MCA within the proximal sylvian fissure..  Heavy atherosclerotic plaquing of the cavernous and supraclinoid ICAs with mild moderate right and mild left cavernous ICA stenosis.  There is diffuse small caliber right M1 segment of the MCA.  Irregularity and narrowing of the right distal vertebral artery concerning for atherosclerotic disease with mild moderate stenosis.  CTA neck: Atherosclerotic plaquing of the carotid bifurcations and proximal ICAs with less than 50% proximal ICA stenosis by NASCET criteria.  CT head: Evolving large right MCA distribution infarction.  Localized mass effect without significant midline shift or hydrocephalus.  No evidence for hemorrhagic conversion.  Clinical correlation and follow-up advised.      Cardiac Imaging   Echo 6/7  CONCLUSIONS     1 - Technically difficult study.     2 - Severely depressed left ventricular systolic function (EF 15-20%).     3 - Mildly to moderately depressed right ventricular systolic function .     4 - Impaired LV relaxation, normal LAP (grade 1 diastolic dysfunction).     5 - Mild aortic stenosis, WIL = 1.45 cm2, AVAi = 0.67 cm2/m2, peak velocity = 2.8 m/s, mean gradient = 18 mmHg.     6 - Mild mitral regurgitation.

## 2018-06-12 NOTE — ASSESSMENT & PLAN NOTE
-Occlusion right M2 segment of the MCA with heavy atherosclerotic plaque  -Etiology likely atheroembolic. EEG completed and results pending due to patients intermittent drowsiness throughout the day.  -LUE axillary DVT-Heparin gtt started   -intermittent fevers during course with no s/s meningitis and infectious workup negative thus far    See hospital course for functional, cognitive/speech/language, and nutrition/swallow status.      Recommendations  -  Encourage mobility, OOB in chair at least 3 hours per day, and early ambulation as appropriate   -  PT/OT evaluate and treat  -  SLP speech and cognitive evaluate and treat  -  Monitor sleep disturbances and establish consistent sleep-wake cycle  -  Monitor for bowel and bladder dysfunction  -  Monitor for shoulder pain and subluxation  -  Monitor for spasticity  -  Monitor for and prevent skin breakdown and pressure ulcers  · Early mobility, repositioning/weight shifting every 20-30 minutes when sitting, turn patient every 2 hours, proper mattress/overlay and chair cushioning, pressure relief/heel protector boots  -  DVT prophylaxis:  [facility]  -  Reviewed discharge options (IP rehab, SNF, HH therapy, and OP therapy)

## 2018-06-12 NOTE — PROGRESS NOTES
[facility]  Vascular Neurology  Comprehensive Stroke Center  Progress Note    Assessment/Plan:     * Embolic stroke involving right middle cerebral artery    67 y/o male with R MCA infarct with left sided weakness, facial droop, dysarthria and confusion. Right sided movement is minimal. Etiology likely atheroembolic vs cardioembolic due to EF of 15%. EEG completed and no seizure activity noted. Patient to be started on Modafinil tomorrow when PEG can be used for medications.     Antithrombotics:  Heparin drip no bolus; will need AC after PEG placement (patient to have PEG placed today - will start AC when PEG can be used for meds and dc heparin gtt)    Statins: Crestor 40 mg daily    Aggressive risk factor modification: HTN, DM, HLD, Diet, Exercise, Obesity, CAD     Rehab efforts: PT/OT/SLP to evaluate and treat, PM&R consult     Diagnostics ordered/pending: NA    VTE prophylaxis: Heparin drip,  SCD's    BP parameters: Infarct: No intervention, SBP <180            DVT of axillary vein, acute left    Heparin drip started  Will need long term anticoagulation after PEG placement (AC to be started tomorrow after PEG tube placement)          Dysarthria    NG tube placed  Aggressive SLP   Plan for PEG 6/12        Fever of unknown origin    Max temp past 24 hours 101.1  WBC WNL  Infectious workup completed and negative   Procal WNL - 0.08   US upper/lower extremities has DVT left axillary and brachial vein        Decreased cardiac ejection fraction    EF 15-20% - seen on echo completed 6/7  Cardiac history of CABG in 2016  Cardiology consulted - appreciate recs   Patient to be started on AC tomorrow when PEG can be used for medications (will dc heparin gtt then); holding lasix due to hyponatremia         Hyponatremia    Sodium 131  Urine osmo and urine sodium labs WNL 6/8  Urine studies ordered by hospital medicine 6/11 - Per hospital medicine 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 There is not mass effect associated with it. We will continue to monitor the patients clinical exam for any worsening of symptoms which may indicate expansion of the stroke or the area of the edema resulting in the clinical change. The pattern is suggestive of embolic etiology.            Left spastic hemiparesis    Due to stroke  Aggressive therapy        CAD (coronary artery disease)    Stoke risk factor  Heparin gtt currently with AC after PEG placement           Essential hypertension    Stroke risk factor  SBP <180  Home meds - coreg and losartan   Ax to lisinopril and Norvasc        COPD (chronic obstructive pulmonary disease)    Stroke risk factor  Duonebs Q6 due to patient not being able to follow commands and RT not being able to administer inhalers   Currently maintaining O2 sats in the 90's on 1L NC          T2DM (type 2 diabetes mellitus)    Stroke risk factor  HgB A1C 9.4  SSI Q6  Detemir 5u BID started 6/11             67 y/o male wth R MCA infarct. Had been at [facility] since 6-4-18 for AMS and weakness that improved and then worsened now with facial droop and left sided weakness. 6-6-18 fever so infectious w/u in process could be PNA as he was coughing with puree food at [facility]. He was placed on ABX Amp/Vanc/valcyclovir for possible meningitis this is stopped as he does not have meningitis. He moved around too much and was clausterphobic for MRI so will attempt MRI here.   6/8 - Urine studies ordered for hyponatremia. EEG results pending. Bilateral upper/lower extremities US ordered for fever. Procal WNL. Infectious workup negative so far. Cardiology consulted for EF 15-20%.   6-9-18 will give lasix 40 mg NG today as per cardiology recs. Discussion with family regarding  anticoagulation due to low EF and DVT left axillary vein and brachial vein, discussion regarding feeding as well and likely bernard of patient swallowing is minimal so will need PEG next week as opposed to waiting and family is in agreement so will start heparin drip with no bolus parameters  6-10-18 once EEG read and no seizure activity may start Modafinil to help with wakefulness  6/11 - EEG to be read today and consider Modafinil. IR consulted for PEG placement tomorrow. Hospital medicine ordering urine studies for hyponatremia.   6/12 - PEG to be placed today. Heparin gtt currently held. Will resume once PEG is placed. AC to start tomorrow once PEG is able to be used. Hyponatremia slightly improving. Continue to hold fluids due to hyponatremia thought to be due to SIADH. Modafinil to be started tomorrow when PEG can be used.    STROKE DOCUMENTATION        NIH Scale:  1a. Level Of Consciousness: 1-->Not alert: but arousable by minor stimulation to obey, answer, or respond  1b. LOC Questions: 2-->Answers neither question correctly  1c. LOC Commands: 2-->Performs neither task correctly  2. Best Gaze: 0-->Normal  3. Visual: 0-->No visual loss  4. Facial Palsy: 1-->Minor paralysis (flattened nasolabial fold, asymmetry on smiling)  5a. Motor Arm, Left: 4-->No movement  5b. Motor Arm, Right: 0-->No drift: limb holds 90 (or 45) degrees for full 10 secs  6a. Motor Leg, Left: 3-->No effort against gravity: leg falls to bed immediately  6b. Motor Leg, Right: 0-->No drift: leg holds 30 degree position for full 5 secs  7. Limb Ataxia: 0-->Absent  8. Sensory: 1-->Mild-to-moderate sensory loss: patient feels pinprick is less sharp or is dull on the affected side: or there is a loss of superficial pain with pinprick, but patient is aware of being touched  9. Best Language: 2-->Severe aphasia: all communication is through fragmentary expression: great need for inference, questioning, and guessing by the listener. Range of  information that can be exchanged is limited: listener carries burden of. . . (see row details)  10. Dysarthria: 2-->Severe dysarthria: patients speech is so slurred as to be unintelligible in the absence of or out of proportion to any dysphasia, or is mute/anarthric  11. Extinction and Inattention (formerly Neglect): 0-->No abnormality  Total (NIH Stroke Scale): 18       Modified Tryon Score: 0  Farber Coma Scale:    ABCD2 Score:    [id number] Score:   HAS -BLED Score:   ICH Score:   Hunt & De Paz Classification:      Hemorrhagic change of an Ischemic Stroke: Does this patient have an ischemic stroke with hemorrhagic changes? No     Neurologic Chief Complaint: R MCA infarct     Subjective:     Interval History: Patient is seen for follow-up neurological assessment and treatment recommendations:     PEG to be placed today. Heparin gtt currently held. Will resume once PEG is placed. AC to start tomorrow once PEG is able to be used. Hyponatremia slightly improving. Continue to hold fluids due to hyponatremia thought to be due to SIADH. Modafinil to be started tomorrow when PEG can be used.    HPI, Past Medical, Family, and Social History remains the same as documented in the initial encounter.     Review of Systems   Constitutional: Positive for fever.   HENT: Positive for trouble swallowing.    Cardiovascular: Negative for chest pain.   Gastrointestinal: Negative for nausea and vomiting.   Neurological: Positive for facial asymmetry, speech difficulty and weakness.   Psychiatric/Behavioral: Positive for confusion.     Scheduled Meds:   acetaminophen  650 mg Rectal Once    albuterol-ipratropium  3 mL Nebulization Q6H    carvedilol  12.5 mg Per NG tube BID WM    citalopram  10 mg Per NG tube Daily    losartan  50 mg Per NG tube Daily    [START ON 6/13/2018] modafinil  200 mg Oral Daily    rosuvastatin  40 mg Per NG tube QHS    senna  8.6 mg Per NG tube Daily    sodium chloride 0.9%  3 mL Intravenous Q8H      Continuous Infusions:   sodium chloride 0.9%       PRN Meds:acetaminophen, dextrose 50%, glucagon (human recombinant), HYDROcodone-acetaminophen, insulin aspart U-100, labetalol, ondansetron, polyethylene glycol, ramelteon, sodium chloride 0.9%    Objective:     Vital Signs (Most Recent):  Temp: 98.9 °F (37.2 °C) (06/12/18 0451)  Pulse: (!) 116 (06/12/18 0750)  Resp: (!) 24 (06/12/18 0750)  BP: 131/70 (06/12/18 0451)  SpO2: 95 % (06/12/18 0750)  BP Location: Right arm    Vital Signs Range (Last 24H):  Temp:  [98 °F (36.7 °C)-99.9 °F (37.7 °C)]   Pulse:  []   Resp:  [18-24]   BP: (118-144)/(63-80)   SpO2:  [89 %-97 %]   BP Location: Right arm    Physical Exam   Constitutional: He appears well-developed.   HENT:   Head: Normocephalic and atraumatic.   NG present    Eyes: EOM are normal. Pupils are equal, round, and reactive to light.   Cardiovascular: Normal rate.    Pulmonary/Chest: Effort normal.   Abdominal: Soft. There is no tenderness.   Musculoskeletal:   LSW (LUE>LLE)   Neurological: He is alert.   Skin: Skin is warm.   Psychiatric:   Agitated    Vitals reviewed.      Neurological Exam:   LOC: drowsy  Attention Span: very poor  Language: Global aphasia  Articulation: Dysarthria  Orientation: Untestable due to severe aphasia   Facial Movement (CN VII): Lower facial weakness on the Left  Motor: Arm left  1/5  Leg left  Paresis: 3/5  Arm right  Normal 5/5  Leg right Paresis: 4/5  Cebellar: No evidence of appendicular or axial ataxia  Sensation: Intact to light touch, temperature and vibration  Anisocoria: Left pupil 3 reactive; Right pupil 2 reactive    Laboratory:  CMP:     Recent Labs  Lab 06/12/18  0447   CALCIUM 9.9   ALBUMIN 2.5*   PROT 7.0   *   K 4.6   CO2 32*   CL 86*   BUN 19   CREATININE 1.0   ALKPHOS 66   ALT 33   AST 84*   BILITOT 0.4     BMP:     Recent Labs  Lab 06/12/18  0447   *   K 4.6   CL 86*   CO2 32*   BUN 19   CREATININE 1.0   CALCIUM 9.9     CBC:     Recent Labs  Lab  06/12/18  0447   WBC 8.89   RBC 3.95*   HGB 11.8*   HCT 36.0*      MCV 91   MCH 29.9   MCHC 32.8     Lipid Panel:   No results for input(s): CHOL, LDLCALC, HDL, TRIG in the last 168 hours.  Coagulation:     Recent Labs  Lab 06/07/18  0540   INR 1.0   APTT 21.4     Platelet Aggregation Study: No results for input(s): PLTAGG, PLTAGINTERP, PLTAGREGLACO, ADPPLTAGGREG in the last 168 hours.  Hgb A1C:   No results for input(s): HGBA1C in the last 168 hours.  TSH:   No results for input(s): TSH in the last 168 hours.    Diagnostic Results     Brain Imaging   CT 6/6   Large right MCA distribution infarct measuring approximately 7 x 3 cm including the right perisylvian region and extending to the frontal and parietal operculum with associated localized mass effect.    Vessel Imaging   CTA 6/8  CTA head: Occlusion right M2 segment of the MCA within the proximal sylvian fissure..  Heavy atherosclerotic plaquing of the cavernous and supraclinoid ICAs with mild moderate right and mild left cavernous ICA stenosis.  There is diffuse small caliber right M1 segment of the MCA.  Irregularity and narrowing of the right distal vertebral artery concerning for atherosclerotic disease with mild moderate stenosis.  CTA neck: Atherosclerotic plaquing of the carotid bifurcations and proximal ICAs with less than 50% proximal ICA stenosis by NASCET criteria.  CT head: Evolving large right MCA distribution infarction.  Localized mass effect without significant midline shift or hydrocephalus.  No evidence for hemorrhagic conversion.  Clinical correlation and follow-up advised.      Cardiac Imaging   Echo 6/7  CONCLUSIONS     1 - Technically difficult study.     2 - Severely depressed left ventricular systolic function (EF 15-20%).     3 - Mildly to moderately depressed right ventricular systolic function .     4 - Impaired LV relaxation, normal LAP (grade 1 diastolic dysfunction).     5 - Mild aortic stenosis, WIL = 1.45 cm2, AVAi = 0.67  cm2/m2, peak velocity = 2.8 m/s, mean gradient = 18 mmHg.     6 - Mild mitral regurgitation.       Colleen Hannah NP  [facility]  Department of Vascular Neurology   [facility]

## 2018-06-12 NOTE — SUBJECTIVE & OBJECTIVE
Interval History 6/12/2018:  Patient is seen for follow-up rehab evaluation and recommendations: Very lethargic on exam.     HPI, Past Medical, Family, and Social History remains the same as documented in the initial encounter.    Scheduled Medications:    acetaminophen  650 mg Rectal Once    albuterol-ipratropium  3 mL Nebulization Q6H    carvedilol  12.5 mg Per NG tube BID WM    citalopram  10 mg Per NG tube Daily    losartan  50 mg Per NG tube Daily    [START ON 6/13/2018] modafinil  200 mg Oral Daily    rosuvastatin  40 mg Per NG tube QHS    senna  8.6 mg Per NG tube Daily    sodium chloride 0.9%  3 mL Intravenous Q8H       Diagnostic Results: Labs: Reviewed    PRN Medications: acetaminophen, dextrose 50%, glucagon (human recombinant), HYDROcodone-acetaminophen, insulin aspart U-100, labetalol, ondansetron, polyethylene glycol, ramelteon, sodium chloride 0.9%    Review of Systems   Reason unable to perform ROS: aphasia/lethargy.     Objective:     Vital Signs (Most Recent):  Temp: 98.9 °F (37.2 °C) (06/12/18 0451)  Pulse: (!) 116 (06/12/18 0750)  Resp: (!) 24 (06/12/18 0750)  BP: 131/70 (06/12/18 0451)  SpO2: 95 % (06/12/18 0750)    Vital Signs (24h Range):  Temp:  [98 °F (36.7 °C)-99.9 °F (37.7 °C)] 98.9 °F (37.2 °C)  Pulse:  [] 116  Resp:  [18-24] 24  SpO2:  [89 %-97 %] 95 %  BP: (118-144)/(63-80) 131/70     Physical Exam   Constitutional: He appears well-developed and well-nourished. He appears lethargic. He is sleeping.   Obese  Family at bedside     HENT:   Head: Normocephalic and atraumatic.   Eyes: EOM are normal. Pupils are equal, round, and reactive to light.   Neck: Normal range of motion.   Cardiovascular: Normal rate and regular rhythm.    Pulmonary/Chest: Effort normal. No respiratory distress.   Abdominal: Soft. There is no tenderness.   NGT in place   Musculoskeletal: Normal range of motion. He exhibits no deformity.   Neurological: He appears lethargic.   -patient is very somnolent  and responses to vigorous sternal rub  -not alert for muscle strength testing     Skin: Skin is warm and dry.   Psychiatric: Cognition and memory are impaired. He is noncommunicative (moans).     NEUROLOGICAL EXAMINATION:     CRANIAL NERVES     CN III, IV, VI   Pupils are equal, round, and reactive to light.  Extraocular motions are normal.

## 2018-06-12 NOTE — ASSESSMENT & PLAN NOTE
Sodium 131  Urine osmo and urine sodium labs WNL 6/8  Urine studies ordered by hospital medicine 6/11 - Per hospital medicine hyponatremia likely due to SIADH, continue holding lasix and continue patient on fluids restriction   Continue to follow

## 2018-06-12 NOTE — ASSESSMENT & PLAN NOTE
Max temp past 24 hours 101.1  WBC WNL  Infectious workup completed and negative   Procal WNL - 0.08   US upper/lower extremities has DVT left axillary and brachial vein

## 2018-06-12 NOTE — PROGRESS NOTES
[facility]  Physical Medicine & Rehab  Progress Note    Patient Name: Mendez Guthrie  MRN: [id number]  Admission Date: 6/7/2018  Length of Stay: 5 days  Attending Physician: Jordan Barry MD    Subjective:     Principal Problem:Embolic stroke involving right middle cerebral artery    Hospital Course:   6/7/18: Evaluated by therapy.  Bed mobility totalA and grooming MaxA. NPO with SLP with aphasia and Cognitive-Linguistic Impairment.   6/8/18: SLP notes Aphasia, Dysphagia and Cognitive-Linguistic Impairment.   NGT in place.   6/11/18: Limited participated with therapy 2/2 lethargy.  Bed mobility dependent. NPO with NGT.     Interval History 6/12/2018:  Patient is seen for follow-up rehab evaluation and recommendations: Very lethargic on exam.     HPI, Past Medical, Family, and Social History remains the same as documented in the initial encounter.    Scheduled Medications:    acetaminophen  650 mg Rectal Once    albuterol-ipratropium  3 mL Nebulization Q6H    carvedilol  12.5 mg Per NG tube BID WM    citalopram  10 mg Per NG tube Daily    losartan  50 mg Per NG tube Daily    [START ON 6/13/2018] modafinil  200 mg Oral Daily    rosuvastatin  40 mg Per NG tube QHS    senna  8.6 mg Per NG tube Daily    sodium chloride 0.9%  3 mL Intravenous Q8H       Diagnostic Results: Labs: Reviewed    PRN Medications: acetaminophen, dextrose 50%, glucagon (human recombinant), HYDROcodone-acetaminophen, insulin aspart U-100, labetalol, ondansetron, polyethylene glycol, ramelteon, sodium chloride 0.9%    Review of Systems   Reason unable to perform ROS: aphasia/lethargy.     Objective:     Vital Signs (Most Recent):  Temp: 98.9 °F (37.2 °C) (06/12/18 0451)  Pulse: (!) 116 (06/12/18 0750)  Resp: (!) 24 (06/12/18 0750)  BP: 131/70 (06/12/18 0451)  SpO2: 95 % (06/12/18 0750)    Vital Signs (24h Range):  Temp:  [98 °F (36.7 °C)-99.9 °F (37.7 °C)] 98.9 °F (37.2 °C)  Pulse:  [] 116  Resp:  [18-24]  24  SpO2:  [89 %-97 %] 95 %  BP: (118-144)/(63-80) 131/70     Physical Exam   Constitutional: He appears well-developed and well-nourished. He appears lethargic. He is sleeping.   Obese  Family at bedside   HENT:   Head: Normocephalic and atraumatic.   Eyes: EOM are normal. Pupils are equal, round, and reactive to light.   Neck: Normal range of motion.   Cardiovascular: Normal rate and regular rhythm.    Pulmonary/Chest: Effort normal. No respiratory distress.   Abdominal: Soft. There is no tenderness.   NGT in place   Musculoskeletal: Normal range of motion. He exhibits no deformity.   Neurological: He appears lethargic.   -patient is very somnolent and responses to vigorous sternal rub  -not alert for muscle strength testing     Skin: Skin is warm and dry.   Psychiatric: Cognition and memory are impaired. He is noncommunicative.    Assessment/Plan:      * Embolic stroke involving right middle cerebral artery    -Occlusion right M2 segment of the MCA with heavy atherosclerotic plaque  -Etiology likely atheroembolic. EEG completed and results pending due to patients intermittent drowsiness throughout the day.  -LUE axillary DVT-Heparin gtt started   -intermittent fevers during course with no s/s meningitis and infectious workup negative thus far    See hospital course for functional, cognitive/speech/language, and nutrition/swallow status.      Recommendations  -  Encourage mobility, OOB in chair at least 3 hours per day, and early ambulation as appropriate   -  PT/OT evaluate and treat  -  SLP speech and cognitive evaluate and treat  -  Monitor sleep disturbances and establish consistent sleep-wake cycle  -  Monitor for bowel and bladder dysfunction  -  Monitor for shoulder pain and subluxation  -  Monitor for spasticity  -  Monitor for and prevent skin breakdown and pressure ulcers  · Early mobility, repositioning/weight shifting every 20-30 minutes when sitting, turn patient every 2 hours, proper mattress/overlay  and chair cushioning, pressure relief/heel protector boots  -  DVT prophylaxis:  [facility]  -  Reviewed discharge options (IP rehab, SNF, HH therapy, and OP therapy)          DVT of axillary vein, acute left    -revealed on US  -Heparin gtt d/c'd          Dysarthria    -SLP eval and treat          Fever of unknown origin    -workup negative so far  -last temperature documented per RN 99 on 6/11        Hyponatremia    -Na 129 on 6/12        Left spastic hemiparesis    -2/2 stroke     Recommendations  -  PT/OT evaluate and treat  -  Initiate stretching program  -  Use splints/casting/bracing to help preserve ROM    -  Functional electrical stimulation  -  Consider pharmaceutical management- (baclofen, diazepam, clonidine, tizanidine)  -  Follow up in PM&R clinic 2-4 weeks after discharge        CAD (coronary artery disease)    -stroke risk factor        Essential hypertension    -stroke risk factor        COPD (chronic obstructive pulmonary disease)    -stroke risk factor        T2DM (type 2 diabetes mellitus)    -stroke risk factor        Recommend Skilled Nursing Facility as he is dependent with therapy and very lethargic with NGT in place.  SNF preference per patient/[facility].  Barriers to SNF admission:  Medical stability. Will sign off.  Please call with questions/concerns or re-consult if situation changes.        Kassie Arzola NP  Department of Physical Medicine & Rehab   [facility]

## 2018-06-12 NOTE — PT/OT/SLP PROGRESS
Speech Language Pathology Treatment    Patient Name:  Mendez Guthrie   MRN:  [id number]  Admitting Diagnosis: Embolic stroke involving right middle cerebral artery    Recommendations:                 General Recommendations:  Dysphagia therapy and Speech/language therapy  Diet recommendations:  NPO, Liquid Diet Level: NPO   Aspiration Precautions: Continue alternate means of nutrition and Standard aspiration precautions   General Precautions: Standard, aspiration, aphasia, fall, NPO  Communication strategies:  go to room if call light pushed    Subjective   Pt having breathing Tx upon ST first attempted to see Pt @0800.     Pt found resting in bed, lethargic upon ST entry to room. Spouse present.    Pain/Comfort:  · Pain Rating 1: 0/10  · Pain Rating Post-Intervention 2: 0/10    Objective:     Has the patient been evaluated by SLP for swallowing?   Yes  Keep patient NPO? No   Current Respiratory Status: nasal cannula      ST presented max auditory, thermal, and tactile stim to Pt - beneficial for rousing Pt though not maintained - eyes opened briefly.  ST presented ice chip to Pt x2 which Pt tolerated with appropriate bolus manipulation and mastication of 1/2 ice chips given min cues, though delayed pharyngeal swallow initiation noted (~5s). Puree trial via 1/4 tsp tolerated with oral stasis despite max cues.  Pt followed basic commands with 50% accuracy provided to Pt during PO trials as well as simple 1 step commands indep. Pt alerted to name called for 1/3 opportunities on the L, and 1/2 opportunities on the R.  Pt noted to alert quicker when named called by spouse. Pt with spontaneous vocalization x1 noted during session with hand gesture to spouse to hold her hand.   ST provided skilled education re: ST POC, goals, diet recommendations, and aspiration precautions. Pt's spouse verbalized understanding, no evidence of learning from Pt.   Whiteboard updated.     Assessment:     Mendez Guthrie is a 68 y.o. male with  an SLP diagnosis of Aphasia and Dysphagia.      Goals:    SLP Goals        Problem: SLP Goal    Goal Priority Disciplines Outcome   SLP Goal     SLP Ongoing (interventions implemented as appropriate)   Description:  Speech Language Pathology Goals  Goals expected to be met by 6/14  1. Pt will participate in ongoing assessment of swallow.   2. Pt will answer simple y/n questions with 60% accy given repeats.  3. Pt will follow simple commands with 60% accy given mod A.  4. Pt will complete auto speech tasks with 50% accy with max A.                          Plan:     · Patient to be seen:  5 x/week   · Plan of Care expires:  07/07/18  · Plan of Care reviewed with:  patient, spouse   · SLP Follow-Up:  Yes       Discharge recommendations:  nursing facility, skilled       Time Tracking:     SLP Treatment Date:   06/12/18  Speech Start Time:  0810  Speech Stop Time:  0830     Speech Total Time (min):  20 min    Billable Minutes: Speech Therapy Individual 10 and Treatment Swallowing Dysfunction 10    Miranda Ashby CCC-SLP  06/12/2018

## 2018-06-12 NOTE — ASSESSMENT & PLAN NOTE
EF 15-20% - seen on echo completed 6/7  Cardiac history of CABG in 2016  Cardiology consulted - appreciate recs   Patient to be started on AC tomorrow when PEG can be used for medications (will dc heparin gtt then); holding lasix due to hyponatremia

## 2018-06-12 NOTE — SUBJECTIVE & OBJECTIVE
Neurologic Chief Complaint: R MCA infarct     Subjective:     Interval History: Patient is seen for follow-up neurological assessment and treatment recommendations:     PEG to be placed today. Heparin gtt currently held. Will resume once PEG is placed. AC to start tomorrow once PEG is able to be used. Hyponatremia slightly improving. Continue to hold fluids due to hyponatremia thought to be due to SIADH. Modafinil to be started tomorrow when PEG can be used.    HPI, Past Medical, Family, and Social History remains the same as documented in the initial encounter.     Review of Systems   Constitutional: Positive for fever.   HENT: Positive for trouble swallowing.    Cardiovascular: Negative for chest pain.   Gastrointestinal: Negative for nausea and vomiting.   Neurological: Positive for facial asymmetry, speech difficulty and weakness.   Psychiatric/Behavioral: Positive for confusion.     Scheduled Meds:   acetaminophen  650 mg Rectal Once    albuterol-ipratropium  3 mL Nebulization Q6H    carvedilol  12.5 mg Per NG tube BID WM    citalopram  10 mg Per NG tube Daily    losartan  50 mg Per NG tube Daily    [START ON 6/13/2018] modafinil  200 mg Oral Daily    rosuvastatin  40 mg Per NG tube QHS    senna  8.6 mg Per NG tube Daily    sodium chloride 0.9%  3 mL Intravenous Q8H     Continuous Infusions:   sodium chloride 0.9%       PRN Meds:acetaminophen, dextrose 50%, glucagon (human recombinant), HYDROcodone-acetaminophen, insulin aspart U-100, labetalol, ondansetron, polyethylene glycol, ramelteon, sodium chloride 0.9%    Objective:     Vital Signs (Most Recent):  Temp: 98.9 °F (37.2 °C) (06/12/18 0451)  Pulse: (!) 116 (06/12/18 0750)  Resp: (!) 24 (06/12/18 0750)  BP: 131/70 (06/12/18 0451)  SpO2: 95 % (06/12/18 0750)  BP Location: Right arm    Vital Signs Range (Last 24H):  Temp:  [98 °F (36.7 °C)-99.9 °F (37.7 °C)]   Pulse:  []   Resp:  [18-24]   BP: (118-144)/(63-80)   SpO2:  [89 %-97 %]   BP  Location: Right arm    Physical Exam   Constitutional: He appears well-developed.   HENT:   Head: Normocephalic and atraumatic.   NG present    Eyes: EOM are normal. Pupils are equal, round, and reactive to light.   Cardiovascular: Normal rate.    Pulmonary/Chest: Effort normal.   Abdominal: Soft. There is no tenderness.   Musculoskeletal:   LSW (LUE>LLE)   Neurological: He is alert.   Skin: Skin is warm.   Psychiatric:   Agitated    Vitals reviewed.      Neurological Exam:   LOC: drowsy  Attention Span: very poor  Language: Global aphasia  Articulation: Dysarthria  Orientation: Untestable due to severe aphasia   Facial Movement (CN VII): Lower facial weakness on the Left  Motor: Arm left  1/5  Leg left  Paresis: 3/5  Arm right  Normal 5/5  Leg right Paresis: 4/5  Cebellar: No evidence of appendicular or axial ataxia  Sensation: Intact to light touch, temperature and vibration  Anisocoria: Left pupil 3 reactive; Right pupil 2 reactive    Laboratory:  CMP:     Recent Labs  Lab 06/12/18  0447   CALCIUM 9.9   ALBUMIN 2.5*   PROT 7.0   *   K 4.6   CO2 32*   CL 86*   BUN 19   CREATININE 1.0   ALKPHOS 66   ALT 33   AST 84*   BILITOT 0.4     BMP:     Recent Labs  Lab 06/12/18  0447   *   K 4.6   CL 86*   CO2 32*   BUN 19   CREATININE 1.0   CALCIUM 9.9     CBC:     Recent Labs  Lab 06/12/18  0447   WBC 8.89   RBC 3.95*   HGB 11.8*   HCT 36.0*      MCV 91   MCH 29.9   MCHC 32.8     Lipid Panel:   No results for input(s): CHOL, LDLCALC, HDL, TRIG in the last 168 hours.  Coagulation:     Recent Labs  Lab 06/07/18  0540   INR 1.0   APTT 21.4     Platelet Aggregation Study: No results for input(s): PLTAGG, PLTAGINTERP, PLTAGREGLACO, ADPPLTAGGREG in the last 168 hours.  Hgb A1C:   No results for input(s): HGBA1C in the last 168 hours.  TSH:   No results for input(s): TSH in the last 168 hours.    Diagnostic Results     Brain Imaging   CT 6/6   Large right MCA distribution infarct measuring approximately 7 x 3  cm including the right perisylvian region and extending to the frontal and parietal operculum with associated localized mass effect.    Vessel Imaging   CTA 6/8  CTA head: Occlusion right M2 segment of the MCA within the proximal sylvian fissure..  Heavy atherosclerotic plaquing of the cavernous and supraclinoid ICAs with mild moderate right and mild left cavernous ICA stenosis.  There is diffuse small caliber right M1 segment of the MCA.  Irregularity and narrowing of the right distal vertebral artery concerning for atherosclerotic disease with mild moderate stenosis.  CTA neck: Atherosclerotic plaquing of the carotid bifurcations and proximal ICAs with less than 50% proximal ICA stenosis by NASCET criteria.  CT head: Evolving large right MCA distribution infarction.  Localized mass effect without significant midline shift or hydrocephalus.  No evidence for hemorrhagic conversion.  Clinical correlation and follow-up advised.      Cardiac Imaging   Echo 6/7  CONCLUSIONS     1 - Technically difficult study.     2 - Severely depressed left ventricular systolic function (EF 15-20%).     3 - Mildly to moderately depressed right ventricular systolic function .     4 - Impaired LV relaxation, normal LAP (grade 1 diastolic dysfunction).     5 - Mild aortic stenosis, WIL = 1.45 cm2, AVAi = 0.67 cm2/m2, peak velocity = 2.8 m/s, mean gradient = 18 mmHg.     6 - Mild mitral regurgitation.

## 2018-06-12 NOTE — PLAN OF CARE
Problem: SLP Goal  Goal: SLP Goal  Speech Language Pathology Goals  Goals expected to be met by 6/14  1. Pt will participate in ongoing assessment of swallow.   2. Pt will answer simple y/n questions with 60% accy given repeats.  3. Pt will follow simple commands with 60% accy given mod A.  4. Pt will complete auto speech tasks with 50% accy with max A.           Pt making progress toward goals.   Miranda Ashby M.S., [facility]-SLP  Speech Language Pathologist  [phone]  06/12/2018

## 2018-06-12 NOTE — ASSESSMENT & PLAN NOTE
Heparin drip started  Will need long term anticoagulation after PEG placement (AC to be started tomorrow after PEG tube placement)

## 2018-06-12 NOTE — PT/OT/SLP PROGRESS
Physical Therapy  Missed Visit    Patient Name:  Mendez Guthrie   MRN:  [id number]  Admitting Diagnosis:  Embolic stroke involving right middle cerebral artery   Recent Surgery: * No surgery found *      Patient not seen today secondary to Unavailable (Comment) (Pt EDMAR at PEG placement). Will follow-up as POC allows.    Nimisha Andrews PT, DPT  6/12/2018  Pager: [phone]

## 2018-06-12 NOTE — H&P
Inpatient Radiology Pre-procedure Note    History of Present Illness:  Mendez Guthrie is a 68 y.o. male who presents for PEG tube placement.  Admission H&P reviewed.  Past Medical History:   Diagnosis Date    Arthritis     Asthma     COPD (chronic obstructive pulmonary disease)     Coronary artery disease     Diabetes mellitus     Embolic stroke involving right middle cerebral artery 6/6/2018    High cholesterol     Hypertension     Long term current use of antithrombotics/antiplatelets     Lung cancer     Stroke 6/6/2018     Past Surgical History:   Procedure Laterality Date    cardiac bypass      CARDIAC SURGERY  05/04/2016    PORTACATH PLACEMENT  2012    cancer treatment       Review of Systems:   As documented in primary team H&P    Home Meds:   Prior to Admission medications    Medication Sig Start Date End Date Taking? Authorizing Provider   aspirin 81 MG Chew Take 81 mg by mouth once daily.    Historical Provider, MD   blood sugar diagnostic Strp Inject 1 strip as directed 3 (three) times daily with meals. Dispense One Touch Ultra Strips Dx:E11.9 3/28/18   Valeria Mayen MD   carvedilol (COREG) 12.5 MG tablet Take 12.5 mg by mouth 2 (two) times daily with meals.    Historical Provider, MD   chlorthalidone (HYGROTEN) 25 MG Tab Take 25 mg by mouth once daily.    Historical Provider, MD   clopidogrel (PLAVIX) 75 mg tablet Take 75 mg by mouth once daily. 5/1/18   Historical Provider, MD   diazePAM (VALIUM) 5 MG tablet Take 1 tablet (5 mg total) by mouth every 6 (six) hours as needed for Anxiety. 10/24/17 5/10/18  Frank Saunders MD   docusate sodium (COLACE) 100 MG capsule Take 100 mg by mouth 2 (two) times daily as needed.     Historical Provider, MD   doxazosin (CARDURA) 2 MG tablet Take 1 tablet (2 mg total) by mouth every evening. 1/28/15 5/10/18  Valeria Mayen MD   fluticasone-salmeterol 250-50 mcg/dose (ADVAIR DISKUS) 250-50 mcg/dose diskus inhaler Inhale 1 puff into the  lungs 2 (two) times daily. Controller 3/28/18   Valeria Mayen MD   hydrocodone-acetaminophen 5-325mg (NORCO) 5-325 mg per tablet Take 1 tablet by mouth every 4 (four) hours as needed for Pain. 11/28/17   Valeria Mayen MD   insulin aspart U-100 (NOVOLOG) 100 unit/mL InPn pen Inject 10 Units into the skin before dinner. 3/28/18 3/28/19  Valeria Mayen MD   insulin detemir U-100 (LEVEMIR FLEXTOUCH U-100 INSULN) 100 unit/mL (3 mL) SubQ InPn pen Inject 15 Units into the skin every evening. Dispense with insulin pen needles 3/28/18 3/28/19  Valeria Mayen MD   linagliptin (TRADJENTA) 5 mg Tab tablet Take 1 tablet (5 mg total) by mouth once daily. 4/3/18 4/3/19  Valeria Mayen MD   losartan (COZAAR) 50 MG tablet Take 50 mg by mouth once daily.  3/7/15   Historical Provider, MD   metFORMIN (GLUCOPHAGE) 1000 MG tablet 'TAKE 1 TABLET BY MOUTH TWICE A DAY WITH MEALS 1/19/18   Valeria Mayen MD   nitroGLYCERIN (NITROSTAT) 0.4 MG SL tablet Place 1 tablet (0.4 mg total) under the tongue every 5 (five) minutes as needed for Chest pain. 8/22/16 5/10/18  Valeria Mayen MD   PROAIR HFA 90 mcg/actuation inhaler  12/26/17   Historical Provider, MD   rosuvastatin (CRESTOR) 40 MG Tab Take 1 tablet (40 mg total) by mouth every evening. 4/3/18   Valeria Mayen MD   SPIRIVA WITH HANDIHALER 18 mcg inhalation capsule inhale the contents of one capsule in the handihaler once daily 3/1/18   Miah Florence MD   traMADol (ULTRAM) 50 mg tablet take 1 tablet by mouth every 6 hours if needed 5/1/18   Valeria Mayen MD   VITAMIN D2 50,000 unit capsule TAKE 1 CAPSULE BY MOUTH EVERY 7 DAYS 1/27/18   Valeria Mayen MD     Scheduled Meds:    albuterol-ipratropium  3 mL Nebulization Q6H    carvedilol  12.5 mg Per NG tube BID WM    citalopram  10 mg Per NG tube Daily    losartan  50 mg Per NG tube Daily    [START ON 6/13/2018] modafinil  200 mg Oral Daily    rosuvastatin   40 mg Per NG tube QHS    senna  8.6 mg Per NG tube Daily    sodium chloride 0.9%  3 mL Intravenous Q8H     Continuous Infusions:    sodium chloride 0.9%       PRN Meds:acetaminophen, dextrose 50%, glucagon (human recombinant), HYDROcodone-acetaminophen, insulin aspart U-100, labetalol, ondansetron, polyethylene glycol, ramelteon, sodium chloride 0.9%  Anticoagulants/Antiplatelets: aspirin and Plavix last doses 6/9/18; heparin last dose 3am today    Allergies:   Review of patient's allergies indicates:   Allergen Reactions    Lisinopril Swelling    Norvasc [amlodipine] Swelling     Sedation Hx: have not been any systemic reactions    Labs:    Recent Labs  Lab 06/07/18  0540   INR 1.0       Recent Labs  Lab 06/12/18  0447   WBC 8.89   HGB 11.8*   HCT 36.0*   MCV 91         Recent Labs  Lab 06/12/18  0447   *   *   K 4.6   CL 86*   CO2 32*   BUN 19   CREATININE 1.0   CALCIUM 9.9   MG 1.7   ALT 33   AST 84*   ALBUMIN 2.5*   BILITOT 0.4         Vitals:  Temp: 97.4 °F (36.3 °C) (06/12/18 1233)  Pulse: 100 (06/12/18 1310)  Resp: (!) 32 (06/12/18 1310)  BP: (!) 149/71 (06/12/18 1233)  SpO2: (!) 92 % (06/12/18 1233)     Physical Exam:  ASA: 2  Mallampati: II    General: no acute distress  Mental Status: not oriented to person place or time  HEENT: normocephalic, atraumatic  Chest: unlabored breathing  Abdomen: nondistended  Extremity: generalized weakness    Plan: PEG tube placement  Sedation Plan: light-to-moderate sedation, versed and fentanyl    Ayden Al MD  Resident  Department of Radiology  Pager: [phone]

## 2018-06-12 NOTE — PLAN OF CARE
Cm spoke to the patient's wife at bedside regarding discharge plan. Cm explained that the patient has not progressed and will not qualify for rehab. Jose Armando gave Mrs. Guthrie a NH SNF list and asked for three choices.    1300  Jose Armando called the patient's wife Jeniffer regarding NH SNF choices. She gave Cm two choices: [facility] and [facility]. Sw notified. Jose Armando will continue to follow.

## 2018-06-12 NOTE — PLAN OF CARE
SW sent SNF referral to [facility] and [facility]. SW will follow up.    Estela Cagle LMSW  [facility]  Ext. [phone]

## 2018-06-12 NOTE — ASSESSMENT & PLAN NOTE
69 y/o male with R MCA infarct with left sided weakness, facial droop, dysarthria and confusion. Right sided movement is minimal. Etiology likely atheroembolic vs cardioembolic due to EF of 15%. EEG completed and no seizure activity noted. Patient to be started on Modafinil tomorrow when PEG can be used for medications.     Antithrombotics:  Heparin drip no bolus; will need AC after PEG placement (patient to have PEG placed today - will start AC when PEG can be used for meds and dc heparin gtt)    Statins: Crestor 40 mg daily    Aggressive risk factor modification: HTN, DM, HLD, Diet, Exercise, Obesity, CAD     Rehab efforts: PT/OT/SLP to evaluate and treat, PM&R consult     Diagnostics ordered/pending: NA    VTE prophylaxis: Heparin drip,  SCD's    BP parameters: Infarct: No intervention, SBP <180

## 2018-06-12 NOTE — PLAN OF CARE
Problem: Patient Care Overview  Goal: Plan of Care Review  Outcome: Ongoing (interventions implemented as appropriate)  POC reviewed with Pt. And spouse at bedside.  Pt. Unable to verbalize understanding.  Questions and concerns d/w spouse.  Please view flowsheet for assessments and vitals.  -Pt.'s NGT came out at beginning of shift.    -Replaced NGT per MD, and KUB done at bedside  -Tube feeds held at midnight for PEG tube placement today  -Heparin gtt to be held at 0400  -Condom Cath in place

## 2018-06-12 NOTE — PLAN OF CARE
Cm discussed the new PT/OT/SLP recommendations for NH SNF. Cm was given two NH SNF choices: the [facility] and [facility] 2[facility]. SW notified. Cm will continue to follow.     06/12/18 1300   Discharge Reassessment   Assessment Type Discharge Planning Reassessment   Provided patient/caregiver education on the expected discharge date and the discharge plan Yes   Do you have any problems affording any of your prescribed medications? No   Discharge Plan A Skilled Nursing Facility   Discharge Plan B New Nursing Home placement - [facility] care facility   Patient choice form signed by patient/caregiver N/A   Can the patient answer the patient profile reliably? No, cognitively impaired   How does the patient rate their overall health at the present time? Fair  (per previous Rn assessment)   Describe the patient's ability to walk at the present time. Major restrictions/daily assistance from another person   How often would a person be available to care for the patient? Often   Number of comorbid conditions (as recorded on the chart) Three   During the past month, has the patient often been bothered by feeling down, depressed or hopeless? No  (per previous Rn assessment)   During the past month, has the patient often been bothered by little interest or pleasure in doing things? No  (per previous Rn assessment)

## 2018-06-13 NOTE — SUBJECTIVE & OBJECTIVE
Neurologic Chief Complaint: R MCA infarct     Subjective:     Interval History: Patient is seen for follow-up neurological assessment and treatment recommendations:     PEG placed yesterday. Heparin gtt dc'ed and eliquis started. Modafinil started today. Continuing to restrict fluids (enteral water boluses) due to SIADH/hyponatremia and holding off on lasix that cardiology recommended.    HPI, Past Medical, Family, and Social History remains the same as documented in the initial encounter.     Review of Systems   Constitutional: Positive for fever.   HENT: Positive for trouble swallowing.    Cardiovascular: Negative for chest pain.   Gastrointestinal: Negative for nausea and vomiting.   Neurological: Positive for facial asymmetry, speech difficulty and weakness.   Psychiatric/Behavioral: Positive for confusion.     Scheduled Meds:   albuterol-ipratropium  3 mL Nebulization Q6H    apixaban  5 mg Per G Tube BID    aspirin  81 mg Oral Daily    carvedilol  12.5 mg Per G Tube BID WM    citalopram  10 mg Per G Tube Daily    insulin detemir U-100  7 Units Subcutaneous BID    losartan  50 mg Per G Tube Daily    modafinil  200 mg Per G Tube Daily    rosuvastatin  40 mg Per G Tube QHS    senna  8.6 mg Per G Tube Daily    sodium chloride 0.9%  3 mL Intravenous Q8H    tuberculin  5 Units Intradermal Once     Continuous Infusions:   sodium chloride 0.9%       PRN Meds:acetaminophen, dextrose 50%, glucagon (human recombinant), HYDROcodone-acetaminophen, insulin aspart U-100, labetalol, ondansetron, polyethylene glycol, sodium chloride 0.9%    Objective:     Vital Signs (Most Recent):  Temp: 99.9 °F (37.7 °C) (06/13/18 0723)  Pulse: (!) 114 (06/13/18 0844)  Resp: 18 (06/13/18 0844)  BP: 124/75 (06/13/18 0723)  SpO2: (!) 93 % (06/13/18 0844)  BP Location: Right arm    Vital Signs Range (Last 24H):  Temp:  [97.4 °F (36.3 °C)-101.6 °F (38.7 °C)]   Pulse:  [100-114]   Resp:  [16-32]   BP: (124-169)/(70-82)   SpO2:  [92  %-98 %]   BP Location: Right arm    Physical Exam   Constitutional: He appears well-developed.   HENT:   Head: Normocephalic and atraumatic.   NG present    Eyes: EOM are normal. Pupils are equal, round, and reactive to light.   Cardiovascular: Normal rate.    Pulmonary/Chest: Effort normal.   Abdominal: Soft. There is no tenderness.   Musculoskeletal:   LSW (LUE>LLE)   Neurological: He is alert.   Skin: Skin is warm.   Psychiatric:   Agitated    Vitals reviewed.      Neurological Exam:   LOC: drowsy  Attention Span: very poor  Language: Global aphasia  Articulation: Dysarthria  Orientation: Untestable due to severe aphasia   Facial Movement (CN VII): Lower facial weakness on the Left  Motor: Arm left  1/5  Leg left  Paresis: 3/5  Arm right  Normal 5/5  Leg right Paresis: 4/5  Cebellar: No evidence of appendicular or axial ataxia  Sensation: Intact to light touch, temperature and vibration  Anisocoria: Left pupil 3 reactive; Right pupil 2 reactive    Laboratory:  CMP:   No results for input(s): GLUCOSE, CALCIUM, ALBUMIN, PROT, NA, K, CO2, CL, BUN, CREATININE, ALKPHOS, ALT, AST, BILITOT in the last 24 hours.  BMP:     Recent Labs  Lab 06/12/18  0447   *   K 4.6   CL 86*   CO2 32*   BUN 19   CREATININE 1.0   CALCIUM 9.9     CBC:     Recent Labs  Lab 06/13/18  0953   WBC 8.03   RBC 4.01*   HGB 11.9*   HCT 36.5*      MCV 91   MCH 29.7   MCHC 32.6     Lipid Panel:   No results for input(s): CHOL, LDLCALC, HDL, TRIG in the last 168 hours.  Coagulation:     Recent Labs  Lab 06/07/18  0540   INR 1.0   APTT 21.4     Platelet Aggregation Study: No results for input(s): PLTAGG, PLTAGINTERP, PLTAGREGLACO, ADPPLTAGGREG in the last 168 hours.  Hgb A1C:   No results for input(s): HGBA1C in the last 168 hours.  TSH:   No results for input(s): TSH in the last 168 hours.    Diagnostic Results     Brain Imaging   CT 6/6   Large right MCA distribution infarct measuring approximately 7 x 3 cm including the right perisylvian  region and extending to the frontal and parietal operculum with associated localized mass effect.    Vessel Imaging   CTA 6/8  CTA head: Occlusion right M2 segment of the MCA within the proximal sylvian fissure..  Heavy atherosclerotic plaquing of the cavernous and supraclinoid ICAs with mild moderate right and mild left cavernous ICA stenosis.  There is diffuse small caliber right M1 segment of the MCA.  Irregularity and narrowing of the right distal vertebral artery concerning for atherosclerotic disease with mild moderate stenosis.  CTA neck: Atherosclerotic plaquing of the carotid bifurcations and proximal ICAs with less than 50% proximal ICA stenosis by NASCET criteria.  CT head: Evolving large right MCA distribution infarction.  Localized mass effect without significant midline shift or hydrocephalus.  No evidence for hemorrhagic conversion.  Clinical correlation and follow-up advised.      Cardiac Imaging   Echo 6/7  CONCLUSIONS     1 - Technically difficult study.     2 - Severely depressed left ventricular systolic function (EF 15-20%).     3 - Mildly to moderately depressed right ventricular systolic function .     4 - Impaired LV relaxation, normal LAP (grade 1 diastolic dysfunction).     5 - Mild aortic stenosis, WIL = 1.45 cm2, AVAi = 0.67 cm2/m2, peak velocity = 2.8 m/s, mean gradient = 18 mmHg.     6 - Mild mitral regurgitation.

## 2018-06-13 NOTE — PROCEDURES
Radiology Post Procedure Note:     Procedure: Percutaneous Gastrostomy    (s): Jeffrey    Blood Loss: Minimal    Specimen: None    Findings:   Patient came to IR and under imaging guidance had a 20 F Gastrostomy Tube Placed without complication.     Reagan RAMOS M.D. personally reviewed and agree with the above dictated note.

## 2018-06-13 NOTE — PT/OT/SLP PROGRESS
"Speech Language Pathology Treatment    Patient Name:  Mendez Guthrie   MRN:  [id number]  Admitting Diagnosis: Embolic stroke involving right middle cerebral artery    Recommendations:                 General Recommendations:  Dysphagia therapy and Speech/language therapy  Diet recommendations:  NPO, Liquid Diet Level: NPO   Aspiration Precautions: Frequent oral care   General Precautions: Standard, aspiration, fall, aphasia, NPO  Communication strategies:  go to room if call light pushed    Subjective     Pt lethargic. Spouse present at b/s.   PEG placed yesterday 6/12.     Pt's spouse reported "This morning my friend came to see him and he woke up and hugged her." Though endorsed continued fatigue/lethargy.     Pain/Comfort:  · Pain Rating 1: 0/10  · Pain Rating Post-Intervention 2: 0/10    Objective:     Has the patient been evaluated by SLP for swallowing?   Yes  Keep patient NPO? Yes   Current Respiratory Status: nasal cannula      Max stim provided to Pt c/b cold rag to face, ice chip to lower labial surface, auditory stim, and sternal rub though Pt with decreased maintenance of alertness. Pt opened eyes, though maintained for ~3-4s.  Ice chip trial revealed oral stasis despite cues; trial removed from Pt's oral cavity by ST via tsp sweep. No spontaneous of volitional vocalizations noted this session. Basic commands followed for 1/4 total opportunities.   ST provided skilled education to Pt and spouse re: ST POC, recommendations, and benefits of oral care with precautions/instructions. Spouse verbalized understanding, though Pt with no evidence of learning.   Cont ST POC.     Assessment:     Mendez Guthrie is a 68 y.o. male with an SLP diagnosis of Aphasia and Dysphagia.      Goals:    SLP Goals        Problem: SLP Goal    Goal Priority Disciplines Outcome   SLP Goal     SLP Ongoing (interventions implemented as appropriate)   Description:  Speech Language Pathology Goals  Goals expected to be met by 6/14  1. Pt " will participate in ongoing assessment of swallow.   2. Pt will answer simple y/n questions with 60% accy given repeats.  3. Pt will follow simple commands with 60% accy given mod A.  4. Pt will complete auto speech tasks with 50% accy with max A.                          Plan:     · Patient to be seen:  5 x/week   · Plan of Care expires:  06/07/18  · Plan of Care reviewed with:  patient, spouse   · SLP Follow-Up:  Yes       Discharge recommendations:  nursing facility, skilled       Time Tracking:     SLP Treatment Date:   06/13/18  Speech Start Time:  1359  Speech Stop Time:  1410     Speech Total Time (min):  11 min    Billable Minutes: Speech Therapy Individual 11    Miranda Ashby M.S., CCC-SLP  Speech Language Pathologist  [phone]  06/13/2018

## 2018-06-13 NOTE — ASSESSMENT & PLAN NOTE
EF 15-20% - seen on echo completed 6/7  Cardiac history of CABG in 2016  Cardiology consulted - recommending AC and asa  Patient started on Eliquis 5 mg BID and ASA

## 2018-06-13 NOTE — PT/OT/SLP PROGRESS
Physical Therapy Treatment    Patient Name:  Mendez Guthrie   MRN:  [id number]  Admitting Diagnosis: Embolic stroke involving right middle cerebral artery  Recent Surgery: * No surgery found *      Recommendations:     Discharge Recommendations:  nursing facility, skilled   Discharge Equipment Recommendations: hospital bed, lift device   Barriers to discharge: Decreased caregiver support    Plan:     During this hospitalization, patient to be seen 4 x/week to address the listed problems via gait training, therapeutic activities, therapeutic exercises, neuromuscular re-education  · Plan of Care Expires:  07/07/18   Plan of Care Reviewed with: patient, spouse    This Plan of care has been discussed with the patient who was involved in its development and understands and is in agreement with the identified goals and treatment plan    Subjective     Communicated with RN prior to session.  Patient found supine upon PT entry to room. Pt's wife present during session.  Pt nonverbal during session.    Pain/Comfort:  · Pain Rating 1: 0/10  · Pain Rating Post-Intervention 1: 0/10    Objective:     Patient found with: telemetry, peripheral IV, PEG Tube, oxygen   Mental Status: Patient is Lethargic during session.    General Precautions: Standard, Cardiac aspiration, fall, NPO   Orthopedic Precautions:N/A   Braces: N/A   Respiratory Status: nasal cannula 2L  Vital Signs (Most Recent):    Temp: (!) 101.9 °F (38.8 °C) (06/13/18 1153)  Pulse: 98 (06/13/18 1228)  Resp: 18 (06/13/18 1228)  BP: 138/65 (06/13/18 1153)  SpO2: (!) 91 % (06/13/18 1228)    Functional Mobility:  Bed Mobility:   · Rolling/Turning to Left: total assistance  · Scooting to HOB via supine bridge: total assistance x 2 people via drawsheet to HOB  · Scooting to EOB to have both feet planted on floor: total assistance  · Supine to Sit: total assistance; from left side of bed  · Sit to Supine: total assistance; to left side of bed    Sitting Balance at Edge of  Bed:  · Assistance Level Required: moderate assist  · Time: 8 minutes  · Postural deviations noted: kyphotic posture  · Encouraged: attention to task  · Comments: pt with eyes closed 100% of EOB trial.    Transfers:    NT due to lethargy    Therapeutic Activities & Exercises:   Supine: LLE and RLE PROM x 10 reps in all planes through available ROM. Exercises performed to develop and maintain pt's  strength, endurance, ROM and flexibility.     Education:  Patient provided with daily orientation and goals of this PT session. Patient and family agreed to participate in session. Patient and family aware of patient's deficits and therapy progression. They were educated to transfer with RN/PCT only; drawhseet to cardiac chair of 2 person. They were provided and educated on proper positioning in supine and in sitting with support of affected LUE extremities in order to increase awareness of extremity and to decrease the effects of immobility, specifically edema and pain. Encouraged patient to perform daily exercises & mobility to increase endurance and decrease effects of bedrest.Time provided for therapeutic counseling and discussion of health disposition. All questions answered to patient's and family's satisfaction, within scope of PT practice; voiced no other concerns. White board updated in patient's room, RN notified of session.    Patient left supine, with LUE propped on pillow for scapular support and edema management, head in midline, neutral pelvis & heels floated for skin protection with all lines intact, call button in reach, bed alarm off and RN notified    AM-PAC 6 CLICK MOBILITY  Turning over in bed (including adjusting bedclothes, sheets and blankets)?: 1  Sitting down on and standing up from a chair with arms (e.g., wheelchair, bedside commode, etc.): 1  Moving from lying on back to sitting on the side of the bed?: 1  Moving to and from a bed to a chair (including a wheelchair)?: 1  Need to walk in  hospital room?: 1  Climbing 3-5 steps with a railing?: 1  Total Score: 6     Assessment:     Mendez Guthrie is a 68 y.o. male admitted with a medical diagnosis of Embolic stroke involving right middle cerebral artery. Pt continues to require significant assist with bed mobility and postural control. Pt with continued lethargy and poor participation in session.  Mendez Napiers deficits effect their ability to safely and independently participate in self-care tasks and functional mobility which increases their caregiver burden. Due to his physical therapy diagnosis of debility and deconditioning, they continue to benefit from acute PT services to address the following limitations: high fall risk, weakness, instability, the need for supervised instruction of exercise. Mendez Napiers deficits effect their roles and responsibilities in which they were able to complete prior to admit. Education was provided to patient & family regarding importance of continued participation in therapy, patient's progress and discharge disposition. Pt would benefit from SNF upon discharge to improve quality of life and focus on recovery of impairments.     Problem List: weakness, impaired endurance, impaired sensation, impaired self care skills, impaired functional mobilty, gait instability, impaired balance, decreased lower extremity function, decreased upper extremity function, impaired cognition, decreased safety awareness. weakness, impaired endurance, impaired sensation, impaired self care skills, impaired functional mobilty, gait instability, impaired balance, decreased lower extremity function, decreased upper extremity function, impaired cognition, decreased safety awareness  Rehab Prognosis: fair; patient would benefit from acute skilled PT services to address these deficits and reach maximum level of function.      GOALS:    Physical Therapy Goals        Problem: Physical Therapy Goal    Goal Priority Disciplines Outcome  Goal Variances Interventions   Physical Therapy Goal     PT/OT, PT Ongoing (interventions implemented as appropriate)     Description:  Goals to be met by: 2018     Patient will increase functional independence with mobility by performin. Supine to sit with Moderate Assistance  2. Sit to supine with Moderate Assistance  3. Sit to stand transfer with Maximum Assistance  4. Bed to chair transfer with Maximum Assistance  5. Gait  x 10 feet with Maximum Assistance with or without appropriate AD   6. Sitting at edge of bed x10 minutes with Minimal Assistance  7. Lower extremity exercise program x15 reps per handout, with assistance as needed                      Time Tracking:     PT Received On: 18  PT Start Time: 1310     PT Stop Time: 1337  PT Total Time (min): 27 min     Billable Minutes:   · Therapeutic Activity 15 and Therapeutic Exercise 12    Treatment Type: Treatment  PT/PTA: PT       Nimisha Andrews PT, DPT  2018  Pager: [phone]

## 2018-06-13 NOTE — PLAN OF CARE
Problem: Occupational Therapy Goal  Goal: Occupational Therapy Goal  Goals to be met by: 6/17/2018     Patient will increase functional independence with ADLs by performing:    UE Dressing with Moderate Assistance.  LE Dressing with Maximum Assistance.  Grooming while seated with Contact Guard Assistance.  Toileting from bedside commode with Moderate Assistance for hygiene and clothing management.   Sitting at edge of bed x 15 minutes with Minimal Assistance.  Supine to sit with Moderate Assistance.  Stand pivot transfers with Moderate Assistance.  Toilet transfer to bedside commode with Moderate Assistance.  Pt will initiate movement in LUE.  Pt will follow 50% of one step commands.  Pt will keep eyes open 100% of session.     Outcome: Ongoing (interventions implemented as appropriate)  Continue with POC. Therapy session limited 2/2 lethargy.   Anita vaughan OT  6/13/2018

## 2018-06-13 NOTE — PLAN OF CARE
ZOIE spoke with Steffi with [facility]. Steffi states that pt will be evaluated today for SNF placement. Steffi will inform  ZOIE if patient can be accepted for SNF.  ZOIE will continue to follow.    Estela Cagle LMSW  [facility]  Ext. [phone]

## 2018-06-13 NOTE — PLAN OF CARE
Problem: Physical Therapy Goal  Goal: Physical Therapy Goal  Goals to be met by: 2018     Patient will increase functional independence with mobility by performin. Supine to sit with Moderate Assistance  2. Sit to supine with Moderate Assistance  3. Sit to stand transfer with Maximum Assistance  4. Bed to chair transfer with Maximum Assistance  5. Gait  x 10 feet with Maximum Assistance with or without appropriate AD   6. Sitting at edge of bed x10 minutes with Minimal Assistance  7. Lower extremity exercise program x15 reps per handout, with assistance as needed     Outcome: Ongoing (interventions implemented as appropriate)    Pt would benefit from SNF upon discharge.  Appropriate transfer level with nursing staff: Bed <> Chair:  Drawsheet to cardiac chair with Total Assistance with 2 person.    Nimisha Andrews PT, DPT  2018  Pager: [phone]

## 2018-06-13 NOTE — PLAN OF CARE
Problem: SLP Goal  Goal: SLP Goal  Speech Language Pathology Goals  Goals expected to be met by 6/14  1. Pt will participate in ongoing assessment of swallow.   2. Pt will answer simple y/n questions with 60% accy given repeats.  3. Pt will follow simple commands with 60% accy given mod A.  4. Pt will complete auto speech tasks with 50% accy with max A.           Pt making progress toward goals.   Miranda Ashby M.S., [facility]-SLP  Speech Language Pathologist  [phone]  06/13/2018

## 2018-06-13 NOTE — ASSESSMENT & PLAN NOTE
69 y/o male with R MCA infarct with left sided weakness, facial droop, dysarthria and confusion. Right sided movement is minimal. Etiology likely atheroembolic vs cardioembolic due to EF of 15%. EEG completed and no seizure activity noted. Patient started on Modafinil 6/13.    Antithrombotics: Eliquis 5 mg BID and ASA    Statins: Crestor 40 mg daily    Aggressive risk factor modification: HTN, DM, HLD, Diet, Exercise, Obesity, CAD     Rehab efforts: PT/OT/SLP to evaluate and treat, PM&R consult     Diagnostics ordered/pending: NA    VTE prophylaxis: Heparin drip,  SCD's    BP parameters: Infarct: No intervention, SBP <180

## 2018-06-13 NOTE — ASSESSMENT & PLAN NOTE
Max temp past 24 hours 101.6  WBC WNL  Infectious workup completed and negative   Procal WNL - 0.08   US upper/lower extremities has DVT left axillary and brachial vein

## 2018-06-13 NOTE — PROGRESS NOTES
[facility]  Vascular Neurology  Comprehensive Stroke Center  Progress Note    Assessment/Plan:     * Embolic stroke involving right middle cerebral artery    69 y/o male with R MCA infarct with left sided weakness, facial droop, dysarthria and confusion. Right sided movement is minimal. Etiology likely atheroembolic vs cardioembolic due to EF of 15%. EEG completed and no seizure activity noted. Patient started on Modafinil 6/13.    Antithrombotics: Eliquis 5 mg BID and ASA    Statins: Crestor 40 mg daily    Aggressive risk factor modification: HTN, DM, HLD, Diet, Exercise, Obesity, CAD     Rehab efforts: PT/OT/SLP to evaluate and treat, PM&R consult     Diagnostics ordered/pending: NA    VTE prophylaxis: Heparin drip,  SCD's    BP parameters: Infarct: No intervention, SBP <180            DVT of axillary vein, acute left    Eliquis started 6/13           Chronic systolic (congestive) heart failure    Seen be cardiology   EF 15-20%  Recommending AC and ASA        Dysarthria    NG tube placed  Aggressive SLP   Plan for PEG 6/13        Fever of unknown origin    Max temp past 24 hours 101.6  WBC WNL  Infectious workup completed and negative   Procal WNL - 0.08   US upper/lower extremities has DVT left axillary and brachial vein          Decreased cardiac ejection fraction    EF 15-20% - seen on echo completed 6/7  Cardiac history of CABG in 2016  Cardiology consulted - recommending AC and asa  Patient started on Eliquis 5 mg BID and ASA        Hyponatremia    Sodium pending   Urine osmo and urine sodium labs WNL 6/8  Urine studies ordered by hospital medicine 6/11 - Per hospital medicine 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 There is not mass effect  associated with it. We will continue to monitor the patients clinical exam for any worsening of symptoms which may indicate expansion of the stroke or the area of the edema resulting in the clinical change. The pattern is suggestive of embolic etiology.            Left spastic hemiparesis    Due to stroke  Aggressive therapy        CAD (coronary artery disease)    Stoke risk factor  Eliquis 5 mg BID and ASA 81          Essential hypertension    Stroke risk factor  SBP <180  Home meds - coreg and losartan   Ax to lisinopril and Norvasc        COPD (chronic obstructive pulmonary disease)    Stroke risk factor  Duonebs Q6 due to patient not being able to follow commands and RT not being able to administer inhalers   Currently maintaining O2 sats in the 90's on 1L NC          T2DM (type 2 diabetes mellitus)    Stroke risk factor  HgB A1C 9.4  SSI Q6  Detemir 5u BID increased to 7 units 6/13             69 y/o male wth R MCA infarct. Had been at [facility] since 6-4-18 for AMS and weakness that improved and then worsened now with facial droop and left sided weakness. 6-6-18 fever so infectious w/u in process could be PNA as he was coughing with puree food at [facility]. He was placed on ABX Amp/Vanc/valcyclovir for possible meningitis this is stopped as he does not have meningitis. He moved around too much and was clausterphobic for MRI so will attempt MRI here.   6/8 - Urine studies ordered for hyponatremia. EEG results pending. Bilateral upper/lower extremities US ordered for fever. Procal WNL. Infectious workup negative so far. Cardiology consulted for EF 15-20%.   6-9-18 will give lasix 40 mg NG today as per cardiology recs. Discussion with family regarding anticoagulation due to low EF and DVT left axillary vein and brachial vein, discussion regarding feeding as well and likely bernard of patient swallowing is minimal so will need PEG next week as opposed to waiting and family is in agreement so will start heparin drip with  no bolus parameters  6-10-18 once EEG read and no seizure activity may start Modafinil to help with wakefulness  6/11 - EEG to be read today and consider Modafinil. IR consulted for PEG placement tomorrow. Hospital medicine ordering urine studies for hyponatremia.   6/12 - PEG to be placed today. Heparin gtt currently held. Will resume once PEG is placed. AC to start tomorrow once PEG is able to be used. Hyponatremia slightly improving. Continue to hold fluids due to hyponatremia thought to be due to SIADH. Modafinil to be started tomorrow when PEG can be used.  6/13 - PEG placed yesterday. Heparin gtt dc'ed and eliquis started. Modafinil started today. Continuing to restrict fluids (enteral water boluses) due to SIADH/hyponatremia and holding off on lasix that cardiology recommended.    STROKE DOCUMENTATION        NIH Scale:  1a. Level Of Consciousness: 1-->Not alert: but arousable by minor stimulation to obey, answer, or respond  1b. LOC Questions: 2-->Answers neither question correctly  1c. LOC Commands: 2-->Performs neither task correctly  2. Best Gaze: 0-->Normal  3. Visual: 0-->No visual loss  4. Facial Palsy: 1-->Minor paralysis (flattened nasolabial fold, asymmetry on smiling)  5a. Motor Arm, Left: 4-->No movement  5b. Motor Arm, Right: 0-->No drift: limb holds 90 (or 45) degrees for full 10 secs  6a. Motor Leg, Left: 3-->No effort against gravity: leg falls to bed immediately  6b. Motor Leg, Right: 0-->No drift: leg holds 30 degree position for full 5 secs  7. Limb Ataxia: 0-->Absent  8. Sensory: 1-->Mild-to-moderate sensory loss: patient feels pinprick is less sharp or is dull on the affected side: or there is a loss of superficial pain with pinprick, but patient is aware of being touched  9. Best Language: 2-->Severe aphasia: all communication is through fragmentary expression: great need for inference, questioning, and guessing by the listener. Range of information that can be exchanged is limited:  listener carries burden of. . . (see row details)  10. Dysarthria: 2-->Severe dysarthria: patients speech is so slurred as to be unintelligible in the absence of or out of proportion to any dysphasia, or is mute/anarthric  11. Extinction and Inattention (formerly Neglect): 0-->No abnormality  Total (NIH Stroke Scale): 18       Modified David Score: 0  [place] Coma Scale:    ABCD2 Score:    [id number] Score:   HAS -BLED Score:   ICH Score:   Hunt & De Paz Classification:      Hemorrhagic change of an Ischemic Stroke: Does this patient have an ischemic stroke with hemorrhagic changes? No     Neurologic Chief Complaint: R MCA infarct     Subjective:     Interval History: Patient is seen for follow-up neurological assessment and treatment recommendations:     PEG placed yesterday. Heparin gtt dc'ed and eliquis started. Modafinil started today. Continuing to restrict fluids (enteral water boluses) due to SIADH/hyponatremia and holding off on lasix that cardiology recommended.    HPI, Past Medical, Family, and Social History remains the same as documented in the initial encounter.     Review of Systems   Constitutional: Positive for fever.   HENT: Positive for trouble swallowing.    Cardiovascular: Negative for chest pain.   Gastrointestinal: Negative for nausea and vomiting.   Neurological: Positive for facial asymmetry, speech difficulty and weakness.   Psychiatric/Behavioral: Positive for confusion.     Scheduled Meds:   albuterol-ipratropium  3 mL Nebulization Q6H    apixaban  5 mg Per G Tube BID    aspirin  81 mg Oral Daily    carvedilol  12.5 mg Per G Tube BID WM    citalopram  10 mg Per G Tube Daily    insulin detemir U-100  7 Units Subcutaneous BID    losartan  50 mg Per G Tube Daily    modafinil  200 mg Per G Tube Daily    rosuvastatin  40 mg Per G Tube QHS    senna  8.6 mg Per G Tube Daily    sodium chloride 0.9%  3 mL Intravenous Q8H    tuberculin  5 Units Intradermal Once     Continuous  Infusions:   sodium chloride 0.9%       PRN Meds:acetaminophen, dextrose 50%, glucagon (human recombinant), HYDROcodone-acetaminophen, insulin aspart U-100, labetalol, ondansetron, polyethylene glycol, sodium chloride 0.9%    Objective:     Vital Signs (Most Recent):  Temp: 99.9 °F (37.7 °C) (06/13/18 0723)  Pulse: (!) 114 (06/13/18 0844)  Resp: 18 (06/13/18 0844)  BP: 124/75 (06/13/18 0723)  SpO2: (!) 93 % (06/13/18 0844)  BP Location: Right arm    Vital Signs Range (Last 24H):  Temp:  [97.4 °F (36.3 °C)-101.6 °F (38.7 °C)]   Pulse:  [100-114]   Resp:  [16-32]   BP: (124-169)/(70-82)   SpO2:  [92 %-98 %]   BP Location: Right arm    Physical Exam   Constitutional: He appears well-developed.   HENT:   Head: Normocephalic and atraumatic.   NG present    Eyes: EOM are normal. Pupils are equal, round, and reactive to light.   Cardiovascular: Normal rate.    Pulmonary/Chest: Effort normal.   Abdominal: Soft. There is no tenderness.   Musculoskeletal:   LSW (LUE>LLE)   Neurological: He is alert.   Skin: Skin is warm.   Psychiatric:   Agitated    Vitals reviewed.      Neurological Exam:   LOC: drowsy  Attention Span: very poor  Language: Global aphasia  Articulation: Dysarthria  Orientation: Untestable due to severe aphasia   Facial Movement (CN VII): Lower facial weakness on the Left  Motor: Arm left  1/5  Leg left  Paresis: 3/5  Arm right  Normal 5/5  Leg right Paresis: 4/5  Cebellar: No evidence of appendicular or axial ataxia  Sensation: Intact to light touch, temperature and vibration  Anisocoria: Left pupil 3 reactive; Right pupil 2 reactive    Laboratory:  CMP:   No results for input(s): GLUCOSE, CALCIUM, ALBUMIN, PROT, NA, K, CO2, CL, BUN, CREATININE, ALKPHOS, ALT, AST, BILITOT in the last 24 hours.  BMP:     Recent Labs  Lab 06/12/18  0447   *   K 4.6   CL 86*   CO2 32*   BUN 19   CREATININE 1.0   CALCIUM 9.9     CBC:     Recent Labs  Lab 06/13/18  0953   WBC 8.03   RBC 4.01*   HGB 11.9*   HCT 36.5*   PLT  294   MCV 91   MCH 29.7   MCHC 32.6     Lipid Panel:   No results for input(s): CHOL, LDLCALC, HDL, TRIG in the last 168 hours.  Coagulation:     Recent Labs  Lab 06/07/18  0540   INR 1.0   APTT 21.4     Platelet Aggregation Study: No results for input(s): PLTAGG, PLTAGINTERP, PLTAGREGLACO, ADPPLTAGGREG in the last 168 hours.  Hgb A1C:   No results for input(s): HGBA1C in the last 168 hours.  TSH:   No results for input(s): TSH in the last 168 hours.    Diagnostic Results     Brain Imaging   CT 6/6   Large right MCA distribution infarct measuring approximately 7 x 3 cm including the right perisylvian region and extending to the frontal and parietal operculum with associated localized mass effect.    Vessel Imaging   CTA 6/8  CTA head: Occlusion right M2 segment of the MCA within the proximal sylvian fissure..  Heavy atherosclerotic plaquing of the cavernous and supraclinoid ICAs with mild moderate right and mild left cavernous ICA stenosis.  There is diffuse small caliber right M1 segment of the MCA.  Irregularity and narrowing of the right distal vertebral artery concerning for atherosclerotic disease with mild moderate stenosis.  CTA neck: Atherosclerotic plaquing of the carotid bifurcations and proximal ICAs with less than 50% proximal ICA stenosis by NASCET criteria.  CT head: Evolving large right MCA distribution infarction.  Localized mass effect without significant midline shift or hydrocephalus.  No evidence for hemorrhagic conversion.  Clinical correlation and follow-up advised.      Cardiac Imaging   Echo 6/7  CONCLUSIONS     1 - Technically difficult study.     2 - Severely depressed left ventricular systolic function (EF 15-20%).     3 - Mildly to moderately depressed right ventricular systolic function .     4 - Impaired LV relaxation, normal LAP (grade 1 diastolic dysfunction).     5 - Mild aortic stenosis, WIL = 1.45 cm2, AVAi = 0.67 cm2/m2, peak velocity = 2.8 m/s, mean gradient = 18 mmHg.     6 -  Mild mitral regurgitation.       Colleen Hannah NP  Comprehensive Stroke Center  Department of Vascular Neurology   [facility]

## 2018-06-13 NOTE — ASSESSMENT & PLAN NOTE
Sodium pending   Urine osmo and urine sodium labs WNL 6/8  Urine studies ordered by hospital medicine 6/11 - Per hospital medicine hyponatremia likely due to SIADH, continue holding lasix and continue patient on fluid restriction   Continue to follow

## 2018-06-14 PROBLEM — J96.20 ACUTE ON CHRONIC RESPIRATORY FAILURE: Status: ACTIVE | Noted: 2018-01-01

## 2018-06-14 PROBLEM — A41.9 SEPSIS: Status: ACTIVE | Noted: 2018-01-01

## 2018-06-14 NOTE — CONSULTS
Consult received for hypotension, hyperglycemia, hyponatremia, elevated procal. Patient  Evaluated and chart reviewed. Full note to follow.    Madai Molina MD  Internal Medicine PGY3

## 2018-06-14 NOTE — CONSULTS
"[facility] Hospital Medicine  Consult Note    Patient Name: Mendez Guthrie  MRN: [id number]  Admission Date: 6/7/2018  Hospital Length of Stay: 7 days  Attending Physician: Jordan Barry MD   Primary Care Provider: Valeria Mayen MD     Hospital Medicine Team: Networked reference to record PCT  Madai Molina MD      Patient information was obtained from relative(s) and past medical records.     Consults  Subjective:     Principal Problem: Embolic stroke involving right middle cerebral artery    Chief Complaint: No chief complaint on file.       HPI: 68 YOM with CAD s/p CABG, HTN, DM2, COPD, GENIE, HFrEF (EF 15%)and hx of lung cancer s/p chemo/radiation who was transferred from [facility] on 6/7/18 for right MCA embolic stroke. Hospital medicine has been consulted for co-management.    Patient was originally admitted to [facility] on 6/5 with encephalopathy and weakness. Family reported in ED at [facility] that patient had been febrile with associated coughing, as well as HA's for 1 week prior. He was admitted for TIA and workup of stroke risk factors. His CT showed chronic microvascular ischemic changes. Carotid US and MRI were attempted, however patient could not tolerate. They were unable to perform LP, but had low suspicion for meningitis as patient was initially stable without antibiotics. Patient developed a fever 6/6/18 and was started on vanc/rocephin/ampicillin/acyclovir. Patient had some dysphagia at lunch 6/6/18, and a repeat CT was ordered after he was reported to have worsening facial droop. Repeat CT showed large infarct in R MCA division with possible mass effect. Patient transferred to [facility] for higher level of care. Infectious workup was negative at the time so abx were discontinued. Patient continued to spike fevers and doppler US revealed "DVT in the left axillary vein with additional thrombus in the proximal segment of the branchial vein." he was placed on heparin gtt as PEG " placement was anticipated on 6/12. He has since been transitioned to apixaban.  TTE was also performed and significant for EF 15%, for which cardiology was consulted and recommended goal directed medical therapy with coreg, losartan, crestor. Lasix was also started. On 6/12 patient developed fevers to 101.6 a/w worsening oxygen requirements. He developed hypotension to 80s/40s on 6/13.  CXR was notable for acute edema on chronic fibrotic changes with DDx of asx pulm edema vs PNA. procal was checked and found to be 3 (initial procal on 6/8 was negative). Patient was placed on ceftriaxone and clindamycin and CT chest was obtained that showed new consolidation in RML and RLL c/w aspiration or PNA. Endobronchial material in right lower lobe airways with intermittent obstruction, could represent retained secretions or aspirated material. He developed hypotension to 80s/40s again this morning, responded to fluids. Repeat CXR today is unchanged.     Past Medical History:   Diagnosis Date    Arthritis     Asthma     COPD (chronic obstructive pulmonary disease)     Coronary artery disease     Diabetes mellitus     Embolic stroke involving right middle cerebral artery 6/6/2018    High cholesterol     Hypertension     Long term current use of antithrombotics/antiplatelets     Lung cancer     Stroke 6/6/2018       Past Surgical History:   Procedure Laterality Date    cardiac bypass      CARDIAC SURGERY  05/04/2016    PORTACATH PLACEMENT  2012    cancer treatment       Review of patient's allergies indicates:   Allergen Reactions    Lisinopril Swelling    Norvasc [amlodipine] Swelling       No current facility-administered medications on file prior to encounter.      Current Outpatient Prescriptions on File Prior to Encounter   Medication Sig    aspirin 81 MG Chew Take 81 mg by mouth once daily.    blood sugar diagnostic Strp Inject 1 strip as directed 3 (three) times daily with meals. Dispense One Touch Ultra  Strips Dx:E11.9    carvedilol (COREG) 12.5 MG tablet Take 12.5 mg by mouth 2 (two) times daily with meals.    chlorthalidone (HYGROTEN) 25 MG Tab Take 25 mg by mouth once daily.    clopidogrel (PLAVIX) 75 mg tablet Take 75 mg by mouth once daily.    diazePAM (VALIUM) 5 MG tablet Take 1 tablet (5 mg total) by mouth every 6 (six) hours as needed for Anxiety.    docusate sodium (COLACE) 100 MG capsule Take 100 mg by mouth 2 (two) times daily as needed.     doxazosin (CARDURA) 2 MG tablet Take 1 tablet (2 mg total) by mouth every evening.    fluticasone-salmeterol 250-50 mcg/dose (ADVAIR DISKUS) 250-50 mcg/dose diskus inhaler Inhale 1 puff into the lungs 2 (two) times daily. Controller    hydrocodone-acetaminophen 5-325mg (NORCO) 5-325 mg per tablet Take 1 tablet by mouth every 4 (four) hours as needed for Pain.    insulin aspart U-100 (NOVOLOG) 100 unit/mL InPn pen Inject 10 Units into the skin before dinner.    insulin detemir U-100 (LEVEMIR FLEXTOUCH U-100 INSULN) 100 unit/mL (3 mL) SubQ InPn pen Inject 15 Units into the skin every evening. Dispense with insulin pen needles    linagliptin (TRADJENTA) 5 mg Tab tablet Take 1 tablet (5 mg total) by mouth once daily.    losartan (COZAAR) 50 MG tablet Take 50 mg by mouth once daily.     metFORMIN (GLUCOPHAGE) 1000 MG tablet 'TAKE 1 TABLET BY MOUTH TWICE A DAY WITH MEALS    nitroGLYCERIN (NITROSTAT) 0.4 MG SL tablet Place 1 tablet (0.4 mg total) under the tongue every 5 (five) minutes as needed for Chest pain.    PROAIR HFA 90 mcg/actuation inhaler     rosuvastatin (CRESTOR) 40 MG Tab Take 1 tablet (40 mg total) by mouth every evening.    SPIRIVA WITH HANDIHALER 18 mcg inhalation capsule inhale the contents of one capsule in the handihaler once daily    traMADol (ULTRAM) 50 mg tablet take 1 tablet by mouth every 6 hours if needed    VITAMIN D2 50,000 unit capsule TAKE 1 CAPSULE BY MOUTH EVERY 7 DAYS     Family History     Problem Relation (Age of Onset)     Cancer Father    Diabetes Father, Mother, Brother, Son    No Known Problems Brother, Sister, Brother, Daughter, Daughter, Daughter, Daughter, Son, Son, Son    Pneumonia Sister, Sister        Social History Main Topics    Smoking status: Former Smoker     Packs/day: 1.00     Years: 39.00     Types: Cigarettes     Start date: 1/23/1972     Quit date: 5/23/2011    Smokeless tobacco: Never Used    Alcohol use 0.0 oz/week      Comment: occasionally    Drug use: No    Sexual activity: Not Currently     Review of Systems   Unable to perform ROS: Mental status change     Objective:     Vital Signs (Most Recent):  Temp: 98.1 °F (36.7 °C) (06/14/18 1128)  Pulse: 88 (06/14/18 1322)  Resp: 20 (06/14/18 1322)  BP: (!) 113/55 (06/14/18 1128)  SpO2: (!) 92 % (06/14/18 1322) Vital Signs (24h Range):  Temp:  [98.1 °F (36.7 °C)-100.7 °F (38.2 °C)] 98.1 °F (36.7 °C)  Pulse:  [76-92] 88  Resp:  [14-24] 20  SpO2:  [87 %-96 %] 92 %  BP: ()/(45-71) 113/55     Weight: 101.2 kg (223 lb 1.7 oz)  Body mass index is 33.92 kg/m².    Physical Exam   Constitutional: He appears well-developed. No distress.   HENT:   Head: Normocephalic and atraumatic.   Cardiovascular: Normal rate and regular rhythm.    Pulmonary/Chest: No respiratory distress. He has decreased breath sounds. He has rales.   Course breath sounds bilaterally    Abdominal: Soft. He exhibits no distension. There is no tenderness.   Neurological:   Nonverbal  Does not follow commands  Waxing and wayning between alertness and somnolence   Skin: Skin is warm and dry. He is not diaphoretic.   Vitals reviewed.      Significant Labs: All pertinent labs within the past 24 hours have been reviewed.    Significant Imaging: I have reviewed all pertinent imaging results/findings within the past 24 hours.    Assessment/Plan:     * Embolic stroke involving right middle cerebral artery    Per primary team          Acute on chronic respiratory failure    - worsening oxygen  "requirements over last 48 hours  - CT chest obtained 6/13 showing "Recent development consolidation in right middle and lower lobes consistent with pneumonia or aspiration.  Endobronchial material in right lower lobe airways with intermittent obstruction, could represent retained secretions or aspirated material."  - pulm consulted for potential bronch to remove debris from airways  - cont aspiration PNA treatment  - cont nebs q6  - chest PT QID        Sepsis    - Chest CT concerning for aspiration and PNA  - procal elevated at 3  - blood cx and UA pending  - conservative fluid resuscitation as patient has poor cardiac and respiratory function  - agree with rocephin and clindamycin  - low threshold for ICU consult if patient becomes hypotensive again          DVT of axillary vein, acute left    - cont apixaban        Chronic systolic (congestive) heart failure    - EF 15% with diastolic dysfunction  - lasix being held in setting of hyponatremia  - cont statin  - will resume BB and ARB after acute illness and hypotension have resolved         GENIE on CPAP    - hold CPAP in setting of likely aspiration, encephalopathy and nonverbal        CAD (coronary artery disease)    - s/p CABG x2 in 2016  - per cards consult note "Will need C as an outpt if he recovers from his stroke and decides he wants to proceed as seems he has been hesitant in the past."  - has cardiology appointment scheduled for 7/17  - cont ASA and statin  - will resume BB and ARB after acute illness has resolved          Essential hypertension    - home meds: coreg 12.5 mg BID,chlorthalidone 25 mg daily, losartan 50 mg daily  - holding all home meds in setting of hypotension  - will cont to monitor BPs and resume prn        COPD (chronic obstructive pulmonary disease)    - cont duonebs q6 as patient cannot follow instructions to use inhalers  - goal oxygen sats are 88-92%          T2DM (type 2 diabetes mellitus)    - a1c 9.4 last month  - home regimen " unclear but based on PCP notes it appears as though patient was on detemir 15 qHS, aspart 10 units before dinner,metformin 1000mg BID, and linagliptin 5 mg daily  - patient currently on continuous tube feeds with aspiration pneumonitis, tube feed rate decreased from 70 to 30 today  - started insulin detemir 7 units BID  - cont MDSSI        MEGGAN (acute kidney injury)    - UA and urine studies ordered            VTE Risk Mitigation         Ordered     apixaban tablet 5 mg  2 times daily      06/13/18 0751     heparin 25,000 units in dextrose 5% 250 mL (100 units/mL) infusion  Continuous      06/09/18 1350     IP VTE HIGH RISK PATIENT  Once      06/07/18 0421     Place sequential compression device  Until discontinued      06/07/18 0421              Thank you for your consult. I will follow-up with patient. Please contact us if you have any additional questions.    Madai Molina MD  Department of Hospital Medicine   [facility]                      06/14/2018                             STAFF PHYSICIAN NOTE                                   Attending Attestation for Rounds with Resident  I have reviewed and concur with the resident's history, physical, assessment, and plan.  I have personally interviewed and examined the patient at bedside and agree with the resident's findings.                                  ________________________________________                                     REASON FOR ADMISSION:     Patient is 68 y.o.male    Body mass index is 33.92 kg/m².,  Embolic stroke involving right middle cerebral artery

## 2018-06-14 NOTE — ASSESSMENT & PLAN NOTE
- EF 15% with diastolic dysfunction  - lasix being held in setting of hyponatremia  - cont statin  - will resume BB and ARB after acute illness and hypotension have resolved

## 2018-06-14 NOTE — ASSESSMENT & PLAN NOTE
"- s/p CABG x2 in 2016  - per cards consult note "Will need [facility] as an outpt if he recovers from his stroke and decides he wants to proceed as seems he has been hesitant in the past."  - has cardiology appointment scheduled for 7/17  - cont ASA and statin  - will resume BB and ARB after acute illness has resolved    "

## 2018-06-14 NOTE — SUBJECTIVE & OBJECTIVE
Past Medical History:   Diagnosis Date    Arthritis     Asthma     COPD (chronic obstructive pulmonary disease)     Coronary artery disease     Diabetes mellitus     Embolic stroke involving right middle cerebral artery 6/6/2018    High cholesterol     Hypertension     Long term current use of antithrombotics/antiplatelets     Lung cancer     Stroke 6/6/2018       Past Surgical History:   Procedure Laterality Date    cardiac bypass      CARDIAC SURGERY  05/04/2016    PORTACATH PLACEMENT  2012    cancer treatment       Review of patient's allergies indicates:   Allergen Reactions    Lisinopril Swelling    Norvasc [amlodipine] Swelling       No current facility-administered medications on file prior to encounter.      Current Outpatient Prescriptions on File Prior to Encounter   Medication Sig    aspirin 81 MG Chew Take 81 mg by mouth once daily.    blood sugar diagnostic Strp Inject 1 strip as directed 3 (three) times daily with meals. Dispense One Touch Ultra Strips Dx:E11.9    carvedilol (COREG) 12.5 MG tablet Take 12.5 mg by mouth 2 (two) times daily with meals.    chlorthalidone (HYGROTEN) 25 MG Tab Take 25 mg by mouth once daily.    clopidogrel (PLAVIX) 75 mg tablet Take 75 mg by mouth once daily.    diazePAM (VALIUM) 5 MG tablet Take 1 tablet (5 mg total) by mouth every 6 (six) hours as needed for Anxiety.    docusate sodium (COLACE) 100 MG capsule Take 100 mg by mouth 2 (two) times daily as needed.     doxazosin (CARDURA) 2 MG tablet Take 1 tablet (2 mg total) by mouth every evening.    fluticasone-salmeterol 250-50 mcg/dose (ADVAIR DISKUS) 250-50 mcg/dose diskus inhaler Inhale 1 puff into the lungs 2 (two) times daily. Controller    hydrocodone-acetaminophen 5-325mg (NORCO) 5-325 mg per tablet Take 1 tablet by mouth every 4 (four) hours as needed for Pain.    insulin aspart U-100 (NOVOLOG) 100 unit/mL InPn pen Inject 10 Units into the skin before dinner.    insulin detemir U-100  (LEVEMIR FLEXTOUCH U-100 INSULN) 100 unit/mL (3 mL) SubQ InPn pen Inject 15 Units into the skin every evening. Dispense with insulin pen needles    linagliptin (TRADJENTA) 5 mg Tab tablet Take 1 tablet (5 mg total) by mouth once daily.    losartan (COZAAR) 50 MG tablet Take 50 mg by mouth once daily.     metFORMIN (GLUCOPHAGE) 1000 MG tablet 'TAKE 1 TABLET BY MOUTH TWICE A DAY WITH MEALS    nitroGLYCERIN (NITROSTAT) 0.4 MG SL tablet Place 1 tablet (0.4 mg total) under the tongue every 5 (five) minutes as needed for Chest pain.    PROAIR HFA 90 mcg/actuation inhaler     rosuvastatin (CRESTOR) 40 MG Tab Take 1 tablet (40 mg total) by mouth every evening.    SPIRIVA WITH HANDIHALER 18 mcg inhalation capsule inhale the contents of one capsule in the handihaler once daily    traMADol (ULTRAM) 50 mg tablet take 1 tablet by mouth every 6 hours if needed    VITAMIN D2 50,000 unit capsule TAKE 1 CAPSULE BY MOUTH EVERY 7 DAYS     Family History     Problem Relation (Age of Onset)    Cancer Father    Diabetes Father, Mother, Brother, Son    No Known Problems Brother, Sister, Brother, Daughter, Daughter, Daughter, Daughter, Son, Son, Son    Pneumonia Sister, Sister        Social History Main Topics    Smoking status: Former Smoker     Packs/day: 1.00     Years: 39.00     Types: Cigarettes     Start date: 1/23/1972     Quit date: 5/23/2011    Smokeless tobacco: Never Used    Alcohol use 0.0 oz/week      Comment: occasionally    Drug use: No    Sexual activity: Not Currently     Review of Systems   Unable to perform ROS: Mental status change     Objective:     Vital Signs (Most Recent):  Temp: 98.1 °F (36.7 °C) (06/14/18 1128)  Pulse: 88 (06/14/18 1322)  Resp: 20 (06/14/18 1322)  BP: (!) 113/55 (06/14/18 1128)  SpO2: (!) 92 % (06/14/18 1322) Vital Signs (24h Range):  Temp:  [98.1 °F (36.7 °C)-100.7 °F (38.2 °C)] 98.1 °F (36.7 °C)  Pulse:  [76-92] 88  Resp:  [14-24] 20  SpO2:  [87 %-96 %] 92 %  BP: ()/(45-71)  113/55     Weight: 101.2 kg (223 lb 1.7 oz)  Body mass index is 33.92 kg/m².    Physical Exam   Constitutional: He appears well-developed. No distress.   HENT:   Head: Normocephalic and atraumatic.   Cardiovascular: Normal rate and regular rhythm.    Pulmonary/Chest: No respiratory distress. He has decreased breath sounds. He has rales.   Course breath sounds bilaterally    Abdominal: Soft. He exhibits no distension. There is no tenderness.   Neurological:   Nonverbal  Does not follow commands  Waxing and wayning between alertness and somnolence   Skin: Skin is warm and dry. He is not diaphoretic.   Vitals reviewed.      Significant Labs: All pertinent labs within the past 24 hours have been reviewed.    Significant Imaging: I have reviewed all pertinent imaging results/findings within the past 24 hours.

## 2018-06-14 NOTE — ASSESSMENT & PLAN NOTE
- a1c 9.4 last month  - home regimen unclear but based on PCP notes it appears as though patient was on detemir 15 qHS, aspart 10 units before dinner,metformin 1000mg BID, and linagliptin 5 mg daily  - patient currently on continuous tube feeds with aspiration pneumonitis, tube feed rate decreased from 70 to 30 today  - started insulin detemir 7 units BID  - cont MDSSI

## 2018-06-14 NOTE — ASSESSMENT & PLAN NOTE
- cont duonebs q6 as patient cannot follow instructions to use inhalers  - goal oxygen sats are 88-92%

## 2018-06-14 NOTE — PLAN OF CARE
Problem: SLP Goal  Goal: SLP Goal  Speech Language Pathology Goals  Goals expected to be met by 6/21-all goals remain appropriate  1. Pt will participate in ongoing assessment of swallow.   2. Pt will answer simple y/n questions with 60% accy given repeats.  3. Pt will follow simple commands with 60% accy given mod A.  4. Pt will complete auto speech tasks with 50% accy with max A.         Outcome: Ongoing (interventions implemented as appropriate)  Goals remain appropriate, cont POC. MADDI Webb, CCC/SLP  6/14/2018

## 2018-06-14 NOTE — PROGRESS NOTES
[facility]  Vascular Neurology  Comprehensive Stroke Center  Progress Note    Assessment/Plan:     * Embolic stroke involving right middle cerebral artery    67 y/o male with R MCA infarct with left sided weakness, facial droop, dysarthria and confusion. Right sided movement is minimal. Etiology likely atheroembolic vs cardioembolic due to EF of 15%. EEG completed and no seizure activity noted. Patient started on Modafinil 6/13.    Antithrombotics: Eliquis 5 mg BID and ASA    Statins: Crestor 40 mg daily    Aggressive risk factor modification: HTN, DM, HLD, Diet, Exercise, Obesity, CAD     Rehab efforts: PT/OT/SLP to evaluate and treat, PM&R consult     Diagnostics ordered/pending: NA    VTE prophylaxis: Heparin drip,  SCD's    BP parameters: Infarct: No intervention, SBP <180            DVT of axillary vein, acute left    Eliquis started 6/13           Chronic systolic (congestive) heart failure    Seen by cardiology   EF 15-20%  Recommending AC and ASA        Dysarthria    NG tube placed  Aggressive SLP   Plan for PEG 6/13        Fever of unknown origin    Max temp past 24 hours 101.6  WBC WNL  Infectious workup completed and negative   Procal WNL - 0.08   US upper/lower extremities has DVT left axillary and brachial vein  Septic work up in progress        Decreased cardiac ejection fraction    EF 15-20% - seen on echo completed 6/7  Cardiac history of CABG in 2016  Cardiology consulted - recommending AC and asa  Patient started on Eliquis 5 mg BID and ASA        Hyponatremia    Sodium pending   Urine osmo and urine sodium labs WNL 6/8  Urine studies ordered by hospital medicine 6/11 - Per hospital medicine 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 There  is not mass effect associated with it. We will continue to monitor the patients clinical exam for any worsening of symptoms which may indicate expansion of the stroke or the area of the edema resulting in the clinical change. The pattern is suggestive of embolic etiology.            Left spastic hemiparesis    Due to stroke  Aggressive therapy        CAD (coronary artery disease)    Stoke risk factor  Eliquis 5 mg BID and ASA 81          Essential hypertension    Stroke risk factor  SBP <180  Home meds - coreg and losartan   Allergy to lisinopril and Norvasc        COPD (chronic obstructive pulmonary disease)    Stroke risk factor  Duonebs Q6 due to patient not being able to follow commands and RT not being able to administer inhalers   Currently maintaining O2 sats in the 90's on 1L NC  Pulmonary may need to be consulted, pneumonia - on clinda and rocephin   Medicine following along         T2DM (type 2 diabetes mellitus)    Stroke risk factor  HgB A1C 9.4  SSI Q6  Detemir 5u BID increased to 7 units 6/13             67 y/o male wth R MCA infarct. Had been at [facility] since 6-4-18 for AMS and weakness that improved and then worsened now with facial droop and left sided weakness. 6-6-18 fever so infectious w/u in process could be PNA as he was coughing with puree food at [facility]. He was placed on ABX Amp/Vanc/valcyclovir for possible meningitis this is stopped as he does not have meningitis. He moved around too much and was clausterphobic for MRI so will attempt MRI here.   6/8 - Urine studies ordered for hyponatremia. EEG results pending. Bilateral upper/lower extremities US ordered for fever. Procal WNL. Infectious workup negative so far. Cardiology consulted for EF 15-20%.   6-9-18 will give lasix 40 mg NG today as per cardiology recs. Discussion with family regarding anticoagulation due to low EF and DVT left axillary vein and brachial vein, discussion regarding feeding as well and likely bernard of patient  swallowing is minimal so will need PEG next week as opposed to waiting and family is in agreement so will start heparin drip with no bolus parameters  6-10-18 once EEG read and no seizure activity may start Modafinil to help with wakefulness  6/11 - EEG to be read today and consider Modafinil. IR consulted for PEG placement tomorrow. Hospital medicine ordering urine studies for hyponatremia.   6/12 - PEG to be placed today. Heparin gtt currently held. Will resume once PEG is placed. AC to start tomorrow once PEG is able to be used. Hyponatremia slightly improving. Continue to hold fluids due to hyponatremia thought to be due to SIADH. Modafinil to be started tomorrow when PEG can be used.  6/13 - PEG placed yesterday. Heparin gtt dc'ed and eliquis started. Modafinil started today. Continuing to restrict fluids (enteral water boluses) due to SIADH/hyponatremia and holding off on lasix that cardiology recommended.  6/14/18 - concern for sepsis today - medicine consulted - hypotensive, febrile, procal elevated.     STROKE DOCUMENTATION        NIH Scale:  1a. Level Of Consciousness: 0-->Alert: keenly responsive  1b. LOC Questions: 2-->Answers neither question correctly  1c. LOC Commands: 1-->Performs one task correctly  2. Best Gaze: 0-->Normal  3. Visual: 0-->No visual loss  4. Facial Palsy: 1-->Minor paralysis (flattened nasolabial fold, asymmetry on smiling)  5a. Motor Arm, Left: 4-->No movement  5b. Motor Arm, Right: 1-->Drift: limb holds 90 (or 45) degrees, but drifts down before full 10 secs: does not hit bed or other support  6a. Motor Leg, Left: 4-->No movement  6b. Motor Leg, Right: 3-->No effort against gravity: leg falls to bed immediately  7. Limb Ataxia: 0-->Absent  8. Sensory: 1-->Mild-to-moderate sensory loss: patient feels pinprick is less sharp or is dull on the affected side: or there is a loss of superficial pain with pinprick, but patient is aware of being touched  9. Best Language: 2-->Severe  aphasia: all communication is through fragmentary expression: great need for inference, questioning, and guessing by the listener. Range of information that can be exchanged is limited: listener carries burden of. . . (see row details)  10. Dysarthria: 2-->Severe dysarthria: patients speech is so slurred as to be unintelligible in the absence of or out of proportion to any dysphasia, or is mute/anarthric  11. Extinction and Inattention (formerly Neglect): 0-->No abnormality  Total (NIH Stroke Scale): 21       Modified [place] Score: 0  Beni Coma Scale:    ABCD2 Score:    [id number] Score:   HAS -BLED Score:   ICH Score:   Hunt & De Paz Classification:      Hemorrhagic change of an Ischemic Stroke: Does this patient have an ischemic stroke with hemorrhagic changes? No     Neurologic Chief Complaint: R MCA infarct     Subjective:     Interval History: Patient is seen for follow-up neurological assessment and treatment recommendations:     Concern for sepsis today - hypotension, hyperglycemia, hyponatremia, febrile   Medicine co management assisting    HPI, Past Medical, Family, and Social History remains the same as documented in the initial encounter.     Review of Systems   Constitutional: Positive for fever.   HENT: Positive for trouble swallowing.    Cardiovascular: Negative for chest pain.   Gastrointestinal: Negative for nausea and vomiting.   Neurological: Positive for facial asymmetry, speech difficulty and weakness.   Psychiatric/Behavioral: Positive for confusion.     Scheduled Meds:   albuterol-ipratropium  3 mL Nebulization Q6H    apixaban  5 mg Per G Tube BID    aspirin  81 mg Oral Daily    cefTRIAXone (ROCEPHIN) IVPB  1 g Intravenous Q12H    citalopram  10 mg Per G Tube Daily    clindamycin (CLEOCIN) IVPB  600 mg Intravenous Q8H    modafinil  200 mg Per G Tube Daily    rosuvastatin  40 mg Per G Tube QHS    senna  8.6 mg Per G Tube Daily    sodium chloride 0.9%  1,000 mL Intravenous Once     sodium chloride 0.9%  3 mL Intravenous Q8H    sodium phosphates  1 enema Rectal Once     Continuous Infusions:   sodium chloride 0.9%       PRN Meds:acetaminophen, dextrose 50%, glucagon (human recombinant), HYDROcodone-acetaminophen, insulin aspart U-100, labetalol, ondansetron, polyethylene glycol, sodium chloride 0.9%    Objective:     Vital Signs (Most Recent):  Temp: 98.6 °F (37 °C) (06/14/18 0715)  Pulse: 90 (06/14/18 0835)  Resp: 14 (06/14/18 0835)  BP: (!) 114/51 (06/14/18 0715)  SpO2: (!) 88 % (06/14/18 0835)  BP Location: Right arm    Vital Signs Range (Last 24H):  Temp:  [98.2 °F (36.8 °C)-101.9 °F (38.8 °C)]   Pulse:  []   Resp:  [14-24]   BP: ()/(45-71)   SpO2:  [88 %-96 %]   BP Location: Right arm    Physical Exam   Constitutional: He appears well-developed.   HENT:   Head: Normocephalic and atraumatic.   Eyes: EOM are normal. Pupils are equal, round, and reactive to light.   Cardiovascular: Normal rate.    Pulmonary/Chest: Effort normal.   Abdominal: Soft. There is no tenderness.   Neurological:   Drowsy but able to arouse    Skin: Skin is warm.   Vitals reviewed.      Neurological Exam:   LOC: drowsy  Attention Span: very poor  Language: Global aphasia  Articulation: Dysarthria  Orientation: Untestable due to severe aphasia   Facial Movement (CN VII): Lower facial weakness on the Left  Motor: Arm left  1/5  Leg left  Paresis: 3/5  Arm right  Normal 5/5  Leg right Paresis: 4/5  Sensation: Intact to light touch, temperature and vibration      Laboratory:  CMP:     Recent Labs  Lab 06/14/18  0830   CALCIUM 9.9   ALBUMIN 2.2*   PROT 7.1   *   K 3.9   CO2 33*   CL 86*   BUN 50*   CREATININE 1.6*   ALKPHOS 85   ALT 60*   AST 97*   BILITOT 0.2     BMP:     Recent Labs  Lab 06/14/18  0830   *   K 3.9   CL 86*   CO2 33*   BUN 50*   CREATININE 1.6*   CALCIUM 9.9     CBC:     Recent Labs  Lab 06/14/18  0830   WBC 8.05   RBC 3.69*   HGB 11.3*   HCT 34.4*      MCV 93   MCH 30.6    MCHC 32.8     Lipid Panel:   No results for input(s): CHOL, LDLCALC, HDL, TRIG in the last 168 hours.  Coagulation:   No results for input(s): PT, INR, APTT in the last 168 hours.  Platelet Aggregation Study: No results for input(s): PLTAGG, PLTAGINTERP, PLTAGREGLACO, ADPPLTAGGREG in the last 168 hours.  Hgb A1C:   No results for input(s): HGBA1C in the last 168 hours.  TSH:   No results for input(s): TSH in the last 168 hours.    Diagnostic Results     Brain Imaging   CT 6/6   Large right MCA distribution infarct measuring approximately 7 x 3 cm including the right perisylvian region and extending to the frontal and parietal operculum with associated localized mass effect.    Vessel Imaging   CTA 6/8  CTA head: Occlusion right M2 segment of the MCA within the proximal sylvian fissure..  Heavy atherosclerotic plaquing of the cavernous and supraclinoid ICAs with mild moderate right and mild left cavernous ICA stenosis.  There is diffuse small caliber right M1 segment of the MCA.  Irregularity and narrowing of the right distal vertebral artery concerning for atherosclerotic disease with mild moderate stenosis.  CTA neck: Atherosclerotic plaquing of the carotid bifurcations and proximal ICAs with less than 50% proximal ICA stenosis by NASCET criteria.  CT head: Evolving large right MCA distribution infarction.  Localized mass effect without significant midline shift or hydrocephalus.  No evidence for hemorrhagic conversion.  Clinical correlation and follow-up advised.      Cardiac Imaging   Echo 6/7  CONCLUSIONS     1 - Technically difficult study.     2 - Severely depressed left ventricular systolic function (EF 15-20%).     3 - Mildly to moderately depressed right ventricular systolic function .     4 - Impaired LV relaxation, normal LAP (grade 1 diastolic dysfunction).     5 - Mild aortic stenosis, WIL = 1.45 cm2, AVAi = 0.67 cm2/m2, peak velocity = 2.8 m/s, mean gradient = 18 mmHg.     6 - Mild mitral  regurgitation.     CT chest 6/14/18  1. Recent development consolidation in right middle and lower lobes consistent with pneumonia or aspiration.  Endobronchial material in right lower lobe airways with intermittent obstruction, could represent retained secretions or aspirated material.  Correlate clinically.  Recommend noncontrast chest CT follow-up in 3 months.  2. Chronic right upper lobe consolidation and volume loss consistent with radiation fibrosis in patient with history of previous lung cancer.  3. Chronic loculation of pleural fluid in the right sulcus is stable.  4. A few subcentimeter pulmonary nodules, stable and likely benign  5. Aortic and coronary atherosclerosis, status post CABG  6. Gastrostomy tube  7. Other findings as above  8.  This report was flagged in Epic as abnormal.        DELON Perez  Comprehensive Stroke Center  Department of Vascular Neurology   [facility]

## 2018-06-14 NOTE — ASSESSMENT & PLAN NOTE
- home meds: coreg 12.5 mg BID,chlorthalidone 25 mg daily, losartan 50 mg daily  - holding all home meds in setting of hypotension  - will cont to monitor BPs and resume prn

## 2018-06-14 NOTE — PT/OT/SLP PROGRESS
Occupational Therapy   Treatment    Name: Mendez Guthrie  MRN: [id number]  Admitting Diagnosis:  Embolic stroke involving right middle cerebral artery       Recommendations:     Discharge Recommendations: nursing facility, skilled  Discharge Equipment Recommendations:  hospital bed, lift device  Barriers to discharge:  None    Subjective     Communicated with: RN prior to session.  Pain/Comfort:  · Pain Rating 1: 0/10  · Pain Rating Post-Intervention 1: 0/10    Patients cultural, spiritual, Faith conflicts given the current situation:      Objective:     Patient found with: telemetry, PEG Tube, oxygen, bed alarm (wife present).  Therapy tech (Hilda) assisted with session.    General Precautions: Standard, aspiration, fall, NPO   Orthopedic Precautions:N/A   Braces: N/A     Occupational Performance:    Bed Mobility:    · Patient completed Rolling/Turning to Left with  total assistance  · Patient completed Scooting/Bridging with total assistance  · Patient completed Supine to Sit with total assistance  · Patient completed Sit to Supine with total assistance     Functional Mobility/Transfers:  · Not attempted this date 2* pt with decreased postural control and increased fatigue towards end of session    Activities of Daily Living:  · Grooming: minimum assistance to maintain balance while washing face with cloth utilizing RUE.  OT handed pt cloth again after he placed it back on bed and instructed him to wash left side, but pt did not attempt task again.      Patient left HOB elevated with all lines intact, call button in reach, bed alarm on and wife present    AMPA 6 Click:  [facility] Total Score: 7    Treatment & Education:  *Pt sat EOB for ~22 minutes with Min A required to maintain upright position.  OT instructed pt to reach forward to touch her hand to address dynamic sitting balance.  Pt reached forward once then stopped; however, pt reached forward towards wife several times during session with RUE.    *PROM  performed on LUE to provide stretch: 4 sets x 10 reps incorporating all joints  *AAROM performed on RUE incorporating all joints: 3 sets x 10 reps.  Pt resisted some movement, but with encouragement able to participate.  *Pt performed 1 bilateral UE exercise to promote increased coordination: 1 set x 10 reps of bicep curls with Max A.  Pt unable to use R hand to grasp L hand.    *Pt performed 1 LE exercise to provide stretch: 1 set x 10 reps on each side.  Pt performed 4 reps on RLE then stopped, so Max A provided for additional reps.  Total A provided on L side.  *POC reviewed with pt and spouse and education provided on progression of therapy.   Education:    Assessment:     Mendez Guthrie is a 68 y.o. male with a medical diagnosis of Embolic stroke involving right middle cerebral artery.  He presents with the following performance deficits affecting function: weakness, impaired endurance, impaired sensation, impaired self care skills, impaired functional mobilty, gait instability, impaired balance, decreased lower extremity function, decreased upper extremity function, impaired cognition, decreased safety awareness.  Pt alert with eyes open for ~85% of session.  During session pt reached out for his wife several times and smiled twice.  Pt demonstrated improvement with sitting balance this date maintaining upright position with Min A for majority of session.  With Min A for balance pt able to perform grooming task using R hand with CGA.  Pt followed some one step commands, but inconsistent requiring max cues throughout therapy for task initiation.  Pt is making progress towards goals and would continue to benefit from skilled OT services to address problems listed below and increase independence with ADLs.    Rehab Prognosis:  Good; patient would benefit from acute skilled OT services to address these deficits and reach maximum level of function.       Plan:     Patient to be seen 4 x/week to address the above  listed problems via self-care/home management, therapeutic activities, therapeutic exercises, neuromuscular re-education  · Plan of Care Expires: 07/06/18  · Plan of Care Reviewed with: patient    This Plan of care has been discussed with the patient who was involved in its development and understands and is in agreement with the identified goals and treatment plan    GOALS:    Occupational Therapy Goals        Problem: Occupational Therapy Goal    Goal Priority Disciplines Outcome Interventions   Occupational Therapy Goal     OT, PT/OT Ongoing (interventions implemented as appropriate)    Description:  Goals to be met by: 6/17/2018     Patient will increase functional independence with ADLs by performing:    UE Dressing with Moderate Assistance.  LE Dressing with Maximum Assistance.  Grooming while seated with Contact Guard Assistance.  Toileting from bedside commode with Moderate Assistance for hygiene and clothing management.   Sitting at edge of bed x 15 minutes with Minimal Assistance.  MET 6/14/2018  Supine to sit with Moderate Assistance.  Stand pivot transfers with Moderate Assistance.  Toilet transfer to bedside commode with Moderate Assistance.  Pt will initiate movement in LUE.  Pt will follow 50% of one step commands.  Pt will keep eyes open 100% of session.                       Time Tracking:     OT Date of Treatment: 06/14/18  OT Start Time: 0741  OT Stop Time: 0811  OT Total Time (min): 30 min    Billable Minutes:Therapeutic Activity 30    YOSEPH Magana  6/14/2018

## 2018-06-14 NOTE — SUBJECTIVE & OBJECTIVE
Neurologic Chief Complaint: R MCA infarct     Subjective:     Interval History: Patient is seen for follow-up neurological assessment and treatment recommendations:     Concern for sepsis today - hypotension, hyperglycemia, hyponatremia, febrile   Medicine co management assisting    HPI, Past Medical, Family, and Social History remains the same as documented in the initial encounter.     Review of Systems   Constitutional: Positive for fever.   HENT: Positive for trouble swallowing.    Cardiovascular: Negative for chest pain.   Gastrointestinal: Negative for nausea and vomiting.   Neurological: Positive for facial asymmetry, speech difficulty and weakness.   Psychiatric/Behavioral: Positive for confusion.     Scheduled Meds:   albuterol-ipratropium  3 mL Nebulization Q6H    apixaban  5 mg Per G Tube BID    aspirin  81 mg Oral Daily    cefTRIAXone (ROCEPHIN) IVPB  1 g Intravenous Q12H    citalopram  10 mg Per G Tube Daily    clindamycin (CLEOCIN) IVPB  600 mg Intravenous Q8H    modafinil  200 mg Per G Tube Daily    rosuvastatin  40 mg Per G Tube QHS    senna  8.6 mg Per G Tube Daily    sodium chloride 0.9%  1,000 mL Intravenous Once    sodium chloride 0.9%  3 mL Intravenous Q8H    sodium phosphates  1 enema Rectal Once     Continuous Infusions:   sodium chloride 0.9%       PRN Meds:acetaminophen, dextrose 50%, glucagon (human recombinant), HYDROcodone-acetaminophen, insulin aspart U-100, labetalol, ondansetron, polyethylene glycol, sodium chloride 0.9%    Objective:     Vital Signs (Most Recent):  Temp: 98.6 °F (37 °C) (06/14/18 0715)  Pulse: 90 (06/14/18 0835)  Resp: 14 (06/14/18 0835)  BP: (!) 114/51 (06/14/18 0715)  SpO2: (!) 88 % (06/14/18 0835)  BP Location: Right arm    Vital Signs Range (Last 24H):  Temp:  [98.2 °F (36.8 °C)-101.9 °F (38.8 °C)]   Pulse:  []   Resp:  [14-24]   BP: ()/(45-71)   SpO2:  [88 %-96 %]   BP Location: Right arm    Physical Exam   Constitutional: He appears  well-developed.   HENT:   Head: Normocephalic and atraumatic.   Eyes: EOM are normal. Pupils are equal, round, and reactive to light.   Cardiovascular: Normal rate.    Pulmonary/Chest: Effort normal.   Abdominal: Soft. There is no tenderness.   Neurological:   Drowsy but able to arouse    Skin: Skin is warm.   Vitals reviewed.      Neurological Exam:   LOC: drowsy  Attention Span: very poor  Language: Global aphasia  Articulation: Dysarthria  Orientation: Untestable due to severe aphasia   Facial Movement (CN VII): Lower facial weakness on the Left  Motor: Arm left  1/5  Leg left  Paresis: 3/5  Arm right  Normal 5/5  Leg right Paresis: 4/5  Sensation: Intact to light touch, temperature and vibration      Laboratory:  CMP:     Recent Labs  Lab 06/14/18  0830   CALCIUM 9.9   ALBUMIN 2.2*   PROT 7.1   *   K 3.9   CO2 33*   CL 86*   BUN 50*   CREATININE 1.6*   ALKPHOS 85   ALT 60*   AST 97*   BILITOT 0.2     BMP:     Recent Labs  Lab 06/14/18  0830   *   K 3.9   CL 86*   CO2 33*   BUN 50*   CREATININE 1.6*   CALCIUM 9.9     CBC:     Recent Labs  Lab 06/14/18  0830   WBC 8.05   RBC 3.69*   HGB 11.3*   HCT 34.4*      MCV 93   MCH 30.6   MCHC 32.8     Lipid Panel:   No results for input(s): CHOL, LDLCALC, HDL, TRIG in the last 168 hours.  Coagulation:   No results for input(s): PT, INR, APTT in the last 168 hours.  Platelet Aggregation Study: No results for input(s): PLTAGG, PLTAGINTERP, PLTAGREGLACO, ADPPLTAGGREG in the last 168 hours.  Hgb A1C:   No results for input(s): HGBA1C in the last 168 hours.  TSH:   No results for input(s): TSH in the last 168 hours.    Diagnostic Results     Brain Imaging   CT 6/6   Large right MCA distribution infarct measuring approximately 7 x 3 cm including the right perisylvian region and extending to the frontal and parietal operculum with associated localized mass effect.    Vessel Imaging   CTA 6/8  CTA head: Occlusion right M2 segment of the MCA within the proximal  sylvian fissure..  Heavy atherosclerotic plaquing of the cavernous and supraclinoid ICAs with mild moderate right and mild left cavernous ICA stenosis.  There is diffuse small caliber right M1 segment of the MCA.  Irregularity and narrowing of the right distal vertebral artery concerning for atherosclerotic disease with mild moderate stenosis.  CTA neck: Atherosclerotic plaquing of the carotid bifurcations and proximal ICAs with less than 50% proximal ICA stenosis by NASCET criteria.  CT head: Evolving large right MCA distribution infarction.  Localized mass effect without significant midline shift or hydrocephalus.  No evidence for hemorrhagic conversion.  Clinical correlation and follow-up advised.      Cardiac Imaging   Echo 6/7  CONCLUSIONS     1 - Technically difficult study.     2 - Severely depressed left ventricular systolic function (EF 15-20%).     3 - Mildly to moderately depressed right ventricular systolic function .     4 - Impaired LV relaxation, normal LAP (grade 1 diastolic dysfunction).     5 - Mild aortic stenosis, WIL = 1.45 cm2, AVAi = 0.67 cm2/m2, peak velocity = 2.8 m/s, mean gradient = 18 mmHg.     6 - Mild mitral regurgitation.     CT chest 6/14/18  1. Recent development consolidation in right middle and lower lobes consistent with pneumonia or aspiration.  Endobronchial material in right lower lobe airways with intermittent obstruction, could represent retained secretions or aspirated material.  Correlate clinically.  Recommend noncontrast chest CT follow-up in 3 months.  2. Chronic right upper lobe consolidation and volume loss consistent with radiation fibrosis in patient with history of previous lung cancer.  3. Chronic loculation of pleural fluid in the right sulcus is stable.  4. A few subcentimeter pulmonary nodules, stable and likely benign  5. Aortic and coronary atherosclerosis, status post CABG  6. Gastrostomy tube  7. Other findings as above  8.  This report was flagged in Epic  as abnormal.

## 2018-06-14 NOTE — ASSESSMENT & PLAN NOTE
Stroke risk factor  Duonebs Q6 due to patient not being able to follow commands and RT not being able to administer inhalers   Currently maintaining O2 sats in the 90's on 1L NC  Pulmonary may need to be consulted, pneumonia - on clinda and rocephin   Medicine following along

## 2018-06-14 NOTE — ASSESSMENT & PLAN NOTE
"- worsening oxygen requirements over last 48 hours  - CT chest obtained 6/13 showing "Recent development consolidation in right middle and lower lobes consistent with pneumonia or aspiration.  Endobronchial material in right lower lobe airways with intermittent obstruction, could represent retained secretions or aspirated material."  - pulm consulted for potential bronch to remove debris from airways  - cont aspiration PNA treatment  - cont nebs q6  - chest PT QID  "

## 2018-06-14 NOTE — ASSESSMENT & PLAN NOTE
- baseline cr 1.0-1.2  - UA negative; FeNa 0.3 indicating pre-renal cause  - improved today after receiving fluids yesterday

## 2018-06-14 NOTE — HPI
"68 YOM with CAD s/p CABG, HTN, DM2, COPD, GENIE, HFrEF (EF 15%)and hx of lung cancer s/p chemo/radiation who was transferred from [facility] on 6/7/18 for right MCA embolic stroke. Hospital medicine has been consulted for co-management.    Patient was originally admitted to [facility] on 6/5 with encephalopathy and weakness. Family reported in ED at [facility] that patient had been febrile with associated coughing, as well as HA's for 1 week prior. He was admitted for TIA and workup of stroke risk factors. His CT showed chronic microvascular ischemic changes. Carotid US and MRI were attempted, however patient could not tolerate. They were unable to perform LP, but had low suspicion for meningitis as patient was initially stable without antibiotics. Patient developed a fever 6/6/18 and was started on vanc/rocephin/ampicillin/acyclovir. Patient had some dysphagia at lunch 6/6/18, and a repeat CT was ordered after he was reported to have worsening facial droop. Repeat CT showed large infarct in R MCA division with possible mass effect. Patient transferred to [facility] for higher level of care. Infectious workup was negative at the time so abx were discontinued. Patient continued to spike fevers and doppler US revealed "DVT in the left axillary vein with additional thrombus in the proximal segment of the branchial vein." he was placed on heparin gtt as PEG placement was anticipated on 6/12. He has since been transitioned to apixaban.  TTE was also performed and significant for EF 15%, for which cardiology was consulted and recommended goal directed medical therapy with coreg, losartan, crestor. Lasix was also started. On 6/12 patient developed fevers to 101.6 a/w worsening oxygen requirements. He developed hypotension to 80s/40s on 6/13.  CXR was notable for acute edema on chronic fibrotic changes with DDx of asx pulm edema vs PNA. procal was checked and found to be 3 (initial procal on 6/8 was negative). Patient was placed on " ceftriaxone and clindamycin and CT chest was obtained that showed new consolidation in RML and RLL c/w aspiration or PNA. Endobronchial material in right lower lobe airways with intermittent obstruction, could represent retained secretions or aspirated material. He developed hypotension to 80s/40s again this morning, responded to fluids. Repeat CXR today is unchanged.

## 2018-06-14 NOTE — PLAN OF CARE
Problem: Occupational Therapy Goal  Goal: Occupational Therapy Goal  Goals to be met by: 6/17/2018     Patient will increase functional independence with ADLs by performing:    UE Dressing with Moderate Assistance.  LE Dressing with Maximum Assistance.  Grooming while seated with Contact Guard Assistance.  Toileting from bedside commode with Moderate Assistance for hygiene and clothing management.   Sitting at edge of bed x 15 minutes with Minimal Assistance.  MET 6/14/2018  Supine to sit with Moderate Assistance.  Stand pivot transfers with Moderate Assistance.  Toilet transfer to bedside commode with Moderate Assistance.  Pt will initiate movement in LUE.  Pt will follow 50% of one step commands.  Pt will keep eyes open 100% of session.       Pt is making progress towards goals.  He remains appropriate candidate for SNF.    YOSEPH Magana  6/14/2018

## 2018-06-14 NOTE — ASSESSMENT & PLAN NOTE
Max temp past 24 hours 101.6  WBC WNL  Infectious workup completed and negative   Procal WNL - 0.08   US upper/lower extremities has DVT left axillary and brachial vein  Septic work up in progress

## 2018-06-14 NOTE — PT/OT/SLP PROGRESS
"Speech Language Pathology Treatment    Patient Name:  Mendez Guthrie   MRN:  [id number]  Admitting Diagnosis: Embolic stroke involving right middle cerebral artery    Recommendations:                 General Recommendations:  Dysphagia therapy, Speech/language therapy and Cognitive-linguistic therapy  Diet recommendations:  NPO, Liquid Diet Level: NPO   Aspiration Precautions: Continue alternate means of nutrition, Frequent oral care and Strict aspiration precautions   General Precautions: Standard, aspiration, fall, NPO  Communication strategies:  provide increased time to answer    Subjective     Pt seen bedside with wife present    Pain/Comfort:  · Pain Rating 1: 0/10  · Pain Rating Post-Intervention 1: 0/10    Objective:     Has the patient been evaluated by SLP for swallowing?   Yes  Keep patient NPO? Yes   Current Respiratory Status: nasal cannula      Pt asleep when SLP entered, mod stim to rouse with consistent max stim to maintain alertness.  Pt did establish eye contact with SLP on L x1, reaching with R hand to  SLP's hand.  HOB raised upright and oral care provided.  Pt opened oral cavity to allow for oral care, swallow elicited x1.  Ice chip presented to lips though no oral acceptance achieved, anterior loss of entire ice chip immediately.  No swallow elicited.  Pt did not follow commands, voice or answer y/n questions.  Phlebotomist entered room for lab draw.  Pt with improved alertness immediately following needle stick, babbling unintelligible repetitive jargon at phlebotomist with gestures to leave room.  Pt waving off wife and therapist with continued gestures of displeasure.  Head shake "no" observed x1.  Dr. Molina arrived to examine patient and SLP paged for fellow patient.  Session discontinued.  Education provided to spouse re: ongoing SLP POC.  She indicated understanding.     Assessment:     Mendez Guthrie is a 68 y.o. male with an SLP diagnosis of Aphasia, Dysphagia and " Cognitive-Linguistic Impairment.      Goals:    SLP Goals        Problem: SLP Goal    Goal Priority Disciplines Outcome   SLP Goal     SLP Ongoing (interventions implemented as appropriate)   Description:  Speech Language Pathology Goals  Goals expected to be met by 6/21-all goals remain appropriate  1. Pt will participate in ongoing assessment of swallow.   2. Pt will answer simple y/n questions with 60% accy given repeats.  3. Pt will follow simple commands with 60% accy given mod A.  4. Pt will complete auto speech tasks with 50% accy with max A.                           Plan:     · Patient to be seen:  3 x/week (decreased 2/2 persistent lethargy)   · Plan of Care expires:  06/07/18  · Plan of Care reviewed with:  patient, spouse   · SLP Follow-Up:  Yes       Discharge recommendations:  nursing facility, skilled   Barriers to Discharge:  Level of Skilled Assistance Needed      Time Tracking:     SLP Treatment Date:   06/14/18  Speech Start Time:  0822  Speech Stop Time:  0832     Speech Total Time (min):  10 min    Billable Minutes: Speech Therapy Individual 10    MADDI Webb, CCC-SLP  06/14/2018

## 2018-06-14 NOTE — ASSESSMENT & PLAN NOTE
- Chest CT concerning for aspiration and PNA  - procal elevated at 3  - blood cx and UA pending  - conservative fluid resuscitation as patient has poor cardiac and respiratory function  - agree with rocephin and clindamycin  - low threshold for ICU consult if patient becomes hypotensive again

## 2018-06-14 NOTE — CONSULTS
"Pulmonology    Consult received.  Patient seen, examined and chart reviewed.  To summarize, Mr. Guthrie is a 68 year old with a history of COPD, GENEI, and stage IIIB NSCLC treated in 2015 with chemoradiation "without any evidence of recurrence." Who presented to an outside hospital with 1 week of encephalopathy, fevers and headache, admitted with "TIA" and subseqently found to have a large MCA embolic infarct.  It does not appear that he received thrombolytics or thrombectomy.  Since that time he has been started on secondary stroke prevention and had a gastrostomy placed due to dysphagia.  Yesterday he developed hypoxemia, hypotension, fever and a round to have a right sided infiltrate on radiograph.  CT chest demonstrated RML/RLL consolidation with narrowing of the proximal bronchus intermedius.  Pulmonology has been consulted to to evaluate for possible clean out bronchoscopy.    A/P:  1. Aspiration pneumonia  2. Acute hypoxemic respiratory failure  3. COPD  4. Chronic RUL consolidation  5. Right endobronchial lesion of unclear chronicity  6. History of NSCLC   · Although there certainly appears to be new narrowing of the bronchus interme dius; there is no role for clean out bronchoscopy at this time.  · His history of NSCLC and the fact that he has been NPO for an prolonged period of time raises concern that the endobronchial findings noted on CT represent return of malignancy rather than aspirated material.  · Bronchoscopy is warranted at some point in order to further investigate radiographic findings.  · His recent large CVA and ongoing severe sepsis, bordering on septic shock place him at an unacceptable risk of complications with bronchoscopy.  As such will defer bronchoscopy until his acute issues have improved.  · Recommend obtaining blood gas and broadening antibiotics to cover for HAP.  Low threshold for transfer to the intensive care unit.  · Full consult to follow.    Sincere Cook MD  [facility] " Fellow  06/14/2018  4:33 PM

## 2018-06-14 NOTE — PLAN OF CARE
ZOIE sent clinical updates to [facility] and [facility] Yonatan [facility] for review. SW waiting to hear back on acceptance from SNF facilities. ZOIE will follow up.    Estela Cagle, ARIN  [facility]  Ext. [phone]

## 2018-06-15 PROBLEM — R23.9 ALTERATION IN SKIN INTEGRITY: Status: ACTIVE | Noted: 2018-01-01

## 2018-06-15 PROBLEM — R50.9 FEVER OF UNKNOWN ORIGIN: Status: RESOLVED | Noted: 2018-01-01 | Resolved: 2018-01-01

## 2018-06-15 NOTE — SUBJECTIVE & OBJECTIVE
Neurologic Chief Complaint: R MCA infarct     Subjective:     Interval History: Patient is seen for follow-up neurological assessment and treatment recommendations:     Hypotensive to 88/36 overnight; good response to 250cc bolus. Hospital medicine adjusting insulin and antibiotics regimen today. Wakefulness much improved since starting Modafinil. Mild bleeding noted at NC site.    HPI, Past Medical, Family, and Social History remains the same as documented in the initial encounter.     Review of Systems   Constitutional: Negative for diaphoresis and fever.   HENT: Positive for nosebleeds and trouble swallowing.    Neurological: Positive for facial asymmetry, speech difficulty and weakness.   Psychiatric/Behavioral: Positive for confusion. Negative for agitation.     Scheduled Meds:   albuterol-ipratropium  3 mL Nebulization Q6H    apixaban  5 mg Per G Tube BID    aspirin  81 mg Oral Daily    citalopram  10 mg Per G Tube Daily    glycerin adult  1 suppository Rectal Daily    insulin detemir U-100  10 Units Subcutaneous BID    modafinil  200 mg Per G Tube Daily    piperacillin-tazobactam (ZOSYN) IVPB  4.5 g Intravenous Q8H    rosuvastatin  40 mg Per G Tube QHS    senna-docusate 8.6-50 mg  2 tablet Per G Tube BID    sodium chloride 0.9%  3 mL Intravenous Q8H    vancomycin (VANCOCIN) IVPB  15 mg/kg Intravenous Q12H     Continuous Infusions:    PRN Meds:acetaminophen, dextrose 50%, glucagon (human recombinant), HYDROcodone-acetaminophen, insulin aspart U-100, labetalol, ondansetron, polyethylene glycol    Objective:     Vital Signs (Most Recent):  Temp: 98.3 °F (36.8 °C) (06/15/18 1140)  Pulse: 94 (06/15/18 1206)  Resp: 18 (06/15/18 1206)  BP: (!) 94/50 (06/15/18 1645)  SpO2: 95 % (06/15/18 1206)  BP Location: Right arm    Vital Signs Range (Last 24H):  Temp:  [98.3 °F (36.8 °C)-99.3 °F (37.4 °C)]   Pulse:  [79-99]   Resp:  [15-20]   BP: ()/(36-75)   SpO2:  [92 %-99 %]   BP Location: Right  arm    Physical Exam   Constitutional: He appears well-developed and well-nourished. No distress.   HENT:   Head: Normocephalic and atraumatic.   Eyes: Conjunctivae are normal. No scleral icterus.   Cardiovascular: Normal rate.    Pulmonary/Chest: Effort normal. No respiratory distress.   Musculoskeletal: He exhibits no edema or tenderness.   Neurological: He is alert. A cranial nerve deficit is present.   Skin: Skin is warm and dry.   Vitals reviewed.      Neurological Exam:   LOC: Alert  Attention Span: Good  Language: Global aphasia  Articulation: Dysarthria  Orientation: Untestable due to severe aphasia   Facial Movement (CN VII): Lower facial weakness on the Left  Vision: L hemianopsia  Gaze: R gaze preference  Motor: Arm left  1/5  Leg left  Paresis: 2/5  Arm right  Normal 4/5  Leg right Paresis: 3/5  Sensation: Intact to light touch, temperature      Laboratory:  CMP:     Recent Labs  Lab 06/15/18  0417   CALCIUM 9.6   ALBUMIN 2.1*   PROT 6.4   *   K 4.2   CO2 35*   CL 88*   BUN 45*   CREATININE 1.3   ALKPHOS 99   ALT 59*   AST 73*   BILITOT 0.2     BMP:     Recent Labs  Lab 06/15/18  0417   *   K 4.2   CL 88*   CO2 35*   BUN 45*   CREATININE 1.3   CALCIUM 9.6     CBC:     Recent Labs  Lab 06/15/18  0417   WBC 8.36   RBC 3.34*   HGB 9.9*   HCT 31.5*      MCV 94   MCH 29.6   MCHC 31.4*     Lipid Panel:   No results for input(s): CHOL, LDLCALC, HDL, TRIG in the last 168 hours.  Coagulation:   No results for input(s): PT, INR, APTT in the last 168 hours.  Platelet Aggregation Study: No results for input(s): PLTAGG, PLTAGINTERP, PLTAGREGLACO, ADPPLTAGGREG in the last 168 hours.  Hgb A1C:   No results for input(s): HGBA1C in the last 168 hours.  TSH:   No results for input(s): TSH in the last 168 hours.      Diagnostic Results     Brain Imaging   CT 6/6   Large right MCA distribution infarct measuring approximately 7 x 3 cm including the right perisylvian region and extending to the frontal and  parietal operculum with associated localized mass effect.      Vessel Imaging     US BUE 6/9/18  Deep venous thrombus in the left axillary vein with additional thrombus in the proximal segment of the branchial vein.  No evidence of DVT in the right upper extremity.    CTA 6/8  CTA head: Occlusion right M2 segment of the MCA within the proximal sylvian fissure..  Heavy atherosclerotic plaquing of the cavernous and supraclinoid ICAs with mild moderate right and mild left cavernous ICA stenosis.  There is diffuse small caliber right M1 segment of the MCA.  Irregularity and narrowing of the right distal vertebral artery concerning for atherosclerotic disease with mild moderate stenosis.  CTA neck: Atherosclerotic plaquing of the carotid bifurcations and proximal ICAs with less than 50% proximal ICA stenosis by NASCET criteria.  CT head: Evolving large right MCA distribution infarction.  Localized mass effect without significant midline shift or hydrocephalus.  No evidence for hemorrhagic conversion.  Clinical correlation and follow-up advised.        Cardiac Imaging   Echo 6/7  CONCLUSIONS     1 - Technically difficult study.     2 - Severely depressed left ventricular systolic function (EF 15-20%).     3 - Mildly to moderately depressed right ventricular systolic function .     4 - Impaired LV relaxation, normal LAP (grade 1 diastolic dysfunction).     5 - Mild aortic stenosis, WIL = 1.45 cm2, AVAi = 0.67 cm2/m2, peak velocity = 2.8 m/s, mean gradient = 18 mmHg.     6 - Mild mitral regurgitation.       Other Imaging    CT chest 6/14/18  1. Recent development consolidation in right middle and lower lobes consistent with pneumonia or aspiration.  Endobronchial material in right lower lobe airways with intermittent obstruction, could represent retained secretions or aspirated material.  Correlate clinically.  Recommend noncontrast chest CT follow-up in 3 months.  2. Chronic right upper lobe consolidation and volume loss  consistent with radiation fibrosis in patient with history of previous lung cancer.  3. Chronic loculation of pleural fluid in the right sulcus is stable.  4. A few subcentimeter pulmonary nodules, stable and likely benign  5. Aortic and coronary atherosclerosis, status post CABG  6. Gastrostomy tube  7. Other findings as above

## 2018-06-15 NOTE — ASSESSMENT & PLAN NOTE
- a1c 9.4 last month  - home regimen unclear but based on PCP notes it appears as though patient was on detemir 15 qHS, aspart 10 units before dinner,metformin 1000mg BID, and linagliptin 5 mg daily  - patient currently on continuous tube feeds with aspiration pneumonitis, tube feed rate decreased from 70 to 30 today  - increased insulin detemir to 10 units BID  - cont MDSSI

## 2018-06-15 NOTE — PT/OT/SLP PROGRESS
Occupational Therapy   Treatment    Name: Mendez Guthrie  MRN: [id number]  Admitting Diagnosis:  Embolic stroke involving right middle cerebral artery       Recommendations:     Discharge Recommendations: nursing facility, skilled  Discharge Equipment Recommendations:  hospital bed, lift device  Barriers to discharge:  None    Subjective     Pt attempted to speak during session, but unable to distinguish speech at this time.    Communicated with: RN prior to session.  Pain/Comfort:  · Pain Rating 1: 0/10  · Pain Rating Post-Intervention 1: 0/10    Patients cultural, spiritual, Evangelical conflicts given the current situation:  None stated    Objective:     Patient found with: telemetry, PEG Tube, oxygen, bed alarm.  Wife and daughter present.  Therapy tech (Era) assisted with session.       General Precautions: Standard, aspiration, fall, NPO   Orthopedic Precautions:N/A   Braces: N/A     Occupational Performance:    Bed Mobility:    · Patient completed Rolling/Turning to Left with  maximal assistance  · Patient completed Scooting/Bridging with maximal assistance  · Patient completed Supine to Sit with maximal assistance  · Patient completed Sit to Supine with maximal assistance     Functional Mobility/Transfers:  · Patient completed Sit <> Stand Transfer with maximal assistance, no AD (with assist of 2) x 2 trials from EOB.  Pt closed eyes and kept head in forward position requiring cues prior to transfer to promote safety and increased postural stability; poor follow through noted.  Total A required to maintain upright position and balance while standing.  Significant anterior lean noted so pt stood for ~5-10 seconds on each trial.    · OT and therapy tech stood on either side of pt.  OT blocked L knee and foot and provided support for LUE.  · Functional Mobility: Pt unable to take steps at this time.    Activities of Daily Living:  · Grooming: minimum assistance to maintain balance while washing face with cloth  utilizing RUE.    Patient left sitting in chair position with legs extended forward with all lines intact, call button in reach and daughter and spouse present    [facility] 6 Click:  [facility] Total Score: 7    Treatment & Education:  *Pt sat EOB for ~30 minutes with Min A-Mod A required to maintain upright position.  Pt pushed with RUE requiring cues to adjust posture and reposition to maintain balance.  Pt able to maintain head in upright position; however, flexed head in forward position and closed eyes intermittently.   *PROM performed on LUE incorporating all joints: 4 sets x 10 reps.  After PROM LUE placed on pillow to provide support while seated EOB.  *Pt performed cervical spine stretch promoting increased flexion/extension: 1 set x 6 reps.  Max cues required 2* decreased participation noted.  With assist from daughter pt able to look up at her, then look back down to complete activity.  *Pt reached arm forward to touch hand of daughter: 1 set x 5 reps to address dynamic sitting balance and postural control.  Increased time and max cues required.  *Pt performed 2 bilateral UE exercises to promote increased coordination: 1 set x 10 reps with Max A- Total A.  OT encouraged pt to grasp L hand with R hand, but no follow through noted.  -Bicep curls  -Chest press   *Pt performed sit to stand transfer as noted above.  *Education provided on benefits of utilizing medi chair.     Education:    Assessment:     Mendez Guthrie is a 68 y.o. male with a medical diagnosis of Embolic stroke involving right middle cerebral artery.  He presents with the following performance deficits affecting function: gait instability, weakness, impaired endurance, impaired balance, impaired self care skills, impaired functional mobilty, impaired cognition, decreased safety awareness, decreased lower extremity function, decreased upper extremity function, decreased ROM, impaired fine motor.  Pt required Min A-Mod A to maintain upright position  while seated EOB with slight pushing from RUE observed.  Increased cues and encouragement required for motivation this date with decreased follow through noted.  Pt has shown that he is able elevate head and maintain head in neutral position; however, during session he flexed head forward and closed eyes intermittently.  Pt able to perform grooming task with Min A for balance utilizing RUE.  Pt required Total A to perform sit to stand transfer and to maintain balance while standing with significant anterior lean noted.  Pt would continue to benefit from skilled OT services to address problems listed below and increase independence with ADLs.  He remains appropriate candidate for SNF.       Rehab Prognosis:  Good; patient would benefit from acute skilled OT services to address these deficits and reach maximum level of function.       Plan:     Patient to be seen 4 x/week to address the above listed problems via self-care/home management, therapeutic activities, therapeutic exercises, neuromuscular re-education  · Plan of Care Expires: 07/06/18  · Plan of Care Reviewed with: patient, spouse, daughter    This Plan of care has been discussed with the patient who was involved in its development and understands and is in agreement with the identified goals and treatment plan    GOALS:    Occupational Therapy Goals        Problem: Occupational Therapy Goal    Goal Priority Disciplines Outcome Interventions   Occupational Therapy Goal     OT, PT/OT Ongoing (interventions implemented as appropriate)    Description:  Goals to be met by: 6/17/2018     Patient will increase functional independence with ADLs by performing:    UE Dressing with Moderate Assistance.  LE Dressing with Maximum Assistance.  Grooming while seated with Contact Guard Assistance.  Toileting from bedside commode with Moderate Assistance for hygiene and clothing management.   Sitting at edge of bed x 15 minutes with Minimal Assistance.  MET  6/14/2018  Supine to sit with Moderate Assistance.  Stand pivot transfers with Moderate Assistance.  Toilet transfer to bedside commode with Moderate Assistance.  Pt will initiate movement in LUE.  Pt will follow 50% of one step commands.  Pt will keep eyes open 100% of session.                       Time Tracking:     OT Date of Treatment: 06/15/18  OT Start Time: 0822  OT Stop Time: 0905  OT Total Time (min): 43 min    Billable Minutes:Therapeutic Activity 43    YOSEPH Magana  6/15/2018

## 2018-06-15 NOTE — ASSESSMENT & PLAN NOTE
Procal 3; Urine cx NG Final. BCx, RCx NGTD  Broad-spectrum abx to cover for HAP  Low threshold for ICU consult if pt continues with hypotension; Check lactic if patient persistently hypotensive

## 2018-06-15 NOTE — PROGRESS NOTES
[facility]  Vascular Neurology  Comprehensive Stroke Center  Progress Note    Assessment/Plan:     * Embolic stroke involving right middle cerebral artery    67 y/o male with R MCA infarct with left sided weakness, facial droop, dysarthria and confusion. Right sided movement is minimal. Etiology likely atheroembolic vs cardioembolic due to EF of 15%. EEG completed and no seizure activity noted. Pt started on Modafinil 6/13; wakefulness now improved.    Hospital medicine following for hypotension, hyperglycemia, hyponatremia, possible sepsis. Low threshold for ICU step up, if necessary.       Antithrombotics: Eliquis 5 mg BID and ASA  Statins: Crestor 40 mg daily  Aggressive risk factor modification: HTN, DM, HLD, Diet, Exercise, Obesity, CAD  Rehab efforts: PT/OT/SLP to evaluate and treat, PM&R consult   Diagnostics ordered/pending: NA  VTE prophylaxis: Heparin drip,  SCD's  BP parameters: Infarct: No intervention, SBP <160        Acute on chronic respiratory failure    Worsening O2 requirements  Consulted Pulm and Medicine; Appreciate recs  Hopeful bronchoscopy Monday  Broad-spectrum abx to cover for HAP  Continue mayra, CPT        Sepsis    Procal 3; Urine cx NG Final. 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 There is not mass effect  associated with it. We will continue to monitor the patients clinical exam for any worsening of symptoms which may indicate expansion of the stroke or the area of the edema resulting in the clinical change. The pattern is suggestive of embolic etiology.            Left spastic hemiparesis    Due to stroke  Aggressive therapy        MEGGAN (acute kidney injury)    Pt's baseline Cr 1.0-1.2  - Hospital Medicine following  - Improved after receiving fluids yesterday  Continuing to monitor        Chronic systolic (congestive) heart failure    Seen by cardiology   EF 15-20%, diastolic dysfunction  Recommending AC and ASA  Meds currently held due to hypotension        Decreased cardiac ejection fraction    EF 15-20% - seen on echo completed 6/7  Cardiac history of CABG in 2016  Cardiology consulted - recommending AC and asa  Patient started on Eliquis 5 mg BID and ASA        CAD (coronary artery disease)    Stoke risk factor  Eliquis 5 mg BID and ASA 81          Essential hypertension    Stroke risk factor  SBP <160  Home meds - coreg and losartan; Acutely held due to hypotension  Allergy to lisinopril and Norvasc        T2DM (type 2 diabetes mellitus)    Stroke risk factor  HgB A1C 9.4  Detemir 10U BID  SSI Q6        Abnormal ventricular wall motion    Stroke risk factor  Evidence on echo        Dysarthria    Result of stroke  Aggressive SLP         GENIE on CPAP    Stroke risk factor  Holding CPAP acutely due to risk aspiration, encephalopathy, pt nonverbal        COPD (chronic obstructive pulmonary disease)    Stroke risk factor  Duonebs Q6 due to patient not being able to follow commands and RT not being able to administer inhalers   Currently maintaining O2 sats in the 90's on 2L NC  Pulmonary, Medicine following; Appreciate recs        History of lung cancer    Stroke risk factor  CT Chest this admission concerning  Pending further w/u with Pulm once pt more acutely stable             67 y/o male wth R MCA infarct. Had  been at [facility] since 6-4-18 for AMS and weakness that improved and then worsened now with facial droop and left sided weakness. 6-6-18 fever so infectious w/u in process could be PNA as he was coughing with puree food at [facility]. He was placed on ABX Amp/Vanc/valcyclovir for possible meningitis this is stopped as he does not have meningitis. He moved around too much and was clausterphobic for MRI so will attempt MRI here.   6/8 - Urine studies ordered for hyponatremia. EEG results pending. Bilateral upper/lower extremities US ordered for fever. Procal WNL. Infectious workup negative so far. Cardiology consulted for EF 15-20%.   6-9-18 will give lasix 40 mg NG today as per cardiology recs. Discussion with family regarding anticoagulation due to low EF and DVT left axillary vein and brachial vein, discussion regarding feeding as well and likely bernard of patient swallowing is minimal so will need PEG next week as opposed to waiting and family is in agreement so will start heparin drip with no bolus parameters  6-10-18 once EEG read and no seizure activity may start Modafinil to help with wakefulness  6/11 - EEG to be read today and consider Modafinil. IR consulted for PEG placement tomorrow. Hospital medicine ordering urine studies for hyponatremia.   6/12 - PEG to be placed today. Heparin gtt currently held. Will resume once PEG is placed. AC to start tomorrow once PEG is able to be used. Hyponatremia slightly improving. Continue to hold fluids due to hyponatremia thought to be due to SIADH. Modafinil to be started tomorrow when PEG can be used.  6/13 - PEG placed yesterday. Heparin gtt dc'ed and eliquis started. Modafinil started today. Continuing to restrict fluids (enteral water boluses) due to SIADH/hyponatremia and holding off on lasix that cardiology recommended.  6/14/18 - concern for sepsis today - medicine consulted - hypotensive, febrile, procal elevated.   6/15: Hypotensive to 88/36 overnight; good response  to 250cc bolus. Hospital medicine adjusting insulin and antibiotics regimen. Wakefulness much improved since starting Modafinil. Mild bleeding noted at NC site.    STROKE DOCUMENTATION        NIH Scale:  1a. Level Of Consciousness: 0-->Alert: keenly responsive  1b. LOC Questions: 2-->Answers neither question correctly  1c. LOC Commands: 1-->Performs one task correctly  2. Best Gaze: 1-->Partial gaze palsy: gaze is abnormal in one or both eyes, but forced deviation or total gaze paresis is not present  3. Visual: 2-->Complete hemianopia  4. Facial Palsy: 2-->Partial paralysis (total or near-total paralysis of lower face)  5a. Motor Arm, Left: 4-->No movement  5b. Motor Arm, Right: 1-->Drift: limb holds 90 (or 45) degrees, but drifts down before full 10 secs: does not hit bed or other support  6a. Motor Leg, Left: 3-->No effort against gravity: leg falls to bed immediately  6b. Motor Leg, Right: 3-->No effort against gravity: leg falls to bed immediately  7. Limb Ataxia: 0-->Absent  8. Sensory: 0-->Normal: no sensory loss  9. Best Language: 2-->Severe aphasia: all communication is through fragmentary expression: great need for inference, questioning, and guessing by the listener. Range of information that can be exchanged is limited: listener carries burden of. . . (see row details)  10. Dysarthria: 2-->Severe dysarthria: patients speech is so slurred as to be unintelligible in the absence of or out of proportion to any dysphasia, or is mute/anarthric  11. Extinction and Inattention (formerly Neglect): 0-->No abnormality  Total (NIH Stroke Scale): 23       Modified Pierson Score: 0  [place] Coma Scale:    ABCD2 Score:    [id number] Score:   HAS -BLED Score:   ICH Score:   Hunt & De Paz Classification:      Hemorrhagic change of an Ischemic Stroke: Does this patient have an ischemic stroke with hemorrhagic changes? No     Neurologic Chief Complaint: R MCA infarct     Subjective:     Interval History: Patient is seen for  follow-up neurological assessment and treatment recommendations:     Hypotensive to 88/36 overnight; good response to 250cc bolus. Hospital medicine adjusting insulin and antibiotics regimen today. Wakefulness much improved since starting Modafinil. Mild bleeding noted at NC site.    HPI, Past Medical, Family, and Social History remains the same as documented in the initial encounter.     Review of Systems   Constitutional: Negative for diaphoresis and fever.   HENT: Positive for nosebleeds and trouble swallowing.    Neurological: Positive for facial asymmetry, speech difficulty and weakness.   Psychiatric/Behavioral: Positive for confusion. Negative for agitation.     Scheduled Meds:   albuterol-ipratropium  3 mL Nebulization Q6H    apixaban  5 mg Per G Tube BID    aspirin  81 mg Oral Daily    citalopram  10 mg Per G Tube Daily    glycerin adult  1 suppository Rectal Daily    insulin detemir U-100  10 Units Subcutaneous BID    modafinil  200 mg Per G Tube Daily    piperacillin-tazobactam (ZOSYN) IVPB  4.5 g Intravenous Q8H    rosuvastatin  40 mg Per G Tube QHS    senna-docusate 8.6-50 mg  2 tablet Per G Tube BID    sodium chloride 0.9%  3 mL Intravenous Q8H    vancomycin (VANCOCIN) IVPB  15 mg/kg Intravenous Q12H     Continuous Infusions:    PRN Meds:acetaminophen, dextrose 50%, glucagon (human recombinant), HYDROcodone-acetaminophen, insulin aspart U-100, labetalol, ondansetron, polyethylene glycol    Objective:     Vital Signs (Most Recent):  Temp: 98.3 °F (36.8 °C) (06/15/18 1140)  Pulse: 94 (06/15/18 1206)  Resp: 18 (06/15/18 1206)  BP: (!) 94/50 (06/15/18 1645)  SpO2: 95 % (06/15/18 1206)  BP Location: Right arm    Vital Signs Range (Last 24H):  Temp:  [98.3 °F (36.8 °C)-99.3 °F (37.4 °C)]   Pulse:  [79-99]   Resp:  [15-20]   BP: ()/(36-75)   SpO2:  [92 %-99 %]   BP Location: Right arm    Physical Exam   Constitutional: He appears well-developed and well-nourished. No distress.   HENT:    Head: Normocephalic and atraumatic.   Eyes: Conjunctivae are normal. No scleral icterus.   Cardiovascular: Normal rate.    Pulmonary/Chest: Effort normal. No respiratory distress.   Musculoskeletal: He exhibits no edema or tenderness.   Neurological: He is alert. A cranial nerve deficit is present.   Skin: Skin is warm and dry.   Vitals reviewed.      Neurological Exam:   LOC: Alert  Attention Span: Good  Language: Global aphasia  Articulation: Dysarthria  Orientation: Untestable due to severe aphasia   Facial Movement (CN VII): Lower facial weakness on the Left  Vision: L hemianopsia  Gaze: R gaze preference  Motor: Arm left  1/5  Leg left  Paresis: 2/5  Arm right  Normal 4/5  Leg right Paresis: 3/5  Sensation: Intact to light touch, temperature      Laboratory:  CMP:     Recent Labs  Lab 06/15/18  0417   CALCIUM 9.6   ALBUMIN 2.1*   PROT 6.4   *   K 4.2   CO2 35*   CL 88*   BUN 45*   CREATININE 1.3   ALKPHOS 99   ALT 59*   AST 73*   BILITOT 0.2     BMP:     Recent Labs  Lab 06/15/18  0417   *   K 4.2   CL 88*   CO2 35*   BUN 45*   CREATININE 1.3   CALCIUM 9.6     CBC:     Recent Labs  Lab 06/15/18  0417   WBC 8.36   RBC 3.34*   HGB 9.9*   HCT 31.5*      MCV 94   MCH 29.6   MCHC 31.4*     Lipid Panel:   No results for input(s): CHOL, LDLCALC, HDL, TRIG in the last 168 hours.  Coagulation:   No results for input(s): PT, INR, APTT in the last 168 hours.  Platelet Aggregation Study: No results for input(s): PLTAGG, PLTAGINTERP, PLTAGREGLACO, ADPPLTAGGREG in the last 168 hours.  Hgb A1C:   No results for input(s): HGBA1C in the last 168 hours.  TSH:   No results for input(s): TSH in the last 168 hours.      Diagnostic Results     Brain Imaging   CT 6/6   Large right MCA distribution infarct measuring approximately 7 x 3 cm including the right perisylvian region and extending to the frontal and parietal operculum with associated localized mass effect.      Vessel Imaging     US BUE 6/9/18  Deep  venous thrombus in the left axillary vein with additional thrombus in the proximal segment of the branchial vein.  No evidence of DVT in the right upper extremity.    CTA 6/8  CTA head: Occlusion right M2 segment of the MCA within the proximal sylvian fissure..  Heavy atherosclerotic plaquing of the cavernous and supraclinoid ICAs with mild moderate right and mild left cavernous ICA stenosis.  There is diffuse small caliber right M1 segment of the MCA.  Irregularity and narrowing of the right distal vertebral artery concerning for atherosclerotic disease with mild moderate stenosis.  CTA neck: Atherosclerotic plaquing of the carotid bifurcations and proximal ICAs with less than 50% proximal ICA stenosis by NASCET criteria.  CT head: Evolving large right MCA distribution infarction.  Localized mass effect without significant midline shift or hydrocephalus.  No evidence for hemorrhagic conversion.  Clinical correlation and follow-up advised.        Cardiac Imaging   Echo 6/7  CONCLUSIONS     1 - Technically difficult study.     2 - Severely depressed left ventricular systolic function (EF 15-20%).     3 - Mildly to moderately depressed right ventricular systolic function .     4 - Impaired LV relaxation, normal LAP (grade 1 diastolic dysfunction).     5 - Mild aortic stenosis, WIL = 1.45 cm2, AVAi = 0.67 cm2/m2, peak velocity = 2.8 m/s, mean gradient = 18 mmHg.     6 - Mild mitral regurgitation.       Other Imaging    CT chest 6/14/18  1. Recent development consolidation in right middle and lower lobes consistent with pneumonia or aspiration.  Endobronchial material in right lower lobe airways with intermittent obstruction, could represent retained secretions or aspirated material.  Correlate clinically.  Recommend noncontrast chest CT follow-up in 3 months.  2. Chronic right upper lobe consolidation and volume loss consistent with radiation fibrosis in patient with history of previous lung cancer.  3. Chronic  loculation of pleural fluid in the right sulcus is stable.  4. A few subcentimeter pulmonary nodules, stable and likely benign  5. Aortic and coronary atherosclerosis, status post CABG  6. Gastrostomy tube  7. Other findings as above      Negrita Whitaker PA-C  [facility]  Department of Vascular Neurology   [facility]

## 2018-06-15 NOTE — ASSESSMENT & PLAN NOTE
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

## 2018-06-15 NOTE — ASSESSMENT & PLAN NOTE
Stroke risk factor  Duonebs Q6 due to patient not being able to follow commands and RT not being able to administer inhalers   Currently maintaining O2 sats in the 90's on 2L NC  Pulmonary, Medicine following; Appreciate recs

## 2018-06-15 NOTE — ASSESSMENT & PLAN NOTE
Worsening O2 requirements  Consulted Pulm and Medicine; Appreciate recs  Hopeful bronchoscopy Monday  Broad-spectrum abx to cover for HAP  Continue CPT mayra

## 2018-06-15 NOTE — CARE UPDATE
RN Proactive Rounding Note  Time of Visit: 2334    Admit Date: 2018  LOS: 8  Code Status: Full Code   Date of Visit: 06/15/2018  : 1950  Age: 68 y.o.  Sex: male  Bed: 729/729 A:   MRN: [id number]  Was the patient discharged from an ICU this admission? No  Was the patient discharged from a PACU within last 24 hours? No  Did the patient receive conscious sedation/general anesthesia in last 24 hours? No  Was the patient in the ED within the past 24 hours? No  Was the patient started on NIPPV within the past 24 hours? No  Attending Physician: Jordan Barry MD  Primary Service: Networked reference to record PCT       ASSESSMENT:     Abnormal Vital Signs: BP 88/36  Clinical Issues: Circulatory     INTERVENTIONS/ RECOMMENDATIONS:     Called to bedside by charge Kathleen CAN re: hypotension. Upon arrival to room, bedside Joshua CAN, and charge RN present. Primary MD already called with orders to give 250 ml bolus.   Recent labs noted: BUN/Creat, and liver enzymes elevated as compared to labs drawn on . Echo from  showing EF 15-20%.    Discussed plan of care with Joshua CAN.    PHYSICIAN ESCALATION:     Yes/No Yes - RN has already spoke with primary MD and received orders.    Disposition: Remain on floor    FOLLOW-UP/CONTINGENCY:     Bolus to be given, and bedside RN will call RRT back with updated vitals. Will call primary if patient remains hypotensive after bolus.     Call back the Rapid Response Nurse at x [phone]  for additional questions or concerns

## 2018-06-15 NOTE — ASSESSMENT & PLAN NOTE
- home meds: coreg 12.5 mg BID,chlorthalidone 25 mg daily, losartan 50 mg daily  - holding all home meds in setting of hypotension and sepsis  - will cont to monitor BPs and resume prn

## 2018-06-15 NOTE — ASSESSMENT & PLAN NOTE
Pt's baseline Cr 1.0-1.2  - Hospital Medicine following  - Improved after receiving fluids yesterday  Continuing to monitor

## 2018-06-15 NOTE — ASSESSMENT & PLAN NOTE
- Chest CT concerning for aspiration and PNA  - procal elevated at 3  - blood cx and UA pending  - conservative fluid resuscitation as patient has poor cardiac and respiratory function  - abc changed to vanc and zosyn to cover for HAP  - low threshold for ICU consult if patient becomes hypotensive again; would also check lactic if patient persistently hypotensive

## 2018-06-15 NOTE — PLAN OF CARE
Problem: Occupational Therapy Goal  Goal: Occupational Therapy Goal  Goals to be met by: 6/17/2018     Patient will increase functional independence with ADLs by performing:    UE Dressing with Moderate Assistance.  LE Dressing with Maximum Assistance.  Grooming while seated with Contact Guard Assistance.  Toileting from bedside commode with Moderate Assistance for hygiene and clothing management.   Sitting at edge of bed x 15 minutes with Minimal Assistance.  MET 6/14/2018  Supine to sit with Moderate Assistance.  Stand pivot transfers with Moderate Assistance.  Toilet transfer to bedside commode with Moderate Assistance.  Pt will initiate movement in LUE.  Pt will follow 50% of one step commands.  Pt will keep eyes open 100% of session.        POC remains appropriate.  He remains appropriate candidate for SNF.    YOSEPH Magana  6/15/2018

## 2018-06-15 NOTE — ASSESSMENT & PLAN NOTE
67 y/o male with R MCA infarct with left sided weakness, facial droop, dysarthria and confusion. Right sided movement is minimal. Etiology likely atheroembolic vs cardioembolic due to EF of 15%. EEG completed and no seizure activity noted. Pt started on Modafinil 6/13; wakefulness now improved.    Hospital medicine following for hypotension, hyperglycemia, hyponatremia, possible sepsis. Low threshold for ICU step up, if necessary.       Antithrombotics: Eliquis 5 mg BID and ASA  Statins: Crestor 40 mg daily  Aggressive risk factor modification: HTN, DM, HLD, Diet, Exercise, Obesity, CAD  Rehab efforts: PT/OT/SLP to evaluate and treat, PM&R consult   Diagnostics ordered/pending: NA  VTE prophylaxis: Heparin drip,  SCD's  BP parameters: Infarct: No intervention, SBP <160

## 2018-06-15 NOTE — NURSING
Pt's BP auscultated at 88/36; stroke night float/charge nurse/ICU charge contacted; otherwise VSS.  250 cc bolus administered. Will reassess pt after.

## 2018-06-15 NOTE — PT/OT/SLP PROGRESS
Physical Therapy Treatment    Patient Name:  Mendez Guthrie   MRN:  [id number]  Admitting Diagnosis: Embolic stroke involving right middle cerebral artery  Recent Surgery: * No surgery found *      Recommendations:     Discharge Recommendations:  nursing facility, skilled   Discharge Equipment Recommendations: hospital bed, lift device   Barriers to discharge: Decreased caregiver support    Plan:     During this hospitalization, patient to be seen 4 x/week to address the listed problems via gait training, therapeutic activities, therapeutic exercises, neuromuscular re-education  · Plan of Care Expires:  07/07/18   Plan of Care Reviewed with: patient, spouse, daughter    This Plan of care has been discussed with the patient who was involved in its development and understands and is in agreement with the identified goals and treatment plan    Subjective     Communicated with RN prior to session.  Patient found supine upon PT entry to room.   Pt nonverbal throughout session. Pt's eyes open and alert.    Pain/Comfort:  · Pain Rating 1: 0/10  · Pain Rating Post-Intervention 1: 0/10    Objective:     Patient found with: telemetry, PEG Tube, bed alarm, oxygen   Mental Status: [place] orientation due to aphasia and follows 0/5 of verbal commands. Patient is Alert and Cooperative during session.    General Precautions: Standard, Cardiac aspiration, fall, NPO   Orthopedic Precautions:N/A   Braces: N/A   Respiratory Status: nasal cannula, 2L  Vital Signs (Most Recent):    Temp: 98.3 °F (36.8 °C) (06/15/18 1140)  Pulse: 94 (06/15/18 1206)  Resp: 18 (06/15/18 1206)  BP: (!) 118/56 (06/15/18 1140)  SpO2: 95 % (06/15/18 1206)    Functional Mobility:  Bed Mobility:   · Rolling/Turning to Left: total assistance  · Scooting to EOB to have both feet planted on floor: maximal assistance  · Supine to Sit: total assistance; from left side of bed  · Sit to Supine: total assistance; to left side of bed    Sitting Balance at Edge of  Bed:  · Assistance Level Required: moderate assist  · Time: 8 minutes  · Postural deviations noted: rounded shoulders, kyphotic posture, PPT  · Encouraged: BUE and BLE weight bearing  · Comments: pt required tactile cues for posture    Transfers:   · Sit <> Stand Transfer: total assistance with no assistive device   · Stand <> Sit Transfer: total assistance with no assistive device   · x2 attempts, pt minimally able to initiate standing, did not lift hips off of bed      Gait:   NT    Therapeutic Activities & Exercises:   Supine: LLE PROM x 10 reps in all planes through available ROM. Exercises performed to develop and maintain pt's strength, endurance, ROM and flexibility.     Education:  Patient provided with daily orientation and goals of this PT session. Patient agreed to participate in session. Patient aware of patient's deficits and therapy progression. They were educated to transfer with RN/PCT only; total A drawsheet to cardiac chair of 2 person. They were provided and educated on proper positioning in supine and in sitting with support of affected LUE extremities in order to increase awareness of extremity and to decrease the effects of immobility, specifically edema and pain. Encouraged patient to perform daily exercises & mobility to increase endurance and decrease effects of bedrest.Time provided for therapeutic counseling and discussion of health disposition. All questions answered to patient's satisfaction, within scope of PT practice; voiced no other concerns. White board updated in patient's room, RN notified of session.    Patient left up in chair, with LUE propped on pillow for scapular support and edema management, head in midline, neutral pelvis & heels floated for skin protection with all lines intact, call button in reach, bed alarm on and RN notified    AM-PAC 6 CLICK MOBILITY  Turning over in bed (including adjusting bedclothes, sheets and blankets)?: 1  Sitting down on and standing up from a  chair with arms (e.g., wheelchair, bedside commode, etc.): 1  Moving from lying on back to sitting on the side of the bed?: 1  Moving to and from a bed to a chair (including a wheelchair)?: 1  Need to walk in hospital room?: 1  Climbing 3-5 steps with a railing?: 1  Total Score: 6     Assessment:     Mendez Guthrie is a 68 y.o. male admitted with a medical diagnosis of Embolic stroke involving right middle cerebral artery.   Patient is making progress towards goals, participating well in this session. Pt continues to require significant assist with bed mobility and functional mobility. Pt with noted improvement with alertness level. Mendez Napiers deficits effect their ability to safely and independently participate in self-care tasks and functional mobility which increases their caregiver burden. Due to his physical therapy diagnosis of debility and deconditioning, they continue to benefit from acute PT services to address the following limitations: high fall risk, weakness, instability, the need for supervised instruction of exercise. Mendez Napiers deficits effect their roles and responsibilities in which they were able to complete prior to admit. Education was provided to patient regarding importance of continued participation in therapy, patient's progress and discharge disposition. Pt would benefit from SNF upon discharge to improve quality of life and focus on recovery of impairments.     Problem List: gait instability, impaired endurance, weakness, impaired functional mobilty, impaired balance, decreased lower extremity function, decreased upper extremity function, impaired cognition, decreased safety awareness, impaired fine motor, impaired self care skills. gait instability, impaired endurance, weakness, impaired functional mobilty, impaired balance, decreased lower extremity function, decreased upper extremity function, impaired cognition, decreased safety awareness, impaired fine motor, impaired  self care skills  Rehab Prognosis: fair; patient would benefit from acute skilled PT services to address these deficits and reach maximum level of function.      GOALS:    Physical Therapy Goals        Problem: Physical Therapy Goal    Goal Priority Disciplines Outcome Goal Variances Interventions   Physical Therapy Goal     PT/OT, PT Ongoing (interventions implemented as appropriate)     Description:  Goals to be met by: 2018     Patient will increase functional independence with mobility by performin. Supine to sit with Moderate Assistance  2. Sit to supine with Moderate Assistance  3. Sit to stand transfer with Maximum Assistance  4. Bed to chair transfer with Maximum Assistance  5. Gait  x 10 feet with Maximum Assistance with or without appropriate AD   6. Sitting at edge of bed x10 minutes with Minimal Assistance  7. Lower extremity exercise program x15 reps per handout, with assistance as needed                       Time Tracking:     PT Received On: 06/15/18  PT Start Time: 1340     PT Stop Time: 1403  PT Total Time (min): 23 min     Billable Minutes:   · Therapeutic Activity 15 and Therapeutic Exercise 8    Treatment Type: Treatment  PT/PTA: PT       Nimisha Andrews PT, DPT  06/15/2018  Pager: [phone]

## 2018-06-15 NOTE — PROGRESS NOTES
Consult received on patient. Intertrigo due to moisture noted above patient's anus. Recommend applying zinc barrier cream qshift and as needed. Remove only soiled cream then reapply more. Turn every 2hrs. Nursing to continue care.     06/15/18 1239       Wound 06/15/18 1239 Moisture associated dermatitis upper Perirectal   Date First Assessed/Time First Assessed: 06/15/18 1239   Primary Wound Type: Moisture associated dermatitis  Orientation: upper  Location: Perirectal   Wound WDL ex   Drainage Amount None   Drainage Characteristics/Odor No odor   Appearance Moist;Pink   Tissue loss description Partial thickness   Periwound Area Intact   Wound Edges Open   Wound Length (cm) 0.1 cm   Wound Width (cm) 0.1 cm   Wound Depth (cm) 0 cm   Wound Volume (cm^3) 0 cm^3

## 2018-06-15 NOTE — CARE UPDATE
Rapid Response Follow-up Note    Followed up with patient for proactive rounding.   No acute issues at this time. Vital signs within normal limits. Current BP 97/51 after bolus. Patient in the process of being straight cath'd for urine.   Reviewed plan of care with primary RN, Benji.   Please call Rapid Response RN with any questions or concerns at  [phone]

## 2018-06-15 NOTE — ASSESSMENT & PLAN NOTE
Stroke risk factor  CT Chest this admission concerning  Pending further w/u with Pulm once pt more acutely stable

## 2018-06-15 NOTE — NURSING
Over course of shift, pt was hypotensive to 88/36 at 2300.  After administration of 250 cc bolus, BP improved to 97/51 and spontaneously sandy to 134/75 by 0600.  Pt was straight cathed for 575.  Condom catheter replaced for more accurate I/Os.  Fingersticks were in the range of 230-250, requiring PRN insulin coverage.  Pt's orientation could not be assessed given aphasia, but appears to follow commands and track.  L side flaccid, pt moving R arm spontaneously.  L arm limb alert in place.  Fall precautions in place including bed alarm. Will continue to monitor.

## 2018-06-15 NOTE — ASSESSMENT & PLAN NOTE
"- worsening oxygen requirements over last 48 hours  - CT chest obtained 6/13 showing "Recent development consolidation in right middle and lower lobes consistent with pneumonia or aspiration.  Endobronchial material in right lower lobe airways with intermittent obstruction, could represent retained secretions or aspirated material."  - pulm consulted, appreciate assistance  - cont nebs q6  - chest PT QID  - abx changed to vanc/zosyn to cover for HAP  "

## 2018-06-15 NOTE — PROGRESS NOTES
"[facility] Hospital Medicine  Progress Note    Patient Name: Mendez Guthrie  MRN: [id number]  Patient Class: IP- Inpatient   Admission Date: 6/7/2018  Length of Stay: 8 days  Attending Physician: Jordan Barry MD  Primary Care Provider: Valeria Mayen MD    Hospital Medicine Team: Networked reference to record PCT  Madai Molina MD    Subjective:     Principal Problem:Embolic stroke involving right middle cerebral artery    HPI:  68 YOM with CAD s/p CABG, HTN, DM2, COPD, GENIE, HFrEF (EF 15%)and hx of lung cancer s/p chemo/radiation who was transferred from [facility] on 6/7/18 for right MCA embolic stroke. Hospital medicine has been consulted for co-management.    Patient was originally admitted to [facility] on 6/5 with encephalopathy and weakness. Family reported in ED at [facility] that patient had been febrile with associated coughing, as well as HA's for 1 week prior. He was admitted for TIA and workup of stroke risk factors. His CT showed chronic microvascular ischemic changes. Carotid US and MRI were attempted, however patient could not tolerate. They were unable to perform LP, but had low suspicion for meningitis as patient was initially stable without antibiotics. Patient developed a fever 6/6/18 and was started on vanc/rocephin/ampicillin/acyclovir. Patient had some dysphagia at lunch 6/6/18, and a repeat CT was ordered after he was reported to have worsening facial droop. Repeat CT showed large infarct in R MCA division with possible mass effect. Patient transferred to [facility] for higher level of care. Infectious workup was negative at the time so abx were discontinued. Patient continued to spike fevers and doppler US revealed "DVT in the left axillary vein with additional thrombus in the proximal segment of the branchial vein." he was placed on heparin gtt as PEG placement was anticipated on 6/12. He has since been transitioned to apixaban.  TTE was also performed and significant " for EF 15%, for which cardiology was consulted and recommended goal directed medical therapy with coreg, losartan, crestor. Lasix was also started. On 6/12 patient developed fevers to 101.6 a/w worsening oxygen requirements. He developed hypotension to 80s/40s on 6/13.  CXR was notable for acute edema on chronic fibrotic changes with DDx of asx pulm edema vs PNA. procal was checked and found to be 3 (initial procal on 6/8 was negative). Patient was placed on ceftriaxone and clindamycin and CT chest was obtained that showed new consolidation in RML and RLL c/w aspiration or PNA. Endobronchial material in right lower lobe airways with intermittent obstruction, could represent retained secretions or aspirated material. He developed hypotension to 80s/40s again this morning, responded to fluids. Repeat CXR today is unchanged.     Hospital Course:  No notes on file    Interval History: patient had another episode of hypotension ON (88/36) that improved with 250 cc bolus. No fevers ON.     Review of Systems   Unable to perform ROS: Mental status change     Objective:     Vital Signs (Most Recent):  Temp: 98.7 °F (37.1 °C) (06/15/18 0804)  Pulse: 95 (06/15/18 0955)  Resp: 15 (06/15/18 0955)  BP: 108/62 (06/15/18 0804)  SpO2: 99 % (06/15/18 0955) Vital Signs (24h Range):  Temp:  [98.1 °F (36.7 °C)-99.3 °F (37.4 °C)] 98.7 °F (37.1 °C)  Pulse:  [80-99] 95  Resp:  [15-20] 15  SpO2:  [86 %-99 %] 99 %  BP: ()/(36-75) 108/62     Weight: 101.2 kg (223 lb 1.7 oz)  Body mass index is 33.92 kg/m².    Intake/Output Summary (Last 24 hours) at 06/15/18 1010  Last data filed at 06/15/18 0118   Gross per 24 hour   Intake                0 ml   Output              725 ml   Net             -725 ml      Physical Exam   Constitutional: He appears well-developed. No distress.   HENT:   Head: Normocephalic and atraumatic.   Eyes: Conjunctivae are normal. Right eye exhibits no discharge. Left eye exhibits no discharge. No scleral icterus.  "  Cardiovascular: Normal rate and regular rhythm.    Pulmonary/Chest: No respiratory distress. He has decreased breath sounds. He has wheezes. He has rales.   Course breath sounds bilaterally    Abdominal: Soft. He exhibits no distension. There is no tenderness.   Neurological:   Nonverbal  Does not follow commands  Waxing and wayning between alertness and somnolence   Skin: Skin is warm and dry. He is not diaphoretic.   Vitals reviewed.      Significant Labs: All pertinent labs within the past 24 hours have been reviewed.    Significant Imaging: I have reviewed all pertinent imaging results/findings within the past 24 hours.    Assessment/Plan:      * Embolic stroke involving right middle cerebral artery    Per primary team          Acute on chronic respiratory failure    - worsening oxygen requirements over last 48 hours  - CT chest obtained 6/13 showing "Recent development consolidation in right middle and lower lobes consistent with pneumonia or aspiration.  Endobronchial material in right lower lobe airways with intermittent obstruction, could represent retained secretions or aspirated material."  - pulm consulted, appreciate assistance  - cont nebs q6  - chest PT QID  - abx changed to vanc/zosyn to cover for HAP        Sepsis    - Chest CT concerning for aspiration and PNA  - procal elevated at 3  - blood cx and UA pending  - conservative fluid resuscitation as patient has poor cardiac and respiratory function  - abc changed to vanc and zosyn to cover for HAP  - low threshold for ICU consult if patient becomes hypotensive again; would also check lactic if patient persistently hypotensive          DVT of axillary vein, acute left    - cont apixaban        Chronic systolic (congestive) heart failure    - EF 15% with diastolic dysfunction  - lasix being held in setting of hyponatremia  - cont statin  - will resume BB and ARB after acute illness and hypotension have resolved         GENIE on CPAP    - hold CPAP in " "setting of likely aspiration, encephalopathy and nonverbal        CAD (coronary artery disease)    - s/p CABG x2 in 2016  - per cards consult note "Will need C as an outpt if he recovers from his stroke and decides he wants to proceed as seems he has been hesitant in the past."  - has cardiology appointment scheduled for 7/17  - cont ASA and statin  - will resume BB and ARB after acute illness has resolved          Essential hypertension    - home meds: coreg 12.5 mg BID,chlorthalidone 25 mg daily, losartan 50 mg daily  - holding all home meds in setting of hypotension and sepsis  - will cont to monitor BPs and resume prn        COPD (chronic obstructive pulmonary disease)    - cont duonebs q6 as patient cannot follow instructions to use inhalers  - goal oxygen sats are 88-92%          T2DM (type 2 diabetes mellitus)    - a1c 9.4 last month  - home regimen unclear but based on PCP notes it appears as though patient was on detemir 15 qHS, aspart 10 units before dinner,metformin 1000mg BID, and linagliptin 5 mg daily  - patient currently on continuous tube feeds with aspiration pneumonitis, tube feed rate decreased from 70 to 30 today  - increased insulin detemir to 10 units BID  - cont MDSSI        MEGGAN (acute kidney injury)    - baseline cr 1.0-1.2  - UA negative; FeNa 0.3 indicating pre-renal cause  - improved today after receiving fluids yesterday            VTE Risk Mitigation         Ordered     apixaban tablet 5 mg  2 times daily      06/13/18 0751     heparin 25,000 units in dextrose 5% 250 mL (100 units/mL) infusion  Continuous      06/09/18 1350     IP VTE HIGH RISK PATIENT  Once      06/07/18 0421     Place sequential compression device  Until discontinued      06/07/18 0421              Madai Molina MD  Department of Hospital Medicine   [facility]                      06/15/2018                             STAFF PHYSICIAN NOTE                                   Attending " Attestation for Rounds with Resident  I have reviewed and concur with the resident's history, physical, assessment, and plan.  I have personally interviewed and examined the patient at bedside and agree with the resident's findings.                                  ________________________________________                                     REASON FOR ADMISSION:     Patient is 68 y.o.male    Body mass index is 33.92 kg/m².,  Embolic stroke involving right middle cerebral artery

## 2018-06-15 NOTE — PLAN OF CARE
Jose Armando was notified by [facility] KATELYN Galindo that the family is requesting for Jose Armando to contact [employer] for her brother overseas in the  to be able to get leave to come see his father. JoseA rmando contacted the [employer] and gave all the requested information. Jose Armando was given a tracking number of [id number]. Jose Armando gave the tracking number and phone number [phone] to call and get updates on the request. Jose Armando will continue to follow.

## 2018-06-15 NOTE — ASSESSMENT & PLAN NOTE
Stroke risk factor  SBP <160  Home meds - coreg and losartan; Acutely held due to hypotension  Allergy to lisinopril and Norvasc

## 2018-06-15 NOTE — CONSULTS
Pulmonary / Critical Care Medicine  Consult Note    Primary Attending:  Jordan Barry MD   Primary Team: Vascular Neurology   Consultant Attending: Dr. Gomez   Consultant Fellow: Sincere Cook MD     Reason for Consult  Aspiration pneumonia; consider clean-out bronchoscopy     History of Present Illness:  Mr. Guthrie is a 68 year old gentleman with a history of COPD, GENIE and stage IIIB NSCLC treated with chemoradiation in 2015, reportedly disease free since.  Who presented to an outside facility with several days of confusion, headache and subjective fevers, and was admitted with a diagnosis of TIA and managed supportively.  One day into his hospitalization he developed dysphagia and left sided weakness and was found to have a large proximal MCA occlusion.   [place] [facility] [place] to [place]olytics or thrombectomy.  Since that time, he has been managed supportively, and 2 days ago had a percutaneous gastrostomy placed.  Later that evening he developed fever, hypoxia and hypotension and was found to have a new consolidation at the base of his right lung.  He was gently resuscitated with IV crystalloid and started on a course of ceftriaxone and clindamycin for aspiration pneumonia.  A CT of the chest was ordered and demonstrated narrowing of the proximal bronchus intermedius, concerning for retained aspirated material.  Pulmonology has been asked to consider bronchoscopy to look for and remove retained endobronchial material.    At the time I examined Mr. Guthrie this morning, he was alert and appeared in good spirits, but unable to follow commands.  He was resting comfortably on 3L NC and in no apparent respiratory distress.     Past Medical History:   Arthritis     Asthma     COPD (chronic obstructive pulmonary disease)     Coronary artery disease     Diabetes mellitus     Embolic stroke involving right middle cerebral artery 6/6/2018    High cholesterol      Hypertension     Long term current use of antithrombotics/antiplatelets     Lung cancer     Stroke 6/6/2018        Past Surgical History:   cardiac bypass      CARDIAC SURGERY  05/04/2016    PORTACATH PLACEMENT  2012    cancer treatment        Allergies:   Lisinopril Swelling    Norvasc [amlodipine] Swelling        Medications:   Home Medications:     aspirin 81 MG Chew Take 81 mg by mouth once daily.    blood sugar diagnostic Strp Inject 1 strip as directed 3 (three) times daily with meals. Dispense One Touch Ultra Strips Dx:E11.9    carvedilol (COREG) 12.5 MG tablet Take 12.5 mg by mouth 2 (two) times daily with meals.    chlorthalidone (HYGROTEN) 25 MG Tab Take 25 mg by mouth once daily.    clopidogrel (PLAVIX) 75 mg tablet Take 75 mg by mouth once daily.    diazePAM (VALIUM) 5 MG tablet Take 1 tablet (5 mg total) by mouth every 6 (six) hours as needed for Anxiety.    docusate sodium (COLACE) 100 MG capsule Take 100 mg by mouth 2 (two) times daily as needed.     doxazosin (CARDURA) 2 MG tablet Take 1 tablet (2 mg total) by mouth every evening.    fluticasone-salmeterol 250-50 mcg/dose (ADVAIR DISKUS) 250-50 mcg/dose diskus inhaler Inhale 1 puff into the lungs 2 (two) times daily. Controller    hydrocodone-acetaminophen 5-325mg (NORCO) 5-325 mg per tablet Take 1 tablet by mouth every 4 (four) hours as needed for Pain.    insulin aspart U-100 (NOVOLOG) 100 unit/mL InPn pen Inject 10 Units into the skin before dinner.    insulin detemir U-100 (LEVEMIR FLEXTOUCH U-100 INSULN) 100 unit/mL (3 mL) SubQ InPn pen Inject 15 Units into the skin every evening. Dispense with insulin pen needles    linagliptin (TRADJENTA) 5 mg Tab tablet Take 1 tablet (5 mg total) by mouth once daily.    losartan (COZAAR) 50 MG tablet Take 50 mg by mouth once daily.     metFORMIN (GLUCOPHAGE) 1000 MG tablet 'TAKE 1 TABLET BY MOUTH TWICE A DAY WITH MEALS    nitroGLYCERIN (NITROSTAT) 0.4 MG SL tablet Place 1 tablet (0.4  mg total) under the tongue every 5 (five) minutes as needed for Chest pain.    PROAIR HFA 90 mcg/actuation inhaler     rosuvastatin (CRESTOR) 40 MG Tab Take 1 tablet (40 mg total) by mouth every evening.    SPIRIVA WITH HANDIHALER 18 mcg inhalation capsule inhale the contents of one capsule in the handihaler once daily    traMADol (ULTRAM) 50 mg tablet take 1 tablet by mouth every 6 hours if needed    VITAMIN D2 50,000 unit capsule TAKE 1 CAPSULE BY MOUTH EVERY 7 DAYS         Current Medications:  Scheduled:   albuterol-ipratropium  3 mL Q6H    apixaban  5 mg BID    aspirin  81 mg Daily    citalopram  10 mg Daily    glycerin adult  1 suppository Daily    insulin detemir U-100  10 Units BID    modafinil  200 mg Daily    piperacillin-tazobactam  4.5 g Q8H    rosuvastatin  40 mg QHS    senna-docusate 8.6-50 mg  2 tablet BID    sodium chloride 0.9%  3 mL Q8H    vancomycin  15 mg/kg Q12H     Continuous Infusions:    PRN:   acetaminophen, dextrose 50%, glucagon (human recombinant), HYDROcodone-acetaminophen, insulin aspart U-100, labetalol, ondansetron, polyethylene glycol     Social History:  Tobacco: Former smoker; quit 7 years ago; approximately 50 pk years.   EtOH: Occasional alcohol use.   Illicit Drugs: Unable to obtain     Family History:  family history includes Cancer in his father; Diabetes in his brother, father, mother, and son; No Known Problems in his brother, brother, daughter, daughter, daughter, daughter, sister, son, son, and son; Pneumonia in his sister and sister.     Review of Systems:  · Other than those symptoms mentioned above, an extensive review of systems was unremarkable.     Vital Signs   Temp:  [98.3 °F (36.8 °C)-99.3 °F (37.4 °C)]   Pulse:  [79-99]   Resp:  [15-20]   BP: ()/(36-75)   SpO2:  [86 %-99 %]    Physical Exam   Gen: appears acutely ill, no distress, unable to follow commands   HEENT: lips, mucosa, and tongue normal; teeth and gums normal and no throat  erythema  conjunctivae/corneas clear. PERRL.   CVS: regular rate and rhythm, S1, S2 normal, no murmur   Chest: normal respiratory effort, normal percussion bilaterally and bronchophony base - right   Abdomen: soft, non-tender non-distended; bowel sounds normal and G-tube secure and functioning   Ext: warm, well perfused and no cyanosis or edema, or clubbing   Skin: no rashes, no ecchymoses, no petechiae   Neuro: Left sided hemiparesis   Lines: IV, Day# 5  Simpson; Day#7  G-tube; Day#2      Labs   WBC 8.36  --    RBC 3.34*  --    HGB 9.9*  --    HCT 31.5*  --      --    MCV 94  --    MCH 29.6  --    MCHC 31.4*  --    *  --    K 4.2  --    CL 88*  --    CO2 35*  --    BUN 45*  --    CREATININE 1.3  --    ALT 59*  --    AST 73*  --    ALKPHOS 99  --    BILITOT 0.2  --    PROT 6.4  --    ALBUMIN 2.1*  --    PH  --  7.433   PCO2  --  61.4*   PO2  --  81   HCO3  --  41.0*   POCSATURATED  --  96   BE  --  17      Imaging CXR          CT Chest 06/15/2018          6/14 chronic right upper lobe consolidation with new right middle/lower lobe consolidation    RML/RLL consolidation with endobronchial obstruction in the bronchus intermedius.  Chronic RUL consolidation/volume loss      Other Studies:   PFTs   10/2017   Very severe obstruction; no bronchodilator response   Echo 6/7/2018 Severely depressed left ventricular systolic function (EF 15-20%).   Mildly to moderately depressed right ventricular systolic function .   Impaired LV relaxation, normal LAP  Mild aortic stenosis  Mild mitral regurgitation.       Micro Blood Cx 6/14 6/14 NGTD  NGTD   Sputum Cx 6/14 No growth      Assessment/Plan:  1. Aspiration pneumoniia  2. Acute hypoxemic respiratory failure  3. Suspected chronic hypercapnia  4. Advanced COPD; without acute exacerbation  5. History of NSCLC of the right lung; treated without any known recurrence  6. Radiation induced RUL fibrosis  7. New right endobronchial obstruction; aspirated material versus  endobronchial lesion  8. ICM; Combined systolic/diasolic heart failure  9. Recent large CVA   · Improving clinically with medical management of suspected aspiration pneumonia.  Continue vancomycin and pip/tazo as you are.  De-escalate as culture data becomes available.  · No role for clean out bronchoscopy.  Continued close attention to pulmonary hygiene and pulmonary toilet.  · Continue scheduled nebulized bronchodilators for obstructive lung disease in addition to home controller medications.  No evidence of acute AECODP nor any indication for systemic corticosteroids.  · Although, bronchoscopy may help in further investigating the endobronchial findings noted the recent chest CT.  He is currently too acutely ill to safely undergo bronchoscopy, and even if a diagnosis of recurrent malignancy was established, treatment would not be appropriate in his current health.  · Recommend continued post-stroke care and medical management of his COPD/LRTI as you are, and outpatient follow up with his pulmonologist when/if his acute illness has improved.  · Pulmonology will sign off at this time; please re-consult as needed.    Sincere Cook MD  Pulmonary / Critical Care Fellow  Cell [phone]  06/15/2018  12:38 PM

## 2018-06-15 NOTE — ASSESSMENT & PLAN NOTE
Seen by cardiology   EF 15-20%, diastolic dysfunction  Recommending AC and ASA  Meds currently held due to hypotension

## 2018-06-15 NOTE — PROGRESS NOTES
" [facility]  Adult Nutrition  Progress Note    SUMMARY       Recommendations    Recommendation/Intervention:   1. Increase TF as tolerated to goal rate of 70mL/hr.    -Will provide 2016kcal, 101g protein, and 1374mL fluid.    -Recommend water flushes of 175mL 4X/day to meet estimated fluid needs. Will monitor.   Goals: Meet > 90% EEN/EPN from TFs  Nutrition Goal Status: progressing towards goal  Communication of RD Recs: reviewed with RN    Reason for Assessment    Reason for Assessment: RD follow-up  Diagnosis: stroke/CVA  Relevant Medical History: DM2, COPD, HTN, CAD, hx lung cancer, CABG  Nutrition Discharge Planning: D/c with TF likely.     Nutrition Risk Screen    Nutrition Risk Screen: no indicators present    Nutrition/Diet History    Typical Food/Fluid Intake: Pt s/p PEG placement. Tolerating TF at ordered rate of 30mL/hr. Family at bedside.   Do you have any cultural, spiritual, Hinduism conflicts, given your current situation?: none  Food Allergies: NKFA  Factors Affecting Nutritional Intake: NPO, difficulty/impaired swallowing    Anthropometrics    Temp: 98.7 °F (37.1 °C)  Height Method: Stated  Height: 5' 8" (172.7 cm)  Height (inches): 68 in  Weight Method: Bed Scale  Weight: 101.2 kg (223 lb 1.7 oz)  Weight (lb): 223.11 lb  Ideal Body Weight (IBW), Male: 154 lb  % Ideal Body Weight, Male (lb): 144.88   BMI (Calculated): 34  BMI Grade: 30 - 34.9- obesity - grade I     Lab/Procedures/Meds    Pertinent Labs Reviewed: reviewed  Pertinent Labs Comments: Na 151, Glu 232  Pertinent Medications Reviewed: reviewed  Pertinent Medications Comments: insulin    Physical Findings/Assessment    Overall Physical Appearance: nourished, overweight  Tubes: gastrostomy tube  Skin: intact    Estimated/Assessed Needs    Weight Used For Calorie Calculations: 103 kg (227 lb 1.2 oz)  Energy Calorie Requirements (kcal): 2223  Energy Need Method: Lane-St Jeor (PAL 1.25)  Protein Requirements: 103-123g " (1.0-1.2g/kg)  Weight Used For Protein Calculations: 103 kg (227 lb 1.2 oz)  Fluid Need Method: RDA Method  RDA Method (mL): 2223  CHO Requirement: 45-50%    Nutrition Prescription Ordered    Current Diet Order: NPO   Current Nutrition Support Formula Ordered: Diabetisource SERGEI  Current Nutrition Support Rate Ordered: 30 (ml)  Current Nutrition Support Frequency Ordered: mL/hr     Evaluation of Received Nutrient/Fluid Intake    Enteral Calories (kcal): 864  Enteral Protein (gm): 43  Enteral (Free Water) Fluid (mL): 589  % Kcal Needs: 39  % Protein Needs: 42  Energy Calories Required: not meeting needs  Protein Required: not meeting needs  Comments: LBM 6/8  Tolerance: tolerating  % Intake of Estimated Energy Needs: 25 - 50 %  % Meal Intake: NPO    Nutrition Risk    Level of Risk/Frequency of Follow-up:  (f/u 1x week)     Assessment and Plan     Contributing Nutrition Diagnosis  Inadequate energy intake     Related to (etiology):   Inability to consume adequate nutrition      Signs and Symptoms (as evidenced by):   NPO with EN meeting 39% EEN      Interventions/Recommendations (treatment strategy):  See recs in RD note 6/8     Nutrition Diagnosis Status:   continues    Monitor and Evaluation    Food and Nutrient Intake: enteral nutrition intake  Food and Nutrient Adminstration: enteral and parenteral nutrition administration  Physical Activity and Function: nutrition-related ADLs and IADLs  Anthropometric Measurements: weight, body mass index, weight change  Biochemical Data, Medical Tests and Procedures: electrolyte and renal panel, glucose/endocrine profile, lipid profile  Nutrition-Focused Physical Findings: overall appearance     Nutrition Follow-Up    RD Follow-up?: Yes

## 2018-06-15 NOTE — SUBJECTIVE & OBJECTIVE
Interval History: patient had another episode of hypotension ON (88/36) that improved with 250 cc bolus. No fevers ON.     Review of Systems   Unable to perform ROS: Mental status change     Objective:     Vital Signs (Most Recent):  Temp: 98.7 °F (37.1 °C) (06/15/18 0804)  Pulse: 95 (06/15/18 0955)  Resp: 15 (06/15/18 0955)  BP: 108/62 (06/15/18 0804)  SpO2: 99 % (06/15/18 0955) Vital Signs (24h Range):  Temp:  [98.1 °F (36.7 °C)-99.3 °F (37.4 °C)] 98.7 °F (37.1 °C)  Pulse:  [80-99] 95  Resp:  [15-20] 15  SpO2:  [86 %-99 %] 99 %  BP: ()/(36-75) 108/62     Weight: 101.2 kg (223 lb 1.7 oz)  Body mass index is 33.92 kg/m².    Intake/Output Summary (Last 24 hours) at 06/15/18 1010  Last data filed at 06/15/18 0118   Gross per 24 hour   Intake                0 ml   Output              725 ml   Net             -725 ml      Physical Exam   Constitutional: He appears well-developed. No distress.   HENT:   Head: Normocephalic and atraumatic.   Eyes: Conjunctivae are normal. Right eye exhibits no discharge. Left eye exhibits no discharge. No scleral icterus.   Cardiovascular: Normal rate and regular rhythm.    Pulmonary/Chest: No respiratory distress. He has decreased breath sounds. He has wheezes. He has rales.   Course breath sounds bilaterally    Abdominal: Soft. He exhibits no distension. There is no tenderness.   Neurological:   Nonverbal  Does not follow commands  Waxing and wayning between alertness and somnolence   Skin: Skin is warm and dry. He is not diaphoretic.   Vitals reviewed.      Significant Labs: All pertinent labs within the past 24 hours have been reviewed.    Significant Imaging: I have reviewed all pertinent imaging results/findings within the past 24 hours.

## 2018-06-15 NOTE — PLAN OF CARE
Problem: Physical Therapy Goal  Goal: Physical Therapy Goal  Goals to be met by: 2018     Patient will increase functional independence with mobility by performin. Supine to sit with Moderate Assistance  2. Sit to supine with Moderate Assistance  3. Sit to stand transfer with Maximum Assistance  4. Bed to chair transfer with Maximum Assistance  5. Gait  x 10 feet with Maximum Assistance with or without appropriate AD   6. Sitting at edge of bed x10 minutes with Minimal Assistance  7. Lower extremity exercise program x15 reps per handout, with assistance as needed     Outcome: Ongoing (interventions implemented as appropriate)    Pt would benefit from SNF upon discharge.  Appropriate transfer level with nursing staff: Bed <> Chair:  Drawsheet to cardiac chair with Total Assistance with 2 people.    Nimisha Andrews PT, DPT  6/15/2018  Pager: [phone]

## 2018-06-16 PROBLEM — J69.0 ASPIRATION PNEUMONIA: Status: ACTIVE | Noted: 2018-01-01

## 2018-06-16 PROBLEM — J96.21 ACUTE ON CHRONIC RESPIRATORY FAILURE WITH HYPOXIA: Status: ACTIVE | Noted: 2018-01-01

## 2018-06-16 NOTE — PLAN OF CARE
Problem: Patient Care Overview  Goal: Plan of Care Review  Outcome: Ongoing (interventions implemented as appropriate)  Plan of care reviewed at bedside with pt and family.  O2 continous at 2lpm per nasal cannula.  VSS.  Neuro checks q 4 hrs.  Feeding continuous at 30ml/hr via gravity.  Aspiration  precautions in effect while receiving feeding.  HOB elevated.  Pulse ox continous with 02 sats at 97%.  On NSR.  Bed in lowest position and locked.  Speech incomprehensible.  Fall precautions maintained.  Call light within easy reach. No distress observed. Will continue to monitor.

## 2018-06-16 NOTE — PLAN OF CARE
Problem: Physical Therapy Goal  Goal: Physical Therapy Goal  Goals to be met by: 2018     Patient will increase functional independence with mobility by performin. Supine to sit with Moderate Assistance  2. Sit to supine with Moderate Assistance  3. Sit to stand transfer with Maximum Assistance  4. Bed to chair transfer with Maximum Assistance  5. Gait  x 10 feet with Maximum Assistance with or without appropriate AD   6. Sitting at edge of bed x10 minutes with Minimal Assistance  7. Lower extremity exercise program x15 reps per handout, with assistance as needed        Discharge Recommendations: SNF    [facility]    Goals remain appropriate.     Lizett Ortiz, PTA.   [phone]   2018

## 2018-06-16 NOTE — ASSESSMENT & PLAN NOTE
Pt's baseline Cr 1.0-1.2  - Hospital Medicine following  - Improved after receiving fluids  Continuing to monitor

## 2018-06-16 NOTE — SUBJECTIVE & OBJECTIVE
Interval History: NAEON. Blood pressures improving.     Review of Systems   Unable to perform ROS: Mental status change     Objective:     Vital Signs (Most Recent):  Temp: 97.1 °F (36.2 °C) (06/16/18 1126)  Pulse: 85 (06/16/18 1156)  Resp: 15 (06/16/18 1156)  BP: (!) 124/59 (06/16/18 1126)  SpO2: 98 % (06/16/18 1156) Vital Signs (24h Range):  Temp:  [97.1 °F (36.2 °C)-98.6 °F (37 °C)] 97.1 °F (36.2 °C)  Pulse:  [73-94] 85  Resp:  [15-20] 15  SpO2:  [88 %-99 %] 98 %  BP: ()/(50-76) 124/59     Weight: 101.2 kg (223 lb 1.7 oz)  Body mass index is 33.92 kg/m².    Intake/Output Summary (Last 24 hours) at 06/16/18 1325  Last data filed at 06/16/18 0600   Gross per 24 hour   Intake              360 ml   Output               50 ml   Net              310 ml      Physical Exam   Constitutional: He appears well-developed. No distress.   HENT:   Head: Normocephalic and atraumatic.   Eyes: Conjunctivae are normal. Right eye exhibits no discharge. Left eye exhibits no discharge. No scleral icterus.   Cardiovascular: Normal rate and regular rhythm.    Pulmonary/Chest: No respiratory distress. He has decreased breath sounds. He has wheezes. He has rales.   Course breath sounds bilaterally    Abdominal: Soft. He exhibits no distension. There is no tenderness.   Neurological:   Nonverbal  Does not follow commands  Waxing and wayning between alertness and somnolence   Skin: Skin is warm and dry. He is not diaphoretic.   Vitals reviewed.      Significant Labs: All pertinent labs within the past 24 hours have been reviewed.    Significant Imaging: I have reviewed all pertinent imaging results/findings within the past 24 hours.
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
## 2018-06-16 NOTE — ASSESSMENT & PLAN NOTE
69 y/o male with R MCA infarct with left sided weakness, facial droop, dysarthria and confusion. Right sided movement is minimal. Etiology likely atheroembolic vs cardioembolic due to EF of 15%. EEG completed and no seizure activity noted. Pt started on Modafinil 6/13; wakefulness now improved.    Hospital medicine following for hypotension, hyperglycemia, hyponatremia, possible sepsis. Low threshold for ICU step up, if necessary.     Modafinil held today as family felt it made pt more aggressive. Will plan for decreased dose starting 6/17.      Antithrombotics: Eliquis 5 mg BID and ASA  Statins: Crestor 40 mg daily  Aggressive risk factor modification: HTN, DM, HLD, Diet, Exercise, Obesity, CAD  Rehab efforts: PT/OT/SLP to evaluate and treat, PM&R consult   Diagnostics ordered/pending: NA  VTE prophylaxis: Heparin drip,  SCD's  BP parameters: Infarct: No intervention, SBP <160

## 2018-06-16 NOTE — ASSESSMENT & PLAN NOTE
"- worsening oxygen requirements over last 48 hours  - CT chest obtained 6/13 showing "Recent development consolidation in right middle and lower lobes consistent with pneumonia or aspiration.  Endobronchial material in right lower lobe airways with intermittent obstruction, could represent retained secretions or aspirated material."  - pulm consulted, appreciate assistance  - cont nebs q6  - chest PT QID  - abx changed to vanc/zosyn to cover for HAP. Will de-escalate to unasyn tomorrow if cx remain no growth  "

## 2018-06-16 NOTE — ASSESSMENT & PLAN NOTE
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

## 2018-06-16 NOTE — PROGRESS NOTES
[facility]  Vascular Neurology  Comprehensive Stroke Center  Progress Note    Assessment/Plan:     * Embolic stroke involving right middle cerebral artery    67 y/o male with R MCA infarct with left sided weakness, facial droop, dysarthria and confusion. Right sided movement is minimal. Etiology likely atheroembolic vs cardioembolic due to EF of 15%. EEG completed and no seizure activity noted. Pt started on Modafinil 6/13; wakefulness now improved.    Hospital medicine following for hypotension, hyperglycemia, hyponatremia, possible sepsis. Low threshold for ICU step up, if necessary.     Modafinil held today as family felt it made pt more aggressive. Will plan for decreased dose starting 6/17.      Antithrombotics: Eliquis 5 mg BID and ASA  Statins: Crestor 40 mg daily  Aggressive risk factor modification: HTN, DM, HLD, Diet, Exercise, Obesity, CAD  Rehab efforts: PT/OT/SLP to evaluate and treat, PM&R consult   Diagnostics ordered/pending: NA  VTE prophylaxis: Heparin drip,  SCD's  BP parameters: Infarct: No intervention, SBP <160        Acute on chronic respiratory failure    Worsening O2 requirements  Consulted Pulm and Medicine; Appreciate recs  Hopeful bronchoscopy Monday  Broad-spectrum abx to cover for HAP  Continue duonebs, CPT        Sepsis    Procal 3; Urine cx NG Final. 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 There is not mass effect associated with it. We will continue to monitor the patients clinical exam for any worsening of symptoms which may indicate expansion of the stroke or the area of the edema resulting in the clinical change. The pattern is suggestive of embolic etiology.            Left spastic hemiparesis    Due to stroke  Aggressive therapy        Chronic systolic (congestive) heart failure    Seen by cardiology   EF 15-20%, diastolic dysfunction  Recommending AC and ASA  Meds currently held due to hypotension        Decreased cardiac ejection fraction    EF 15-20% - seen on echo completed 6/7  Cardiac history of CABG in 2016  Cardiology consulted - recommending AC and asa  Patient started on Eliquis 5 mg BID and ASA        Coronary artery disease involving native coronary artery of native heart without angina pectoris    Stoke risk factor  Eliquis 5 mg BID and ASA 81          Essential hypertension    Stroke risk factor  SBP <160  Home meds - coreg and losartan; Acutely held due to hypotension  Pt hypotensive 6/15 evening, requiring another bolus; Stable since  (Allergy to lisinopril and Norvasc)        Type 2 diabetes mellitus without complication, with long-term current use of insulin    Stroke risk factor  HgB A1C 9.4  Detemir 13U BID  SSI Q6        MEGGAN (acute kidney injury)    Pt's baseline Cr 1.0-1.2  - Hospital Medicine following  - Improved after receiving fluids  Continuing to monitor        Abnormal ventricular wall motion    Stroke risk factor  Evidence on echo        Dysarthria    Result of stroke  Aggressive SLP         GENIE on CPAP    Stroke risk factor  Holding CPAP acutely due to risk aspiration, encephalopathy, pt nonverbal        COPD (chronic obstructive pulmonary disease)    Stroke risk factor  Duonebs Q6 due to patient not being able to follow commands and RT not being able to administer inhalers    Currently maintaining O2 sats in the 90's on 2L NC  Pulmonary, Medicine following; Appreciate recs        History of lung cancer    Stroke risk factor  CT Chest this admission concerning  Pending further w/u with Pulm once pt more acutely stable             67 y/o male wth R MCA infarct. Had been at [facility] since 6-4-18 for AMS and weakness that improved and then worsened now with facial droop and left sided weakness. 6-6-18 fever so infectious w/u in process could be PNA as he was coughing with puree food at [facility]. He was placed on ABX Amp/Vanc/valcyclovir for possible meningitis this is stopped as he does not have meningitis. He moved around too much and was clausterphobic for MRI so will attempt MRI here.   6/8 - Urine studies ordered for hyponatremia. EEG results pending. Bilateral upper/lower extremities US ordered for fever. Procal WNL. Infectious workup negative so far. Cardiology consulted for EF 15-20%.   6-9-18 will give lasix 40 mg NG today as per cardiology recs. Discussion with family regarding anticoagulation due to low EF and DVT left axillary vein and brachial vein, discussion regarding feeding as well and likely bernard of patient swallowing is minimal so will need PEG next week as opposed to waiting and family is in agreement so will start heparin drip with no bolus parameters  6-10-18 once EEG read and no seizure activity may start Modafinil to help with wakefulness  6/11 - EEG to be read today and consider Modafinil. IR consulted for PEG placement tomorrow. Hospital medicine ordering urine studies for hyponatremia.   6/12 - PEG to be placed today. Heparin gtt currently held. Will resume once PEG is placed. AC to start tomorrow once PEG is able to be used. Hyponatremia slightly improving. Continue to hold fluids due to hyponatremia thought to be due to SIADH. Modafinil to be started tomorrow when PEG can be used.  6/13 - PEG placed yesterday. Heparin gtt dc'ed and eliquis started. Modafinil  started today. Continuing to restrict fluids (enteral water boluses) due to SIADH/hyponatremia and holding off on lasix that cardiology recommended.  6/14/18 - concern for sepsis today - medicine consulted - hypotensive, febrile, procal elevated.   6/15: Hypotensive to 88/36 overnight; good response to 250cc bolus. Hospital medicine adjusting insulin and antibiotics regimen. Wakefulness much improved since starting Modafinil. Mild bleeding noted at NC site.  6/16: Pt hypotensive, requiring another bolus yesterday; BP so far stable since. Modafinil held today as family felt made pt more aggressive; plan for decreased dose tomorrow. Added Lactulose PRN to bowel regimen.    STROKE DOCUMENTATION        NIH Scale:  1a. Level Of Consciousness: 1-->Not alert: but arousable by minor stimulation to obey, answer, or respond  1b. LOC Questions: 2-->Answers neither question correctly  1c. LOC Commands: 1-->Performs one task correctly  2. Best Gaze: 1-->Partial gaze palsy: gaze is abnormal in one or both eyes, but forced deviation or total gaze paresis is not present  3. Visual: 2-->Complete hemianopia  4. Facial Palsy: 2-->Partial paralysis (total or near-total paralysis of lower face)  5a. Motor Arm, Left: 4-->No movement  5b. Motor Arm, Right: 1-->Drift: limb holds 90 (or 45) degrees, but drifts down before full 10 secs: does not hit bed or other support  6a. Motor Leg, Left: 3-->No effort against gravity: leg falls to bed immediately  6b. Motor Leg, Right: 3-->No effort against gravity: leg falls to bed immediately  7. Limb Ataxia: 0-->Absent  8. Sensory: 0-->Normal: no sensory loss  9. Best Language: 2-->Severe aphasia: all communication is through fragmentary expression: great need for inference, questioning, and guessing by the listener. Range of information that can be exchanged is limited: listener carries burden of. . . (see row details)  10. Dysarthria: 2-->Severe dysarthria: patients speech is so slurred as to be  unintelligible in the absence of or out of proportion to any dysphasia, or is mute/anarthric  11. Extinction and Inattention (formerly Neglect): 0-->No abnormality  Total (NIH Stroke Scale): 24       Modified Orient Score: 0  [place] Coma Scale:    ABCD2 Score:    [id number] Score:   HAS -BLED Score:   ICH Score:   Hunt & De Paz Classification:      Hemorrhagic change of an Ischemic Stroke: Does this patient have an ischemic stroke with hemorrhagic changes? No     Neurologic Chief Complaint: R MCA infarct     Subjective:     Interval History: Patient is seen for follow-up neurological assessment and treatment recommendations:     Pt hypotensive, requiring another bolus yesterday; BP so far stable since. Modafinil held today as family felt made pt more aggressive; plan for decreased dose tomorrow. Added Lactulose PRN to bowel regimen.    HPI, Past Medical, Family, and Social History remains the same as documented in the initial encounter.     Review of Systems   Constitutional: Negative for diaphoresis and fever.   HENT: Positive for trouble swallowing. Negative for nosebleeds.    Neurological: Positive for facial asymmetry, speech difficulty and weakness.   Psychiatric/Behavioral: Positive for confusion. Negative for agitation.     Scheduled Meds:   albuterol-ipratropium  3 mL Nebulization Q6H    apixaban  5 mg Per G Tube BID    aspirin  81 mg Oral Daily    citalopram  10 mg Per G Tube Daily    glycerin adult  1 suppository Rectal Daily    insulin detemir U-100  13 Units Subcutaneous BID    [START ON 6/17/2018] modafinil  100 mg Per G Tube Daily    piperacillin-tazobactam (ZOSYN) IVPB  4.5 g Intravenous Q8H    rosuvastatin  40 mg Per G Tube QHS    senna-docusate 8.6-50 mg  2 tablet Per G Tube BID    sodium chloride 0.9%  3 mL Intravenous Q8H    vancomycin (VANCOCIN) IVPB  15 mg/kg Intravenous Q12H     Continuous Infusions:    PRN Meds:acetaminophen, dextrose 50%, glucagon (human recombinant),  HYDROcodone-acetaminophen, insulin aspart U-100, labetalol, lactulose, miconazole NITRATE 2 %, ondansetron, polyethylene glycol    Objective:     Vital Signs (Most Recent):  Temp: 97.1 °F (36.2 °C) (06/16/18 1126)  Pulse: 85 (06/16/18 1156)  Resp: 15 (06/16/18 1156)  BP: (!) 124/59 (06/16/18 1126)  SpO2: 98 % (06/16/18 1156)  BP Location: Right arm    Vital Signs Range (Last 24H):  Temp:  [97.1 °F (36.2 °C)-98.6 °F (37 °C)]   Pulse:  [73-94]   Resp:  [15-20]   BP: ()/(50-76)   SpO2:  [88 %-99 %]   BP Location: Right arm    Physical Exam   Constitutional: He appears well-developed and well-nourished. He appears lethargic. No distress.   HENT:   Head: Normocephalic and atraumatic.   Eyes: Conjunctivae are normal. No scleral icterus.   Cardiovascular: Normal rate.    Pulmonary/Chest: Effort normal. No respiratory distress.   Musculoskeletal: He exhibits no edema or tenderness.   Neurological: He appears lethargic. A cranial nerve deficit is present.   Skin: Skin is warm and dry.   Vitals reviewed.      Neurological Exam:   LOC: Drowsy  Attention Span: Poor today  Language: Global aphasia  Articulation: Dysarthria  Orientation: Untestable due to severe aphasia   Facial Movement (CN VII): Lower facial weakness on the Left  Vision: L hemianopsia  Gaze: R gaze preference  Motor: Arm left  1/5  Leg left  Paresis: 2/5  Arm right  Normal 4/5  Leg right Paresis: 3/5  Sensation: Intact to light touch, temperature      Laboratory:  CMP:     Recent Labs  Lab 06/16/18  0454   CALCIUM 9.6   ALBUMIN 2.0*   PROT 6.2   *   K 4.3   CO2 33*   CL 93*   BUN 26*   CREATININE 1.0   ALKPHOS 97   ALT 55*   AST 57*   BILITOT 0.3     BMP:     Recent Labs  Lab 06/16/18  0454   *   K 4.3   CL 93*   CO2 33*   BUN 26*   CREATININE 1.0   CALCIUM 9.6     CBC:     Recent Labs  Lab 06/16/18  0454   WBC 9.08   RBC 3.28*   HGB 9.8*   HCT 31.5*   *   MCV 96   MCH 29.9   MCHC 31.1*     Lipid Panel:   No results for input(s): CHOL,  LDLCALC, HDL, TRIG in the last 168 hours.  Coagulation:   No results for input(s): PT, INR, APTT in the last 168 hours.  Platelet Aggregation Study: No results for input(s): PLTAGG, PLTAGINTERP, PLTAGREGLACO, ADPPLTAGGREG in the last 168 hours.  Hgb A1C:   No results for input(s): HGBA1C in the last 168 hours.  TSH:   No results for input(s): TSH in the last 168 hours.      Diagnostic Results     Brain Imaging   CT 6/6   Large right MCA distribution infarct measuring approximately 7 x 3 cm including the right perisylvian region and extending to the frontal and parietal operculum with associated localized mass effect.      Vessel Imaging     US BUE 6/9/18  Deep venous thrombus in the left axillary vein with additional thrombus in the proximal segment of the branchial vein.  No evidence of DVT in the right upper extremity.    CTA 6/8  CTA head: Occlusion right M2 segment of the MCA within the proximal sylvian fissure..  Heavy atherosclerotic plaquing of the cavernous and supraclinoid ICAs with mild moderate right and mild left cavernous ICA stenosis.  There is diffuse small caliber right M1 segment of the MCA.  Irregularity and narrowing of the right distal vertebral artery concerning for atherosclerotic disease with mild moderate stenosis.  CTA neck: Atherosclerotic plaquing of the carotid bifurcations and proximal ICAs with less than 50% proximal ICA stenosis by NASCET criteria.  CT head: Evolving large right MCA distribution infarction.  Localized mass effect without significant midline shift or hydrocephalus.  No evidence for hemorrhagic conversion.  Clinical correlation and follow-up advised.        Cardiac Imaging   Echo 6/7  CONCLUSIONS     1 - Technically difficult study.     2 - Severely depressed left ventricular systolic function (EF 15-20%).     3 - Mildly to moderately depressed right ventricular systolic function .     4 - Impaired LV relaxation, normal LAP (grade 1 diastolic dysfunction).     5 - Mild  aortic stenosis, WIL = 1.45 cm2, AVAi = 0.67 cm2/m2, peak velocity = 2.8 m/s, mean gradient = 18 mmHg.     6 - Mild mitral regurgitation.       Other Imaging    CT chest 6/14/18  1. Recent development consolidation in right middle and lower lobes consistent with pneumonia or aspiration.  Endobronchial material in right lower lobe airways with intermittent obstruction, could represent retained secretions or aspirated material.  Correlate clinically.  Recommend noncontrast chest CT follow-up in 3 months.  2. Chronic right upper lobe consolidation and volume loss consistent with radiation fibrosis in patient with history of previous lung cancer.  3. Chronic loculation of pleural fluid in the right sulcus is stable.  4. A few subcentimeter pulmonary nodules, stable and likely benign  5. Aortic and coronary atherosclerosis, status post CABG  6. Gastrostomy tube  7. Other findings as above      Negrita Whitaker PA-C  Comprehensive Stroke Center  Department of Vascular Neurology   [facility]

## 2018-06-16 NOTE — SUBJECTIVE & OBJECTIVE
Neurologic Chief Complaint: R MCA infarct     Subjective:     Interval History: Patient is seen for follow-up neurological assessment and treatment recommendations:     Pt hypotensive, requiring another bolus yesterday; BP so far stable since. Modafinil held today as family felt made pt more aggressive; plan for decreased dose tomorrow. Added Lactulose PRN to bowel regimen.    HPI, Past Medical, Family, and Social History remains the same as documented in the initial encounter.     Review of Systems   Constitutional: Negative for diaphoresis and fever.   HENT: Positive for trouble swallowing. Negative for nosebleeds.    Neurological: Positive for facial asymmetry, speech difficulty and weakness.   Psychiatric/Behavioral: Positive for confusion. Negative for agitation.     Scheduled Meds:   albuterol-ipratropium  3 mL Nebulization Q6H    apixaban  5 mg Per G Tube BID    aspirin  81 mg Oral Daily    citalopram  10 mg Per G Tube Daily    glycerin adult  1 suppository Rectal Daily    insulin detemir U-100  13 Units Subcutaneous BID    [START ON 6/17/2018] modafinil  100 mg Per G Tube Daily    piperacillin-tazobactam (ZOSYN) IVPB  4.5 g Intravenous Q8H    rosuvastatin  40 mg Per G Tube QHS    senna-docusate 8.6-50 mg  2 tablet Per G Tube BID    sodium chloride 0.9%  3 mL Intravenous Q8H    vancomycin (VANCOCIN) IVPB  15 mg/kg Intravenous Q12H     Continuous Infusions:    PRN Meds:acetaminophen, dextrose 50%, glucagon (human recombinant), HYDROcodone-acetaminophen, insulin aspart U-100, labetalol, lactulose, miconazole NITRATE 2 %, ondansetron, polyethylene glycol    Objective:     Vital Signs (Most Recent):  Temp: 97.1 °F (36.2 °C) (06/16/18 1126)  Pulse: 85 (06/16/18 1156)  Resp: 15 (06/16/18 1156)  BP: (!) 124/59 (06/16/18 1126)  SpO2: 98 % (06/16/18 1156)  BP Location: Right arm    Vital Signs Range (Last 24H):  Temp:  [97.1 °F (36.2 °C)-98.6 °F (37 °C)]   Pulse:  [73-94]   Resp:  [15-20]   BP:  ()/(50-76)   SpO2:  [88 %-99 %]   BP Location: Right arm    Physical Exam   Constitutional: He appears well-developed and well-nourished. He appears lethargic. No distress.   HENT:   Head: Normocephalic and atraumatic.   Eyes: Conjunctivae are normal. No scleral icterus.   Cardiovascular: Normal rate.    Pulmonary/Chest: Effort normal. No respiratory distress.   Musculoskeletal: He exhibits no edema or tenderness.   Neurological: He appears lethargic. A cranial nerve deficit is present.   Skin: Skin is warm and dry.   Vitals reviewed.      Neurological Exam:   LOC: Drowsy  Attention Span: Poor today  Language: Global aphasia  Articulation: Dysarthria  Orientation: Untestable due to severe aphasia   Facial Movement (CN VII): Lower facial weakness on the Left  Vision: L hemianopsia  Gaze: R gaze preference  Motor: Arm left  1/5  Leg left  Paresis: 2/5  Arm right  Normal 4/5  Leg right Paresis: 3/5  Sensation: Intact to light touch, temperature      Laboratory:  CMP:     Recent Labs  Lab 06/16/18  0454   CALCIUM 9.6   ALBUMIN 2.0*   PROT 6.2   *   K 4.3   CO2 33*   CL 93*   BUN 26*   CREATININE 1.0   ALKPHOS 97   ALT 55*   AST 57*   BILITOT 0.3     BMP:     Recent Labs  Lab 06/16/18  0454   *   K 4.3   CL 93*   CO2 33*   BUN 26*   CREATININE 1.0   CALCIUM 9.6     CBC:     Recent Labs  Lab 06/16/18  0454   WBC 9.08   RBC 3.28*   HGB 9.8*   HCT 31.5*   *   MCV 96   MCH 29.9   MCHC 31.1*     Lipid Panel:   No results for input(s): CHOL, LDLCALC, HDL, TRIG in the last 168 hours.  Coagulation:   No results for input(s): PT, INR, APTT in the last 168 hours.  Platelet Aggregation Study: No results for input(s): PLTAGG, PLTAGINTERP, PLTAGREGLACO, ADPPLTAGGREG in the last 168 hours.  Hgb A1C:   No results for input(s): HGBA1C in the last 168 hours.  TSH:   No results for input(s): TSH in the last 168 hours.      Diagnostic Results     Brain Imaging   CT 6/6   Large right MCA distribution infarct  measuring approximately 7 x 3 cm including the right perisylvian region and extending to the frontal and parietal operculum with associated localized mass effect.      Vessel Imaging     US BUE 6/9/18  Deep venous thrombus in the left axillary vein with additional thrombus in the proximal segment of the branchial vein.  No evidence of DVT in the right upper extremity.    CTA 6/8  CTA head: Occlusion right M2 segment of the MCA within the proximal sylvian fissure..  Heavy atherosclerotic plaquing of the cavernous and supraclinoid ICAs with mild moderate right and mild left cavernous ICA stenosis.  There is diffuse small caliber right M1 segment of the MCA.  Irregularity and narrowing of the right distal vertebral artery concerning for atherosclerotic disease with mild moderate stenosis.  CTA neck: Atherosclerotic plaquing of the carotid bifurcations and proximal ICAs with less than 50% proximal ICA stenosis by NASCET criteria.  CT head: Evolving large right MCA distribution infarction.  Localized mass effect without significant midline shift or hydrocephalus.  No evidence for hemorrhagic conversion.  Clinical correlation and follow-up advised.        Cardiac Imaging   Echo 6/7  CONCLUSIONS     1 - Technically difficult study.     2 - Severely depressed left ventricular systolic function (EF 15-20%).     3 - Mildly to moderately depressed right ventricular systolic function .     4 - Impaired LV relaxation, normal LAP (grade 1 diastolic dysfunction).     5 - Mild aortic stenosis, WIL = 1.45 cm2, AVAi = 0.67 cm2/m2, peak velocity = 2.8 m/s, mean gradient = 18 mmHg.     6 - Mild mitral regurgitation.       Other Imaging    CT chest 6/14/18  1. Recent development consolidation in right middle and lower lobes consistent with pneumonia or aspiration.  Endobronchial material in right lower lobe airways with intermittent obstruction, could represent retained secretions or aspirated material.  Correlate clinically.  Recommend  noncontrast chest CT follow-up in 3 months.  2. Chronic right upper lobe consolidation and volume loss consistent with radiation fibrosis in patient with history of previous lung cancer.  3. Chronic loculation of pleural fluid in the right sulcus is stable.  4. A few subcentimeter pulmonary nodules, stable and likely benign  5. Aortic and coronary atherosclerosis, status post CABG  6. Gastrostomy tube  7. Other findings as above

## 2018-06-16 NOTE — PROGRESS NOTES
"[facility] Hospital Medicine  Progress Note    Patient Name: Mendez Guthrie  MRN: [id number]  Patient Class: IP- Inpatient   Admission Date: 6/7/2018  Length of Stay: 9 days  Attending Physician: Joey Nunez MD  Primary Care Provider: Valeria Mayen MD    Hospital Medicine Team: Networked reference to record PCT  Madai Molina MD    Subjective:     Principal Problem:Embolic stroke involving right middle cerebral artery    HPI:  68 YOM with CAD s/p CABG, HTN, DM2, COPD, GENIE, HFrEF (EF 15%)and hx of lung cancer s/p chemo/radiation who was transferred from [facility] on 6/7/18 for right MCA embolic stroke. Hospital medicine has been consulted for co-management.    Patient was originally admitted to [facility] on 6/5 with encephalopathy and weakness. Family reported in ED at [facility] that patient had been febrile with associated coughing, as well as HA's for 1 week prior. He was admitted for TIA and workup of stroke risk factors. His CT showed chronic microvascular ischemic changes. Carotid US and MRI were attempted, however patient could not tolerate. They were unable to perform LP, but had low suspicion for meningitis as patient was initially stable without antibiotics. Patient developed a fever 6/6/18 and was started on vanc/rocephin/ampicillin/acyclovir. Patient had some dysphagia at lunch 6/6/18, and a repeat CT was ordered after he was reported to have worsening facial droop. Repeat CT showed large infarct in R MCA division with possible mass effect. Patient transferred to [facility] for higher level of care. Infectious workup was negative at the time so abx were discontinued. Patient continued to spike fevers and doppler US revealed "DVT in the left axillary vein with additional thrombus in the proximal segment of the branchial vein." he was placed on heparin gtt as PEG placement was anticipated on 6/12. He has since been transitioned to apixaban.  TTE was also performed and significant " for EF 15%, for which cardiology was consulted and recommended goal directed medical therapy with coreg, losartan, crestor. Lasix was also started. On 6/12 patient developed fevers to 101.6 a/w worsening oxygen requirements. He developed hypotension to 80s/40s on 6/13.  CXR was notable for acute edema on chronic fibrotic changes with DDx of asx pulm edema vs PNA. procal was checked and found to be 3 (initial procal on 6/8 was negative). Patient was placed on ceftriaxone and clindamycin and CT chest was obtained that showed new consolidation in RML and RLL c/w aspiration or PNA. Endobronchial material in right lower lobe airways with intermittent obstruction, could represent retained secretions or aspirated material. He developed hypotension to 80s/40s again this morning, responded to fluids. Repeat CXR today is unchanged.     Hospital Course:  No notes on file    Interval History: NAEON. Blood pressures improving.     Review of Systems   Unable to perform ROS: Mental status change     Objective:     Vital Signs (Most Recent):  Temp: 97.1 °F (36.2 °C) (06/16/18 1126)  Pulse: 85 (06/16/18 1156)  Resp: 15 (06/16/18 1156)  BP: (!) 124/59 (06/16/18 1126)  SpO2: 98 % (06/16/18 1156) Vital Signs (24h Range):  Temp:  [97.1 °F (36.2 °C)-98.6 °F (37 °C)] 97.1 °F (36.2 °C)  Pulse:  [73-94] 85  Resp:  [15-20] 15  SpO2:  [88 %-99 %] 98 %  BP: ()/(50-76) 124/59     Weight: 101.2 kg (223 lb 1.7 oz)  Body mass index is 33.92 kg/m².    Intake/Output Summary (Last 24 hours) at 06/16/18 1325  Last data filed at 06/16/18 0600   Gross per 24 hour   Intake              360 ml   Output               50 ml   Net              310 ml      Physical Exam   Constitutional: He appears well-developed. No distress.   HENT:   Head: Normocephalic and atraumatic.   Eyes: Conjunctivae are normal. Right eye exhibits no discharge. Left eye exhibits no discharge. No scleral icterus.   Cardiovascular: Normal rate and regular rhythm.   "  Pulmonary/Chest: No respiratory distress. He has decreased breath sounds. He has wheezes. He has rales.   Course breath sounds bilaterally    Abdominal: Soft. He exhibits no distension. There is no tenderness.   Neurological:   Nonverbal  Does not follow commands  Waxing and wayning between alertness and somnolence   Skin: Skin is warm and dry. He is not diaphoretic.   Vitals reviewed.      Significant Labs: All pertinent labs within the past 24 hours have been reviewed.    Significant Imaging: I have reviewed all pertinent imaging results/findings within the past 24 hours.    Assessment/Plan:      * Embolic stroke involving right middle cerebral artery    Per primary team          Acute on chronic respiratory failure    - worsening oxygen requirements over last 48 hours  - CT chest obtained 6/13 showing "Recent development consolidation in right middle and lower lobes consistent with pneumonia or aspiration.  Endobronchial material in right lower lobe airways with intermittent obstruction, could represent retained secretions or aspirated material."  - pulm consulted, appreciate assistance  - cont nebs q6  - chest PT QID  - abx changed to vanc/zosyn to cover for HAP. Will de-escalate to unasyn tomorrow if cx remain no growth        Sepsis    - Chest CT concerning for aspiration and PNA  - procal elevated at 3  - blood cx and UA pending  - conservative fluid resuscitation as patient has poor cardiac and respiratory function  - low threshold for ICU consult if patient becomes hypotensive again; would also check lactic if patient persistently hypotensive  - cont vanc and zosyn for today. Will de-escalate tomorrow (6/17) if cx remain negative          DVT of axillary vein, acute left    - cont apixaban        Chronic systolic (congestive) heart failure    - EF 15% with diastolic dysfunction  - lasix being held in setting of hyponatremia  - cont statin  - will resume BB and ARB after acute illness and hypotension have " "resolved         GENIE on CPAP    - hold CPAP in setting of likely aspiration, encephalopathy and nonverbal        Coronary artery disease involving native coronary artery of native heart without angina pectoris    - s/p CABG x2 in 2016  - per cards consult note "Will need [facility] as an outpt if he recovers from his stroke and decides he wants to proceed as seems he has been hesitant in the past."  - has cardiology appointment scheduled for 7/17  - cont ASA and statin  - will resume BB and ARB after acute illness has resolved          Essential hypertension    - home meds: coreg 12.5 mg BID,chlorthalidone 25 mg daily, losartan 50 mg daily  - holding all home meds in setting of hypotension and sepsis  - will cont to monitor BPs and resume prn        COPD (chronic obstructive pulmonary disease)    - cont duonebs q6 as patient cannot follow instructions to use inhalers  - goal oxygen sats are 88-92%          Type 2 diabetes mellitus without complication, with long-term current use of insulin    - a1c 9.4 last month  - home regimen unclear but based on PCP notes it appears as though patient was on detemir 15 qHS, aspart 10 units before dinner,metformin 1000mg BID, and linagliptin 5 mg daily  - patient currently on continuous tube feeds with aspiration pneumonitis, tube feed rate decreased from 70 to 30 today  - increased insulin detemir to 13 units BID  - cont MDSSI        MEGGAN (acute kidney injury)    - baseline cr 1.0-1.2  - UA negative; FeNa 0.3 indicating pre-renal cause  - resolved            VTE Risk Mitigation         Ordered     apixaban tablet 5 mg  2 times daily      06/13/18 0751     heparin 25,000 units in dextrose 5% 250 mL (100 units/mL) infusion  Continuous      06/09/18 1350     IP VTE HIGH RISK PATIENT  Once      06/07/18 0421     Place sequential compression device  Until discontinued      06/07/18 0421        Discussed with staff      Madai Molina MD  Department of [facility] Medicine   [facility] [place]

## 2018-06-16 NOTE — ASSESSMENT & PLAN NOTE
- a1c 9.4 last month  - home regimen unclear but based on PCP notes it appears as though patient was on detemir 15 qHS, aspart 10 units before dinner,metformin 1000mg BID, and linagliptin 5 mg daily  - patient currently on continuous tube feeds with aspiration pneumonitis, tube feed rate decreased from 70 to 30 today  - increased insulin detemir to 13 units BID  - cont MDSSI

## 2018-06-16 NOTE — ASSESSMENT & PLAN NOTE
Stroke risk factor  SBP <160  Home meds - coreg and losartan; Acutely held due to hypotension  Pt hypotensive 6/15 evening, requiring another bolus; Stable since  (Allergy to lisinopril and Norvasc)

## 2018-06-16 NOTE — ASSESSMENT & PLAN NOTE
- Chest CT concerning for aspiration and PNA  - procal elevated at 3  - blood cx and UA pending  - conservative fluid resuscitation as patient has poor cardiac and respiratory function  - low threshold for ICU consult if patient becomes hypotensive again; would also check lactic if patient persistently hypotensive  - cont vanc and zosyn for today. Will de-escalate tomorrow (6/17) if cx remain negative

## 2018-06-17 PROBLEM — E87.1 HYPONATREMIA: Status: RESOLVED | Noted: 2018-01-01 | Resolved: 2018-01-01

## 2018-06-17 PROBLEM — A41.9 SEPSIS: Status: RESOLVED | Noted: 2018-01-01 | Resolved: 2018-01-01

## 2018-06-17 NOTE — ASSESSMENT & PLAN NOTE
69 y/o male with R MCA infarct with left sided weakness, facial droop, dysarthria and confusion. Right sided movement is minimal. Etiology likely atheroembolic vs cardioembolic due to EF of 15%. EEG completed and no seizure activity noted. Pt started on Modafinil 6/13; wakefulness now improved.    Hospital medicine following; sepsis, hypotension, hyponatremia now resolved, medicine de-escalated abx regimen. No longer likely that pt will require ICU step up.    Reduced-dose Modafinil started 6/17; will monitor response (family had reported aggression on prior dose.)      Antithrombotics: Eliquis 5 mg BID and ASA  Statins: Crestor 40 mg daily  Aggressive risk factor modification: HTN, DM, HLD, Diet, Exercise, Obesity, CAD  Rehab efforts: PT/OT/SLP to evaluate and treat, PM&R consult   Dispo: SNF  Diagnostics ordered/pending: NA  VTE prophylaxis: Eliquis, SCDs  BP parameters: Infarct: Avoid hypotension

## 2018-06-17 NOTE — PLAN OF CARE
Problem: Patient Care Overview  Goal: Plan of Care Review  Outcome: Ongoing (interventions implemented as appropriate)  Plan of care reviewed with pt and family at bedside.  Pt's pain managed by prn meds.  Modafinil 100 mg held per day's shift nurse report on 6/16/18.  Changes were observed in pt's behavior.  Pt was less combative, calm and slept well last night.  02 at 2lpm continous per nasal cannula.  VSS.   Diabetisource continous via gtube at 35ml/hr.  Aspiration precautions maintained.  Wife at bedside.  Had BM 2x.  Pericare provided.  No distress observed.  Safety maintained.  Call light within easy reach.

## 2018-06-17 NOTE — PROGRESS NOTES
[facility]  Vascular Neurology  Comprehensive Stroke Center  Progress Note    Assessment/Plan:     * Embolic stroke involving right middle cerebral artery    67 y/o male with R MCA infarct with left sided weakness, facial droop, dysarthria and confusion. Right sided movement is minimal. Etiology likely atheroembolic vs cardioembolic due to EF of 15%. EEG completed and no seizure activity noted. Pt started on Modafinil 6/13; wakefulness now improved.    Hospital medicine following; sepsis, hypotension, hyponatremia now resolved, medicine de-escalated abx regimen. No longer likely that pt will require ICU step up.    Reduced-dose Modafinil started 6/17; will monitor response (family had reported aggression on prior dose.)      Antithrombotics: Eliquis 5 mg BID and ASA  Statins: Crestor 40 mg daily  Aggressive risk factor modification: HTN, DM, HLD, Diet, Exercise, Obesity, CAD  Rehab efforts: PT/OT/SLP to evaluate and treat, PM&R consult   Dispo: SNF  Diagnostics ordered/pending: NA  VTE prophylaxis: Eliquis, SCDs  BP parameters: Infarct: Avoid hypotension        Acute on chronic respiratory failure with hypoxia    Initially with worsening O2 requirements  Consulted Pulm and Medicine; Appreciate recs  Pulm signed off; Rec outpatient follow-up once more stable  Broad-spectrum abx de-escalated to Unasyn (needs 7 days total abx tx, since 6/15)  Continue duonebs, CPT  Need to discuss with Medicine when can titrate back up on TFs        DVT of axillary vein, acute left    Eliquis started 6/13           Cytotoxic cerebral edema    Large area of cytotoxic cerebral edema identified when reviewing brain imaging in the territory of the R middle cerebral artery. There is not mass effect associated with it. We will continue to monitor the patients clinical exam for any worsening of symptoms which may indicate expansion of the stroke or the area of the edema resulting in the clinical change. The pattern is  suggestive of embolic etiology.            Left spastic hemiparesis    Due to stroke  Aggressive therapy        Chronic systolic (congestive) heart failure    Seen by cardiology   EF 15-20%, diastolic dysfunction  Recommending AC and ASA  Meds currently held due to hypotension        Decreased cardiac ejection fraction    EF 15-20% - seen on echo completed 6/7  Cardiac history of CABG in 2016  Cardiology consulted - recommending AC and asa  Patient started on Eliquis 5 mg BID and ASA        Coronary artery disease involving native coronary artery of native heart without angina pectoris    Stoke risk factor  Eliquis 5 mg BID and ASA 81          Essential hypertension    Stroke risk factor  SBP <160  Home meds - coreg and losartan; Acutely held due to hypotension  Stable, no hypotension over last 24 hrs  (Allergy to lisinopril and Norvasc)        Type 2 diabetes mellitus without complication, with long-term current use of insulin    Stroke risk factor  HgB A1C 9.4  Detemir 13U BID  SSI Q6        Abnormal ventricular wall motion    Stroke risk factor  Evidence on echo        Dysarthria    Result of stroke  Aggressive SLP         GENIE on CPAP    Stroke risk factor  Holding CPAP acutely due to risk aspiration, encephalopathy, pt nonverbal        COPD (chronic obstructive pulmonary disease)    Stroke risk factor  Duonebs Q6 due to patient not being able to follow commands and RT not being able to administer inhalers   Currently maintaining O2 sats in the 90's on 2L NC  Pulmonary, Medicine consulted; Appreciate recs        History of lung cancer    Stroke risk factor  CT Chest this admission concerning  Pulmonology feels pt too ill for acute intervention; Recommend outpatient follow-up once more stable. Signed off.             67 y/o male wt R MCA infarct. Had been at [facility] 6[facility]18 for AMS and weakness that improved and then worsened now with facial droop and left sided weakness. 6-6-18 fever so infectious w/u in  process could be PNA as he was coughing with puree food at [facility]. He was placed on ABX Amp/Vanc/valcyclovir for possible meningitis this is stopped as he does not have meningitis. He moved around too much and was clausterphobic for MRI so will attempt MRI here.   6/8 - Urine studies ordered for hyponatremia. EEG results pending. Bilateral upper/lower extremities US ordered for fever. Procal WNL. Infectious workup negative so far. Cardiology consulted for EF 15-20%.   6-9-18 will give lasix 40 mg NG today as per cardiology recs. Discussion with family regarding anticoagulation due to low EF and DVT left axillary vein and brachial vein, discussion regarding feeding as well and likely bernard of patient swallowing is minimal so will need PEG next week as opposed to waiting and family is in agreement so will start heparin drip with no bolus parameters  6-10-18 once EEG read and no seizure activity may start Modafinil to help with wakefulness  6/11 - EEG to be read today and consider Modafinil. IR consulted for PEG placement tomorrow. Hospital medicine ordering urine studies for hyponatremia.   6/12 - PEG to be placed today. Heparin gtt currently held. Will resume once PEG is placed. AC to start tomorrow once PEG is able to be used. Hyponatremia slightly improving. Continue to hold fluids due to hyponatremia thought to be due to SIADH. Modafinil to be started tomorrow when PEG can be used.  6/13 - PEG placed yesterday. Heparin gtt dc'ed and eliquis started. Modafinil started today. Continuing to restrict fluids (enteral water boluses) due to SIADH/hyponatremia and holding off on lasix that cardiology recommended.  6/14/18 - concern for sepsis today - medicine consulted - hypotensive, febrile, procal elevated.   6/15: Hypotensive to 88/36 overnight; good response to 250cc bolus. Hospital medicine adjusting insulin and antibiotics regimen. Wakefulness much improved since starting Modafinil. Mild bleeding noted at NC  site.  6/16: Pt hypotensive, requiring another bolus yesterday; BP so far stable since. Modafinil held today as family felt made pt more aggressive; plan for decreased dose tomorrow. Added Lactulose PRN to bowel regimen.  6/17: Pt clinically improved from infection/respiratory perspective; medicine de-escalated abx regimen. No further hypotension episodes. Reduced-dose Modafinil today; will monitor response.    STROKE DOCUMENTATION        NIH Scale:  1a. Level Of Consciousness: 0-->Alert: keenly responsive  1b. LOC Questions: 2-->Answers neither question correctly  1c. LOC Commands: 1-->Performs one task correctly  2. Best Gaze: 1-->Partial gaze palsy: gaze is abnormal in one or both eyes, but forced deviation or total gaze paresis is not present  3. Visual: 2-->Complete hemianopia  4. Facial Palsy: 2-->Partial paralysis (total or near-total paralysis of lower face)  5a. Motor Arm, Left: 4-->No movement  5b. Motor Arm, Right: 1-->Drift: limb holds 90 (or 45) degrees, but drifts down before full 10 secs: does not hit bed or other support  6a. Motor Leg, Left: 3-->No effort against gravity: leg falls to bed immediately  6b. Motor Leg, Right: 2-->Some effort against gravity: leg falls to bed by 5 secs, but has some effort against gravity  7. Limb Ataxia: 0-->Absent  8. Sensory: 0-->Normal: no sensory loss  9. Best Language: 2-->Severe aphasia: all communication is through fragmentary expression: great need for inference, questioning, and guessing by the listener. Range of information that can be exchanged is limited: listener carries burden of. . . (see row details)  10. Dysarthria: 2-->Severe dysarthria: patients speech is so slurred as to be unintelligible in the absence of or out of proportion to any dysphasia, or is mute/anarthric  11. Extinction and Inattention (formerly Neglect): 0-->No abnormality  Total (NIH Stroke Scale): 22       Modified David Score: 0  Beni Coma Scale:    ABCD2 Score:    [id number]  Score:   HAS -BLED Score:   ICH Score:   Hunt & De Paz Classification:      Hemorrhagic change of an Ischemic Stroke: Does this patient have an ischemic stroke with hemorrhagic changes? No     Neurologic Chief Complaint: R MCA infarct     Subjective:     Interval History: Patient is seen for follow-up neurological assessment and treatment recommendations:     CARMENON. Pt clinically improved from infection/respiratory perspective; medicine de-escalated abx regimen. No further hypotension episodes. Reduced-dose Modafinil today; will monitor response.    HPI, Past Medical, Family, and Social History remains the same as documented in the initial encounter.     Review of Systems   Constitutional: Negative for diaphoresis and fever.   HENT: Positive for trouble swallowing. Negative for nosebleeds.    Neurological: Positive for facial asymmetry, speech difficulty and weakness.   Psychiatric/Behavioral: Positive for confusion. Negative for agitation.     Scheduled Meds:   albuterol-ipratropium  3 mL Nebulization Q6H    ampicillin-sulbactim (UNASYN) IVPB  3 g Intravenous Q6H    apixaban  5 mg Per G Tube BID    aspirin  81 mg Oral Daily    citalopram  10 mg Per G Tube Daily    glycerin adult  1 suppository Rectal Daily    insulin detemir U-100  13 Units Subcutaneous BID    modafinil  100 mg Per G Tube Daily    rosuvastatin  40 mg Per G Tube QHS    senna-docusate 8.6-50 mg  2 tablet Per G Tube BID    sodium chloride 0.9%  3 mL Intravenous Q8H     Continuous Infusions:    PRN Meds:acetaminophen, dextrose 50%, glucagon (human recombinant), HYDROcodone-acetaminophen, insulin aspart U-100, labetalol, lactulose, miconazole NITRATE 2 %, ondansetron, polyethylene glycol    Objective:     Vital Signs (Most Recent):  Temp: 97.9 °F (36.6 °C) (06/17/18 1132)  Pulse: 93 (06/17/18 1516)  Resp: 20 (06/17/18 1426)  BP: 137/69 (06/17/18 1132)  SpO2: 98 % (06/17/18 1426)  BP Location: Right arm    Vital Signs Range (Last 24H):  Temp:   [97.1 °F (36.2 °C)-98.9 °F (37.2 °C)]   Pulse:  [77-96]   Resp:  [16-20]   BP: (124-137)/(60-69)   SpO2:  [94 %-100 %]   BP Location: Right arm    Physical Exam   Constitutional: He appears well-developed and well-nourished. No distress.   HENT:   Head: Normocephalic and atraumatic.   Eyes: Conjunctivae are normal. No scleral icterus.   Cardiovascular: Normal rate.    Pulmonary/Chest: Effort normal. No respiratory distress.   Musculoskeletal: He exhibits no edema or tenderness.   Neurological: He is alert. A cranial nerve deficit is present.   Skin: Skin is warm and dry.   Vitals reviewed.      Neurological Exam:   LOC: Alert  Attention Span: Good  Language: Global aphasia  Articulation: Dysarthria  Orientation: Untestable due to severe aphasia   Facial Movement (CN VII): Lower facial weakness on the Left  Vision: L hemianopsia  Gaze: R gaze preference  Motor: Arm left  1/5  Leg left  Paresis: 2/5  Arm right  Normal 4/5  Leg right Paresis: 3/5  Sensation: Intact to light touch, temperature      Laboratory:  CMP:     Recent Labs  Lab 06/17/18  0509   CALCIUM 9.9   ALBUMIN 2.1*   PROT 6.6      K 4.1   CO2 33*   CL 95   BUN 16   CREATININE 0.8   ALKPHOS 99   ALT 53*   AST 54*   BILITOT 0.3     BMP:     Recent Labs  Lab 06/17/18  0509      K 4.1   CL 95   CO2 33*   BUN 16   CREATININE 0.8   CALCIUM 9.9     CBC:     Recent Labs  Lab 06/17/18  0509   WBC 9.02   RBC 3.48*   HGB 10.4*   HCT 33.5*   *   MCV 96   MCH 29.9   MCHC 31.0*     Lipid Panel:   No results for input(s): CHOL, LDLCALC, HDL, TRIG in the last 168 hours.  Coagulation:   No results for input(s): PT, INR, APTT in the last 168 hours.  Platelet Aggregation Study: No results for input(s): PLTAGG, PLTAGINTERP, PLTAGREGLACO, ADPPLTAGGREG in the last 168 hours.  Hgb A1C:   No results for input(s): HGBA1C in the last 168 hours.  TSH:   No results for input(s): TSH in the last 168 hours.      Diagnostic Results     Brain Imaging   CT 6/6   Large  right MCA distribution infarct measuring approximately 7 x 3 cm including the right perisylvian region and extending to the frontal and parietal operculum with associated localized mass effect.      Vessel Imaging     US BUE 6/9/18  Deep venous thrombus in the left axillary vein with additional thrombus in the proximal segment of the branchial vein.  No evidence of DVT in the right upper extremity.    CTA 6/8  CTA head: Occlusion right M2 segment of the MCA within the proximal sylvian fissure..  Heavy atherosclerotic plaquing of the cavernous and supraclinoid ICAs with mild moderate right and mild left cavernous ICA stenosis.  There is diffuse small caliber right M1 segment of the MCA.  Irregularity and narrowing of the right distal vertebral artery concerning for atherosclerotic disease with mild moderate stenosis.  CTA neck: Atherosclerotic plaquing of the carotid bifurcations and proximal ICAs with less than 50% proximal ICA stenosis by NASCET criteria.  CT head: Evolving large right MCA distribution infarction.  Localized mass effect without significant midline shift or hydrocephalus.  No evidence for hemorrhagic conversion.  Clinical correlation and follow-up advised.        Cardiac Imaging   Echo 6/7  CONCLUSIONS     1 - Technically difficult study.     2 - Severely depressed left ventricular systolic function (EF 15-20%).     3 - Mildly to moderately depressed right ventricular systolic function .     4 - Impaired LV relaxation, normal LAP (grade 1 diastolic dysfunction).     5 - Mild aortic stenosis, WIL = 1.45 cm2, AVAi = 0.67 cm2/m2, peak velocity = 2.8 m/s, mean gradient = 18 mmHg.     6 - Mild mitral regurgitation.       Other Imaging    CT chest 6/14/18  1. Recent development consolidation in right middle and lower lobes consistent with pneumonia or aspiration.  Endobronchial material in right lower lobe airways with intermittent obstruction, could represent retained secretions or aspirated material.   Correlate clinically.  Recommend noncontrast chest CT follow-up in 3 months.  2. Chronic right upper lobe consolidation and volume loss consistent with radiation fibrosis in patient with history of previous lung cancer.  3. Chronic loculation of pleural fluid in the right sulcus is stable.  4. A few subcentimeter pulmonary nodules, stable and likely benign  5. Aortic and coronary atherosclerosis, status post CABG  6. Gastrostomy tube  7. Other findings as above      Negrita Whitaker PA-C  Comprehensive Stroke Center  Department of Vascular Neurology   [facility]

## 2018-06-17 NOTE — SUBJECTIVE & OBJECTIVE
Neurologic Chief Complaint: R MCA infarct     Subjective:     Interval History: Patient is seen for follow-up neurological assessment and treatment recommendations:     NORBERTEON. Pt clinically improved from infection/respiratory perspective; medicine de-escalated abx regimen. No further hypotension episodes. Reduced-dose Modafinil today; will monitor response.    HPI, Past Medical, Family, and Social History remains the same as documented in the initial encounter.     Review of Systems   Constitutional: Negative for diaphoresis and fever.   HENT: Positive for trouble swallowing. Negative for nosebleeds.    Neurological: Positive for facial asymmetry, speech difficulty and weakness.   Psychiatric/Behavioral: Positive for confusion. Negative for agitation.     Scheduled Meds:   albuterol-ipratropium  3 mL Nebulization Q6H    ampicillin-sulbactim (UNASYN) IVPB  3 g Intravenous Q6H    apixaban  5 mg Per G Tube BID    aspirin  81 mg Oral Daily    citalopram  10 mg Per G Tube Daily    glycerin adult  1 suppository Rectal Daily    insulin detemir U-100  13 Units Subcutaneous BID    modafinil  100 mg Per G Tube Daily    rosuvastatin  40 mg Per G Tube QHS    senna-docusate 8.6-50 mg  2 tablet Per G Tube BID    sodium chloride 0.9%  3 mL Intravenous Q8H     Continuous Infusions:    PRN Meds:acetaminophen, dextrose 50%, glucagon (human recombinant), HYDROcodone-acetaminophen, insulin aspart U-100, labetalol, lactulose, miconazole NITRATE 2 %, ondansetron, polyethylene glycol    Objective:     Vital Signs (Most Recent):  Temp: 97.9 °F (36.6 °C) (06/17/18 1132)  Pulse: 93 (06/17/18 1516)  Resp: 20 (06/17/18 1426)  BP: 137/69 (06/17/18 1132)  SpO2: 98 % (06/17/18 1426)  BP Location: Right arm    Vital Signs Range (Last 24H):  Temp:  [97.1 °F (36.2 °C)-98.9 °F (37.2 °C)]   Pulse:  [77-96]   Resp:  [16-20]   BP: (124-137)/(60-69)   SpO2:  [94 %-100 %]   BP Location: Right arm    Physical Exam   Constitutional: He appears  well-developed and well-nourished. No distress.   HENT:   Head: Normocephalic and atraumatic.   Eyes: Conjunctivae are normal. No scleral icterus.   Cardiovascular: Normal rate.    Pulmonary/Chest: Effort normal. No respiratory distress.   Musculoskeletal: He exhibits no edema or tenderness.   Neurological: He is alert. A cranial nerve deficit is present.   Skin: Skin is warm and dry.   Vitals reviewed.      Neurological Exam:   LOC: Alert  Attention Span: Good  Language: Global aphasia  Articulation: Dysarthria  Orientation: Untestable due to severe aphasia   Facial Movement (CN VII): Lower facial weakness on the Left  Vision: L hemianopsia  Gaze: R gaze preference  Motor: Arm left  1/5  Leg left  Paresis: 2/5  Arm right  Normal 4/5  Leg right Paresis: 3/5  Sensation: Intact to light touch, temperature      Laboratory:  CMP:     Recent Labs  Lab 06/17/18  0509   CALCIUM 9.9   ALBUMIN 2.1*   PROT 6.6      K 4.1   CO2 33*   CL 95   BUN 16   CREATININE 0.8   ALKPHOS 99   ALT 53*   AST 54*   BILITOT 0.3     BMP:     Recent Labs  Lab 06/17/18  0509      K 4.1   CL 95   CO2 33*   BUN 16   CREATININE 0.8   CALCIUM 9.9     CBC:     Recent Labs  Lab 06/17/18  0509   WBC 9.02   RBC 3.48*   HGB 10.4*   HCT 33.5*   *   MCV 96   MCH 29.9   MCHC 31.0*     Lipid Panel:   No results for input(s): CHOL, LDLCALC, HDL, TRIG in the last 168 hours.  Coagulation:   No results for input(s): PT, INR, APTT in the last 168 hours.  Platelet Aggregation Study: No results for input(s): PLTAGG, PLTAGINTERP, PLTAGREGLACO, ADPPLTAGGREG in the last 168 hours.  Hgb A1C:   No results for input(s): HGBA1C in the last 168 hours.  TSH:   No results for input(s): TSH in the last 168 hours.      Diagnostic Results     Brain Imaging   CT 6/6   Large right MCA distribution infarct measuring approximately 7 x 3 cm including the right perisylvian region and extending to the frontal and parietal operculum with associated localized mass  effect.      Vessel Imaging     US BUE 6/9/18  Deep venous thrombus in the left axillary vein with additional thrombus in the proximal segment of the branchial vein.  No evidence of DVT in the right upper extremity.    CTA 6/8  CTA head: Occlusion right M2 segment of the MCA within the proximal sylvian fissure..  Heavy atherosclerotic plaquing of the cavernous and supraclinoid ICAs with mild moderate right and mild left cavernous ICA stenosis.  There is diffuse small caliber right M1 segment of the MCA.  Irregularity and narrowing of the right distal vertebral artery concerning for atherosclerotic disease with mild moderate stenosis.  CTA neck: Atherosclerotic plaquing of the carotid bifurcations and proximal ICAs with less than 50% proximal ICA stenosis by NASCET criteria.  CT head: Evolving large right MCA distribution infarction.  Localized mass effect without significant midline shift or hydrocephalus.  No evidence for hemorrhagic conversion.  Clinical correlation and follow-up advised.        Cardiac Imaging   Echo 6/7  CONCLUSIONS     1 - Technically difficult study.     2 - Severely depressed left ventricular systolic function (EF 15-20%).     3 - Mildly to moderately depressed right ventricular systolic function .     4 - Impaired LV relaxation, normal LAP (grade 1 diastolic dysfunction).     5 - Mild aortic stenosis, WIL = 1.45 cm2, AVAi = 0.67 cm2/m2, peak velocity = 2.8 m/s, mean gradient = 18 mmHg.     6 - Mild mitral regurgitation.       Other Imaging    CT chest 6/14/18  1. Recent development consolidation in right middle and lower lobes consistent with pneumonia or aspiration.  Endobronchial material in right lower lobe airways with intermittent obstruction, could represent retained secretions or aspirated material.  Correlate clinically.  Recommend noncontrast chest CT follow-up in 3 months.  2. Chronic right upper lobe consolidation and volume loss consistent with radiation fibrosis in patient with  history of previous lung cancer.  3. Chronic loculation of pleural fluid in the right sulcus is stable.  4. A few subcentimeter pulmonary nodules, stable and likely benign  5. Aortic and coronary atherosclerosis, status post CABG  6. Gastrostomy tube  7. Other findings as above

## 2018-06-17 NOTE — PROGRESS NOTES
"[facility] Hospital Medicine  Progress Note    Patient Name: Mendez Guthrie  MRN: [id number]  Patient Class: IP- Inpatient   Admission Date: 6/7/2018  Length of Stay: 10 days  Attending Physician: Joey Nunez MD  Primary Care Provider: Valeria Mayen MD    Hospital Medicine Team: Networked reference to record PCT  Madai Molina MD    Subjective:     Principal Problem:Embolic stroke involving right middle cerebral artery    HPI:  68 YOM with CAD s/p CABG, HTN, DM2, COPD, GENIE, HFrEF (EF 15%)and hx of lung cancer s/p chemo/radiation who was transferred from [facility] on 6/7/18 for right MCA embolic stroke. Hospital medicine has been consulted for co-management.    Patient was originally admitted to [facility] on 6/5 with encephalopathy and weakness. Family reported in ED at [facility] that patient had been febrile with associated coughing, as well as HA's for 1 week prior. He was admitted for TIA and workup of stroke risk factors. His CT showed chronic microvascular ischemic changes. Carotid US and MRI were attempted, however patient could not tolerate. They were unable to perform LP, but had low suspicion for meningitis as patient was initially stable without antibiotics. Patient developed a fever 6/6/18 and was started on vanc/rocephin/ampicillin/acyclovir. Patient had some dysphagia at lunch 6/6/18, and a repeat CT was ordered after he was reported to have worsening facial droop. Repeat CT showed large infarct in R MCA division with possible mass effect. Patient transferred to [facility] for higher level of care. Infectious workup was negative at the time so abx were discontinued. Patient continued to spike fevers and doppler US revealed "DVT in the left axillary vein with additional thrombus in the proximal segment of the branchial vein." he was placed on heparin gtt as PEG placement was anticipated on 6/12. He has since been transitioned to apixaban.  TTE was also performed and significant " for EF 15%, for which cardiology was consulted and recommended goal directed medical therapy with coreg, losartan, crestor. Lasix was also started. On 6/12 patient developed fevers to 101.6 a/w worsening oxygen requirements. He developed hypotension to 80s/40s on 6/13.  CXR was notable for acute edema on chronic fibrotic changes with DDx of asx pulm edema vs PNA. procal was checked and found to be 3 (initial procal on 6/8 was negative). Patient was placed on ceftriaxone and clindamycin and CT chest was obtained that showed new consolidation in RML and RLL c/w aspiration or PNA. Endobronchial material in right lower lobe airways with intermittent obstruction, could represent retained secretions or aspirated material. He developed hypotension to 80s/40s again this morning, responded to fluids. Repeat CXR today is unchanged.     Hospital Course:  No notes on file    Interval History: NAEON. Oxygenation, MEGGAN, blood sugars all significantly improved    Review of Systems   Unable to perform ROS: Mental status change     Objective:     Vital Signs (Most Recent):  Temp: 97.9 °F (36.6 °C) (06/17/18 1132)  Pulse: 96 (06/17/18 1132)  Resp: 18 (06/17/18 1132)  BP: 137/69 (06/17/18 1132)  SpO2: 96 % (06/17/18 1132) Vital Signs (24h Range):  Temp:  [97.1 °F (36.2 °C)-98.9 °F (37.2 °C)] 97.9 °F (36.6 °C)  Pulse:  [75-96] 96  Resp:  [17-20] 18  SpO2:  [91 %-100 %] 96 %  BP: (124-137)/(60-69) 137/69     Weight: 101.2 kg (223 lb 1.7 oz)  Body mass index is 33.92 kg/m².    Intake/Output Summary (Last 24 hours) at 06/17/18 1229  Last data filed at 06/17/18 0800   Gross per 24 hour   Intake              670 ml   Output              350 ml   Net              320 ml      Physical Exam   Constitutional: He appears well-developed. No distress.   HENT:   Head: Normocephalic and atraumatic.   Eyes: Conjunctivae are normal. Right eye exhibits no discharge. Left eye exhibits no discharge. No scleral icterus.   Cardiovascular: Normal rate and  "regular rhythm.    Pulmonary/Chest: No respiratory distress. He has decreased breath sounds. He has no wheezes. He has rales.   Course breath sounds bilaterally    Abdominal: Soft. He exhibits no distension. There is no tenderness.   Neurological:   Nonverbal  Does not follow commands  Waxing and wayning between alertness and somnolence   Skin: Skin is warm and dry. He is not diaphoretic.   Vitals reviewed.      Significant Labs: All pertinent labs within the past 24 hours have been reviewed.    Significant Imaging: I have reviewed all pertinent imaging results/findings within the past 24 hours.    Assessment/Plan:      * Embolic stroke involving right middle cerebral artery    Per primary team          Acute on chronic respiratory failure with hypoxia    - worsening oxygen requirements over last 48 hours  - CT chest obtained 6/13 showing "Recent development consolidation in right middle and lower lobes consistent with pneumonia or aspiration.  Endobronchial material in right lower lobe airways with intermittent obstruction, could represent retained secretions or aspirated material."  - pulm consulted, appreciate assistance  - cont nebs q6  - chest PT QID  - de-escalated to unasyn today as cx remain no growth        DVT of axillary vein, acute left    - cont apixaban        Chronic systolic (congestive) heart failure    - EF 15% with diastolic dysfunction  - lasix being held in setting of hyponatremia  - cont statin  - will resume BB and ARB after acute illness and hypotension have resolved         GENIE on CPAP    - hold CPAP in setting of likely aspiration, encephalopathy and nonverbal        Coronary artery disease involving native coronary artery of native heart without angina pectoris    - s/p CABG x2 in 2016  - per cards consult note "Will need [facility] as an outpt if he recovers from his stroke and decides he wants to proceed as seems he has been hesitant in the past."  - has cardiology appointment scheduled for " 7/17  - cont ASA and statin  - will resume BB and ARB after acute illness has resolved          Essential hypertension    - home meds: coreg 12.5 mg BID,chlorthalidone 25 mg daily, losartan 50 mg daily  - holding all home meds in setting of hypotension and sepsis  - will cont to monitor BPs and resume prn        COPD (chronic obstructive pulmonary disease)    - cont duonebs q6 as patient cannot follow instructions to use inhalers  - goal oxygen sats are 88-92%          Type 2 diabetes mellitus without complication, with long-term current use of insulin    - a1c 9.4 last month  - home regimen unclear but based on PCP notes it appears as though patient was on detemir 15 qHS, aspart 10 units before dinner,metformin 1000mg BID, and linagliptin 5 mg daily  - patient currently on continuous tube feeds with aspiration pneumonitis, tube feed rate decreased from 70 to 30 today  - increased insulin detemir to 13 units BID  - cont MDSSI          VTE Risk Mitigation         Ordered     apixaban tablet 5 mg  2 times daily      06/13/18 0751     heparin 25,000 units in dextrose 5% 250 mL (100 units/mL) infusion  Continuous      06/09/18 1350     IP VTE HIGH RISK PATIENT  Once      06/07/18 0421     Place sequential compression device  Until discontinued      06/07/18 0421          Discussed with staff    Madai Molina MD  Department of Hospital Medicine   [facility]

## 2018-06-17 NOTE — ASSESSMENT & PLAN NOTE
"- worsening oxygen requirements over last 48 hours  - CT chest obtained 6/13 showing "Recent development consolidation in right middle and lower lobes consistent with pneumonia or aspiration.  Endobronchial material in right lower lobe airways with intermittent obstruction, could represent retained secretions or aspirated material."  - pulm consulted, appreciate assistance  - cont nebs q6  - chest PT QID  - de-escalated to unasyn today as cx remain no growth  "

## 2018-06-17 NOTE — ASSESSMENT & PLAN NOTE
Initially with worsening O2 requirements  Consulted Pulm and Medicine; Appreciate recs  Pulm signed off; Rec outpatient follow-up once more stable  Broad-spectrum abx de-escalated to Unasyn (needs 7 days total abx tx, since 6/15)  Continue dumaheshbs, CPT  Need to discuss with Medicine when can titrate back up on TFs

## 2018-06-17 NOTE — ASSESSMENT & PLAN NOTE
Stroke risk factor  Duonebs Q6 due to patient not being able to follow commands and RT not being able to administer inhalers   Currently maintaining O2 sats in the 90's on 2L NC  Pulmonary, Medicine consulted; Appreciate recs

## 2018-06-17 NOTE — ASSESSMENT & PLAN NOTE
Stroke risk factor  SBP <160  Home meds - coreg and losartan; Acutely held due to hypotension  Stable, no hypotension over last 24 hrs  (Allergy to lisinopril and Norvasc)

## 2018-06-17 NOTE — ASSESSMENT & PLAN NOTE
Stroke risk factor  CT Chest this admission concerning  Pulmonology feels pt too ill for acute intervention; Recommend outpatient follow-up once more stable. Signed off.

## 2018-06-17 NOTE — SUBJECTIVE & OBJECTIVE
Interval History: NAEON. Oxygenation, MEGGAN, blood sugars all significantly improved    Review of Systems   Unable to perform ROS: Mental status change     Objective:     Vital Signs (Most Recent):  Temp: 97.9 °F (36.6 °C) (06/17/18 1132)  Pulse: 96 (06/17/18 1132)  Resp: 18 (06/17/18 1132)  BP: 137/69 (06/17/18 1132)  SpO2: 96 % (06/17/18 1132) Vital Signs (24h Range):  Temp:  [97.1 °F (36.2 °C)-98.9 °F (37.2 °C)] 97.9 °F (36.6 °C)  Pulse:  [75-96] 96  Resp:  [17-20] 18  SpO2:  [91 %-100 %] 96 %  BP: (124-137)/(60-69) 137/69     Weight: 101.2 kg (223 lb 1.7 oz)  Body mass index is 33.92 kg/m².    Intake/Output Summary (Last 24 hours) at 06/17/18 1229  Last data filed at 06/17/18 0800   Gross per 24 hour   Intake              670 ml   Output              350 ml   Net              320 ml      Physical Exam   Constitutional: He appears well-developed. No distress.   HENT:   Head: Normocephalic and atraumatic.   Eyes: Conjunctivae are normal. Right eye exhibits no discharge. Left eye exhibits no discharge. No scleral icterus.   Cardiovascular: Normal rate and regular rhythm.    Pulmonary/Chest: No respiratory distress. He has decreased breath sounds. He has no wheezes. He has rales.   Course breath sounds bilaterally    Abdominal: Soft. He exhibits no distension. There is no tenderness.   Neurological:   Nonverbal  Does not follow commands  Waxing and wayning between alertness and somnolence   Skin: Skin is warm and dry. He is not diaphoretic.   Vitals reviewed.      Significant Labs: All pertinent labs within the past 24 hours have been reviewed.    Significant Imaging: I have reviewed all pertinent imaging results/findings within the past 24 hours.

## 2018-06-18 PROBLEM — G93.40 ACUTE ENCEPHALOPATHY: Status: ACTIVE | Noted: 2018-01-01

## 2018-06-18 NOTE — NURSING
Shortly after shift change during rounds RN found pt without peg tube in place. No bleeding identified on bed or near site, but pt seems more agitated and guarding abdominal area indicating discomfort. Stroke team notified immediately. Simpson catheter inserted to maintain patency, IV tylenol ordered for comfort, soft mitten restraints will be applied, and abdominal binder in place. No signs of bruising at this time. Will continue to monitor.

## 2018-06-18 NOTE — PLAN OF CARE
Problem: Patient Care Overview  Goal: Plan of Care Review  Alert and awake.  On 02 at 2lpm via NC.  VSS.  Continues on neuro and vascular checks Q4.  Non violent restraints (soft) in place.  Pt monitored.   Episodes of agitation and calmness observed.  Attempted several times  tp remove mittens.  Distraction provided and was ongoing.  Responded well and rested. Bed alarm on and audible.  Binder at gtube site intact.  Minimal bleeding observed at gtube site.  Will continue to monitor.  Bed in lowest position and locked.  Wife at bedside.

## 2018-06-18 NOTE — SUBJECTIVE & OBJECTIVE
Neurologic Chief Complaint: R MCA infarct     Subjective:     Interval History: Patient is seen for follow-up neurological assessment and treatment recommendations:     Pt pulled out PEG yesterday evening; ford placed to maintain patency. Attempted to place NGT but pt agitated, family refused. PO meds held for now, including Eliquis (per IR request).    HPI, Past Medical, Family, and Social History remains the same as documented in the initial encounter.     Review of Systems   Constitutional: Negative for diaphoresis and fever.   HENT: Positive for trouble swallowing. Negative for nosebleeds.    Neurological: Positive for facial asymmetry, speech difficulty and weakness.   Psychiatric/Behavioral: Positive for agitation and confusion.     Scheduled Meds:   albuterol-ipratropium  3 mL Nebulization Q6H    ampicillin-sulbactim (UNASYN) IVPB  3 g Intravenous Q6H    apixaban  5 mg Per G Tube BID    aspirin  81 mg Oral Daily    citalopram  10 mg Per G Tube Daily    insulin detemir U-100  13 Units Subcutaneous BID    modafinil  100 mg Per G Tube Daily    risperidone 1 mg/ml  0.5 mg Oral Once    rosuvastatin  40 mg Per G Tube QHS    senna-docusate 8.6-50 mg  2 tablet Per G Tube BID    sodium chloride 0.9%  3 mL Intravenous Q8H     Continuous Infusions:    PRN Meds:acetaminophen, dextrose 50%, glucagon (human recombinant), HYDROcodone-acetaminophen, insulin aspart U-100, labetalol, lactulose, miconazole NITRATE 2 %, ondansetron, polyethylene glycol    Objective:     Vital Signs (Most Recent):  Temp: 98.7 °F (37.1 °C) (06/18/18 1133)  Pulse: 96 (06/18/18 1254)  Resp: 18 (06/18/18 1254)  BP: (!) 163/93 (06/18/18 1133)  SpO2: (!) 90 % (06/18/18 1254)  BP Location: Right arm    Vital Signs Range (Last 24H):  Temp:  [96.2 °F (35.7 °C)-98.7 °F (37.1 °C)]   Pulse:  [81-96]   Resp:  [16-20]   BP: (111-163)/(55-93)   SpO2:  [90 %-100 %]   BP Location: Right arm    Physical Exam   Constitutional: He appears well-developed  and well-nourished. No distress.   HENT:   Head: Normocephalic and atraumatic.   Eyes: Conjunctivae are normal. No scleral icterus.   Cardiovascular: Normal rate.    Pulmonary/Chest: Effort normal. No respiratory distress.   Musculoskeletal: He exhibits no edema or tenderness.   Neurological: He is alert. A cranial nerve deficit is present.   Skin: Skin is warm and dry.   Vitals reviewed.      Neurological Exam:   LOC: Alert  Attention Span: Good  Language: Global aphasia  Articulation: Dysarthria  Orientation: Untestable due to severe aphasia   Facial Movement (CN VII): Lower facial weakness on the Left  Vision: L hemianopsia  Gaze: R gaze preference  Motor: Arm left  1/5  Leg left  Paresis: 2/5  Arm right  Normal 4/5  Leg right Paresis: 3/5  Sensation: Intact to light touch, temperature      Laboratory:  CMP:     Recent Labs  Lab 06/18/18  0516   CALCIUM 9.6   ALBUMIN 2.1*   PROT 6.4   *   K 4.3   CO2 34*   CL 94*   BUN 14   CREATININE 0.8   ALKPHOS 85   ALT 44   AST 44*   BILITOT 0.3     BMP:     Recent Labs  Lab 06/18/18  0516   *   K 4.3   CL 94*   CO2 34*   BUN 14   CREATININE 0.8   CALCIUM 9.6     CBC:     Recent Labs  Lab 06/18/18  0516   WBC 8.60   RBC 3.41*   HGB 10.0*   HCT 32.5*   *   MCV 95   MCH 29.3   MCHC 30.8*     Lipid Panel:   No results for input(s): CHOL, LDLCALC, HDL, TRIG in the last 168 hours.  Coagulation:   No results for input(s): PT, INR, APTT in the last 168 hours.  Platelet Aggregation Study: No results for input(s): PLTAGG, PLTAGINTERP, PLTAGREGLACO, ADPPLTAGGREG in the last 168 hours.  Hgb A1C:   No results for input(s): HGBA1C in the last 168 hours.  TSH:   No results for input(s): TSH in the last 168 hours.      Diagnostic Results     Brain Imaging   CT 6/6   Large right MCA distribution infarct measuring approximately 7 x 3 cm including the right perisylvian region and extending to the frontal and parietal operculum with associated localized mass  effect.      Vessel Imaging     US BUE 6/9/18  Deep venous thrombus in the left axillary vein with additional thrombus in the proximal segment of the branchial vein.  No evidence of DVT in the right upper extremity.    CTA 6/8  CTA head: Occlusion right M2 segment of the MCA within the proximal sylvian fissure..  Heavy atherosclerotic plaquing of the cavernous and supraclinoid ICAs with mild moderate right and mild left cavernous ICA stenosis.  There is diffuse small caliber right M1 segment of the MCA.  Irregularity and narrowing of the right distal vertebral artery concerning for atherosclerotic disease with mild moderate stenosis.  CTA neck: Atherosclerotic plaquing of the carotid bifurcations and proximal ICAs with less than 50% proximal ICA stenosis by NASCET criteria.  CT head: Evolving large right MCA distribution infarction.  Localized mass effect without significant midline shift or hydrocephalus.  No evidence for hemorrhagic conversion.  Clinical correlation and follow-up advised.        Cardiac Imaging   Echo 6/7  CONCLUSIONS     1 - Technically difficult study.     2 - Severely depressed left ventricular systolic function (EF 15-20%).     3 - Mildly to moderately depressed right ventricular systolic function .     4 - Impaired LV relaxation, normal LAP (grade 1 diastolic dysfunction).     5 - Mild aortic stenosis, WIL = 1.45 cm2, AVAi = 0.67 cm2/m2, peak velocity = 2.8 m/s, mean gradient = 18 mmHg.     6 - Mild mitral regurgitation.       Other Imaging    CT chest 6/14/18  1. Recent development consolidation in right middle and lower lobes consistent with pneumonia or aspiration.  Endobronchial material in right lower lobe airways with intermittent obstruction, could represent retained secretions or aspirated material.  Correlate clinically.  Recommend noncontrast chest CT follow-up in 3 months.  2. Chronic right upper lobe consolidation and volume loss consistent with radiation fibrosis in patient with  history of previous lung cancer.  3. Chronic loculation of pleural fluid in the right sulcus is stable.  4. A few subcentimeter pulmonary nodules, stable and likely benign  5. Aortic and coronary atherosclerosis, status post CABG  6. Gastrostomy tube  7. Other findings as above

## 2018-06-18 NOTE — PLAN OF CARE
Hospital Medicine Co-management Care Update:     Chart reviewed from last 24 hours. Documented oxygen saturations of 98 to 100 % on 2L. Patient pulled     Recommendations:    - Continue Unasyn, day 5 of antibiotics. Will need 7 days total.   - Goal oxygen saturation 88% in patient with COPD; please titrate to this (oxygen order adjusted in Epic)  - Continue duonebs Q6H   - Continue to hold blood pressure medications in setting of acute illness; currently systolic 110s      Above discussed with staff.     Anushka Bowers MD  IM PGY-3

## 2018-06-18 NOTE — ASSESSMENT & PLAN NOTE
Large area of cytotoxic cerebral edema identified when reviewing brain imaging in the territory of the R middle cerebral artery. There is not mass effect associated with it. We will continue to monitor the patients clinical exam for any worsening of symptoms which may indicate expansion of the stroke or the area of the edema resulting in the clinical change. The pattern is suggestive of atheroembolic etiology.

## 2018-06-18 NOTE — ASSESSMENT & PLAN NOTE
Stroke risk factor  CT Chest this admission concerning  Pulmonology feels pt too ill for acute intervention; Signed off  Plan for outpatient follow up with pulmonologist Dr Saunders ([facility]) for further workup of CT findings when acute illness has improved

## 2018-06-18 NOTE — PLAN OF CARE
Problem: SLP Goal  Goal: SLP Goal  Speech Language Pathology Goals  Goals expected to be met by 6/21-all goals remain appropriate  1. Pt will participate in ongoing assessment of swallow.   2. Pt will answer simple y/n questions with 60% accy given repeats.  3. Pt will follow simple commands with 60% accy given mod A.  4. Pt will complete auto speech tasks with 50% accy with max A.            Progressing toward goals.   Miranda Ashby M.S., [facility]-SLP  Speech Language Pathologist  [phone]  06/18/2018

## 2018-06-18 NOTE — PROGRESS NOTES
[facility]  Vascular Neurology  Comprehensive Stroke Center  Progress Note    Assessment/Plan:     * Embolic stroke involving right middle cerebral artery    67 y/o male with R MCA infarct with left sided weakness, facial droop, dysarthria and confusion. Right sided movement is minimal. Etiology likely atheroembolic vs cardioembolic due to EF of 15%. EEG completed and no seizure activity noted. Pt started on Modafinil 6/13; wakefulness now improved.    Hospital medicine following; sepsis, hypotension, hyponatremia now resolved, medicine de-escalated abx regimen. No longer likely that pt will require ICU step up.    Pt pulled out PEG 6/17 evening; ford placed to temporarily maintain patency. Attempted to place NGT however pt agitated so family refused. Plan for PEG replacement 6/18 PM. PO meds held for now, including Eliquis (per IR request); abx given IV.    Reduced-dose Modafinil started 6/17. Family concerned this med is causing increased aggitation/aggression in the pt. We discussed the pros/cons of pt being somnolent without a stimulant vs combative with it. Dose held 6/18 due to NPO/no access, but would like to assess response to lower dose Modafinil for a few days before decision to discontinue entirely. Will monitor.      Antithrombotics: Eliquis 5 mg BID and ASA  Statins: Crestor 40 mg daily  Aggressive risk factor modification: HTN, DM, HLD, Diet, Exercise, Obesity, CAD  Rehab efforts: PT/OT/SLP to evaluate and treat, PM&R consult   Dispo: SNF  Diagnostics ordered/pending: NA  VTE prophylaxis: Eliquis, SCDs  BP parameters: Infarct: Avoid hypotension        Acute on chronic respiratory failure with hypoxia    Initially with worsening O2 requirements  Consulted Pulm and Medicine; Appreciate recs  Pulm signed off; Rec outpatient follow-up once more stable  Broad-spectrum abx de-escalated to Unasyn (needs 7 days total abx tx, end date 6/22)  Continue duonebs, CPT        DVT of axillary  vein, acute left    Eliquis started 6/13 - Held on 6/18 for PEG replacement        Cytotoxic cerebral edema    Large area of cytotoxic cerebral edema identified when reviewing brain imaging in the territory of the R middle cerebral artery. There is not mass effect associated with it. We will continue to monitor the patients clinical exam for any worsening of symptoms which may indicate expansion of the stroke or the area of the edema resulting in the clinical change. The pattern is suggestive of atheroembolic etiology.        Left spastic hemiparesis    Due to stroke  Aggressive therapy        Chronic systolic (congestive) heart failure    Seen by cardiology   EF 15-20%, diastolic dysfunction  Recommending AC and ASA  Meds currently held due to hypotension        Decreased cardiac ejection fraction    EF 15-20% - seen on echo completed 6/7  Cardiac history of CABG in 2016  Cardiology consulted - recommending AC and asa  Patient started on Eliquis 5 mg BID and ASA        Coronary artery disease involving native coronary artery of native heart without angina pectoris    Stoke risk factor  Eliquis 5 mg BID and ASA 81          Essential hypertension    Stroke risk factor  SBP <160  Home meds - coreg and losartan; Acutely held due to hypotension  Stable, no hypotension over last 24 hrs  (Allergy to lisinopril and Norvasc)        Type 2 diabetes mellitus without complication, with long-term current use of insulin    Stroke risk factor  HgB A1C 9.4  Detemir 13U BID -- Held while pt with no access, NPO for PEG replacement  SSI Q6 PRN        Abnormal ventricular wall motion    Stroke risk factor  Evidence on echo  Pt on Eliquis        Dysarthria    Result of stroke  Aggressive SLP         GENIE on CPAP    Stroke risk factor  Holding CPAP acutely due to risk aspiration, encephalopathy, pt nonverbal        COPD (chronic obstructive pulmonary disease)    Stroke risk factor  Duonebs Q6 due to patient not being able to follow  commands and RT not being able to administer inhalers   Currently maintaining O2 sats in the 90's on 2L NC  Medicine following; Appreciate recs  Pulmonary consulted, Signed off        History of lung cancer    Stroke risk factor  CT Chest this admission concerning  Pulmonology feels pt too ill for acute intervention; Signed off  Plan for outpatient follow up with pulmonologist Dr Saunders ([facility]) for further workup of CT findings when acute illness has improved             67 y/o male wth R MCA infarct. Had been at [facility] since 6-4-18 for AMS and weakness that improved and then worsened now with facial droop and left sided weakness. 6-6-18 fever so infectious w/u in process could be PNA as he was coughing with puree food at [facility]. He was placed on ABX Amp/Vanc/valcyclovir for possible meningitis this is stopped as he does not have meningitis. He moved around too much and was clausterphobic for MRI so will attempt MRI here.   6/8 - Urine studies ordered for hyponatremia. EEG results pending. Bilateral upper/lower extremities US ordered for fever. Procal WNL. Infectious workup negative so far. Cardiology consulted for EF 15-20%.   6-9-18 will give lasix 40 mg NG today as per cardiology recs. Discussion with family regarding anticoagulation due to low EF and DVT left axillary vein and brachial vein, discussion regarding feeding as well and likely bernard of patient swallowing is minimal so will need PEG next week as opposed to waiting and family is in agreement so will start heparin drip with no bolus parameters  6-10-18 once EEG read and no seizure activity may start Modafinil to help with wakefulness  6/11 - EEG to be read today and consider Modafinil. IR consulted for PEG placement tomorrow. Hospital medicine ordering urine studies for hyponatremia.   6/12 - PEG to be placed today. Heparin gtt currently held. Will resume once PEG is placed. AC to start tomorrow once PEG is able to be used. Hyponatremia slightly  improving. Continue to hold fluids due to hyponatremia thought to be due to SIADH. Modafinil to be started tomorrow when PEG can be used.  6/13 - PEG placed yesterday. Heparin gtt dc'ed and eliquis started. Modafinil started today. Continuing to restrict fluids (enteral water boluses) due to SIADH/hyponatremia and holding off on lasix that cardiology recommended.  6/14/18 - concern for sepsis today - medicine consulted - hypotensive, febrile, procal elevated.   6/15: Hypotensive to 88/36 overnight; good response to 250cc bolus. Hospital medicine adjusting insulin and antibiotics regimen. Wakefulness much improved since starting Modafinil. Mild bleeding noted at NC site.  6/16: Pt hypotensive, requiring another bolus yesterday; BP so far stable since. Modafinil held today as family felt made pt more aggressive; plan for decreased dose tomorrow. Added Lactulose PRN to bowel regimen.  6/17: Pt clinically improved from infection/respiratory perspective; medicine de-escalated abx regimen. No further hypotension episodes. Reduced-dose Modafinil today; will monitor response.  6/18: Pt pulled out PEG yesterday evening; ford placed to maintain patency. Attempted to place NGT but pt agitated, family refused. PO meds held for now, including Eliquis (per IR request). D/c'd scheduled suppository, pt on Senna.    STROKE DOCUMENTATION        NIH Scale:  1a. Level Of Consciousness: 0-->Alert: keenly responsive  1b. LOC Questions: 2-->Answers neither question correctly  1c. LOC Commands: 1-->Performs one task correctly  2. Best Gaze: 1-->Partial gaze palsy: gaze is abnormal in one or both eyes, but forced deviation or total gaze paresis is not present  3. Visual: 2-->Complete hemianopia  4. Facial Palsy: 2-->Partial paralysis (total or near-total paralysis of lower face)  5a. Motor Arm, Left: 4-->No movement  5b. Motor Arm, Right: 1-->Drift: limb holds 90 (or 45) degrees, but drifts down before full 10 secs: does not hit bed or  other support  6a. Motor Leg, Left: 3-->No effort against gravity: leg falls to bed immediately  6b. Motor Leg, Right: 2-->Some effort against gravity: leg falls to bed by 5 secs, but has some effort against gravity  7. Limb Ataxia: 0-->Absent  8. Sensory: 0-->Normal: no sensory loss  9. Best Language: 2-->Severe aphasia: all communication is through fragmentary expression: great need for inference, questioning, and guessing by the listener. Range of information that can be exchanged is limited: listener carries burden of. . . (see row details)  10. Dysarthria: 2-->Severe dysarthria: patients speech is so slurred as to be unintelligible in the absence of or out of proportion to any dysphasia, or is mute/anarthric  11. Extinction and Inattention (formerly Neglect): 0-->No abnormality  Total (NIH Stroke Scale): 22       Modified Cowley Score: 0  [place] Coma Scale:    ABCD2 Score:    [id number] Score:   HAS -BLED Score:   ICH Score:   Hunt & De Paz Classification:      Hemorrhagic change of an Ischemic Stroke: Does this patient have an ischemic stroke with hemorrhagic changes? No     Neurologic Chief Complaint: R MCA infarct     Subjective:     Interval History: Patient is seen for follow-up neurological assessment and treatment recommendations:     Pt pulled out PEG yesterday evening; ford placed to maintain patency. Attempted to place NGT but pt agitated, family refused. PO meds held for now, including Eliquis (per IR request).    HPI, Past Medical, Family, and Social History remains the same as documented in the initial encounter.     Review of Systems   Constitutional: Negative for diaphoresis and fever.   HENT: Positive for trouble swallowing. Negative for nosebleeds.    Neurological: Positive for facial asymmetry, speech difficulty and weakness.   Psychiatric/Behavioral: Positive for agitation and confusion.     Scheduled Meds:   albuterol-ipratropium  3 mL Nebulization Q6H    ampicillin-sulbactim (UNASYN)  IVPB  3 g Intravenous Q6H    apixaban  5 mg Per G Tube BID    aspirin  81 mg Oral Daily    citalopram  10 mg Per G Tube Daily    insulin detemir U-100  13 Units Subcutaneous BID    modafinil  100 mg Per G Tube Daily    risperidone 1 mg/ml  0.5 mg Oral Once    rosuvastatin  40 mg Per G Tube QHS    senna-docusate 8.6-50 mg  2 tablet Per G Tube BID    sodium chloride 0.9%  3 mL Intravenous Q8H     Continuous Infusions:    PRN Meds:acetaminophen, dextrose 50%, glucagon (human recombinant), HYDROcodone-acetaminophen, insulin aspart U-100, labetalol, lactulose, miconazole NITRATE 2 %, ondansetron, polyethylene glycol    Objective:     Vital Signs (Most Recent):  Temp: 98.7 °F (37.1 °C) (06/18/18 1133)  Pulse: 96 (06/18/18 1254)  Resp: 18 (06/18/18 1254)  BP: (!) 163/93 (06/18/18 1133)  SpO2: (!) 90 % (06/18/18 1254)  BP Location: Right arm    Vital Signs Range (Last 24H):  Temp:  [96.2 °F (35.7 °C)-98.7 °F (37.1 °C)]   Pulse:  [81-96]   Resp:  [16-20]   BP: (111-163)/(55-93)   SpO2:  [90 %-100 %]   BP Location: Right arm    Physical Exam   Constitutional: He appears well-developed and well-nourished. No distress.   HENT:   Head: Normocephalic and atraumatic.   Eyes: Conjunctivae are normal. No scleral icterus.   Cardiovascular: Normal rate.    Pulmonary/Chest: Effort normal. No respiratory distress.   Musculoskeletal: He exhibits no edema or tenderness.   Neurological: He is alert. A cranial nerve deficit is present.   Skin: Skin is warm and dry.   Vitals reviewed.      Neurological Exam:   LOC: Alert  Attention Span: Good  Language: Global aphasia  Articulation: Dysarthria  Orientation: Untestable due to severe aphasia   Facial Movement (CN VII): Lower facial weakness on the Left  Vision: L hemianopsia  Gaze: R gaze preference  Motor: Arm left  1/5  Leg left  Paresis: 2/5  Arm right  Normal 4/5  Leg right Paresis: 3/5  Sensation: Intact to light touch, temperature      Laboratory:  CMP:     Recent Labs  Lab  06/18/18  0516   CALCIUM 9.6   ALBUMIN 2.1*   PROT 6.4   *   K 4.3   CO2 34*   CL 94*   BUN 14   CREATININE 0.8   ALKPHOS 85   ALT 44   AST 44*   BILITOT 0.3     BMP:     Recent Labs  Lab 06/18/18  0516   *   K 4.3   CL 94*   CO2 34*   BUN 14   CREATININE 0.8   CALCIUM 9.6     CBC:     Recent Labs  Lab 06/18/18  0516   WBC 8.60   RBC 3.41*   HGB 10.0*   HCT 32.5*   *   MCV 95   MCH 29.3   MCHC 30.8*     Lipid Panel:   No results for input(s): CHOL, LDLCALC, HDL, TRIG in the last 168 hours.  Coagulation:   No results for input(s): PT, INR, APTT in the last 168 hours.  Platelet Aggregation Study: No results for input(s): PLTAGG, PLTAGINTERP, PLTAGREGLACO, ADPPLTAGGREG in the last 168 hours.  Hgb A1C:   No results for input(s): HGBA1C in the last 168 hours.  TSH:   No results for input(s): TSH in the last 168 hours.      Diagnostic Results     Brain Imaging   CT 6/6   Large right MCA distribution infarct measuring approximately 7 x 3 cm including the right perisylvian region and extending to the frontal and parietal operculum with associated localized mass effect.      Vessel Imaging     US BUE 6/9/18  Deep venous thrombus in the left axillary vein with additional thrombus in the proximal segment of the branchial vein.  No evidence of DVT in the right upper extremity.    CTA 6/8  CTA head: Occlusion right M2 segment of the MCA within the proximal sylvian fissure..  Heavy atherosclerotic plaquing of the cavernous and supraclinoid ICAs with mild moderate right and mild left cavernous ICA stenosis.  There is diffuse small caliber right M1 segment of the MCA.  Irregularity and narrowing of the right distal vertebral artery concerning for atherosclerotic disease with mild moderate stenosis.  CTA neck: Atherosclerotic plaquing of the carotid bifurcations and proximal ICAs with less than 50% proximal ICA stenosis by NASCET criteria.  CT head: Evolving large right MCA distribution infarction.  Localized mass  effect without significant midline shift or hydrocephalus.  No evidence for hemorrhagic conversion.  Clinical correlation and follow-up advised.        Cardiac Imaging   Echo 6/7  CONCLUSIONS     1 - Technically difficult study.     2 - Severely depressed left ventricular systolic function (EF 15-20%).     3 - Mildly to moderately depressed right ventricular systolic function .     4 - Impaired LV relaxation, normal LAP (grade 1 diastolic dysfunction).     5 - Mild aortic stenosis, WIL = 1.45 cm2, AVAi = 0.67 cm2/m2, peak velocity = 2.8 m/s, mean gradient = 18 mmHg.     6 - Mild mitral regurgitation.       Other Imaging    CT chest 6/14/18  1. Recent development consolidation in right middle and lower lobes consistent with pneumonia or aspiration.  Endobronchial material in right lower lobe airways with intermittent obstruction, could represent retained secretions or aspirated material.  Correlate clinically.  Recommend noncontrast chest CT follow-up in 3 months.  2. Chronic right upper lobe consolidation and volume loss consistent with radiation fibrosis in patient with history of previous lung cancer.  3. Chronic loculation of pleural fluid in the right sulcus is stable.  4. A few subcentimeter pulmonary nodules, stable and likely benign  5. Aortic and coronary atherosclerosis, status post CABG  6. Gastrostomy tube  7. Other findings as above      Negrita Whitaker PA-C  Comprehensive Stroke Center  Department of Vascular Neurology   [facility]

## 2018-06-18 NOTE — PLAN OF CARE
ZOIE spoke with Steffi with admissions at the [facility] to inform that patient is not medically ready for discharge at this time but ZOIE will continue to send updates. Steffi voiced understanding.    Estela Cagle LMSW  [facility]  Ext. [phone]

## 2018-06-18 NOTE — ASSESSMENT & PLAN NOTE
Stroke risk factor  HgB A1C 9.4  Detemir 13U BID -- Held while pt with no access, NPO for PEG replacement  SSI Q6 PRN

## 2018-06-18 NOTE — PROGRESS NOTES
meds and insulin held this am per stroke team, pt remains npo at present, awaiting g tube placement, wife is refusing ngt placement

## 2018-06-18 NOTE — ASSESSMENT & PLAN NOTE
Initially with worsening O2 requirements  Consulted Pulm and Medicine; Appreciate recs  Pulm signed off; Rec outpatient follow-up once more stable  Broad-spectrum abx de-escalated to Unasyn (needs 7 days total abx tx, end date 6/22)  Continue CPT mayra

## 2018-06-18 NOTE — PHYSICIAN QUERY
PT Name: Mendez Guthrie  MR #: [id number]    Physician Query Form - Neurological Condition Clarification      CDS: Soila Shea RN, CCDS       Contact information: [email or web address]    This form is a permanent document in the medical record.     Query Date: June 18, 2018    By submitting this query, we are merely seeking further clarification of documentation. Please utilize your independent clinical judgment when addressing the question(s) below.    The Medical record contains the following:   Indicators   Supporting Clinical Findings Location in Medical Record    AMS, Confusion, LOC, etc.     X        X Acute / Chronic Illness Embolic stroke involving right middle cerebral artery,  Acute on chronic respiratory failure, Sepsis, DVT of axillary vein, acute left, Cytotoxic cerebral edema, MEGGAN.     Aspiration pneumoniia 6/15-PN        6/15-Pulmonology Consult    Radiology Findings     X Electrolyte Imbalance Hyponatremia  Na 129 > 130 > 131  Per hospital medicine, hyponatremia likely due to SIADH, continue holding lasix and continue patient on fluid restriction. 6/15-PN    Medication     X Treatment Wakefulness much improved since starting Modafinil. 6/15-PN   X     Other GENIE on CPAP  Stroke risk factor  Holding CPAP acutely due to risk aspiration, encephalopathy, pt nonverbal. 6/15-PN       Provider, please specify the diagnosis or diagnoses associated with above clinical findings.      [  ] Metabolic Encephalopathy    [  ] Toxic Encephalopathy    [  ] Other Encephalopathy    [ x  ] Other (please specify): _Toxic-metabolic encephalopathy.    [  ] Clinically Undetermined      Please document in your progress notes daily for the duration of treatment until resolved, and  include in your discharge summary.

## 2018-06-18 NOTE — PLAN OF CARE
ZOIE sent clinical updates to the [facility] for review. .     Estela Cagle, ARIN  [facility]  Ext. [phone]

## 2018-06-18 NOTE — ASSESSMENT & PLAN NOTE
69 y/o male with R MCA infarct with left sided weakness, facial droop, dysarthria and confusion. Right sided movement is minimal. Etiology likely atheroembolic vs cardioembolic due to EF of 15%. EEG completed and no seizure activity noted. Pt started on Modafinil 6/13; wakefulness now improved.    Hospital medicine following; sepsis, hypotension, hyponatremia now resolved, medicine de-escalated abx regimen. No longer likely that pt will require ICU step up.    Pt pulled out PEG 6/17 evening; ford placed to temporarily maintain patency. Attempted to place NGT however pt agitated so family refused. Plan for PEG replacement 6/18 PM. PO meds held for now, including Eliquis (per IR request); abx given IV.    Reduced-dose Modafinil started 6/17. Family concerned this med is causing increased aggitation/aggression in the pt. We discussed the pros/cons of pt being somnolent without a stimulant vs combative with it. Dose held 6/18 due to NPO/no access, but would like to assess response to lower dose Modafinil for a few days before decision to discontinue entirely. Will monitor.      Antithrombotics: Eliquis 5 mg BID and ASA  Statins: Crestor 40 mg daily  Aggressive risk factor modification: HTN, DM, HLD, Diet, Exercise, Obesity, CAD  Rehab efforts: PT/OT/SLP to evaluate and treat, PM&R consult   Dispo: SNF  Diagnostics ordered/pending: NA  VTE prophylaxis: Eliquis, SCDs  BP parameters: Infarct: Avoid hypotension

## 2018-06-18 NOTE — NURSING
"RN acknowledged NG tube insertion per Stroke team request, but wife refused placement. She would like to have "Peg tube placement re-evaluated, and speak with a provider before putting him through another NG tube placement". Wife also asked "can the medications he's getting be given in his IV or a shot" until the Peg is replaced. Stroke team notified.  "

## 2018-06-18 NOTE — PLAN OF CARE
Problem: Occupational Therapy Goal  Goal: Occupational Therapy Goal  Goals to be met by: 6/25/2018     Patient will increase functional independence with ADLs by performing:    UE Dressing with Moderate Assistance.  LE Dressing with Maximum Assistance.  Grooming while seated with Contact Guard Assistance.  Toileting from bedside commode with Moderate Assistance for hygiene and clothing management.   Sitting at edge of bed x 15 minutes with Minimal Assistance.  (keep for consistency)  Supine to sit with Moderate Assistance.  Stand pivot transfers with Moderate Assistance.  Toilet transfer to bedside commode with Moderate Assistance.  Pt will initiate movement in LUE.  Pt will follow 50% of one step commands.  Pt will keep eyes open 100% of session.        POC remains appropriate with date for goals to be met extended to 6/25/2018.  Pt remains appropriate candidate for SNF.     YOSEPH Magana  6/18/2018

## 2018-06-18 NOTE — PT/OT/SLP PROGRESS
"Speech Language Pathology Treatment    Patient Name:  Kalia Guthrie   MRN:  [id number]  Admitting Diagnosis: Embolic stroke involving right middle cerebral artery    Recommendations:                 General Recommendations:  Dysphagia therapy and Speech/language therapy  Diet recommendations:  NPO, Liquid Diet Level: NPO   Aspiration Precautions: Alternate means of nutrition/hydration and Frequent oral care   General Precautions: Standard, aspiration, fall, NPO  Communication strategies:  go to room if call light pushed    Subjective     Mild improvement noted today.  Spouse at bedside.   Produced    Pain/Comfort:  · Pain Rating 1: 0/10  · Pain Rating Post-Intervention 2: 0/10    Objective:     Has the patient been evaluated by SLP for swallowing?   Yes  Keep patient NPO? Yes   Current Respiratory Status: nasal cannula      Given  Max A, Pt roused though wax/wane of alertness noted t/o session. ST administered ice chip x2 along with puree via 1/4 tsp x2 - no overt signs of airway compromise though high risk of aspiration present 2/2 Pt requiring cues for alertness (falling asleep with bolus in oral cavity) along with delayed swallow initiation noted. Pt verbalized "kalia" when asked to state name this date and Repeated 3/3 vowels given direct model by ST; voice quality strained and increased pitch noted. No other verbalizations able to be elicited despite max A. Auto speech tasks completed with 0% despite max cues. Pt answered simple yes/no questions via head nodd with 50% acc indep, improving to 100% acc given mod A. Upon completion of session, Pt waved "goodbye" to ST. ST provided skilled education re: ongoing SLP POC, spouse verbalized understanding.   Cont ST POC.   Assessment:     Kalia Guthrie is a 68 y.o. male with an SLP diagnosis of Aphasia, Dysphagia and Cognitive-Linguistic Impairment.      Goals:    SLP Goals        Problem: SLP Goal    Goal Priority Disciplines Outcome   SLP Goal     SLP Ongoing " (interventions implemented as appropriate)   Description:  Speech Language Pathology Goals  Goals expected to be met by 6/21-all goals remain appropriate  1. Pt will participate in ongoing assessment of swallow.   2. Pt will answer simple y/n questions with 60% accy given repeats.  3. Pt will follow simple commands with 60% accy given mod A.  4. Pt will complete auto speech tasks with 50% accy with max A.                           Plan:     · Patient to be seen:  3 x/week   · Plan of Care expires:  06/07/18  · Plan of Care reviewed with:  patient, spouse   · SLP Follow-Up:  Yes       Discharge recommendations:  nursing facility, skilled       Time Tracking:     SLP Treatment Date:   06/18/18  Speech Start Time:  1526  Speech Stop Time:  1543     Speech Total Time (min):  17 min    Billable Minutes: Speech Therapy Individual 8 and Treatment Swallowing Dysfunction 9    Miranda Ashby M.S., [facility]-SLP  Speech Language Pathologist  [phone]  06/18/2018

## 2018-06-18 NOTE — ASSESSMENT & PLAN NOTE
Stroke risk factor  Duonebs Q6 due to patient not being able to follow commands and RT not being able to administer inhalers   Currently maintaining O2 sats in the 90's on 2L NC  Medicine following; Appreciate maryjo  Pulmonary consulted, Signed off

## 2018-06-19 NOTE — SUBJECTIVE & OBJECTIVE
Neurologic Chief Complaint: R MCA infarct     Subjective:     Interval History: Patient is seen for follow-up neurological assessment and treatment recommendations:     pulled ford from peg site last night - see note. Replaced in IR today. Patient in wrist restraint with abdominal binder. Family at bedside, educated on importance of protection of peg site. Dc modafinil     HPI, Past Medical, Family, and Social History remains the same as documented in the initial encounter.     Review of Systems   Constitutional: Negative for diaphoresis and fever.   HENT: Positive for trouble swallowing. Negative for nosebleeds.    Neurological: Positive for facial asymmetry, speech difficulty and weakness.   Psychiatric/Behavioral: Positive for agitation and confusion.     Scheduled Meds:   albuterol-ipratropium  3 mL Nebulization Q6H    ampicillin-sulbactim (UNASYN) IVPB  3 g Intravenous Q6H    apixaban  5 mg Per G Tube BID    citalopram  10 mg Per G Tube Daily    insulin detemir U-100  13 Units Subcutaneous BID    risperidone 1 mg/ml  0.5 mg Oral Once    rosuvastatin  40 mg Per G Tube QHS    senna-docusate 8.6-50 mg  2 tablet Per G Tube BID    sodium chloride 0.9%  3 mL Intravenous Q8H     Continuous Infusions:    PRN Meds:acetaminophen, dextrose 50%, glucagon (human recombinant), HYDROcodone-acetaminophen, insulin aspart U-100, labetalol, lactulose, miconazole NITRATE 2 %, ondansetron, polyethylene glycol    Objective:     Vital Signs (Most Recent):  Temp: 98.4 °F (36.9 °C) (06/19/18 1208)  Pulse: 88 (06/19/18 1223)  Resp: 20 (06/19/18 1223)  BP: 131/73 (06/19/18 1208)  SpO2: 98 % (06/19/18 1223)  BP Location: Right arm    Vital Signs Range (Last 24H):  Temp:  [97 °F (36.1 °C)-99 °F (37.2 °C)]   Pulse:  [80-95]   Resp:  [16-20]   BP: (127-149)/(55-73)   SpO2:  [93 %-98 %]   BP Location: Right arm    Physical Exam   Constitutional: He appears well-developed and well-nourished. No distress.   HENT:   Head: Normocephalic  and atraumatic.   Eyes: Conjunctivae are normal. No scleral icterus.   Cardiovascular: Normal rate.    Pulmonary/Chest: Effort normal. No respiratory distress.   Musculoskeletal: He exhibits no edema or tenderness.   Neurological: He is alert.   Skin: Skin is warm and dry.   Vitals reviewed.      Neurological Exam:   LOC: Alert  Attention Span: Good  Language: Global aphasia  Articulation: Dysarthria  Orientation: Untestable due to severe aphasia   Facial Movement (CN VII): Lower facial weakness on the Left  Vision: L hemianopsia  Gaze: R gaze preference  Motor: Arm left  1/5  Leg left  Paresis: 2/5  Arm right  Normal 4/5  Leg right Paresis: 3/5  Sensation: Intact to light touch, temperature      Laboratory:  CMP:     Recent Labs  Lab 06/19/18  0511   CALCIUM 9.1   ALBUMIN 2.1*   PROT 6.5      K 4.2   CO2 31*   CL 97   BUN 16   CREATININE 0.9   ALKPHOS 79   ALT 37   AST 41*   BILITOT 0.3     BMP:     Recent Labs  Lab 06/19/18  0511      K 4.2   CL 97   CO2 31*   BUN 16   CREATININE 0.9   CALCIUM 9.1     CBC:     Recent Labs  Lab 06/19/18  0511   WBC 7.32   RBC 3.52*   HGB 10.5*   HCT 33.8*   *   MCV 96   MCH 29.8   MCHC 31.1*     Lipid Panel:   No results for input(s): CHOL, LDLCALC, HDL, TRIG in the last 168 hours.  Coagulation:   No results for input(s): PT, INR, APTT in the last 168 hours.  Platelet Aggregation Study: No results for input(s): PLTAGG, PLTAGINTERP, PLTAGREGLACO, ADPPLTAGGREG in the last 168 hours.  Hgb A1C:   No results for input(s): HGBA1C in the last 168 hours.  TSH:   No results for input(s): TSH in the last 168 hours.      Diagnostic Results     Brain Imaging   CT 6/6   Large right MCA distribution infarct measuring approximately 7 x 3 cm including the right perisylvian region and extending to the frontal and parietal operculum with associated localized mass effect.    Vessel Imaging   US BUE 6/9/18  Deep venous thrombus in the left axillary vein with additional thrombus in  the proximal segment of the branchial vein.  No evidence of DVT in the right upper extremity.    CTA 6/8  CTA head: Occlusion right M2 segment of the MCA within the proximal sylvian fissure..  Heavy atherosclerotic plaquing of the cavernous and supraclinoid ICAs with mild moderate right and mild left cavernous ICA stenosis.  There is diffuse small caliber right M1 segment of the MCA.  Irregularity and narrowing of the right distal vertebral artery concerning for atherosclerotic disease with mild moderate stenosis.  CTA neck: Atherosclerotic plaquing of the carotid bifurcations and proximal ICAs with less than 50% proximal ICA stenosis by NASCET criteria.  CT head: Evolving large right MCA distribution infarction.  Localized mass effect without significant midline shift or hydrocephalus.  No evidence for hemorrhagic conversion.  Clinical correlation and follow-up advised.        Cardiac Imaging   Echo 6/7  CONCLUSIONS     1 - Technically difficult study.     2 - Severely depressed left ventricular systolic function (EF 15-20%).     3 - Mildly to moderately depressed right ventricular systolic function .     4 - Impaired LV relaxation, normal LAP (grade 1 diastolic dysfunction).     5 - Mild aortic stenosis, WIL = 1.45 cm2, AVAi = 0.67 cm2/m2, peak velocity = 2.8 m/s, mean gradient = 18 mmHg.     6 - Mild mitral regurgitation.       Other Imaging  CT chest 6/14/18  1. Recent development consolidation in right middle and lower lobes consistent with pneumonia or aspiration.  Endobronchial material in right lower lobe airways with intermittent obstruction, could represent retained secretions or aspirated material.  Correlate clinically.  Recommend noncontrast chest CT follow-up in 3 months.  2. Chronic right upper lobe consolidation and volume loss consistent with radiation fibrosis in patient with history of previous lung cancer.  3. Chronic loculation of pleural fluid in the right sulcus is stable.  4. A few  subcentimeter pulmonary nodules, stable and likely benign  5. Aortic and coronary atherosclerosis, status post CABG  6. Gastrostomy tube  7. Other findings as above

## 2018-06-19 NOTE — ASSESSMENT & PLAN NOTE
EF 15-20% - seen on echo completed 6/7  Cardiac history of CABG in 2016  Cardiology consulted - recommending AC and asa  Patient started on Eliquis 5 mg BID

## 2018-06-19 NOTE — PT/OT/SLP PROGRESS
Physical Therapy  Missed Visit    Patient Name:  Menedz Guthrie   MRN:  [id number]  Admitting Diagnosis:  Embolic stroke involving right middle cerebral artery   Recent Surgery: * No surgery found *      Patient not seen today secondary to Unavailable (Comment) (Pt EDMAR in am for PEG placment, PT unable to return in pm). Will follow-up as POC allows.    Nimisha Andrews PT, DPT  6/19/2018  Pager: [phone]

## 2018-06-19 NOTE — PROGRESS NOTES
Call placed to stroke team, pt was found with restraints off, ford was found not intact to peg site, pt  pulled out  IV earlier, instructed wife that pt cannot come out of restraints, wife said someone else did that.stroke team is here to reinsert ford to peg, IV started to left hand per RN,

## 2018-06-19 NOTE — ASSESSMENT & PLAN NOTE
Stroke risk factor  CT Chest this admission concerning  Pulmonology feels pt too ill for acute intervention; Signed off  Plan for outpatient follow up with pulmonologist Dr Saunders ([facility]) for further workup of CT findings when acute illness has improved

## 2018-06-19 NOTE — PLAN OF CARE
SW sent clinical updates to the [facility] for review. Patient will discharge to the [facility] when medically ready for discharge.     Estela Cagle LMSW  [facility]  Ext. [phone]

## 2018-06-19 NOTE — PLAN OF CARE
Problem: Occupational Therapy Goal  Goal: Occupational Therapy Goal  Goals to be met by: 6/25/2018     Patient will increase functional independence with ADLs by performing:    UE Dressing with Moderate Assistance.  LE Dressing with Maximum Assistance.  Grooming while seated with Contact Guard Assistance.  Toileting from bedside commode with Moderate Assistance for hygiene and clothing management.   Sitting at edge of bed x 15 minutes with Minimal Assistance.  (keep for consistency)  Supine to sit with Moderate Assistance.  Stand pivot transfers with Moderate Assistance.  Toilet transfer to bedside commode with Moderate Assistance.  Pt will initiate movement in LUE.  Pt will follow 50% of one step commands.  Pt will keep eyes open 100% of session.          POC remains appropriate.    YOSEPH Magana  6/19/2018

## 2018-06-19 NOTE — ASSESSMENT & PLAN NOTE
Initially with worsening O2 requirements  Consulted Pulm and Medicine; Appreciate recs  Pulm signed off; Rec outpatient follow-up once more stable  Broad-spectrum abx de-escalated to Unasyn (course completed)  Continue CPT mayra

## 2018-06-19 NOTE — PLAN OF CARE
Problem: SLP Goal  Goal: SLP Goal  Speech Language Pathology Goals  Goals expected to be met by 6/21-all goals remain appropriate  1. Pt will participate in ongoing assessment of swallow.   2. Pt will answer simple y/n questions with 60% accy given repeats.  3. Pt will follow simple commands with 60% accy given mod A.  4. Pt will complete auto speech tasks with 50% accy with max A.          Outcome: Ongoing (interventions implemented as appropriate)  Goals remain appropriate, cont POC. MADDI Webb, CCC/SLP  6/19/2018

## 2018-06-19 NOTE — SIGNIFICANT EVENT
Patient pulled ford out of G tube site. Replaced new sterile ford into G tube site. Noted patient with discomfort at site (aphasic), brown drainage with surrounding edema at the site noted. Tylenol suppository PRN ordered for pain (no NG access due to family refusal).        Neisha Gil PA-C  Vascular Neurology  [phone]

## 2018-06-19 NOTE — PROCEDURES
Radiology Post-Procedure Note    Pre Op Diagnosis: G tube dislodged  Post Op Diagnosis: Same    Procedure: G tube replacement    Procedure performed by: Morteza Tijerina MD    Written Informed Consent Obtained: Yes  Specimen Removed: NO  Estimated Blood Loss: Minimal    Findings:   Successful G tube replacement.  20 Fr tube placed.  May use tomorrow.    Patient tolerated procedure well.    @SIG@

## 2018-06-19 NOTE — PT/OT/SLP PROGRESS
Occupational Therapy   Treatment    Name: Mendez Guthrie  MRN: [id number]  Admitting Diagnosis:  Embolic stroke involving right middle cerebral artery       Recommendations:     Discharge Recommendations: nursing facility, skilled  Discharge Equipment Recommendations:  hospital bed, lift device  Barriers to discharge:  None    Subjective     Communicated with: RN prior to session.  Pain/Comfort:  · Pain Rating 1: 0/10  · Pain Rating Post-Intervention 1: 0/10    Patients cultural, spiritual, Caodaism conflicts given the current situation:      Objective:     Patient found with: bed alarm, Condom Catheter, PEG Tube, pressure relief boots, telemetry, SCD, oxygen, peripheral IV.  Wife present.  Therapy tech (Hilda) assisted with session.    General Precautions: Standard, aspiration, aphasia, fall, NPO   Orthopedic Precautions:N/A   Braces: N/A     Occupational Performance:    Bed Mobility:    · Patient completed Rolling/Turning to Left with  total assistance  · Patient completed Rolling/Turning to Right with total assistance  · Patient completed Scooting/Bridging with total assistance  · Patient completed Supine to Sit with total assistance  · Patient completed Sit to Supine with total assistance     Functional Mobility/Transfers:  · Not assessed 2* pt closing eyes and not following commands.    Activities of Daily Living:  · Grooming: maximal assistance to maintain balance while seated EOB.  Pt placed cloth in mouth x 3 trials.  OT removed cloth and offered it to pt again instructing him to wash his cheeks and forehead; visual model provided.  Pt grasped cloth again and placed in mouth.      Patient left HOB elevated with all lines intact, call button in reach, bed alarm on, RN notified and wife present    [facility] 6 Click:  [facility] Total Score: 7    Treatment & Education:  *Pt sat EOB for ~22 minutes with Mod A-Max A required to maintain upright position.  *Pt performed weight bearing activity to promote increased  postural stability.  Pt leaned onto R forearm, held for 5 seconds, then pushed back upwards utilizing core muscles and RUE: 1 set x 5 reps with Max A-Total A.   *Pt attempted grooming task as noted above.  *PROM performed on LUE incorporating all joints: 4 sets x 10 reps.  *Pt performed 1 bilateral UE exercise to promote increased coordination: 1 set x 10 reps of bicep curls with Total A.  OT attempted to help pt grasp L hand with R hand, but pt did not hold on.  *OT asked pt to look up and look out the window to stretch cervical spine; poor follow through.  *OT asked pt to reach upwards and touch her hand to address dynamic sitting balance and ability to follow commands.  Pt performed 2 trials to command with RUE.  *AAROM performed on RUE: 2 sets x 10 reps  *POC reviewed with pt     Other:  *Pt threw wash cloth onto couch after placing it into mouth.  Later in session he motioned for wash cloth.  OT handed it back to him and he threw it on couch again.    Education:    Assessment:     Mendez Guthrie is a 68 y.o. male with a medical diagnosis of Embolic stroke involving right middle cerebral artery.  He presents with the following performance deficits affecting function: weakness, impaired endurance, impaired self care skills, impaired functional mobilty, impaired balance, impaired cognition, decreased coordination, decreased safety awareness, decreased lower extremity function, decreased upper extremity function, gait instability.  Pt demonstrated improvement with sitting balance this date maintaining upright position with Mod A-Max A while seated EOB.  Pt displayed ability to hold head up in neutral position and keep eyes open several times during session; however, did not perform task to command.  Pt did not follow majority of commands this date requiring consistent cues to help promote engagement in therapy.  Pt requires increased assist for ADLs and mobility at this time and would continue to benefit from  skilled OT services to address problems listed below and increase independence with ADLs.  He remains appropriate candidate for SNF.        Rehab Prognosis:  Good; patient would benefit from acute skilled OT services to address these deficits and reach maximum level of function.       Plan:     Patient to be seen 4 x/week to address the above listed problems via self-care/home management, therapeutic activities, therapeutic exercises, neuromuscular re-education  · Plan of Care Expires: 07/06/18  · Plan of Care Reviewed with: patient, spouse    This Plan of care has been discussed with the patient who was involved in its development and understands and is in agreement with the identified goals and treatment plan    GOALS:    Occupational Therapy Goals        Problem: Occupational Therapy Goal    Goal Priority Disciplines Outcome Interventions   Occupational Therapy Goal     OT, PT/OT Ongoing (interventions implemented as appropriate)    Description:  Goals to be met by: 6/25/2018     Patient will increase functional independence with ADLs by performing:    UE Dressing with Moderate Assistance.  LE Dressing with Maximum Assistance.  Grooming while seated with Contact Guard Assistance.  Toileting from bedside commode with Moderate Assistance for hygiene and clothing management.   Sitting at edge of bed x 15 minutes with Minimal Assistance.  (keep for consistency)  Supine to sit with Moderate Assistance.  Stand pivot transfers with Moderate Assistance.  Toilet transfer to bedside commode with Moderate Assistance.  Pt will initiate movement in LUE.  Pt will follow 50% of one step commands.  Pt will keep eyes open 100% of session.                         Time Tracking:     OT Date of Treatment: 06/19/18  OT Start Time: 1425  OT Stop Time: 1458  OT Total Time (min): 33 min    Billable Minutes:Therapeutic Activity 33    YOSEPH Magana  6/19/2018

## 2018-06-19 NOTE — ASSESSMENT & PLAN NOTE
69 y/o male with R MCA infarct with left sided weakness, facial droop, dysarthria and confusion. Right sided movement is minimal. Etiology likely atheroembolic vs cardioembolic due to EF of 15%. EEG completed and no seizure activity noted. Pt started on Modafinil 6/13; wakefulness now improved.    Hospital medicine following; sepsis, hypotension, hyponatremia now resolved, medicine de-escalated abx regimen. No longer likely that pt will require ICU step up.    Pt pulled out PEG 6/17 & 6/18 evening; ford placed to temporarily maintain patency.  PEG replacement 6/19. Ok to use on 6/20 per team    Dc modafinil     Antithrombotics: Eliquis 5 mg BID and ASA (will give rectal asa until able to use peg)  Statins: Crestor 40 mg daily  Aggressive risk factor modification: HTN, DM, HLD, Diet, Exercise, Obesity, CAD  Rehab efforts: PT/OT/SLP to evaluate and treat, PM&R consult   Dispo: SNF  Diagnostics ordered/pending: NA  VTE prophylaxis:  SCDs + hep vte until able to get eliquis  BP parameters: Infarct: Avoid hypotension

## 2018-06-19 NOTE — H&P
Inpatient Radiology Pre-procedure Note    History of Present Illness:  Mendez Guthrie is a 68 y.o. male who presents for G tube replacement.  Admission H&P reviewed.  Past Medical History:   Diagnosis Date    Arthritis     Asthma     COPD (chronic obstructive pulmonary disease)     Coronary artery disease     Diabetes mellitus     Embolic stroke involving right middle cerebral artery 6/6/2018    High cholesterol     Hypertension     Long term current use of antithrombotics/antiplatelets     Lung cancer     Stroke 6/6/2018     Past Surgical History:   Procedure Laterality Date    cardiac bypass      CARDIAC SURGERY  05/04/2016    PORTACATH PLACEMENT  2012    cancer treatment       Review of Systems:   As documented in primary team H&P    Home Meds:   Prior to Admission medications    Medication Sig Start Date End Date Taking? Authorizing Provider   aspirin 81 MG Chew Take 81 mg by mouth once daily.    Historical Provider, MD   blood sugar diagnostic Strp Inject 1 strip as directed 3 (three) times daily with meals. Dispense One Touch Ultra Strips Dx:E11.9 3/28/18   Valeria Mayen MD   carvedilol (COREG) 12.5 MG tablet Take 12.5 mg by mouth 2 (two) times daily with meals.    Historical Provider, MD   chlorthalidone (HYGROTEN) 25 MG Tab Take 25 mg by mouth once daily.    Historical Provider, MD   clopidogrel (PLAVIX) 75 mg tablet Take 75 mg by mouth once daily. 5/1/18   Historical Provider, MD   diazePAM (VALIUM) 5 MG tablet Take 1 tablet (5 mg total) by mouth every 6 (six) hours as needed for Anxiety. 10/24/17 5/10/18  Frank Saunders MD   docusate sodium (COLACE) 100 MG capsule Take 100 mg by mouth 2 (two) times daily as needed.     Historical Provider, MD   doxazosin (CARDURA) 2 MG tablet Take 1 tablet (2 mg total) by mouth every evening. 1/28/15 5/10/18  Valeria Mayen MD   fluticasone-salmeterol 250-50 mcg/dose (ADVAIR DISKUS) 250-50 mcg/dose diskus inhaler Inhale 1 puff into the  lungs 2 (two) times daily. Controller 3/28/18   Valeria Mayen MD   hydrocodone-acetaminophen 5-325mg (NORCO) 5-325 mg per tablet Take 1 tablet by mouth every 4 (four) hours as needed for Pain. 11/28/17   Valeria Mayen MD   insulin aspart U-100 (NOVOLOG) 100 unit/mL InPn pen Inject 10 Units into the skin before dinner. 3/28/18 3/28/19  Valeria Mayen MD   insulin detemir U-100 (LEVEMIR FLEXTOUCH U-100 INSULN) 100 unit/mL (3 mL) SubQ InPn pen Inject 15 Units into the skin every evening. Dispense with insulin pen needles 3/28/18 3/28/19  Valeria Mayen MD   linagliptin (TRADJENTA) 5 mg Tab tablet Take 1 tablet (5 mg total) by mouth once daily. 4/3/18 4/3/19  aVleria Mayen MD   losartan (COZAAR) 50 MG tablet Take 50 mg by mouth once daily.  3/7/15   Historical Provider, MD   metFORMIN (GLUCOPHAGE) 1000 MG tablet 'TAKE 1 TABLET BY MOUTH TWICE A DAY WITH MEALS 1/19/18   Valeria Mayen MD   nitroGLYCERIN (NITROSTAT) 0.4 MG SL tablet Place 1 tablet (0.4 mg total) under the tongue every 5 (five) minutes as needed for Chest pain. 8/22/16 5/10/18  Valeria Mayen MD   PROAIR HFA 90 mcg/actuation inhaler  12/26/17   Historical Provider, MD   rosuvastatin (CRESTOR) 40 MG Tab Take 1 tablet (40 mg total) by mouth every evening. 4/3/18   Valeria Mayen MD   SPIRIVA WITH HANDIHALER 18 mcg inhalation capsule inhale the contents of one capsule in the handihaler once daily 3/1/18   Maih Florence MD   traMADol (ULTRAM) 50 mg tablet take 1 tablet by mouth every 6 hours if needed 5/1/18   Valeria Mayen MD   VITAMIN D2 50,000 unit capsule TAKE 1 CAPSULE BY MOUTH EVERY 7 DAYS 1/27/18   Valeria Mayen MD     Scheduled Meds:    albuterol-ipratropium  3 mL Nebulization Q6H    ampicillin-sulbactim (UNASYN) IVPB  3 g Intravenous Q6H    apixaban  5 mg Per G Tube BID    citalopram  10 mg Per G Tube Daily    insulin detemir U-100  13 Units Subcutaneous BID     modafinil  100 mg Per G Tube Daily    risperidone 1 mg/ml  0.5 mg Oral Once    rosuvastatin  40 mg Per G Tube QHS    senna-docusate 8.6-50 mg  2 tablet Per G Tube BID    sodium chloride 0.9%  3 mL Intravenous Q8H     Continuous Infusions:   PRN Meds:acetaminophen, dextrose 50%, glucagon (human recombinant), HYDROcodone-acetaminophen, insulin aspart U-100, labetalol, lactulose, miconazole NITRATE 2 %, ondansetron, polyethylene glycol  Anticoagulants/Antiplatelets: no anticoagulation    Allergies:   Review of patient's allergies indicates:   Allergen Reactions    Lisinopril Swelling    Norvasc [amlodipine] Swelling     Sedation Hx: have not been any systemic reactions    Labs:  No results for input(s): INR in the last 168 hours.    Invalid input(s):  PT,  PTT    Recent Labs  Lab 06/19/18  0511   WBC 7.32   HGB 10.5*   HCT 33.8*   MCV 96   *      Recent Labs  Lab 06/13/18  0953  06/19/18  0511   *  < > 104   *  < > 141   K 4.4  < > 4.2   CL 87*  < > 97   CO2 32*  < > 31*   BUN 22  < > 16   CREATININE 0.9  < > 0.9   CALCIUM 10.0  < > 9.1   MG 1.7  --   --    ALT 32  < > 37   AST 70*  < > 41*   ALBUMIN 2.2*  < > 2.1*   BILITOT 0.5  < > 0.3   < > = values in this interval not displayed.      Vitals:  Temp: 97 °F (36.1 °C) (06/19/18 0727)  Pulse: 88 (06/19/18 0752)  Resp: 18 (06/19/18 0752)  BP: (!) 149/73 (06/19/18 0727)  SpO2: (!) 93 % (06/19/18 0752)     Physical Exam:  ASA: 3  Mallampati: unable to fully assess, appears less than 3, but given limited patient effort assessment it limited    General: no acute distress  Mental Status: alert and oriented to person, place and time  HEENT: normocephalic, atraumatic  Chest: unlabored breathing  Abdomen: nondistended  Extremity: moves all extremities    Plan: G tube replacement  Sedation Plan: up to moderate    Isai Villalta MD  Radiology

## 2018-06-19 NOTE — ASSESSMENT & PLAN NOTE
Stroke risk factor  Duonebs Q6 due to patient not being able to follow commands and RT not being able to administer inhalers   Currently maintaining O2 sats in the 90's on 2L NC  Medicine following; Appreciate recs  Pulmonary consulted, Signed off - f/u outpatient

## 2018-06-19 NOTE — PLAN OF CARE
Problem: Patient Care Overview  Goal: Plan of Care Review  POC reviewed with pt and family at 1400. Pt spouse verbalized understanding. Questions and concerns addressed. No acute events today. Pt progressing toward goals. Will continue to monitor. See flowsheets for full assessment and VS info.

## 2018-06-19 NOTE — PT/OT/SLP PROGRESS
"Speech Language Pathology Treatment    Patient Name:  Mendez Guthrie   MRN:  [id number]  Admitting Diagnosis: Embolic stroke involving right middle cerebral artery    Recommendations:                 General Recommendations:  Dysphagia therapy, Speech/language therapy and Cognitive-linguistic therapy  Diet recommendations:  NPO, Liquid Diet Level: NPO   Aspiration Precautions: Continue alternate means of nutrition and Strict aspiration precautions   General Precautions: Standard, aspiration, aphasia, fall, NPO  Communication strategies:  provide increased time to answer and go to room if call light pushed    Subjective     "uh uh" Pt shaking head no     Pain/Comfort:  · Pain Rating 1: 0/10  · Pain Rating Post-Intervention 1: 0/10    Objective:     Has the patient been evaluated by SLP for swallowing?   Yes  Keep patient NPO? Yes   Current Respiratory Status: nasal cannula      Pt seen bedside with spouse present, increased alertness noted given consistent stim.  Inconsistent y/n response elicited in response to conversation.  Simple command followed x1 prior to pt adamantly shaking his head and repeating, "uh uh" with further attempts.  When SLP moved to R side of bed, pt smiled and reached out with his hand to hold SLP's hand, smiling and attempting to count.  Mr. Simms did repeat 3 separate vowel sounds x1 each and stated 2 numbers when counting 1-5, 3 given max cues.  Repetitive behavior noted with counting task with pt stating each number 3-4 times each prior to moving on.  Pt using fingers to attempt to aid accy with counting task though unsuccessful.  Familiar music played to encourage non-confrontational humming/vocalizations.  Pt with large smile, nodding head in time with music though no vocalizations/mouthing achieved.  When SLP turned music off, pt gestured to turn back on.  Education provided to pt's wife re: language/cognitive stim and SLP POC.  She verbalized understanding.           Assessment: "     Mendez Guthrie is a 68 y.o. male with an SLP diagnosis of Aphasia, Dysphagia and Cognitive-Linguistic Impairment.      Goals:    SLP Goals        Problem: SLP Goal    Goal Priority Disciplines Outcome   SLP Goal     SLP Ongoing (interventions implemented as appropriate)   Description:  Speech Language Pathology Goals  Goals expected to be met by 6/21-all goals remain appropriate  1. Pt will participate in ongoing assessment of swallow.   2. Pt will answer simple y/n questions with 60% accy given repeats.  3. Pt will follow simple commands with 60% accy given mod A.  4. Pt will complete auto speech tasks with 50% accy with max A.                           Plan:     · Patient to be seen:  3 x/week   · Plan of Care expires:  06/07/18  · Plan of Care reviewed with:  patient, spouse   · SLP Follow-Up:  Yes       Discharge recommendations:  nursing facility, skilled   Barriers to Discharge:  Level of Skilled Assistance Needed      Time Tracking:     SLP Treatment Date:   06/19/18  Speech Start Time:  1130  Speech Stop Time:  1145     Speech Total Time (min):  15 min    Billable Minutes: Speech Therapy Individual 15    MADDI Webb, CCC-SLP  06/19/2018

## 2018-06-19 NOTE — PLAN OF CARE
Problem: Infection, Risk/Actual (Adult)  Goal: Identify Related Risk Factors and Signs and Symptoms  Related risk factors and signs and symptoms are identified upon initiation of Human Response Clinical Practice Guideline (CPG)   Outcome: Ongoing (interventions implemented as appropriate)  Plan of care reviewed with pt and wife at bedside. On restrains Non behavioral  (non violent) R wrist.    Assessments ongoing every two hours.No injury observed at R wrist.  VSS.  Pulse ox continous with 02 sats above 95%.   Resp unlaboured.  NPO.  Wife at bedside.  Bed in lowest position. Bed alarm on.  Will continue to monitor.

## 2018-06-19 NOTE — PROGRESS NOTES
[facility]  Vascular Neurology  Comprehensive Stroke Center  Progress Note    Assessment/Plan:     * Embolic stroke involving right middle cerebral artery    67 y/o male with R MCA infarct with left sided weakness, facial droop, dysarthria and confusion. Right sided movement is minimal. Etiology likely atheroembolic vs cardioembolic due to EF of 15%. EEG completed and no seizure activity noted. Pt started on Modafinil 6/13; wakefulness now improved.    Hospital medicine following; sepsis, hypotension, hyponatremia now resolved, medicine de-escalated abx regimen. No longer likely that pt will require ICU step up.    Pt pulled out PEG 6/17 & 6/18 evening; ford placed to temporarily maintain patency.  PEG replacement 6/19.  Per IR can use for meds but need to clamp for 1 hour post use     Dc modafinil     Antithrombotics: Eliquis 5 mg BID   Statins: Crestor 40 mg daily  Aggressive risk factor modification: HTN, DM, HLD, Diet, Exercise, Obesity, CAD  Rehab efforts: PT/OT/SLP to evaluate and treat, PM&R consult   Dispo: SNF  Diagnostics ordered/pending: NA  VTE prophylaxis:  SCDs + hep vte until able to get eliquis  BP parameters: Infarct: Avoid hypotension        Acute on chronic respiratory failure with hypoxia    Initially with worsening O2 requirements  Consulted Pulm and Medicine; Appreciate recs  Pulm signed off; Rec outpatient follow-up once more stable  Broad-spectrum abx de-escalated to Unasyn (needs 7 days total abx tx, end date 6/22)  Continue duonebs, CPT        Abnormal ventricular wall motion    Stroke risk factor  Evidence on echo  Pt on Eliquis        DVT of axillary vein, acute left    Eliquis started 6/13 - Held on 6/18 for PEG replacement        Chronic systolic (congestive) heart failure    Seen by cardiology   EF 15-20%, diastolic dysfunction  Recommending AC and ASA  Meds currently held due to hypotension        Dysarthria    Result of stroke  Aggressive SLP         Decreased  cardiac ejection fraction    EF 15-20% - seen on echo completed 6/7  Cardiac history of CABG in 2016  Cardiology consulted - recommending AC   Patient started on Eliquis 5 mg BID         Cytotoxic cerebral edema    Large area of cytotoxic cerebral edema identified when reviewing brain imaging in the territory of the R middle cerebral artery. There is not mass effect associated with it. We will continue to monitor the patients clinical exam for any worsening of symptoms which may indicate expansion of the stroke or the area of the edema resulting in the clinical change. The pattern is suggestive of atheroembolic etiology.        Left spastic hemiparesis    Due to stroke  Aggressive therapy        GENIE on CPAP    Stroke risk factor  Holding CPAP acutely due to risk aspiration, encephalopathy, pt nonverbal        Coronary artery disease involving native coronary artery of native heart without angina pectoris    Stoke risk factor  Eliquis 5 mg BID           Essential hypertension    Stroke risk factor  SBP <160  Home meds - coreg and losartan; Acutely held due to hypotension  Stable, no hypotension over last 24 hrs  (Allergy to lisinopril and Norvasc)        COPD (chronic obstructive pulmonary disease)    Stroke risk factor  Duonebs Q6 due to patient not being able to follow commands and RT not being able to administer inhalers   Currently maintaining O2 sats in the 90's on 2L NC  Medicine following; Appreciate recs  Pulmonary consulted, Signed off        History of lung cancer    Stroke risk factor  CT Chest this admission concerning  Pulmonology feels pt too ill for acute intervention; Signed off  Plan for outpatient follow up with pulmonologist Dr Saunders ([facility]) for further workup of CT findings when acute illness has improved        Type 2 diabetes mellitus without complication, with long-term current use of insulin    Stroke risk factor  HgB A1C 9.4  Detemir 13U BID -- Held while pt with no access, NPO for  PEG replacement  SSI Q6 PRN             67 y/o male wth R MCA infarct. Had been at [facility] since 6-4-18 for AMS and weakness that improved and then worsened now with facial droop and left sided weakness. 6-6-18 fever so infectious w/u in process could be PNA as he was coughing with puree food at [facility]. He was placed on ABX Amp/Vanc/valcyclovir for possible meningitis this is stopped as he does not have meningitis. He moved around too much and was clausterphobic for MRI so will attempt MRI here.   6/8 - Urine studies ordered for hyponatremia. EEG results pending. Bilateral upper/lower extremities US ordered for fever. Procal WNL. Infectious workup negative so far. Cardiology consulted for EF 15-20%.   6-9-18 will give lasix 40 mg NG today as per cardiology recs. Discussion with family regarding anticoagulation due to low EF and DVT left axillary vein and brachial vein, discussion regarding feeding as well and likely bernard of patient swallowing is minimal so will need PEG next week as opposed to waiting and family is in agreement so will start heparin drip with no bolus parameters  6-10-18 once EEG read and no seizure activity may start Modafinil to help with wakefulness  6/11 - EEG to be read today and consider Modafinil. IR consulted for PEG placement tomorrow. Hospital medicine ordering urine studies for hyponatremia.   6/12 - PEG to be placed today. Heparin gtt currently held. Will resume once PEG is placed. AC to start tomorrow once PEG is able to be used. Hyponatremia slightly improving. Continue to hold fluids due to hyponatremia thought to be due to SIADH. Modafinil to be started tomorrow when PEG can be used.  6/13 - PEG placed yesterday. Heparin gtt dc'ed and eliquis started. Modafinil started today. Continuing to restrict fluids (enteral water boluses) due to SIADH/hyponatremia and holding off on lasix that cardiology recommended.  6/14/18 - concern for sepsis today - medicine consulted - hypotensive,  febrile, procal elevated.   6/15: Hypotensive to 88/36 overnight; good response to 250cc bolus. Hospital medicine adjusting insulin and antibiotics regimen. Wakefulness much improved since starting Modafinil. Mild bleeding noted at NC site.  6/16: Pt hypotensive, requiring another bolus yesterday; BP so far stable since. Modafinil held today as family felt made pt more aggressive; plan for decreased dose tomorrow. Added Lactulose PRN to bowel regimen.  6/17: Pt clinically improved from infection/respiratory perspective; medicine de-escalated abx regimen. No further hypotension episodes. Reduced-dose Modafinil today; will monitor response.  6/18: Pt pulled out PEG yesterday evening; ford placed to maintain patency. Attempted to place NGT but pt agitated, family refused. PO meds held for now, including Eliquis (per IR request). D/c'd scheduled suppository, pt on Senna.  6/19 - pulled ford from peg site last night - see note. Replaced in IR today. Patient in wrist restraint with abdominal binder. Family at bedside, educated on importance of protection of peg site. Dc modafinil     STROKE DOCUMENTATION        NIH Scale:  1a. Level Of Consciousness: 0-->Alert: keenly responsive  1b. LOC Questions: 2-->Answers neither question correctly  1c. LOC Commands: 1-->Performs one task correctly  2. Best Gaze: 1-->Partial gaze palsy: gaze is abnormal in one or both eyes, but forced deviation or total gaze paresis is not present  3. Visual: 2-->Complete hemianopia  4. Facial Palsy: 2-->Partial paralysis (total or near-total paralysis of lower face)  5a. Motor Arm, Left: 4-->No movement  5b. Motor Arm, Right: 1-->Drift: limb holds 90 (or 45) degrees, but drifts down before full 10 secs: does not hit bed or other support  6a. Motor Leg, Left: 3-->No effort against gravity: leg falls to bed immediately  6b. Motor Leg, Right: 2-->Some effort against gravity: leg falls to bed by 5 secs, but has some effort against gravity  7. Limb  Ataxia: 0-->Absent  8. Sensory: 0-->Normal: no sensory loss  9. Best Language: 2-->Severe aphasia: all communication is through fragmentary expression: great need for inference, questioning, and guessing by the listener. Range of information that can be exchanged is limited: listener carries burden of. . . (see row details)  10. Dysarthria: 2-->Severe dysarthria: patients speech is so slurred as to be unintelligible in the absence of or out of proportion to any dysphasia, or is mute/anarthric  11. Extinction and Inattention (formerly Neglect): 0-->No abnormality  Total (NIH Stroke Scale): 22       Modified Isabella Score: 0  [place] Coma Scale:    ABCD2 Score:    [id number] Score:   HAS -BLED Score:   ICH Score:   Hunt & De Paz Classification:      Hemorrhagic change of an Ischemic Stroke: Does this patient have an ischemic stroke with hemorrhagic changes? No     Neurologic Chief Complaint: R MCA infarct     Subjective:     Interval History: Patient is seen for follow-up neurological assessment and treatment recommendations:     pulled ford from peg site last night - see note. Replaced in IR today. Patient in wrist restraint with abdominal binder. Family at bedside, educated on importance of protection of peg site. Dc modafinil     HPI, Past Medical, Family, and Social History remains the same as documented in the initial encounter.     Review of Systems   Constitutional: Negative for diaphoresis and fever.   HENT: Positive for trouble swallowing. Negative for nosebleeds.    Neurological: Positive for facial asymmetry, speech difficulty and weakness.   Psychiatric/Behavioral: Positive for agitation and confusion.     Scheduled Meds:   albuterol-ipratropium  3 mL Nebulization Q6H    ampicillin-sulbactim (UNASYN) IVPB  3 g Intravenous Q6H    apixaban  5 mg Per G Tube BID    citalopram  10 mg Per G Tube Daily    insulin detemir U-100  13 Units Subcutaneous BID    risperidone 1 mg/ml  0.5 mg Oral Once     rosuvastatin  40 mg Per G Tube QHS    senna-docusate 8.6-50 mg  2 tablet Per G Tube BID    sodium chloride 0.9%  3 mL Intravenous Q8H     Continuous Infusions:    PRN Meds:acetaminophen, dextrose 50%, glucagon (human recombinant), HYDROcodone-acetaminophen, insulin aspart U-100, labetalol, lactulose, miconazole NITRATE 2 %, ondansetron, polyethylene glycol    Objective:     Vital Signs (Most Recent):  Temp: 98.4 °F (36.9 °C) (06/19/18 1208)  Pulse: 88 (06/19/18 1223)  Resp: 20 (06/19/18 1223)  BP: 131/73 (06/19/18 1208)  SpO2: 98 % (06/19/18 1223)  BP Location: Right arm    Vital Signs Range (Last 24H):  Temp:  [97 °F (36.1 °C)-99 °F (37.2 °C)]   Pulse:  [80-95]   Resp:  [16-20]   BP: (127-149)/(55-73)   SpO2:  [93 %-98 %]   BP Location: Right arm    Physical Exam   Constitutional: He appears well-developed and well-nourished. No distress.   HENT:   Head: Normocephalic and atraumatic.   Eyes: Conjunctivae are normal. No scleral icterus.   Cardiovascular: Normal rate.    Pulmonary/Chest: Effort normal. No respiratory distress.   Musculoskeletal: He exhibits no edema or tenderness.   Neurological: He is alert.   Skin: Skin is warm and dry.   Vitals reviewed.      Neurological Exam:   LOC: Alert  Attention Span: Good  Language: Global aphasia  Articulation: Dysarthria  Orientation: Untestable due to severe aphasia   Facial Movement (CN VII): Lower facial weakness on the Left  Vision: L hemianopsia  Gaze: R gaze preference  Motor: Arm left  1/5  Leg left  Paresis: 2/5  Arm right  Normal 4/5  Leg right Paresis: 3/5  Sensation: Intact to light touch, temperature      Laboratory:  CMP:     Recent Labs  Lab 06/19/18  0511   CALCIUM 9.1   ALBUMIN 2.1*   PROT 6.5      K 4.2   CO2 31*   CL 97   BUN 16   CREATININE 0.9   ALKPHOS 79   ALT 37   AST 41*   BILITOT 0.3     BMP:     Recent Labs  Lab 06/19/18  0511      K 4.2   CL 97   CO2 31*   BUN 16   CREATININE 0.9   CALCIUM 9.1     CBC:     Recent Labs  Lab  06/19/18  0511   WBC 7.32   RBC 3.52*   HGB 10.5*   HCT 33.8*   *   MCV 96   MCH 29.8   MCHC 31.1*     Lipid Panel:   No results for input(s): CHOL, LDLCALC, HDL, TRIG in the last 168 hours.  Coagulation:   No results for input(s): PT, INR, APTT in the last 168 hours.  Platelet Aggregation Study: No results for input(s): PLTAGG, PLTAGINTERP, PLTAGREGLACO, ADPPLTAGGREG in the last 168 hours.  Hgb A1C:   No results for input(s): HGBA1C in the last 168 hours.  TSH:   No results for input(s): TSH in the last 168 hours.      Diagnostic Results     Brain Imaging   CT 6/6   Large right MCA distribution infarct measuring approximately 7 x 3 cm including the right perisylvian region and extending to the frontal and parietal operculum with associated localized mass effect.    Vessel Imaging   US BUE 6/9/18  Deep venous thrombus in the left axillary vein with additional thrombus in the proximal segment of the branchial vein.  No evidence of DVT in the right upper extremity.    CTA 6/8  CTA head: Occlusion right M2 segment of the MCA within the proximal sylvian fissure..  Heavy atherosclerotic plaquing of the cavernous and supraclinoid ICAs with mild moderate right and mild left cavernous ICA stenosis.  There is diffuse small caliber right M1 segment of the MCA.  Irregularity and narrowing of the right distal vertebral artery concerning for atherosclerotic disease with mild moderate stenosis.  CTA neck: Atherosclerotic plaquing of the carotid bifurcations and proximal ICAs with less than 50% proximal ICA stenosis by NASCET criteria.  CT head: Evolving large right MCA distribution infarction.  Localized mass effect without significant midline shift or hydrocephalus.  No evidence for hemorrhagic conversion.  Clinical correlation and follow-up advised.        Cardiac Imaging   Echo 6/7  CONCLUSIONS     1 - Technically difficult study.     2 - Severely depressed left ventricular systolic function (EF 15-20%).     3 - Mildly  to moderately depressed right ventricular systolic function .     4 - Impaired LV relaxation, normal LAP (grade 1 diastolic dysfunction).     5 - Mild aortic stenosis, WIL = 1.45 cm2, AVAi = 0.67 cm2/m2, peak velocity = 2.8 m/s, mean gradient = 18 mmHg.     6 - Mild mitral regurgitation.       Other Imaging  CT chest 6/14/18  1. Recent development consolidation in right middle and lower lobes consistent with pneumonia or aspiration.  Endobronchial material in right lower lobe airways with intermittent obstruction, could represent retained secretions or aspirated material.  Correlate clinically.  Recommend noncontrast chest CT follow-up in 3 months.  2. Chronic right upper lobe consolidation and volume loss consistent with radiation fibrosis in patient with history of previous lung cancer.  3. Chronic loculation of pleural fluid in the right sulcus is stable.  4. A few subcentimeter pulmonary nodules, stable and likely benign  5. Aortic and coronary atherosclerosis, status post CABG  6. Gastrostomy tube  7. Other findings as above      DELON Perez  Comprehensive Stroke Center  Department of Vascular Neurology   [facility]

## 2018-06-19 NOTE — PLAN OF CARE
[facility] Medicine Co-management Care Update:      Chart reviewed from last 24 hours- pt pulled ford in PEG site again overnight. Plan for IR placement today.     Recommendations:     - Continue Unasyn -Will need 7 days total of abx, last day today (end date placed)  - Goal oxygen saturation 88-92% in patient with COPD; please titrate to this (oxygen order adjusted in Epic)  - Continue duonebs Q6H and chest PT QID  -will need to resume eliquis after IR PEG     PEG site reviewed after insertion this AM - mild erythema noted, likely 2/2 to trauma from pt pulling at site previously. No concerns for active infection at this time.     Above discussed with staff.

## 2018-06-19 NOTE — ASSESSMENT & PLAN NOTE
67 y/o male with R MCA infarct with left sided weakness, facial droop, dysarthria and confusion. Right sided movement is minimal. Etiology likely atheroembolic vs cardioembolic due to EF of 15%. EEG completed and no seizure activity noted. Pt started on Modafinil 6/13; wakefulness now improved.    Hospital medicine following; sepsis, hypotension, hyponatremia now resolved, medicine de-escalated abx regimen. No longer likely that pt will require ICU step up.    Pt pulled out PEG 6/17 & 6/18 evening; ford placed to temporarily maintain patency.  PEG replacement 6/19. Meds and Tube feedings to be resumed 6/20. Patient in right arm wrist restraint and abdominal binder.    Dc modafinil     Antithrombotics: Eliquis 5 mg BID    Statins: Crestor 40 mg daily  Aggressive risk factor modification: HTN, DM, HLD, Diet, Exercise, Obesity, CAD  Rehab efforts: PT/OT/SLP to evaluate and treat, PM&R consult   Dispo: SNF  Diagnostics ordered/pending: NA  VTE prophylaxis:  SCDs + eliquis  BP parameters: Infarct: Avoid hypotension

## 2018-06-19 NOTE — ASSESSMENT & PLAN NOTE
Seen by cardiology   EF 15-20%, diastolic dysfunction  Recommending AC - apixaban 5 mg BID   Meds currently held due to hypotension

## 2018-06-20 NOTE — PROGRESS NOTES
[facility]  Vascular Neurology  Comprehensive Stroke Center  Progress Note    Assessment/Plan:     * Embolic stroke involving right middle cerebral artery    69 y/o male with R MCA infarct with left sided weakness, facial droop, dysarthria and confusion. Right sided movement is minimal. Etiology likely atheroembolic vs cardioembolic due to EF of 15%. EEG completed and no seizure activity noted. Pt started on Modafinil 6/13; wakefulness now improved.    Hospital medicine following; sepsis, hypotension, hyponatremia now resolved, medicine de-escalated abx regimen. No longer likely that pt will require ICU step up.    Pt pulled out PEG 6/17 & 6/18 evening; ford placed to temporarily maintain patency.  PEG replacement 6/19. Meds and Tube feedings to be resumed 6/20. Patient in right arm wrist restraint and abdominal binder.    Dc modafinil     Antithrombotics: Eliquis 5 mg BID    Statins: Crestor 40 mg daily  Aggressive risk factor modification: HTN, DM, HLD, Diet, Exercise, Obesity, CAD  Rehab efforts: PT/OT/SLP to evaluate and treat, PM&R consult   Dispo: SNF  Diagnostics ordered/pending: NA  VTE prophylaxis:  SCDs + eliquis  BP parameters: Infarct: Avoid hypotension        Acute on chronic respiratory failure with hypoxia    Initially with worsening O2 requirements  Consulted Pulm and Medicine; Appreciate recs  Pulm signed off; Rec outpatient follow-up once more stable  Broad-spectrum abx de-escalated to Unasyn (course completed)  Continue duonebs, CPT        Abnormal ventricular wall motion    Stroke risk factor  Evidence on echo  Pt on Eliquis        DVT of axillary vein, acute left    Eliquis started 6/13        Chronic systolic (congestive) heart failure    Seen by cardiology   EF 15-20%, diastolic dysfunction  Recommending AC - apixaban 5 mg BID   Meds currently held due to hypotension        Dysarthria    Result of stroke  Aggressive SLP         Decreased cardiac ejection fraction    EF  15-20% - seen on echo completed 6/7  Cardiac history of CABG in 2016  Cardiology consulted - recommending AC and asa  Patient started on Eliquis 5 mg BID        Cytotoxic cerebral edema    Large area of cytotoxic cerebral edema identified when reviewing brain imaging in the territory of the R middle cerebral artery. There is not mass effect associated with it. We will continue to monitor the patients clinical exam for any worsening of symptoms which may indicate expansion of the stroke or the area of the edema resulting in the clinical change. The pattern is suggestive of atheroembolic etiology.        Left spastic hemiparesis    Due to stroke  Aggressive therapy        GENIE on CPAP    Stroke risk factor  Holding CPAP acutely due to risk aspiration, encephalopathy, pt nonverbal        Coronary artery disease involving native coronary artery of native heart without angina pectoris    Stoke risk factor  Eliquis 5 mg BID         Essential hypertension    Stroke risk factor  SBP <160  Home meds - coreg and losartan; Acutely held due to hypotension  Stable, no hypotension over last 24 hrs  (Allergy to lisinopril and Norvasc)        COPD (chronic obstructive pulmonary disease)    Stroke risk factor  Duonebs Q6 due to patient not being able to follow commands and RT not being able to administer inhalers   Currently maintaining O2 sats in the 90's on 2L NC  Medicine following; Appreciate recs  Pulmonary consulted, Signed off - f/u outpatient         History of lung cancer    Stroke risk factor  CT Chest this admission concerning  Pulmonology feels pt too ill for acute intervention; Signed off  Plan for outpatient follow up with pulmonologist Dr Saunders ([facility]) for further workup of CT findings when acute illness has improved        Type 2 diabetes mellitus without complication, with long-term current use of insulin    Stroke risk factor  HgB A1C 9.4  Detemir 13U BID   SSI Q6 PRN             67 y/o male wth R MCA  infarct. Had been at [facility] since 6-4-18 for AMS and weakness that improved and then worsened now with facial droop and left sided weakness. 6-6-18 fever so infectious w/u in process could be PNA as he was coughing with puree food at [facility]. He was placed on ABX Amp/Vanc/valcyclovir for possible meningitis this is stopped as he does not have meningitis. He moved around too much and was clausterphobic for MRI so will attempt MRI here.   6/8 - Urine studies ordered for hyponatremia. EEG results pending. Bilateral upper/lower extremities US ordered for fever. Procal WNL. Infectious workup negative so far. Cardiology consulted for EF 15-20%.   6-9-18 will give lasix 40 mg NG today as per cardiology recs. Discussion with family regarding anticoagulation due to low EF and DVT left axillary vein and brachial vein, discussion regarding feeding as well and likely bernard of patient swallowing is minimal so will need PEG next week as opposed to waiting and family is in agreement so will start heparin drip with no bolus parameters  6-10-18 once EEG read and no seizure activity may start Modafinil to help with wakefulness  6/11 - EEG to be read today and consider Modafinil. IR consulted for PEG placement tomorrow. Hospital medicine ordering urine studies for hyponatremia.   6/12 - PEG to be placed today. Heparin gtt currently held. Will resume once PEG is placed. AC to start tomorrow once PEG is able to be used. Hyponatremia slightly improving. Continue to hold fluids due to hyponatremia thought to be due to SIADH. Modafinil to be started tomorrow when PEG can be used.  6/13 - PEG placed yesterday. Heparin gtt dc'ed and eliquis started. Modafinil started today. Continuing to restrict fluids (enteral water boluses) due to SIADH/hyponatremia and holding off on lasix that cardiology recommended.  6/14/18 - concern for sepsis today - medicine consulted - hypotensive, febrile, procal elevated.   6/15: Hypotensive to 88/36 overnight;  good response to 250cc bolus. Hospital medicine adjusting insulin and antibiotics regimen. Wakefulness much improved since starting Modafinil. Mild bleeding noted at NC site.  6/16: Pt hypotensive, requiring another bolus yesterday; BP so far stable since. Modafinil held today as family felt made pt more aggressive; plan for decreased dose tomorrow. Added Lactulose PRN to bowel regimen.  6/17: Pt clinically improved from infection/respiratory perspective; medicine de-escalated abx regimen. No further hypotension episodes. Reduced-dose Modafinil today; will monitor response.  6/18: Pt pulled out PEG yesterday evening; ford placed to maintain patency. Attempted to place NGT but pt agitated, family refused. PO meds held for now, including Eliquis (per IR request). D/c'd scheduled suppository, pt on Senna.  6/19 - pulled frod from peg site last night - see note. Replaced in IR today. Patient in wrist restraint with abdominal binder. Family at bedside, educated on importance of protection of peg site. Dc modafinil   6/20 - Meds and TF resumed through PEG tube. Restraint still applied to right arm and abdominal binder applied. Unasyn completed.     STROKE DOCUMENTATION        NIH Scale:  1a. Level Of Consciousness: 0-->Alert: keenly responsive  1b. LOC Questions: 2-->Answers neither question correctly  1c. LOC Commands: 1-->Performs one task correctly  2. Best Gaze: 1-->Partial gaze palsy: gaze is abnormal in one or both eyes, but forced deviation or total gaze paresis is not present  3. Visual: 2-->Complete hemianopia  4. Facial Palsy: 2-->Partial paralysis (total or near-total paralysis of lower face)  5a. Motor Arm, Left: 4-->No movement  5b. Motor Arm, Right: 1-->Drift: limb holds 90 (or 45) degrees, but drifts down before full 10 secs: does not hit bed or other support  6a. Motor Leg, Left: 3-->No effort against gravity: leg falls to bed immediately  6b. Motor Leg, Right: 2-->Some effort against gravity: leg falls  to bed by 5 secs, but has some effort against gravity  7. Limb Ataxia: 0-->Absent  8. Sensory: 0-->Normal: no sensory loss  9. Best Language: 2-->Severe aphasia: all communication is through fragmentary expression: great need for inference, questioning, and guessing by the listener. Range of information that can be exchanged is limited: listener carries burden of. . . (see row details)  10. Dysarthria: 2-->Severe dysarthria: patients speech is so slurred as to be unintelligible in the absence of or out of proportion to any dysphasia, or is mute/anarthric  11. Extinction and Inattention (formerly Neglect): 0-->No abnormality  Total (NIH Stroke Scale): 22       Modified Wolverine Score: 0  [place] Coma Scale:    ABCD2 Score:    [id number] Score:   HAS -BLED Score:   ICH Score:   Hunt & De Paz Classification:      Hemorrhagic change of an Ischemic Stroke: Does this patient have an ischemic stroke with hemorrhagic changes? No     Neurologic Chief Complaint: R MCA infarct     Subjective:     Interval History: Patient is seen for follow-up neurological assessment and treatment recommendations:     Meds and TF resumed through PEG tube. Restraint still applied to right arm and abdominal binder applied. Unasyn completed.     HPI, Past Medical, Family, and Social History remains the same as documented in the initial encounter.     Review of Systems   Constitutional: Negative for diaphoresis and fever.   HENT: Positive for trouble swallowing. Negative for nosebleeds.    Neurological: Positive for facial asymmetry, speech difficulty and weakness.   Psychiatric/Behavioral: Positive for agitation and confusion.     Scheduled Meds:   albuterol-ipratropium  3 mL Nebulization Q6H    apixaban  5 mg Per G Tube BID    citalopram  10 mg Per G Tube Daily    insulin detemir U-100  13 Units Subcutaneous BID    risperidone 1 mg/ml  0.5 mg Oral Once    rosuvastatin  40 mg Per G Tube QHS    senna-docusate 8.6-50 mg  2 tablet Per G Tube  BID    sodium chloride 0.9%  3 mL Intravenous Q8H     Continuous Infusions:    PRN Meds:acetaminophen, dextrose 50%, glucagon (human recombinant), HYDROcodone-acetaminophen, insulin aspart U-100, labetalol, lactulose, miconazole NITRATE 2 %, ondansetron, polyethylene glycol    Objective:     Vital Signs (Most Recent):  Temp: 97.9 °F (36.6 °C) (06/20/18 0359)  Pulse: 92 (06/20/18 0733)  Resp: 16 (06/20/18 0733)  BP: (!) 105/59 (06/20/18 0359)  SpO2: 96 % (06/20/18 0733)  BP Location: Right arm    Vital Signs Range (Last 24H):  Temp:  [97.9 °F (36.6 °C)-98.7 °F (37.1 °C)]   Pulse:  [80-98]   Resp:  [16-20]   BP: (105-141)/(59-73)   SpO2:  [94 %-98 %]   BP Location: Right arm    Physical Exam   Constitutional: He appears well-developed and well-nourished. No distress.   HENT:   Head: Normocephalic and atraumatic.   Eyes: Conjunctivae are normal. No scleral icterus.   Cardiovascular: Normal rate.    Pulmonary/Chest: Effort normal. No respiratory distress.   Musculoskeletal: He exhibits no edema or tenderness.   Neurological: He is alert.   Skin: Skin is warm and dry.   Vitals reviewed.      Neurological Exam:   LOC: Alert  Attention Span: Good  Language: Global aphasia  Articulation: Dysarthria  Orientation: Untestable due to severe aphasia   Facial Movement (CN VII): Lower facial weakness on the Left  Vision: L hemianopsia  Gaze: R gaze preference  Motor: Arm left  1/5  Leg left  Paresis: 2/5  Arm right  Normal 4/5  Leg right Paresis: 3/5  Sensation: Intact to light touch, temperature      Laboratory:  CMP:     Recent Labs  Lab 06/20/18  0607   CALCIUM 9.4   ALBUMIN 2.2*   PROT 6.9      K 4.3   CO2 31*   CL 97   BUN 18   CREATININE 0.8   ALKPHOS 84   ALT 33   AST 36   BILITOT 0.3     BMP:     Recent Labs  Lab 06/20/18  0607      K 4.3   CL 97   CO2 31*   BUN 18   CREATININE 0.8   CALCIUM 9.4     CBC:     Recent Labs  Lab 06/20/18  0607   WBC 7.91   RBC 3.77*   HGB 11.2*   HCT 35.7*   *   MCV 95    MCH 29.7   MCHC 31.4*     Lipid Panel:   No results for input(s): CHOL, LDLCALC, HDL, TRIG in the last 168 hours.  Coagulation:   No results for input(s): PT, INR, APTT in the last 168 hours.  Platelet Aggregation Study: No results for input(s): PLTAGG, PLTAGINTERP, PLTAGREGLACO, ADPPLTAGGREG in the last 168 hours.  Hgb A1C:   No results for input(s): HGBA1C in the last 168 hours.  TSH:   No results for input(s): TSH in the last 168 hours.      Diagnostic Results     Brain Imaging   CT 6/6   Large right MCA distribution infarct measuring approximately 7 x 3 cm including the right perisylvian region and extending to the frontal and parietal operculum with associated localized mass effect.    Vessel Imaging   US BUE 6/9/18  Deep venous thrombus in the left axillary vein with additional thrombus in the proximal segment of the branchial vein.  No evidence of DVT in the right upper extremity.    CTA 6/8  CTA head: Occlusion right M2 segment of the MCA within the proximal sylvian fissure..  Heavy atherosclerotic plaquing of the cavernous and supraclinoid ICAs with mild moderate right and mild left cavernous ICA stenosis.  There is diffuse small caliber right M1 segment of the MCA.  Irregularity and narrowing of the right distal vertebral artery concerning for atherosclerotic disease with mild moderate stenosis.  CTA neck: Atherosclerotic plaquing of the carotid bifurcations and proximal ICAs with less than 50% proximal ICA stenosis by NASCET criteria.  CT head: Evolving large right MCA distribution infarction.  Localized mass effect without significant midline shift or hydrocephalus.  No evidence for hemorrhagic conversion.  Clinical correlation and follow-up advised.        Cardiac Imaging   Echo 6/7  CONCLUSIONS     1 - Technically difficult study.     2 - Severely depressed left ventricular systolic function (EF 15-20%).     3 - Mildly to moderately depressed right ventricular systolic function .     4 - Impaired LV  relaxation, normal LAP (grade 1 diastolic dysfunction).     5 - Mild aortic stenosis, WIL = 1.45 cm2, AVAi = 0.67 cm2/m2, peak velocity = 2.8 m/s, mean gradient = 18 mmHg.     6 - Mild mitral regurgitation.       Other Imaging  CT chest 6/14/18  1. Recent development consolidation in right middle and lower lobes consistent with pneumonia or aspiration.  Endobronchial material in right lower lobe airways with intermittent obstruction, could represent retained secretions or aspirated material.  Correlate clinically.  Recommend noncontrast chest CT follow-up in 3 months.  2. Chronic right upper lobe consolidation and volume loss consistent with radiation fibrosis in patient with history of previous lung cancer.  3. Chronic loculation of pleural fluid in the right sulcus is stable.  4. A few subcentimeter pulmonary nodules, stable and likely benign  5. Aortic and coronary atherosclerosis, status post CABG  6. Gastrostomy tube  7. Other findings as above      Colleen Hannah NP  Comprehensive Stroke Center  Department of Vascular Neurology   [facility]

## 2018-06-20 NOTE — PT/OT/SLP PROGRESS
Physical Therapy Treatment    Patient Name:  Mendez Guthrie   MRN:  [id number]  Admitting Diagnosis: Embolic stroke involving right middle cerebral artery  Recent Surgery: * No surgery found *      Recommendations:     Discharge Recommendations:  nursing facility, skilled   Discharge Equipment Recommendations: hospital bed, lift device   Barriers to discharge: Decreased caregiver support    Plan:     During this hospitalization, patient to be seen 4 x/week to address the above listed problems via gait training, therapeutic activities, therapeutic exercises, neuromuscular re-education  · Plan of Care Expires:  07/07/18   Plan of Care Reviewed with: patient, spouse    This Plan of care has been discussed with the patient who was involved in its development and understands and is in agreement with the identified goals and treatment plan    Subjective     Communicated with RN (Juliana) prior to session.     Patient comments: Pt non-verbal  Pain/Comfort:  · Pain Rating 1: 0/10  · Pain Rating Post-Intervention 1: 0/10    Objective:     Patient found with: bed alarm, Condom Catheter, oxygen, PEG Tube, pressure relief boots, peripheral IV, SCD, telemetry (R UE restraint not fastened)    Patient found sup in bed upon PT entry to room, agreeable to treatment.  Wife present in the room.    General Precautions: Standard, Cardiac aphasia, aspiration, NPO   Orthopedic Precautions:N/A   Braces: N/A       BED MOBILITY (vc's for hand placement sequencing of task):        Rolling to the R with max A with no use of bedrail       Sup > sit at the EOB with total A from R side lying        Sit > sup with max A       Scooting hips to the EOB upon sitting with mod/max A x2-3 scoots       Scooting hips along the EOB to the R requiring max A x3 scoots          SITTING AT THE EDGE OF THE BED (20 min)   Assistance Level Required: mod A for trunk/head control with R UE support and L UE in weight bearing position on firm surface     Postural  deviations noted: flexed trunk, PPT, rounded shoulders, forward head, flexed cervical spine, L post lean, no self right reactions   Encouraged: upright posture, midline orientation, eyes open, WB'ng through L UE        TRANSFERS     Sit <> Stand Transfer from EOB NP 2* Pt not appropriate 2* decreased trunk control while sitting at the EOB poor command following     THERAPEUTIC ACTIVITIES/EXERCISES  Pt receives PROM to L LE sup in bed (ankle DF, hip flex, hip add/abd, hip IR/ER)  x10 reps     EDUCATION  Education provided to pt regarding: postural control, weight shift, attention to the L.    They were provided and educated on proper positioning in supine and in sitting with support of affected L UE in order to increase awareness of it and to decrease the effects of immobility, specifically edema and pain.     Whiteboard updated with correct mobility information. RN/PCT notified.  Pt appropriate for OOB transfer to medichair with RN/PCT via drawsheet    Patient left right sidelying, with L UE propped on pillow for scapular support and edema management with all lines intact, call button in reach, bed alarm on, restraints reapplied at end of session, RN notified and wife present    AM-PAC 6 CLICK MOBILITY  Turning over in bed (including adjusting bedclothes, sheets and blankets)?: 2  Sitting down on and standing up from a chair with arms (e.g., wheelchair, bedside commode, etc.): 1  Moving from lying on back to sitting on the side of the bed?: 1  Moving to and from a bed to a chair (including a wheelchair)?: 1  Need to walk in hospital room?: 1  Climbing 3-5 steps with a railing?: 1  Total Score: 7     Assessment:     Mendez Guthrie is a 68 y.o. male admitted with a medical diagnosis of Embolic stroke involving right middle cerebral artery.  He presents with the following impairments/functional limitations:  weakness, impaired endurance, impaired sensation, impaired self care skills, impaired functional mobilty,  impaired balance, visual deficits, impaired cognition, decreased coordination, decreased upper extremity function, decreased lower extremity function, decreased safety awareness, abnormal tone, decreased ROM, impaired coordination, impaired fine motor, edema, impaired joint extensibility. L hemiparesis requiring significant assistance and verbal cues for bed mob, scooting to/along the EOB, static sitting 2* weakness, cognitive deficits and decreased attention to the L.   In light of pt's current functional level and deficits, it is anticipated that pt will need to participate in an intense rehab program consisting of PT, OT and ST in order to achieve full rehab potential to return to previous level of function and roles.  Pt will cont to benefit from skilled PT intervention to address deficits and improve functional mobility.     Rehab Prognosis:  Fair; patient would benefit from acute skilled PT services to address these deficits and reach maximum level of function.      GOALS:    Physical Therapy Goals        Problem: Physical Therapy Goal    Goal Priority Disciplines Outcome Goal Variances Interventions   Physical Therapy Goal     PT/OT, PT Ongoing (interventions implemented as appropriate)     Description:  Goals to be met by: 2018     Patient will increase functional independence with mobility by performin. Supine to sit with Moderate Assistance  2. Sit to supine with Moderate Assistance  3. Sit to stand transfer with Maximum Assistance  4. Bed to chair transfer with Maximum Assistance  5. Gait  x 10 feet with Maximum Assistance with or without appropriate AD   6. Sitting at edge of bed x10 minutes with Minimal Assistance  7. Lower extremity exercise program x15 reps per handout, with assistance as needed                       Time Tracking:     PT Received On: 18  PT Start Time: 1013     PT Stop Time: 1056  PT Total Time (min): 43 min     Billable Minutes: Therapeutic Activity 13,  Therapeutic Exercise 10 and Neuromuscular Re-education 20    Treatment Type: Treatment  PT/PTA: PTA     PTA Visit Number: 2       Lizett Ortiz PTA.  Pager [phone]    6/20/2018    .

## 2018-06-20 NOTE — SUBJECTIVE & OBJECTIVE
Neurologic Chief Complaint: R MCA infarct     Subjective:     Interval History: Patient is seen for follow-up neurological assessment and treatment recommendations:     Meds and TF resumed through PEG tube. Restraint still applied to right arm and abdominal binder applied. Unasyn completed.     HPI, Past Medical, Family, and Social History remains the same as documented in the initial encounter.     Review of Systems   Constitutional: Negative for diaphoresis and fever.   HENT: Positive for trouble swallowing. Negative for nosebleeds.    Neurological: Positive for facial asymmetry, speech difficulty and weakness.   Psychiatric/Behavioral: Positive for agitation and confusion.     Scheduled Meds:   albuterol-ipratropium  3 mL Nebulization Q6H    apixaban  5 mg Per G Tube BID    citalopram  10 mg Per G Tube Daily    insulin detemir U-100  13 Units Subcutaneous BID    risperidone 1 mg/ml  0.5 mg Oral Once    rosuvastatin  40 mg Per G Tube QHS    senna-docusate 8.6-50 mg  2 tablet Per G Tube BID    sodium chloride 0.9%  3 mL Intravenous Q8H     Continuous Infusions:    PRN Meds:acetaminophen, dextrose 50%, glucagon (human recombinant), HYDROcodone-acetaminophen, insulin aspart U-100, labetalol, lactulose, miconazole NITRATE 2 %, ondansetron, polyethylene glycol    Objective:     Vital Signs (Most Recent):  Temp: 97.9 °F (36.6 °C) (06/20/18 0359)  Pulse: 92 (06/20/18 0733)  Resp: 16 (06/20/18 0733)  BP: (!) 105/59 (06/20/18 0359)  SpO2: 96 % (06/20/18 0733)  BP Location: Right arm    Vital Signs Range (Last 24H):  Temp:  [97.9 °F (36.6 °C)-98.7 °F (37.1 °C)]   Pulse:  [80-98]   Resp:  [16-20]   BP: (105-141)/(59-73)   SpO2:  [94 %-98 %]   BP Location: Right arm    Physical Exam   Constitutional: He appears well-developed and well-nourished. No distress.   HENT:   Head: Normocephalic and atraumatic.   Eyes: Conjunctivae are normal. No scleral icterus.   Cardiovascular: Normal rate.    Pulmonary/Chest: Effort  normal. No respiratory distress.   Musculoskeletal: He exhibits no edema or tenderness.   Neurological: He is alert.   Skin: Skin is warm and dry.   Vitals reviewed.      Neurological Exam:   LOC: Alert  Attention Span: Good  Language: Global aphasia  Articulation: Dysarthria  Orientation: Untestable due to severe aphasia   Facial Movement (CN VII): Lower facial weakness on the Left  Vision: L hemianopsia  Gaze: R gaze preference  Motor: Arm left  1/5  Leg left  Paresis: 2/5  Arm right  Normal 4/5  Leg right Paresis: 3/5  Sensation: Intact to light touch, temperature      Laboratory:  CMP:     Recent Labs  Lab 06/20/18  0607   CALCIUM 9.4   ALBUMIN 2.2*   PROT 6.9      K 4.3   CO2 31*   CL 97   BUN 18   CREATININE 0.8   ALKPHOS 84   ALT 33   AST 36   BILITOT 0.3     BMP:     Recent Labs  Lab 06/20/18  0607      K 4.3   CL 97   CO2 31*   BUN 18   CREATININE 0.8   CALCIUM 9.4     CBC:     Recent Labs  Lab 06/20/18  0607   WBC 7.91   RBC 3.77*   HGB 11.2*   HCT 35.7*   *   MCV 95   MCH 29.7   MCHC 31.4*     Lipid Panel:   No results for input(s): CHOL, LDLCALC, HDL, TRIG in the last 168 hours.  Coagulation:   No results for input(s): PT, INR, APTT in the last 168 hours.  Platelet Aggregation Study: No results for input(s): PLTAGG, PLTAGINTERP, PLTAGREGLACO, ADPPLTAGGREG in the last 168 hours.  Hgb A1C:   No results for input(s): HGBA1C in the last 168 hours.  TSH:   No results for input(s): TSH in the last 168 hours.      Diagnostic Results     Brain Imaging   CT 6/6   Large right MCA distribution infarct measuring approximately 7 x 3 cm including the right perisylvian region and extending to the frontal and parietal operculum with associated localized mass effect.    Vessel Imaging   US BUE 6/9/18  Deep venous thrombus in the left axillary vein with additional thrombus in the proximal segment of the branchial vein.  No evidence of DVT in the right upper extremity.    CTA 6/8  CTA head: Occlusion  right M2 segment of the MCA within the proximal sylvian fissure..  Heavy atherosclerotic plaquing of the cavernous and supraclinoid ICAs with mild moderate right and mild left cavernous ICA stenosis.  There is diffuse small caliber right M1 segment of the MCA.  Irregularity and narrowing of the right distal vertebral artery concerning for atherosclerotic disease with mild moderate stenosis.  CTA neck: Atherosclerotic plaquing of the carotid bifurcations and proximal ICAs with less than 50% proximal ICA stenosis by NASCET criteria.  CT head: Evolving large right MCA distribution infarction.  Localized mass effect without significant midline shift or hydrocephalus.  No evidence for hemorrhagic conversion.  Clinical correlation and follow-up advised.        Cardiac Imaging   Echo 6/7  CONCLUSIONS     1 - Technically difficult study.     2 - Severely depressed left ventricular systolic function (EF 15-20%).     3 - Mildly to moderately depressed right ventricular systolic function .     4 - Impaired LV relaxation, normal LAP (grade 1 diastolic dysfunction).     5 - Mild aortic stenosis, WIL = 1.45 cm2, AVAi = 0.67 cm2/m2, peak velocity = 2.8 m/s, mean gradient = 18 mmHg.     6 - Mild mitral regurgitation.       Other Imaging  CT chest 6/14/18  1. Recent development consolidation in right middle and lower lobes consistent with pneumonia or aspiration.  Endobronchial material in right lower lobe airways with intermittent obstruction, could represent retained secretions or aspirated material.  Correlate clinically.  Recommend noncontrast chest CT follow-up in 3 months.  2. Chronic right upper lobe consolidation and volume loss consistent with radiation fibrosis in patient with history of previous lung cancer.  3. Chronic loculation of pleural fluid in the right sulcus is stable.  4. A few subcentimeter pulmonary nodules, stable and likely benign  5. Aortic and coronary atherosclerosis, status post CABG  6. Gastrostomy  tube  7. Other findings as above

## 2018-06-20 NOTE — ASSESSMENT & PLAN NOTE
Stroke risk factor  CT Chest this admission concerning  Pulmonology feels pt too ill for acute intervention; Signed off  Plan for outpatient follow up with pulmonologist Dr Saunders ([facility]) for further workup of CT findings when acute illness has improved

## 2018-06-20 NOTE — PLAN OF CARE
Problem: Physical Therapy Goal  Goal: Physical Therapy Goal  Goals to be met by: 2018     Patient will increase functional independence with mobility by performin. Supine to sit with Moderate Assistance  2. Sit to supine with Moderate Assistance  3. Sit to stand transfer with Maximum Assistance  4. Bed to chair transfer with Maximum Assistance  5. Gait  x 10 feet with Maximum Assistance with or without appropriate AD   6. Sitting at edge of bed x10 minutes with Minimal Assistance  7. Lower extremity exercise program x15 reps per handout, with assistance as needed        Discharge Recommendations: SNF    [facility] via drawsheet    Goals remain appropriate.     Lizett Ortiz, PTA.   [phone]   2018

## 2018-06-20 NOTE — CARE UPDATE
Plan of care reviewed with patient and wife at bedside, understanding verbalized and demonstrated by wife. Patient remains expressive aphasic, only nodding head yes or no appropriately to questions asked by RN or staff. SOPHIE MORRIS, neuro status stable. Patients PEG remains in place, and was only utilized throughout night for medications. Still awaiting Xray for further evaluation and orders. Patient's O2 saturation remains above 95% with 4L NC applied. Bed alarm explained and active, bed in lowest position, call bell in reach. Will continue to monitor.

## 2018-06-21 NOTE — PT/OT/SLP PROGRESS
"Speech Language Pathology Treatment    Patient Name:  Mendez Guthrie   MRN:  [id number]   729/729 A    Admitting Diagnosis: Embolic stroke involving right middle cerebral artery    Recommendations:                 General Recommendations:  Dysphagia therapy, Speech/language therapy and Cognitive-linguistic therapy  Diet recommendations:  NPO, Liquid Diet Level: NPO   Aspiration Precautions: Continue alternate means of nutrition and Strict aspiration precautions   General Precautions: Standard, aphasia, aspiration, fall, NPO  Communication strategies:  provide increased time to answer and go to room if call light pushed    Subjective     Patient seen x2. Initial attempt, patient sitting at EOB with PT.   Patient lethargic. Wife present in room during second visit and reporting patient recently given pain medication.   Wife states, "They are hoping we can leave Monday."    Pain/Comfort:    no pain    Objective:     Has the patient been evaluated by SLP for swallowing?   Yes  Keep patient NPO? Yes   Current Respiratory Status: nasal cannula      Patient required constant tactile and auditory stimulation to remain awake/alert. SLP provided oral care with toothbrush, toothpaste, oral swabs, and suctioning. Patient noted to complete 3 spontaneous swallows during oral care with no overt s/s of aspiration. Ice chip presented, however, patient clamping mouth closed and shaking head no. Inconsistent y/n response elicited in response to conversation. Patient did not follow commands. Per patient's wife, Mr. Guthrie was repeating single words last night.  Education provided to pt's wife re: language/cognitive stim and SLP POC.  She verbalized understanding.         Assessment:     Mendez Guthrie is a 68 y.o. male with an SLP diagnosis of Aphasia, Dysphagia and Cognitive-Linguistic Impairment.      Goals:    SLP Goals        Problem: SLP Goal    Goal Priority Disciplines Outcome   SLP Goal     SLP Ongoing (interventions " implemented as appropriate)   Description:  Speech Language Pathology Goals  Goals expected to be met by 6/21-all goals remain appropriate continue 6/28  1. Pt will participate in ongoing assessment of swallow.   2. Pt will answer simple y/n questions with 60% accy given repeats.  3. Pt will follow simple commands with 60% accy given mod A.  4. Pt will complete auto speech tasks with 50% accy with max A.                            Plan:     · Patient to be seen:  3 x/week   · Plan of Care expires:  07/06/18  · Plan of Care reviewed with:  patient, spouse   · SLP Follow-Up:  Yes       Discharge recommendations:  nursing facility, skilled   Barriers to Discharge:  Level of Skilled Assistance Needed      Time Tracking:     SLP Treatment Date:   06/21/18  Speech Start Time:   (9730-0346)  Speech Stop Time:   (2709-1172)     Speech Total Time (min):   18    Billable Minutes: Speech Therapy Individual 18    MADDI Morris, CCC-SLP   Pager: [phone]  06/21/2018

## 2018-06-21 NOTE — PROGRESS NOTES
[facility]  Vascular Neurology  Comprehensive Stroke Center  Progress Note    Assessment/Plan:     * Embolic stroke involving right middle cerebral artery    69 y/o male with R MCA infarct with left sided weakness, facial droop, dysarthria and confusion. Right sided movement is minimal. Etiology likely atheroembolic vs cardioembolic due to EF of 15%. EEG completed and no seizure activity noted. Pt started on Modafinil 6/13; wakefulness now improved.    Hospital medicine following; sepsis, hypotension, hyponatremia now resolved, medicine de-escalated abx regimen. No longer likely that pt will require ICU step up.    Pt pulled out PEG 6/17 & 6/18 evening; ford placed to temporarily maintain patency.  PEG replacement 6/19. Meds and Tube feedings resumed 6/20. Patient in right arm wrist restraint and abdominal binder. Psych consulted. Will completed full consult tomorrow. They suggested to increase risperidone to 1 mg qhs and limit use of narcotics.     Dc modafinil 6/19    Antithrombotics: Eliquis 5 mg BID    Statins: Crestor 40 mg daily  Aggressive risk factor modification: HTN, DM, HLD, Diet, Exercise, Obesity, CAD  Rehab efforts: PT/OT/SLP to evaluate and treat, PM&R consult   Dispo: SNF  Diagnostics ordered/pending: NA  VTE prophylaxis:  SCDs + eliquis  BP parameters: Infarct: Avoid hypotension        Acute on chronic respiratory failure with hypoxia    Initially with worsening O2 requirements  Consulted Pulm and Medicine; Appreciate recs  Pulm signed off; Rec outpatient follow-up once more stable  Broad-spectrum abx de-escalated to Unasyn (course completed)  Continue duonebs, CPT        Abnormal ventricular wall motion    Stroke risk factor  Evidence on echo  Pt on Eliquis        DVT of axillary vein, acute left    Eliquis started 6/13        Chronic systolic (congestive) heart failure    Seen by cardiology   EF 15-20%, diastolic dysfunction  Recommending AC - apixaban 5 mg BID   Meds  currently held due to hypotension        Dysarthria    Result of stroke  Aggressive SLP         Decreased cardiac ejection fraction    EF 15-20% - seen on echo completed 6/7  Cardiac history of CABG in 2016  Cardiology consulted - recommending AC and asa  Patient started on Eliquis 5 mg BID        Cytotoxic cerebral edema    Large area of cytotoxic cerebral edema identified when reviewing brain imaging in the territory of the R middle cerebral artery. There is not mass effect associated with it. We will continue to monitor the patients clinical exam for any worsening of symptoms which may indicate expansion of the stroke or the area of the edema resulting in the clinical change. The pattern is suggestive of atheroembolic etiology.        Left spastic hemiparesis    Due to stroke  Aggressive therapy        GENIE on CPAP    Stroke risk factor  Holding CPAP acutely due to risk aspiration, encephalopathy, pt nonverbal        Coronary artery disease involving native coronary artery of native heart without angina pectoris    Stoke risk factor  Eliquis 5 mg BID         Essential hypertension    Stroke risk factor  SBP <160  Home meds - coreg and losartan; Acutely held due to hypotension  Stable, no hypotension over last 24 hrs  (Allergy to lisinopril and Norvasc)        COPD (chronic obstructive pulmonary disease)    Stroke risk factor  Duonebs Q6 due to patient not being able to follow commands and RT not being able to administer inhalers   Currently maintaining O2 sats in the 90's on 2L NC  Medicine following; Appreciate recs  Pulmonary consulted, Signed off - f/u outpatient         History of lung cancer    Stroke risk factor  CT Chest this admission concerning  Pulmonology feels pt too ill for acute intervention; Signed off  Plan for outpatient follow up with pulmonologist Dr Saunders ([facility]) for further workup of CT findings when acute illness has improved        Type 2 diabetes mellitus without complication, with  long-term current use of insulin    Stroke risk factor  HgB A1C 9.4  Detemir 13U BID   SSI Q6 PRN             67 y/o male wth R MCA infarct. Had been at [facility] since 6-4-18 for AMS and weakness that improved and then worsened now with facial droop and left sided weakness. 6-6-18 fever so infectious w/u in process could be PNA as he was coughing with puree food at [facility]. He was placed on ABX Amp/Vanc/valcyclovir for possible meningitis this is stopped as he does not have meningitis. He moved around too much and was clausterphobic for MRI so will attempt MRI here.   6/8 - Urine studies ordered for hyponatremia. EEG results pending. Bilateral upper/lower extremities US ordered for fever. Procal WNL. Infectious workup negative so far. Cardiology consulted for EF 15-20%.   6-9-18 will give lasix 40 mg NG today as per cardiology recs. Discussion with family regarding anticoagulation due to low EF and DVT left axillary vein and brachial vein, discussion regarding feeding as well and likely bernard of patient swallowing is minimal so will need PEG next week as opposed to waiting and family is in agreement so will start heparin drip with no bolus parameters  6-10-18 once EEG read and no seizure activity may start Modafinil to help with wakefulness  6/11 - EEG to be read today and consider Modafinil. IR consulted for PEG placement tomorrow. Hospital medicine ordering urine studies for hyponatremia.   6/12 - PEG to be placed today. Heparin gtt currently held. Will resume once PEG is placed. AC to start tomorrow once PEG is able to be used. Hyponatremia slightly improving. Continue to hold fluids due to hyponatremia thought to be due to SIADH. Modafinil to be started tomorrow when PEG can be used.  6/13 - PEG placed yesterday. Heparin gtt dc'ed and eliquis started. Modafinil started today. Continuing to restrict fluids (enteral water boluses) due to SIADH/hyponatremia and holding off on lasix that cardiology  recommended.  6/14/18 - concern for sepsis today - medicine consulted - hypotensive, febrile, procal elevated.   6/15: Hypotensive to 88/36 overnight; good response to 250cc bolus. Hospital medicine adjusting insulin and antibiotics regimen. Wakefulness much improved since starting Modafinil. Mild bleeding noted at NC site.  6/16: Pt hypotensive, requiring another bolus yesterday; BP so far stable since. Modafinil held today as family felt made pt more aggressive; plan for decreased dose tomorrow. Added Lactulose PRN to bowel regimen.  6/17: Pt clinically improved from infection/respiratory perspective; medicine de-escalated abx regimen. No further hypotension episodes. Reduced-dose Modafinil today; will monitor response.  6/18: Pt pulled out PEG yesterday evening; ford placed to maintain patency. Attempted to place NGT but pt agitated, family refused. PO meds held for now, including Eliquis (per IR request). D/c'd scheduled suppository, pt on Senna.  6/19 - pulled ford from peg site last night - see note. Replaced in IR today. Patient in wrist restraint with abdominal binder. Family at bedside, educated on importance of protection of peg site. Dc modafinil   6/20 - Meds and TF resumed through PEG tube. Restraint still applied to right arm and abdominal binder applied. Unasyn completed.   6/21 - Patient continues to be agitated at times. Right arm restraint continues so patient will not pull out PEG. Psych consulted. They will complete full consult tomorrow. Suggested risperidone 1 mg qhs and decrease narcotic use.     STROKE DOCUMENTATION        NIH Scale:  1a. Level Of Consciousness: 0-->Alert: keenly responsive  1b. LOC Questions: 2-->Answers neither question correctly  1c. LOC Commands: 1-->Performs one task correctly  2. Best Gaze: 1-->Partial gaze palsy: gaze is abnormal in one or both eyes, but forced deviation or total gaze paresis is not present  3. Visual: 2-->Complete hemianopia  4. Facial Palsy:  2-->Partial paralysis (total or near-total paralysis of lower face)  5a. Motor Arm, Left: 4-->No movement  5b. Motor Arm, Right: 1-->Drift: limb holds 90 (or 45) degrees, but drifts down before full 10 secs: does not hit bed or other support  6a. Motor Leg, Left: 3-->No effort against gravity: leg falls to bed immediately  6b. Motor Leg, Right: 2-->Some effort against gravity: leg falls to bed by 5 secs, but has some effort against gravity  7. Limb Ataxia: 0-->Absent  8. Sensory: 0-->Normal: no sensory loss  9. Best Language: 2-->Severe aphasia: all communication is through fragmentary expression: great need for inference, questioning, and guessing by the listener. Range of information that can be exchanged is limited: listener carries burden of. . . (see row details)  10. Dysarthria: 2-->Severe dysarthria: patients speech is so slurred as to be unintelligible in the absence of or out of proportion to any dysphasia, or is mute/anarthric  11. Extinction and Inattention (formerly Neglect): 0-->No abnormality  Total (NIH Stroke Scale): 22       Modified Holmes Score: 0  Beni Coma Scale:    ABCD2 Score:    [id number] Score:   HAS -BLED Score:   ICH Score:   Hunt & De Paz Classification:      Hemorrhagic change of an Ischemic Stroke: Does this patient have an ischemic stroke with hemorrhagic changes? No     Neurologic Chief Complaint: R MCA infarct     Subjective:     Interval History: Patient is seen for follow-up neurological assessment and treatment recommendations:     Patient continues to be agitated at times. Right arm restraint continues so patient will not pull out PEG.    HPI, Past Medical, Family, and Social History remains the same as documented in the initial encounter.     Review of Systems   Constitutional: Negative for diaphoresis and fever.   HENT: Positive for trouble swallowing. Negative for nosebleeds.    Neurological: Positive for facial asymmetry, speech difficulty and weakness.    Psychiatric/Behavioral: Positive for agitation and confusion.     Scheduled Meds:   albuterol-ipratropium  3 mL Nebulization Q6H    apixaban  5 mg Per G Tube BID    citalopram  10 mg Per G Tube Daily    insulin detemir U-100  13 Units Subcutaneous BID    risperidone 1 mg/ml  0.5 mg Oral Once    rosuvastatin  40 mg Per G Tube QHS    senna-docusate 8.6-50 mg  2 tablet Per G Tube BID    sodium chloride 0.9%  3 mL Intravenous Q8H     Continuous Infusions:    PRN Meds:acetaminophen, dextrose 50%, glucagon (human recombinant), HYDROcodone-acetaminophen, insulin aspart U-100, labetalol, lactulose, miconazole NITRATE 2 %, ondansetron, polyethylene glycol    Objective:     Vital Signs (Most Recent):  Temp: 97.3 °F (36.3 °C) (06/21/18 0801)  Pulse: 94 (06/21/18 0801)  Resp: 18 (06/21/18 0801)  BP: 118/68 (06/21/18 0801)  SpO2: (!) 93 % (06/21/18 0801)  BP Location: Right arm    Vital Signs Range (Last 24H):  Temp:  [97.3 °F (36.3 °C)-99.5 °F (37.5 °C)]   Pulse:  []   Resp:  [16-18]   BP: (101-118)/(57-68)   SpO2:  [93 %-98 %]   BP Location: Right arm    Physical Exam   Constitutional: He appears well-developed and well-nourished. No distress.   HENT:   Head: Normocephalic and atraumatic.   Eyes: Conjunctivae are normal. No scleral icterus.   Cardiovascular: Normal rate.    Pulmonary/Chest: Effort normal. No respiratory distress.   Musculoskeletal: He exhibits no edema or tenderness.   Neurological: He is alert.   Skin: Skin is warm and dry.   Vitals reviewed.      Neurological Exam:   LOC: Alert  Attention Span: Good  Language: Global aphasia  Articulation: Dysarthria  Orientation: Untestable due to severe aphasia   Facial Movement (CN VII): Lower facial weakness on the Left  Vision: L hemianopsia  Gaze: R gaze preference  Motor: Arm left  1/5  Leg left  Paresis: 2/5  Arm right  Normal 4/5  Leg right Paresis: 3/5  Sensation: Intact to light touch, temperature      Laboratory:  CMP:     Recent Labs  Lab  06/21/18  0547   CALCIUM 9.5   ALBUMIN 2.3*   PROT 6.7   *   K 3.9   CO2 30*   CL 95   BUN 21   CREATININE 0.9   ALKPHOS 82   ALT 44   AST 54*   BILITOT 0.2     BMP:     Recent Labs  Lab 06/21/18  0547   *   K 3.9   CL 95   CO2 30*   BUN 21   CREATININE 0.9   CALCIUM 9.5     CBC:     Recent Labs  Lab 06/21/18  0547   WBC 8.03   RBC 3.67*   HGB 11.1*   HCT 34.4*   *   MCV 94   MCH 30.2   MCHC 32.3     Lipid Panel:   No results for input(s): CHOL, LDLCALC, HDL, TRIG in the last 168 hours.  Coagulation:   No results for input(s): PT, INR, APTT in the last 168 hours.  Platelet Aggregation Study: No results for input(s): PLTAGG, PLTAGINTERP, PLTAGREGLACO, ADPPLTAGGREG in the last 168 hours.  Hgb A1C:   No results for input(s): HGBA1C in the last 168 hours.  TSH:   No results for input(s): TSH in the last 168 hours.      Diagnostic Results     Brain Imaging   CT 6/6   Large right MCA distribution infarct measuring approximately 7 x 3 cm including the right perisylvian region and extending to the frontal and parietal operculum with associated localized mass effect.    Vessel Imaging   US BUE 6/9/18  Deep venous thrombus in the left axillary vein with additional thrombus in the proximal segment of the branchial vein.  No evidence of DVT in the right upper extremity.    CTA 6/8  CTA head: Occlusion right M2 segment of the MCA within the proximal sylvian fissure..  Heavy atherosclerotic plaquing of the cavernous and supraclinoid ICAs with mild moderate right and mild left cavernous ICA stenosis.  There is diffuse small caliber right M1 segment of the MCA.  Irregularity and narrowing of the right distal vertebral artery concerning for atherosclerotic disease with mild moderate stenosis.  CTA neck: Atherosclerotic plaquing of the carotid bifurcations and proximal ICAs with less than 50% proximal ICA stenosis by NASCET criteria.  CT head: Evolving large right MCA distribution infarction.  Localized mass effect  without significant midline shift or hydrocephalus.  No evidence for hemorrhagic conversion.  Clinical correlation and follow-up advised.        Cardiac Imaging   Echo 6/7  CONCLUSIONS     1 - Technically difficult study.     2 - Severely depressed left ventricular systolic function (EF 15-20%).     3 - Mildly to moderately depressed right ventricular systolic function .     4 - Impaired LV relaxation, normal LAP (grade 1 diastolic dysfunction).     5 - Mild aortic stenosis, WIL = 1.45 cm2, AVAi = 0.67 cm2/m2, peak velocity = 2.8 m/s, mean gradient = 18 mmHg.     6 - Mild mitral regurgitation.       Other Imaging  CT chest 6/14/18  1. Recent development consolidation in right middle and lower lobes consistent with pneumonia or aspiration.  Endobronchial material in right lower lobe airways with intermittent obstruction, could represent retained secretions or aspirated material.  Correlate clinically.  Recommend noncontrast chest CT follow-up in 3 months.  2. Chronic right upper lobe consolidation and volume loss consistent with radiation fibrosis in patient with history of previous lung cancer.  3. Chronic loculation of pleural fluid in the right sulcus is stable.  4. A few subcentimeter pulmonary nodules, stable and likely benign  5. Aortic and coronary atherosclerosis, status post CABG  6. Gastrostomy tube  7. Other findings as above      Colleen Hannah NP  Comprehensive Stroke Center  Department of Vascular Neurology   [facility]

## 2018-06-21 NOTE — PLAN OF CARE
Problem: Stroke (Ischemic) (Adult)  Goal: Signs and Symptoms of Listed Potential Problems Will be Absent, Minimized or Managed (Stroke)  Signs and symptoms of listed potential problems will be absent, minimized or managed by discharge/transition of care (reference Stroke (Ischemic) (Adult) CPG).   Outcome: Ongoing (interventions implemented as appropriate)  Plan of care reviewed with pt and family at bedside.  VSS stable.  On accuchecks q6 hrs.    Continues to receive Diabetisource at 40ml/hr via Gtube per gravity.  Aspiration precautions maintained. On tele.  NSR.  R Wrist restraint for pulling medical devices.  Assessments continue q2 hrs.  No injuries observed while being restrained.  Wife at bedside.  Pt slept well last night.  Pain maintained by prn meds.  Bed in lowest position and locked.  SCD's in place.  Bed alarm on.  Call light within easy reach.  No distress observed.

## 2018-06-21 NOTE — PLAN OF CARE
SW sent clinical updates to [facility] for review. Patient will discharge to SNF once medically ready for discharge.     Estela Cagle, ARIN  [facility]  Ext. [phone]

## 2018-06-21 NOTE — PT/OT/SLP PROGRESS
Occupational Therapy   Treatment    Name: Mendez Guthrie  MRN: [id number]  Admitting Diagnosis:  Embolic stroke involving right middle cerebral artery       Recommendations:     Discharge Recommendations: nursing facility, skilled  Discharge Equipment Recommendations:  hospital bed, lift device  Barriers to discharge:  None    Subjective     Communicated with: RN prior to session.  Pt agreeable to participate with therapy this date. Family present throughout session.   Pain/Comfort:  · Pain Rating 1: 0/10  · Pain Rating Post-Intervention 1: 0/10    Objective:     Patient found with: bed alarm, Condom Catheter, peripheral IV, telemetry, SCD, restraints, pulse ox (continuous), pressure relief boots, PEG Tube    General Precautions: Standard, aphasia, aspiration, fall, NPO   Orthopedic Precautions:N/A   Braces: N/A     Occupational Performance:    Bed Mobility:    · Patient completed Rolling/Turning to Left with  maximal assistance  · Patient completed Supine to Sit with total assistance  · Patient completed Sit to Supine with total assistance     Sitting EOB:   Pt sat EOB ~25 minutes with moderate assistance for postural control; Challenging core control, static/dynamic sitting balance, and midline orientation.    Functional Mobility/Transfers:  · NT this date 2/2 overall sitting balance and level of participation while seated EOB    Activities of Daily Living:  · Grooming: minimum assistance to wash face while seated EOB ( required moderate assistance for postural control)   · UB Dressing: maximal assistance to sal gown like jacket while seated EOB  · LB Dressing: dependence to sal B socks while seated EOB    Patient left with bed in chair position with all lines intact, call button in reach and RN notified    [facility] 6 Click:  [facility] Total Score: 7    Treatment & Education:  -Pt edu on OT role/POC-- family present for education  - White board updated  - Multiple self care tasks completed-- as noted above  - Attempted  "to engage pt in multiple activities however unwilling to participate   - Pt toelrated BUE AAROM/PROM exercises while seated EOB including 1x15 reps in shoulder flexion, elbow flexion/extension, shoulder IR/ER, supination/pronation, wrist and digit flexion/extension. Pt demo poor follow through with exercises.   - Provided education to family to improve overall level of alertness-- family verbalized understanding    Cognition:   Followed 5% of simple one-step commands  Eyes closed 85% of session  Tearful while seated EOB; shaking head no when asked to participate in a variety of tasks  Education:   ** Upon returning from sitting to supine; pt eyes open and verbalizing, " I want to go home"     Assessment:     Mendez Guthrie is a 68 y.o. male with a medical diagnosis of Embolic stroke involving right middle cerebral artery.  He demo poor command following and participation in therapy session this date. Performance deficits affecting function are weakness, impaired endurance, impaired functional mobilty, gait instability, decreased upper extremity function, decreased lower extremity function, impaired balance, impaired self care skills, impaired cognition, abnormal tone, decreased ROM, impaired fine motor, edema, decreased safety awareness, visual deficits. Pt would benefit from SNF following d/c to continue to progress towards goals and improve quality of life.     Rehab Prognosis:  Good; patient would benefit from acute skilled OT services to address these deficits and reach maximum level of function.       Plan:     Patient to be seen 4 x/week to address the above listed problems via self-care/home management, therapeutic activities, therapeutic exercises, neuromuscular re-education  · Plan of Care Expires: 07/06/18  · Plan of Care Reviewed with: spouse, patient    This Plan of care has been discussed with the patient who was involved in its development and understands and is in agreement with the identified goals " and treatment plan    GOALS:    Occupational Therapy Goals        Problem: Occupational Therapy Goal    Goal Priority Disciplines Outcome Interventions   Occupational Therapy Goal     OT, PT/OT Ongoing (interventions implemented as appropriate)    Description:  Goals to be met by: 6/25/2018     Patient will increase functional independence with ADLs by performing:    UE Dressing with Moderate Assistance.  LE Dressing with Maximum Assistance.  Grooming while seated with Contact Guard Assistance.  Toileting from bedside commode with Moderate Assistance for hygiene and clothing management.   Sitting at edge of bed x 15 minutes with Minimal Assistance.  (keep for consistency)  Supine to sit with Moderate Assistance.  Stand pivot transfers with Moderate Assistance.  Toilet transfer to bedside commode with Moderate Assistance.  Pt will initiate movement in LUE.  Pt will follow 50% of one step commands.  Pt will keep eyes open 100% of session.                         Time Tracking:     OT Date of Treatment: 06/21/18  OT Start Time: 1040  OT Stop Time: 1125  OT Total Time (min): 45 min    Billable Minutes:Therapeutic Activity 10  Therapeutic Exercise 15  Neuromuscular Re-education 20    Anita vaughan, YASIR  6/21/2018

## 2018-06-21 NOTE — PLAN OF CARE
Problem: SLP Goal  Goal: SLP Goal  Speech Language Pathology Goals  Goals expected to be met by 6/21-all goals remain appropriate continue 6/28  1. Pt will participate in ongoing assessment of swallow.   2. Pt will answer simple y/n questions with 60% accy given repeats.  3. Pt will follow simple commands with 60% accy given mod A.  4. Pt will complete auto speech tasks with 50% accy with max A.          Outcome: Ongoing (interventions implemented as appropriate)  Goals remain appropriate. ST will continue to follow.   ZACHARIAH De La Rosa., CCC-SLP  Pager: [phone]  06/21/2018

## 2018-06-21 NOTE — ASSESSMENT & PLAN NOTE
Stroke risk factor  CT Chest this admission concerning  Pulmonology feels pt too ill for acute intervention; Signed off  Plan for outpatient follow up with pulmonologist Dr Saunders ([facility]) for further workup of CT findings when acute illness has improved

## 2018-06-21 NOTE — PLAN OF CARE
Problem: Occupational Therapy Goal  Goal: Occupational Therapy Goal  Goals to be met by: 6/25/2018     Patient will increase functional independence with ADLs by performing:    UE Dressing with Moderate Assistance.  LE Dressing with Maximum Assistance.  Grooming while seated with Contact Guard Assistance.  Toileting from bedside commode with Moderate Assistance for hygiene and clothing management.   Sitting at edge of bed x 15 minutes with Minimal Assistance.  (keep for consistency)  Supine to sit with Moderate Assistance.  Stand pivot transfers with Moderate Assistance.  Toilet transfer to bedside commode with Moderate Assistance.  Pt will initiate movement in LUE.  Pt will follow 50% of one step commands.  Pt will keep eyes open 100% of session.        Outcome: Ongoing (interventions implemented as appropriate)  Continue with POC.   Anita vaughan OT  6/21/2018

## 2018-06-21 NOTE — ASSESSMENT & PLAN NOTE
67 y/o male with R MCA infarct with left sided weakness, facial droop, dysarthria and confusion. Right sided movement is minimal. Etiology likely atheroembolic vs cardioembolic due to EF of 15%. EEG completed and no seizure activity noted. Pt started on Modafinil 6/13; wakefulness now improved.    Hospital medicine following; sepsis, hypotension, hyponatremia now resolved, medicine de-escalated abx regimen. No longer likely that pt will require ICU step up.    Pt pulled out PEG 6/17 & 6/18 evening; ford placed to temporarily maintain patency.  PEG replacement 6/19. Meds and Tube feedings resumed 6/20. Patient in right arm wrist restraint and abdominal binder. Psych consulted. Will completed full consult tomorrow. They suggested to increase risperidone to 1 mg qhs and limit use of narcotics.     Dc modafinil 6/19    Antithrombotics: Eliquis 5 mg BID    Statins: Crestor 40 mg daily  Aggressive risk factor modification: HTN, DM, HLD, Diet, Exercise, Obesity, CAD  Rehab efforts: PT/OT/SLP to evaluate and treat, PM&R consult   Dispo: SNF  Diagnostics ordered/pending: NA  VTE prophylaxis:  SCDs + eliquis  BP parameters: Infarct: Avoid hypotension

## 2018-06-21 NOTE — PLAN OF CARE
Psychiatry consulted by primary team this afternoon. They report he is pulling out line and confused, post stroke, preventing SNF placement. Encouraged them to minimize opioid medications and to start risperdal 1 mg qhs (per their report 0.5 mg qhs was not effective). CL team will perform full consult tomorrow.

## 2018-06-21 NOTE — SUBJECTIVE & OBJECTIVE
Neurologic Chief Complaint: R MCA infarct     Subjective:     Interval History: Patient is seen for follow-up neurological assessment and treatment recommendations:     Patient continues to be agitated at times. Right arm restraint continues so patient will not pull out PEG.    HPI, Past Medical, Family, and Social History remains the same as documented in the initial encounter.     Review of Systems   Constitutional: Negative for diaphoresis and fever.   HENT: Positive for trouble swallowing. Negative for nosebleeds.    Neurological: Positive for facial asymmetry, speech difficulty and weakness.   Psychiatric/Behavioral: Positive for agitation and confusion.     Scheduled Meds:   albuterol-ipratropium  3 mL Nebulization Q6H    apixaban  5 mg Per G Tube BID    citalopram  10 mg Per G Tube Daily    insulin detemir U-100  13 Units Subcutaneous BID    risperidone 1 mg/ml  0.5 mg Oral Once    rosuvastatin  40 mg Per G Tube QHS    senna-docusate 8.6-50 mg  2 tablet Per G Tube BID    sodium chloride 0.9%  3 mL Intravenous Q8H     Continuous Infusions:    PRN Meds:acetaminophen, dextrose 50%, glucagon (human recombinant), HYDROcodone-acetaminophen, insulin aspart U-100, labetalol, lactulose, miconazole NITRATE 2 %, ondansetron, polyethylene glycol    Objective:     Vital Signs (Most Recent):  Temp: 97.3 °F (36.3 °C) (06/21/18 0801)  Pulse: 94 (06/21/18 0801)  Resp: 18 (06/21/18 0801)  BP: 118/68 (06/21/18 0801)  SpO2: (!) 93 % (06/21/18 0801)  BP Location: Right arm    Vital Signs Range (Last 24H):  Temp:  [97.3 °F (36.3 °C)-99.5 °F (37.5 °C)]   Pulse:  []   Resp:  [16-18]   BP: (101-118)/(57-68)   SpO2:  [93 %-98 %]   BP Location: Right arm    Physical Exam   Constitutional: He appears well-developed and well-nourished. No distress.   HENT:   Head: Normocephalic and atraumatic.   Eyes: Conjunctivae are normal. No scleral icterus.   Cardiovascular: Normal rate.    Pulmonary/Chest: Effort normal. No  respiratory distress.   Musculoskeletal: He exhibits no edema or tenderness.   Neurological: He is alert.   Skin: Skin is warm and dry.   Vitals reviewed.      Neurological Exam:   LOC: Alert  Attention Span: Good  Language: Global aphasia  Articulation: Dysarthria  Orientation: Untestable due to severe aphasia   Facial Movement (CN VII): Lower facial weakness on the Left  Vision: L hemianopsia  Gaze: R gaze preference  Motor: Arm left  1/5  Leg left  Paresis: 2/5  Arm right  Normal 4/5  Leg right Paresis: 3/5  Sensation: Intact to light touch, temperature      Laboratory:  CMP:     Recent Labs  Lab 06/21/18  0547   CALCIUM 9.5   ALBUMIN 2.3*   PROT 6.7   *   K 3.9   CO2 30*   CL 95   BUN 21   CREATININE 0.9   ALKPHOS 82   ALT 44   AST 54*   BILITOT 0.2     BMP:     Recent Labs  Lab 06/21/18  0547   *   K 3.9   CL 95   CO2 30*   BUN 21   CREATININE 0.9   CALCIUM 9.5     CBC:     Recent Labs  Lab 06/21/18  0547   WBC 8.03   RBC 3.67*   HGB 11.1*   HCT 34.4*   *   MCV 94   MCH 30.2   MCHC 32.3     Lipid Panel:   No results for input(s): CHOL, LDLCALC, HDL, TRIG in the last 168 hours.  Coagulation:   No results for input(s): PT, INR, APTT in the last 168 hours.  Platelet Aggregation Study: No results for input(s): PLTAGG, PLTAGINTERP, PLTAGREGLACO, ADPPLTAGGREG in the last 168 hours.  Hgb A1C:   No results for input(s): HGBA1C in the last 168 hours.  TSH:   No results for input(s): TSH in the last 168 hours.      Diagnostic Results     Brain Imaging   CT 6/6   Large right MCA distribution infarct measuring approximately 7 x 3 cm including the right perisylvian region and extending to the frontal and parietal operculum with associated localized mass effect.    Vessel Imaging   US BUE 6/9/18  Deep venous thrombus in the left axillary vein with additional thrombus in the proximal segment of the branchial vein.  No evidence of DVT in the right upper extremity.    CTA 6/8  CTA head: Occlusion right M2  segment of the MCA within the proximal sylvian fissure..  Heavy atherosclerotic plaquing of the cavernous and supraclinoid ICAs with mild moderate right and mild left cavernous ICA stenosis.  There is diffuse small caliber right M1 segment of the MCA.  Irregularity and narrowing of the right distal vertebral artery concerning for atherosclerotic disease with mild moderate stenosis.  CTA neck: Atherosclerotic plaquing of the carotid bifurcations and proximal ICAs with less than 50% proximal ICA stenosis by NASCET criteria.  CT head: Evolving large right MCA distribution infarction.  Localized mass effect without significant midline shift or hydrocephalus.  No evidence for hemorrhagic conversion.  Clinical correlation and follow-up advised.        Cardiac Imaging   Echo 6/7  CONCLUSIONS     1 - Technically difficult study.     2 - Severely depressed left ventricular systolic function (EF 15-20%).     3 - Mildly to moderately depressed right ventricular systolic function .     4 - Impaired LV relaxation, normal LAP (grade 1 diastolic dysfunction).     5 - Mild aortic stenosis, WIL = 1.45 cm2, AVAi = 0.67 cm2/m2, peak velocity = 2.8 m/s, mean gradient = 18 mmHg.     6 - Mild mitral regurgitation.       Other Imaging  CT chest 6/14/18  1. Recent development consolidation in right middle and lower lobes consistent with pneumonia or aspiration.  Endobronchial material in right lower lobe airways with intermittent obstruction, could represent retained secretions or aspirated material.  Correlate clinically.  Recommend noncontrast chest CT follow-up in 3 months.  2. Chronic right upper lobe consolidation and volume loss consistent with radiation fibrosis in patient with history of previous lung cancer.  3. Chronic loculation of pleural fluid in the right sulcus is stable.  4. A few subcentimeter pulmonary nodules, stable and likely benign  5. Aortic and coronary atherosclerosis, status post CABG  6. Gastrostomy tube  7.  Other findings as above

## 2018-06-22 PROBLEM — R45.1 AGITATION: Status: ACTIVE | Noted: 2018-01-01

## 2018-06-22 NOTE — PLAN OF CARE
Problem: Fall Risk (Adult)  Goal: Identify Related Risk Factors and Signs and Symptoms  Related risk factors and signs and symptoms are identified upon initiation of Human Response Clinical Practice Guideline (CPG)   Outcome: Ongoing (interventions implemented as appropriate)  Patient will remain free of falls during hospital stay

## 2018-06-22 NOTE — ASSESSMENT & PLAN NOTE
67 y/o male with R MCA infarct with left sided weakness, facial droop, dysarthria and confusion. Right sided movement is minimal. Etiology likely atheroembolic vs cardioembolic due to EF of 15%. EEG completed and no seizure activity noted. Pt started on Modafinil 6/13; wakefulness now improved.    Hospital medicine following; sepsis, hypotension, hyponatremia now resolved, medicine de-escalated abx regimen. No longer likely that pt will require ICU step up.    Pt pulled out PEG 6/17 & 6/18 evening; ford placed to temporarily maintain patency.  PEG replacement 6/19. Meds and Tube feedings resumed 6/20. Patient in right arm wrist restraint and abdominal binder. Psych consulted.  Increased risperidone to 1 mg qhs and limit use of narcotics.    Will completed full consult today. Goal is to dc restraints today after psych makes recommendations.     Dc modafinil 6/19    Antithrombotics: Eliquis 5 mg BID    Statins: Crestor 40 mg daily  Aggressive risk factor modification: HTN, DM, HLD, Diet, Exercise, Obesity, CAD  Rehab efforts: PT/OT/SLP to evaluate and treat, PM&R consult   Dispo: SNF  Diagnostics ordered/pending: NA  VTE prophylaxis:  SCDs + eliquis  BP parameters: Infarct: Avoid hypotension

## 2018-06-22 NOTE — PLAN OF CARE
Problem: Occupational Therapy Goal  Goal: Occupational Therapy Goal  Goals to be met by: 6/25/2018     Patient will increase functional independence with ADLs by performing:    UE Dressing with Moderate Assistance.  LE Dressing with Maximum Assistance.  Grooming while seated with Contact Guard Assistance.  Toileting from bedside commode with Moderate Assistance for hygiene and clothing management.   Sitting at edge of bed x 15 minutes with Minimal Assistance.  (keep for consistency)  Supine to sit with Moderate Assistance.  Stand pivot transfers with Moderate Assistance.  Toilet transfer to bedside commode with Moderate Assistance.  Pt will initiate movement in LUE.  Pt will follow 50% of one step commands.  Pt will keep eyes open 100% of session.        Outcome: Ongoing (interventions implemented as appropriate)  Continue with POC.   Anita vaughan OT  6/22/2018

## 2018-06-22 NOTE — PT/OT/SLP PROGRESS
"Speech Language Pathology Treatment    Patient Name:  Mendez Guthrie   MRN:  [id number]  Admitting Diagnosis: Embolic stroke involving right middle cerebral artery    Recommendations:                 General Recommendations:  Dysphagia therapy, Speech/language therapy and Cognitive-linguistic therapy  Diet recommendations:  NPO, Liquid Diet Level: NPO   Aspiration Precautions: Continue alternate means of nutrition and Strict aspiration precautions   General Precautions: Standard, aphasia, aspiration, fall, NPO  Communication strategies:  provide increased time to answer and go to room if call light pushed    Subjective     First session shortened 2/2 Psych MD entered to complete evaluation.       Pain/Comfort:  · Pain Rating 1: 0/10  · Pain Rating Post-Intervention 1: 0/10    Objective:     Has the patient been evaluated by SLP for swallowing?   Yes  Keep patient NPO? Yes   Current Respiratory Status: nasal cannula      Pt seen bedside over 2 session with fluctuating alertness/participation.  Pt closing eyes with frequent no response which appeared to be more of an avoidance behavior than lethargy.  Initially pt shaking head no to all therapy prompts.  Slightly improved participation on 2nd attempt.  Pt with inconsistent y/n response.  Simple commands followed x2 given max encouragement.  Pt vocalized "ah" intermittent during po trials only.  No other vocalizations elicited. Pt readily opened mouth for oral care with suction prior to po trials.  Swallow initiated x1.  Po trials including ice chip x2, thin liquid via tspn x10 and 1/4 tspn of puree x1 presented by SLP.   Improved oral transit time noted with initial ice chip and thin liquid trials.  Swallow response was fairly timely with fair hyolaryngeal rise palpated.  Single swallow initiated with no overt s/s aspiration.  Vocal quality was clear.  No oral transit achieved with puree trial with pocketing of entire bolus noted in L lateral sulcus.  Suction utilized " to clear bolus.  Ice chip again presented though no active manipulation, ice chip removed by SLP.  Session discontinued.  Education provided ot pt and his spouse, re: ongoing SLP POC and language stimulation.  Spouse verbalized understanding. No response by pt.        Assessment:     Mendez Guthrie is a 68 y.o. male with an SLP diagnosis of Aphasia, Dysphagia and Cognitive-Linguistic Impairment.      Goals:    SLP Goals        Problem: SLP Goal    Goal Priority Disciplines Outcome   SLP Goal     SLP Ongoing (interventions implemented as appropriate)   Description:  Speech Language Pathology Goals  Goals expected to be met by 6/21-all goals remain appropriate continue 6/28  1. Pt will participate in ongoing assessment of swallow.   2. Pt will answer simple y/n questions with 60% accy given repeats.  3. Pt will follow simple commands with 60% accy given mod A.  4. Pt will complete auto speech tasks with 50% accy with max A.                            Plan:     · Patient to be seen:  3 x/week   · Plan of Care expires:  07/06/18  · Plan of Care reviewed with:  patient, spouse   · SLP Follow-Up:  Yes       Discharge recommendations:  nursing facility, skilled   Barriers to Discharge:  Level of Skilled Assistance Needed      Time Tracking:     SLP Treatment Date:   06/22/18  Speech Start Time:  1110 (1006)  Speech Stop Time:  1125 (1009)     Speech Total Time (min):  15 +3=18 min    Billable Minutes: Speech Therapy Individual 8 and Treatment Swallowing Dysfunction 10    MADDI Webb, CCC-SLP  06/22/2018

## 2018-06-22 NOTE — ASSESSMENT & PLAN NOTE
Patient agitated at night per wife  Pulled out PEG tube   Requiring right arm wrist restraint  Risperidone 1 mg qhs added   Psych consulted - appreciate recs  Hope to dc restraints today

## 2018-06-22 NOTE — ASSESSMENT & PLAN NOTE
Stroke risk factor  CT Chest this admission concerning  Pulmonology feels pt too ill for acute intervention; Signed off  Plan for outpatient follow up with pulmonologist Dr Saunders ([facility]) for further workup of CT findings when acute illness has improved

## 2018-06-22 NOTE — PROGRESS NOTES
[facility]  Vascular Neurology  Comprehensive Stroke Center  Progress Note    Assessment/Plan:     * Embolic stroke involving right middle cerebral artery    67 y/o male with R MCA infarct with left sided weakness, facial droop, dysarthria and confusion. Right sided movement is minimal. Etiology likely atheroembolic vs cardioembolic due to EF of 15%. EEG completed and no seizure activity noted. Pt started on Modafinil 6/13; wakefulness now improved.    Hospital medicine following; sepsis, hypotension, hyponatremia now resolved, medicine de-escalated abx regimen. No longer likely that pt will require ICU step up.    Pt pulled out PEG 6/17 & 6/18 evening; ford placed to temporarily maintain patency.  PEG replacement 6/19. Meds and Tube feedings resumed 6/20. Patient in right arm wrist restraint and abdominal binder. Psych consulted.  Increased risperidone to 1 mg qhs and limit use of narcotics.    Will completed full consult today. Goal is to dc restraints today after psych makes recommendations.     Dc modafinil 6/19    Antithrombotics: Eliquis 5 mg BID    Statins: Crestor 40 mg daily  Aggressive risk factor modification: HTN, DM, HLD, Diet, Exercise, Obesity, CAD  Rehab efforts: PT/OT/SLP to evaluate and treat, PM&R consult   Dispo: SNF  Diagnostics ordered/pending: NA  VTE prophylaxis:  SCDs + eliquis  BP parameters: Infarct: Avoid hypotension        Agitation    Patient agitated at night per wife  Pulled out PEG tube   Requiring right arm wrist restraint  Risperidone 1 mg qhs added   Psych consulted - appreciate recs  Hope to dc restraints today        Acute on chronic respiratory failure with hypoxia    Initially with worsening O2 requirements  Consulted Pulm and Medicine; Appreciate recs  Pulm signed off; Rec outpatient follow-up once more stable  Broad-spectrum abx de-escalated to Unasyn (course completed)  Continue duonebs, CPT        Abnormal ventricular wall motion    Stroke risk  factor  Evidence on echo  Pt on Eliquis        DVT of axillary vein, acute left    Eliquis started 6/13        Chronic systolic (congestive) heart failure    Seen by cardiology   EF 15-20%, diastolic dysfunction  Recommending AC - apixaban 5 mg BID   Meds currently held due to hypotension        Dysarthria    Result of stroke  Aggressive SLP         Decreased cardiac ejection fraction    EF 15-20% - seen on echo completed 6/7  Cardiac history of CABG in 2016  Cardiology consulted - recommending AC and asa  Patient started on Eliquis 5 mg BID        Cytotoxic cerebral edema    Large area of cytotoxic cerebral edema identified when reviewing brain imaging in the territory of the R middle cerebral artery. There is not mass effect associated with it. We will continue to monitor the patients clinical exam for any worsening of symptoms which may indicate expansion of the stroke or the area of the edema resulting in the clinical change. The pattern is suggestive of atheroembolic etiology.        Left spastic hemiparesis    Due to stroke  Aggressive therapy        GENIE on CPAP    Stroke risk factor  Holding CPAP acutely due to risk aspiration, encephalopathy, pt nonverbal        Coronary artery disease involving native coronary artery of native heart without angina pectoris    Stoke risk factor  Eliquis 5 mg BID         Essential hypertension    Stroke risk factor  SBP <160  Home meds - coreg and losartan; Acutely held due to hypotension  Stable, no hypotension over last 24 hrs  (Allergy to lisinopril and Norvasc)        COPD (chronic obstructive pulmonary disease)    Stroke risk factor  Duonebs Q6 due to patient not being able to follow commands and RT not being able to administer inhalers   Currently maintaining O2 sats in the 90's on 2L NC  Medicine following; Appreciate recs  Pulmonary consulted, Signed off - f/u outpatient         History of lung cancer    Stroke risk factor  CT Chest this admission  concerning  Pulmonology feels pt too ill for acute intervention; Signed off  Plan for outpatient follow up with pulmonologist Dr Saunders ([facility]) for further workup of CT findings when acute illness has improved        Type 2 diabetes mellitus without complication, with long-term current use of insulin    Stroke risk factor  HgB A1C 9.4  Detemir 13U BID   SSI Q6 PRN             67 y/o male wth R MCA infarct. Had been at [facility] since 6-4-18 for AMS and weakness that improved and then worsened now with facial droop and left sided weakness. 6-6-18 fever so infectious w/u in process could be PNA as he was coughing with puree food at [facility]. He was placed on ABX Amp/Vanc/valcyclovir for possible meningitis this is stopped as he does not have meningitis. He moved around too much and was clausterphobic for MRI so will attempt MRI here.   6/8 - Urine studies ordered for hyponatremia. EEG results pending. Bilateral upper/lower extremities US ordered for fever. Procal WNL. Infectious workup negative so far. Cardiology consulted for EF 15-20%.   6-9-18 will give lasix 40 mg NG today as per cardiology recs. Discussion with family regarding anticoagulation due to low EF and DVT left axillary vein and brachial vein, discussion regarding feeding as well and likely bernard of patient swallowing is minimal so will need PEG next week as opposed to waiting and family is in agreement so will start heparin drip with no bolus parameters  6-10-18 once EEG read and no seizure activity may start Modafinil to help with wakefulness  6/11 - EEG to be read today and consider Modafinil. IR consulted for PEG placement tomorrow. Hospital medicine ordering urine studies for hyponatremia.   6/12 - PEG to be placed today. Heparin gtt currently held. Will resume once PEG is placed. AC to start tomorrow once PEG is able to be used. Hyponatremia slightly improving. Continue to hold fluids due to hyponatremia thought to be due to SIADH. Modafinil to  be started tomorrow when PEG can be used.  6/13 - PEG placed yesterday. Heparin gtt dc'ed and eliquis started. Modafinil started today. Continuing to restrict fluids (enteral water boluses) due to SIADH/hyponatremia and holding off on lasix that cardiology recommended.  6/14/18 - concern for sepsis today - medicine consulted - hypotensive, febrile, procal elevated.   6/15: Hypotensive to 88/36 overnight; good response to 250cc bolus. Hospital medicine adjusting insulin and antibiotics regimen. Wakefulness much improved since starting Modafinil. Mild bleeding noted at NC site.  6/16: Pt hypotensive, requiring another bolus yesterday; BP so far stable since. Modafinil held today as family felt made pt more aggressive; plan for decreased dose tomorrow. Added Lactulose PRN to bowel regimen.  6/17: Pt clinically improved from infection/respiratory perspective; medicine de-escalated abx regimen. No further hypotension episodes. Reduced-dose Modafinil today; will monitor response.  6/18: Pt pulled out PEG yesterday evening; ford placed to maintain patency. Attempted to place NGT but pt agitated, family refused. PO meds held for now, including Eliquis (per IR request). D/c'd scheduled suppository, pt on Senna.  6/19 - pulled ford from peg site last night - see note. Replaced in IR today. Patient in wrist restraint with abdominal binder. Family at bedside, educated on importance of protection of peg site. Dc modafinil   6/20 - Meds and TF resumed through PEG tube. Restraint still applied to right arm and abdominal binder applied. Unasyn completed.   6/21 - Patient continues to be agitated at times. Right arm restraint continues so patient will not pull out PEG. Psych consulted. They will complete full consult tomorrow. Suggested risperidone 1 mg qhs and decrease narcotic use.   6/22 - Psych to complete consult today. Appreciate recs. Sodium 132, enteral water bolus decreased to 800 mg four times daily. Continue to monitor.  Goal is to dc restraint today after psych makes recommendations.    STROKE DOCUMENTATION        NIH Scale:  1a. Level Of Consciousness: 1-->Not alert: but arousable by minor stimulation to obey, answer, or respond  1b. LOC Questions: 2-->Answers neither question correctly  1c. LOC Commands: 1-->Performs one task correctly  2. Best Gaze: 1-->Partial gaze palsy: gaze is abnormal in one or both eyes, but forced deviation or total gaze paresis is not present  3. Visual: 2-->Complete hemianopia  4. Facial Palsy: 2-->Partial paralysis (total or near-total paralysis of lower face)  5a. Motor Arm, Left: 4-->No movement  5b. Motor Arm, Right: 1-->Drift: limb holds 90 (or 45) degrees, but drifts down before full 10 secs: does not hit bed or other support  6a. Motor Leg, Left: 3-->No effort against gravity: leg falls to bed immediately  6b. Motor Leg, Right: 2-->Some effort against gravity: leg falls to bed by 5 secs, but has some effort against gravity  7. Limb Ataxia: 0-->Absent  8. Sensory: 0-->Normal: no sensory loss  9. Best Language: 2-->Severe aphasia: all communication is through fragmentary expression: great need for inference, questioning, and guessing by the listener. Range of information that can be exchanged is limited: listener carries burden of. . . (see row details)  10. Dysarthria: 2-->Severe dysarthria: patients speech is so slurred as to be unintelligible in the absence of or out of proportion to any dysphasia, or is mute/anarthric  11. Extinction and Inattention (formerly Neglect): 0-->No abnormality  Total (NIH Stroke Scale): 23       Modified Tom Green Score: 0  Beni Coma Scale:    ABCD2 Score:    [id number] Score:   HAS -BLED Score:   ICH Score:   Hunt & De Paz Classification:      Hemorrhagic change of an Ischemic Stroke: Does this patient have an ischemic stroke with hemorrhagic changes? No     Neurologic Chief Complaint: R MCA infarct     Subjective:     Interval History: Patient is seen for follow-up  neurological assessment and treatment recommendations:     Psych to complete consult today. Appreciate recs. Sodium 132, enteral water bolus decreased to 800 mg four times daily. Continue to monitor. Goal is to dc restraint today after psych makes recommendations.      HPI, Past Medical, Family, and Social History remains the same as documented in the initial encounter.     Review of Systems   Constitutional: Negative for diaphoresis and fever.   HENT: Positive for trouble swallowing. Negative for nosebleeds.    Neurological: Positive for facial asymmetry, speech difficulty and weakness.   Psychiatric/Behavioral: Positive for agitation and confusion.     Scheduled Meds:   albuterol-ipratropium  3 mL Nebulization Q6H    apixaban  5 mg Per G Tube BID    citalopram  10 mg Per G Tube Daily    insulin detemir U-100  13 Units Subcutaneous BID    risperidone 1 mg/ml  1 mg Oral QHS    rosuvastatin  40 mg Per G Tube QHS    senna-docusate 8.6-50 mg  2 tablet Per G Tube BID    sodium chloride 0.9%  3 mL Intravenous Q8H     Continuous Infusions:    PRN Meds:acetaminophen, dextrose 50%, glucagon (human recombinant), HYDROcodone-acetaminophen, insulin aspart U-100, labetalol, lactulose, miconazole NITRATE 2 %, ondansetron    Objective:     Vital Signs (Most Recent):  Temp: 96.9 °F (36.1 °C) (06/22/18 0538)  Pulse: 94 (06/22/18 0717)  Resp: 18 (06/22/18 0714)  BP: 119/75 (06/22/18 0538)  SpO2: 99 % (06/22/18 0717)  BP Location: Right arm    Vital Signs Range (Last 24H):  Temp:  [96.9 °F (36.1 °C)-99.3 °F (37.4 °C)]   Pulse:  []   Resp:  [16-18]   BP: (100-121)/(52-75)   SpO2:  [93 %-99 %]   BP Location: Right arm    Physical Exam   Constitutional: He appears well-developed and well-nourished. No distress.   HENT:   Head: Normocephalic and atraumatic.   Eyes: Conjunctivae are normal. No scleral icterus.   Cardiovascular: Normal rate.    Pulmonary/Chest: Effort normal. No respiratory distress.   Musculoskeletal: He  exhibits no edema or tenderness.   Neurological: He is alert.   Skin: Skin is warm and dry.   Vitals reviewed.      Neurological Exam:   LOC: Alert  Attention Span: Good  Language: Global aphasia  Articulation: Dysarthria  Orientation: Untestable due to severe aphasia   Facial Movement (CN VII): Lower facial weakness on the Left  Vision: L hemianopsia  Gaze: R gaze preference  Motor: Arm left  1/5  Leg left  Paresis: 2/5  Arm right  Normal 4/5  Leg right Paresis: 3/5  Sensation: Intact to light touch, temperature      Laboratory:  CMP:     Recent Labs  Lab 06/22/18 0457   CALCIUM 9.4   ALBUMIN 2.3*   PROT 6.9   *   K 5.0   CO2 23   CL 96   BUN 17   CREATININE 0.9   ALKPHOS 81   ALT 38   AST 42*   BILITOT 0.2     BMP:     Recent Labs  Lab 06/22/18 0457   *   K 5.0   CL 96   CO2 23   BUN 17   CREATININE 0.9   CALCIUM 9.4     CBC:     Recent Labs  Lab 06/22/18 0457   WBC 8.00   RBC 3.66*   HGB 11.0*   HCT 34.6*   *   MCV 95   MCH 30.1   MCHC 31.8*     Lipid Panel:   No results for input(s): CHOL, LDLCALC, HDL, TRIG in the last 168 hours.  Coagulation:   No results for input(s): PT, INR, APTT in the last 168 hours.  Platelet Aggregation Study: No results for input(s): PLTAGG, PLTAGINTERP, PLTAGREGLACO, ADPPLTAGGREG in the last 168 hours.  Hgb A1C:   No results for input(s): HGBA1C in the last 168 hours.  TSH:   No results for input(s): TSH in the last 168 hours.      Diagnostic Results     Brain Imaging   CT 6/6   Large right MCA distribution infarct measuring approximately 7 x 3 cm including the right perisylvian region and extending to the frontal and parietal operculum with associated localized mass effect.    Vessel Imaging   US BUE 6/9/18  Deep venous thrombus in the left axillary vein with additional thrombus in the proximal segment of the branchial vein.  No evidence of DVT in the right upper extremity.    CTA 6/8  CTA head: Occlusion right M2 segment of the MCA within the proximal sylvian  fissure..  Heavy atherosclerotic plaquing of the cavernous and supraclinoid ICAs with mild moderate right and mild left cavernous ICA stenosis.  There is diffuse small caliber right M1 segment of the MCA.  Irregularity and narrowing of the right distal vertebral artery concerning for atherosclerotic disease with mild moderate stenosis.  CTA neck: Atherosclerotic plaquing of the carotid bifurcations and proximal ICAs with less than 50% proximal ICA stenosis by NASCET criteria.  CT head: Evolving large right MCA distribution infarction.  Localized mass effect without significant midline shift or hydrocephalus.  No evidence for hemorrhagic conversion.  Clinical correlation and follow-up advised.        Cardiac Imaging   Echo 6/7  CONCLUSIONS     1 - Technically difficult study.     2 - Severely depressed left ventricular systolic function (EF 15-20%).     3 - Mildly to moderately depressed right ventricular systolic function .     4 - Impaired LV relaxation, normal LAP (grade 1 diastolic dysfunction).     5 - Mild aortic stenosis, WIL = 1.45 cm2, AVAi = 0.67 cm2/m2, peak velocity = 2.8 m/s, mean gradient = 18 mmHg.     6 - Mild mitral regurgitation.       Other Imaging  CT chest 6/14/18  1. Recent development consolidation in right middle and lower lobes consistent with pneumonia or aspiration.  Endobronchial material in right lower lobe airways with intermittent obstruction, could represent retained secretions or aspirated material.  Correlate clinically.  Recommend noncontrast chest CT follow-up in 3 months.  2. Chronic right upper lobe consolidation and volume loss consistent with radiation fibrosis in patient with history of previous lung cancer.  3. Chronic loculation of pleural fluid in the right sulcus is stable.  4. A few subcentimeter pulmonary nodules, stable and likely benign  5. Aortic and coronary atherosclerosis, status post CABG  6. Gastrostomy tube  7. Other findings as above      Colleen Hannah,  NP  Comprehensive Stroke Center  Department of Vascular Neurology   [facility]

## 2018-06-22 NOTE — HPI
"Consultation-Liaison Psychiatry Consult Note      Chief Complaint / Reason for Consult:     delirium     Subjective:     History of Present Illness:   Mendez Guthrie is a 68 y.o. male with a history of R MCA infarct with left sided weakness, facial droop, dysarthria and confusion. Right sided movement is minimal. Etiology likely atheroembolic vs cardioembolic due to EF of 15%. Hospital medicine following; sepsis, hypotension, hyponatremia now resolved, medicine de-escalated abx regimen. No longer likely that pt will require ICU step up.  Psychiatry was consulted for recommendations for management of agitated delirium.  Per chart review, patient received Risperdal 1 mg overnight for agitation.  Wife reports patient has been in soft restraints on his arm for 3 days after he removed his PEG tube.    Patient was seen this morning with wife at bedside.  Patient was unable to answer any questions and only repeated the word "zoom. Zzzzooooommm. Zooooooooooooom."  Patient unable to follow commands or complete CAM-ICU screen.  Wife states that patient has been able to articulate her names and the names of some of their children.  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  Wife states she has not been told by the medical team what her 's new baseline functioning will be.  Wife states that patient did well overnight after he received his pain medication.  Was taking tramadol at home for arthritis related pain.    Medical Review Of Systems:  Pertinent items are noted in HPI.    Psychiatric Review Of Systems - Is patient experiencing or having changes in:  sleep: yes  appetite: yes  weight: yes  energy/anergy: yes  interest/pleasure/anhedonia: no  somatic symptoms: no  libido: no  anxiety/panic: yes  guilty/hopelessness: no  concentration: yes  S.I.B.s/risky behavior: no  any drugs: no  alcohol: no     Past Psychiatric History:  Previous " Medication Trials: no   Previous Psychiatric Hospitalizations: no   Previous Suicide Attempts: no   History of Violence: no  Outpatient Psychiatrist: no    Social History:  Marital Status:   Children: 8   Employment Status/Info: retired, formerly a    Education: 10th grade  Special Ed: no  Housing Status: with wife in [place]   History of phys/sexual abuse: no  Access to gun: no    Substance Abuse History:  Recreational Drugs: denied  Use of Alcohol: denied  Rehab History:no   Tobacco Use:no  Use of Caffeine: denies use  Use of OTC: denied  Legal consequences of chemical use: no    Legal History:  Past Charges/Incarcerations:no   Pending charges:no     Family Psychiatric History:   denied

## 2018-06-22 NOTE — PLAN OF CARE
Problem: Pressure Ulcer (Adult)  Intervention: Prevent/Manage Excess Moisture  Bed in low position. Linen clean and dry. Pt turned and repositioned Q2 hours. Perineum cleaned and dried. Will continue to assess as needed and necessary.

## 2018-06-22 NOTE — SUBJECTIVE & OBJECTIVE
Neurologic Chief Complaint: R MCA infarct     Subjective:     Interval History: Patient is seen for follow-up neurological assessment and treatment recommendations:     Psych to complete consult today. Appreciate recs. Sodium 132, enteral water bolus decreased to 800 mg four times daily. Continue to monitor. Goal is to dc restraint today after psych makes recommendations.      HPI, Past Medical, Family, and Social History remains the same as documented in the initial encounter.     Review of Systems   Constitutional: Negative for diaphoresis and fever.   HENT: Positive for trouble swallowing. Negative for nosebleeds.    Neurological: Positive for facial asymmetry, speech difficulty and weakness.   Psychiatric/Behavioral: Positive for agitation and confusion.     Scheduled Meds:   albuterol-ipratropium  3 mL Nebulization Q6H    apixaban  5 mg Per G Tube BID    citalopram  10 mg Per G Tube Daily    insulin detemir U-100  13 Units Subcutaneous BID    risperidone 1 mg/ml  1 mg Oral QHS    rosuvastatin  40 mg Per G Tube QHS    senna-docusate 8.6-50 mg  2 tablet Per G Tube BID    sodium chloride 0.9%  3 mL Intravenous Q8H     Continuous Infusions:    PRN Meds:acetaminophen, dextrose 50%, glucagon (human recombinant), HYDROcodone-acetaminophen, insulin aspart U-100, labetalol, lactulose, miconazole NITRATE 2 %, ondansetron    Objective:     Vital Signs (Most Recent):  Temp: 96.9 °F (36.1 °C) (06/22/18 0538)  Pulse: 94 (06/22/18 0717)  Resp: 18 (06/22/18 0714)  BP: 119/75 (06/22/18 0538)  SpO2: 99 % (06/22/18 0717)  BP Location: Right arm    Vital Signs Range (Last 24H):  Temp:  [96.9 °F (36.1 °C)-99.3 °F (37.4 °C)]   Pulse:  []   Resp:  [16-18]   BP: (100-121)/(52-75)   SpO2:  [93 %-99 %]   BP Location: Right arm    Physical Exam   Constitutional: He appears well-developed and well-nourished. No distress.   HENT:   Head: Normocephalic and atraumatic.   Eyes: Conjunctivae are normal. No scleral icterus.    Cardiovascular: Normal rate.    Pulmonary/Chest: Effort normal. No respiratory distress.   Musculoskeletal: He exhibits no edema or tenderness.   Neurological: He is alert.   Skin: Skin is warm and dry.   Vitals reviewed.      Neurological Exam:   LOC: Alert  Attention Span: Good  Language: Global aphasia  Articulation: Dysarthria  Orientation: Untestable due to severe aphasia   Facial Movement (CN VII): Lower facial weakness on the Left  Vision: L hemianopsia  Gaze: R gaze preference  Motor: Arm left  1/5  Leg left  Paresis: 2/5  Arm right  Normal 4/5  Leg right Paresis: 3/5  Sensation: Intact to light touch, temperature      Laboratory:  CMP:     Recent Labs  Lab 06/22/18  0457   CALCIUM 9.4   ALBUMIN 2.3*   PROT 6.9   *   K 5.0   CO2 23   CL 96   BUN 17   CREATININE 0.9   ALKPHOS 81   ALT 38   AST 42*   BILITOT 0.2     BMP:     Recent Labs  Lab 06/22/18  0457   *   K 5.0   CL 96   CO2 23   BUN 17   CREATININE 0.9   CALCIUM 9.4     CBC:     Recent Labs  Lab 06/22/18 0457   WBC 8.00   RBC 3.66*   HGB 11.0*   HCT 34.6*   *   MCV 95   MCH 30.1   MCHC 31.8*     Lipid Panel:   No results for input(s): CHOL, LDLCALC, HDL, TRIG in the last 168 hours.  Coagulation:   No results for input(s): PT, INR, APTT in the last 168 hours.  Platelet Aggregation Study: No results for input(s): PLTAGG, PLTAGINTERP, PLTAGREGLACO, ADPPLTAGGREG in the last 168 hours.  Hgb A1C:   No results for input(s): HGBA1C in the last 168 hours.  TSH:   No results for input(s): TSH in the last 168 hours.      Diagnostic Results     Brain Imaging   CT 6/6   Large right MCA distribution infarct measuring approximately 7 x 3 cm including the right perisylvian region and extending to the frontal and parietal operculum with associated localized mass effect.    Vessel Imaging   US BUE 6/9/18  Deep venous thrombus in the left axillary vein with additional thrombus in the proximal segment of the branchial vein.  No evidence of DVT in the  right upper extremity.    CTA 6/8  CTA head: Occlusion right M2 segment of the MCA within the proximal sylvian fissure..  Heavy atherosclerotic plaquing of the cavernous and supraclinoid ICAs with mild moderate right and mild left cavernous ICA stenosis.  There is diffuse small caliber right M1 segment of the MCA.  Irregularity and narrowing of the right distal vertebral artery concerning for atherosclerotic disease with mild moderate stenosis.  CTA neck: Atherosclerotic plaquing of the carotid bifurcations and proximal ICAs with less than 50% proximal ICA stenosis by NASCET criteria.  CT head: Evolving large right MCA distribution infarction.  Localized mass effect without significant midline shift or hydrocephalus.  No evidence for hemorrhagic conversion.  Clinical correlation and follow-up advised.        Cardiac Imaging   Echo 6/7  CONCLUSIONS     1 - Technically difficult study.     2 - Severely depressed left ventricular systolic function (EF 15-20%).     3 - Mildly to moderately depressed right ventricular systolic function .     4 - Impaired LV relaxation, normal LAP (grade 1 diastolic dysfunction).     5 - Mild aortic stenosis, WIL = 1.45 cm2, AVAi = 0.67 cm2/m2, peak velocity = 2.8 m/s, mean gradient = 18 mmHg.     6 - Mild mitral regurgitation.       Other Imaging  CT chest 6/14/18  1. Recent development consolidation in right middle and lower lobes consistent with pneumonia or aspiration.  Endobronchial material in right lower lobe airways with intermittent obstruction, could represent retained secretions or aspirated material.  Correlate clinically.  Recommend noncontrast chest CT follow-up in 3 months.  2. Chronic right upper lobe consolidation and volume loss consistent with radiation fibrosis in patient with history of previous lung cancer.  3. Chronic loculation of pleural fluid in the right sulcus is stable.  4. A few subcentimeter pulmonary nodules, stable and likely benign  5. Aortic and coronary  atherosclerosis, status post CABG  6. Gastrostomy tube  7. Other findings as above

## 2018-06-22 NOTE — PT/OT/SLP PROGRESS
Occupational Therapy   Treatment    Name: Mendez Guthrie  MRN: [id number]  Admitting Diagnosis:  Embolic stroke involving right middle cerebral artery       Recommendations:     Discharge Recommendations: nursing facility, skilled  Discharge Equipment Recommendations:  hospital bed, lift device  Barriers to discharge:  None    Subjective     Communicated with: RN prior to session.  Pt required max tactile/verbal cues to arouse; pt with improved level of alertness once seated EOB  Pain/Comfort:  · Pain Rating 1: 0/10  · Pain Rating Post-Intervention 1: 0/10    Patients cultural, spiritual, Jain conflicts given the current situation:      Objective:     Patient found with: bed alarm, Condom Catheter, telemetry, SCD, pulse ox (continuous), peripheral IV, pressure relief boots, oxygen, PEG Tube    General Precautions: Standard, aspiration, fall, aphasia, NPO   Orthopedic Precautions:N/A   Braces: N/A     Occupational Performance:    Bed Mobility:    · Patient completed Rolling/Turning to Left with  maximal assistance  · Patient completed Scooting/Bridging with total assistance  · Patient completed Supine to Sit with total assistance  · Patient completed Sit to Supine with maximal assistance     Sitting EOB:   Pt sat EOB ~20 minutes with moderate assistance for postural control; Challenging core control, static/dynamic sitting balance, and midline orientation; * Pt demo'd improved level of arousal and participation in session upon sitting EOB this date    Activities of Daily Living:  · Grooming: When provided wash cloth, comb, and tooth brush-- pt able to appropriately use items without cues for initiation   · UB Dressing: maximal assistance to sal gown like jacket while seated EOB  · LB Dressing: dependence to sal B socks while supine    Patient left with bed in chair position with all lines intact, call button in reach and RN notified    [facility] 6 Click:  AMPA Total Score: 8    Treatment & Education:  -Pt edu on  OT role/POC  -Importance of OOB activity with staff assistance  -Safety during functional t/f and mobility  - White board updated  - Multiple self care tasks completed-- as noted above  - Provided BUE PROM/AAROM to BUE while seated EOB: 1x15 reps in shoulder flexion, elbow flexion/extension, shoulder IR/ER, supination/pronation, wrist and digit flexion/extension.     Cogntion:   Followed 25% of simple one-step commands  Eyes open 50% of session  Education:    Assessment:     Mendez Guthrie is a 68 y.o. male with a medical diagnosis of Embolic stroke involving right middle cerebral artery.  He presents improve participation in therapy and simple command following.  Performance deficits affecting function are weakness, impaired endurance, impaired functional mobilty, decreased upper extremity function, decreased lower extremity function, impaired self care skills, impaired balance, impaired cognition, impaired fine motor, abnormal tone, decreased ROM, visual deficits, decreased safety awareness, impaired sensation, edema.  Pt would benefit from SNF following d/c to continue to progress towards goals and improve quality of life.     Rehab Prognosis:  Good; patient would benefit from acute skilled OT services to address these deficits and reach maximum level of function.       Plan:     Patient to be seen 4 x/week to address the above listed problems via self-care/home management, therapeutic activities, therapeutic exercises, neuromuscular re-education  · Plan of Care Expires: 07/06/18  · Plan of Care Reviewed with: spouse, patient    This Plan of care has been discussed with the patient who was involved in its development and understands and is in agreement with the identified goals and treatment plan    GOALS:    Occupational Therapy Goals        Problem: Occupational Therapy Goal    Goal Priority Disciplines Outcome Interventions   Occupational Therapy Goal     OT, PT/OT Ongoing (interventions implemented as  appropriate)    Description:  Goals to be met by: 6/25/2018     Patient will increase functional independence with ADLs by performing:    UE Dressing with Moderate Assistance.  LE Dressing with Maximum Assistance.  Grooming while seated with Contact Guard Assistance.  Toileting from bedside commode with Moderate Assistance for hygiene and clothing management.   Sitting at edge of bed x 15 minutes with Minimal Assistance.  (keep for consistency)  Supine to sit with Moderate Assistance.  Stand pivot transfers with Moderate Assistance.  Toilet transfer to bedside commode with Moderate Assistance.  Pt will initiate movement in LUE.  Pt will follow 50% of one step commands.  Pt will keep eyes open 100% of session.                         Time Tracking:     OT Date of Treatment: 06/22/18  OT Start Time: 0829  OT Stop Time: 0858  OT Total Time (min): 29 min    Billable Minutes:Self Care/Home Management 18  Therapeutic Exercise 11    Anita vaughan OT  6/22/2018

## 2018-06-22 NOTE — PLAN OF CARE
Problem: Fall Risk (Adult)  Goal: Identify Related Risk Factors and Signs and Symptoms  Related risk factors and signs and symptoms are identified upon initiation of Human Response Clinical Practice Guideline (CPG)   Outcome: Ongoing (interventions implemented as appropriate)  Fall precautions maintained, call bell within reach, VSS, bed in lowest position, family at bedside. Will continue to monitor.

## 2018-06-22 NOTE — PLAN OF CARE
Problem: SLP Goal  Goal: SLP Goal  Speech Language Pathology Goals  Goals expected to be met by 6/21-all goals remain appropriate continue 6/28  1. Pt will participate in ongoing assessment of swallow.   2. Pt will answer simple y/n questions with 60% accy given repeats.  3. Pt will follow simple commands with 60% accy given mod A.  4. Pt will complete auto speech tasks with 50% accy with max A.           Outcome: Ongoing (interventions implemented as appropriate)  Pt with increased participation with po trials today, rec cont npo. MADDI Webb, CCC/SLP  6/22/2018

## 2018-06-22 NOTE — CONSULTS
"[facility]  Psychiatry  Consult Note    Patient Name: Mendez Guthrie  MRN: [id number]   Code Status: Full Code  Admission Date: 6/7/2018  Hospital Length of Stay: 15 days  Attending Physician: Jaylan Morataya MD  Primary Care Provider: Valeria Mayen MD    Current Legal Status: N/A    Patient information was obtained from patient and spouse/SO.   Inpatient consult to Psychiatry  Consult performed by: NEFTALY SMITH  Consult ordered by: YASSINE MUJICA        Subjective:     Principal Problem:Embolic stroke involving right middle cerebral artery    Chief Complaint:  Delirium     HPI:   Consultation-Liaison Psychiatry Consult Note      Chief Complaint / Reason for Consult:     delirium     Subjective:     History of Present Illness:   Mendez Guthrie is a 68 y.o. male with a history of R MCA infarct with left sided weakness, facial droop, dysarthria and confusion. Right sided movement is minimal. Etiology likely atheroembolic vs cardioembolic due to EF of 15%. Hospital medicine following; sepsis, hypotension, hyponatremia now resolved, medicine de-escalated abx regimen. No longer likely that pt will require ICU step up.  Psychiatry was consulted for recommendations for management of agitated delirium.  Per chart review, patient received Risperdal 1 mg overnight for agitation.  Wife reports patient has been in soft restraints on his arm for 3 days after he removed his PEG tube.    Patient was seen this morning with wife at bedside.  Patient was unable to answer any questions and only repeated the word "zoom. Zzzzooooommm. Zooooooooooooom."  Patient unable to follow commands or complete CAM-ICU screen.  Wife states that patient has been able to articulate her names and the names of some of their children.  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  Wife states she has not been told by the medical team what " her 's new baseline functioning will be.  Wife states that patient did well overnight after he received his pain medication.  Was taking tramadol at home for arthritis related pain.    Medical Review Of Systems:  Pertinent items are noted in HPI.    Psychiatric Review Of Systems - Is patient experiencing or having changes in:  sleep: yes  appetite: yes  weight: yes  energy/anergy: yes  interest/pleasure/anhedonia: no  somatic symptoms: no  libido: no  anxiety/panic: yes  guilty/hopelessness: no  concentration: yes  S.I.B.s/risky behavior: no  any drugs: no  alcohol: no     Allergies:  Lisinopril and Norvasc [amlodipine]    Past Medical/Surgical History  Past Medical History:   Diagnosis Date    Arthritis     Asthma     COPD (chronic obstructive pulmonary disease)     Coronary artery disease     Diabetes mellitus     Embolic stroke involving right middle cerebral artery 6/6/2018    High cholesterol     Hypertension     Long term current use of antithrombotics/antiplatelets     Lung cancer     Stroke 6/6/2018      has a past medical history of Arthritis; Asthma; COPD (chronic obstructive pulmonary disease); Coronary artery disease; Diabetes mellitus; Embolic stroke involving right middle cerebral artery (6/6/2018); High cholesterol; Hypertension; Long term current use of antithrombotics/antiplatelets; Lung cancer; and Stroke (6/6/2018).  Past Surgical History:   Procedure Laterality Date    cardiac bypass      CARDIAC SURGERY  05/04/2016    PORTACATH PLACEMENT  2012    cancer treatment       Past Psychiatric History:  Previous Medication Trials: no   Previous Psychiatric Hospitalizations: no   Previous Suicide Attempts: no   History of Violence: no  Outpatient Psychiatrist: no    Social History:  Marital Status:   Children: 8   Employment Status/Info: retired, formerly a    Education: 10th grade  Special Ed: no  Housing Status: with wife in [place]   History of phys/sexual  abuse: no  Access to gun: no    Substance Abuse History:  Recreational Drugs: denied  Use of Alcohol: denied  Rehab History:no   Tobacco Use:no  Use of Caffeine: denies use  Use of OTC: denied  Legal consequences of chemical use: no    Legal History:  Past Charges/Incarcerations:no   Pending charges:no     Family Psychiatric History:   denied    Objective:     Current Medications:  Infusions:    Scheduled:   albuterol-ipratropium  3 mL Nebulization Q6H    apixaban  5 mg Per G Tube BID    citalopram  10 mg Per G Tube Daily    insulin detemir U-100  13 Units Subcutaneous BID    risperidone 1 mg/ml  1 mg Oral QHS    rosuvastatin  40 mg Per G Tube QHS    senna-docusate 8.6-50 mg  2 tablet Per G Tube BID    sodium chloride 0.9%  3 mL Intravenous Q8H     PRN:  acetaminophen, dextrose 50%, glucagon (human recombinant), HYDROcodone-acetaminophen, insulin aspart U-100, labetalol, lactulose, miconazole NITRATE 2 %, ondansetron    Home Medications:  Prior to Admission medications    Medication Sig Start Date End Date Taking? Authorizing Provider   aspirin 81 MG Chew Take 81 mg by mouth once daily.    Historical Provider, MD   blood sugar diagnostic Strp Inject 1 strip as directed 3 (three) times daily with meals. Dispense One Touch Ultra Strips Dx:E11.9 3/28/18   Valeria Mayen MD   carvedilol (COREG) 12.5 MG tablet Take 12.5 mg by mouth 2 (two) times daily with meals.    Historical Provider, MD   chlorthalidone (HYGROTEN) 25 MG Tab Take 25 mg by mouth once daily.    Historical Provider, MD   clopidogrel (PLAVIX) 75 mg tablet Take 75 mg by mouth once daily. 5/1/18   Historical Provider, MD   diazePAM (VALIUM) 5 MG tablet Take 1 tablet (5 mg total) by mouth every 6 (six) hours as needed for Anxiety. 10/24/17 5/10/18  Frank Saunders MD   docusate sodium (COLACE) 100 MG capsule Take 100 mg by mouth 2 (two) times daily as needed.     Historical Provider, MD   doxazosin (CARDURA) 2 MG tablet Take 1 tablet (2 mg  total) by mouth every evening. 1/28/15 5/10/18  Valerai Mayen MD   fluticasone-salmeterol 250-50 mcg/dose (ADVAIR DISKUS) 250-50 mcg/dose diskus inhaler Inhale 1 puff into the lungs 2 (two) times daily. Controller 3/28/18   Valeria Mayen MD   hydrocodone-acetaminophen 5-325mg (NORCO) 5-325 mg per tablet Take 1 tablet by mouth every 4 (four) hours as needed for Pain. 11/28/17   Valeria Mayen MD   insulin aspart U-100 (NOVOLOG) 100 unit/mL InPn pen Inject 10 Units into the skin before dinner. 3/28/18 3/28/19  Valeria Mayen MD   insulin detemir U-100 (LEVEMIR FLEXTOUCH U-100 INSULN) 100 unit/mL (3 mL) SubQ InPn pen Inject 15 Units into the skin every evening. Dispense with insulin pen needles 3/28/18 3/28/19  Valeria Mayen MD   linagliptin (TRADJENTA) 5 mg Tab tablet Take 1 tablet (5 mg total) by mouth once daily. 4/3/18 4/3/19  Valeria Mayen MD   losartan (COZAAR) 50 MG tablet Take 50 mg by mouth once daily.  3/7/15   Historical Provider, MD   metFORMIN (GLUCOPHAGE) 1000 MG tablet 'TAKE 1 TABLET BY MOUTH TWICE A DAY WITH MEALS 1/19/18   Valeria Mayen MD   nitroGLYCERIN (NITROSTAT) 0.4 MG SL tablet Place 1 tablet (0.4 mg total) under the tongue every 5 (five) minutes as needed for Chest pain. 8/22/16 5/10/18  Valeria Mayen MD   PROAIR HFA 90 mcg/actuation inhaler  12/26/17   Historical Provider, MD   rosuvastatin (CRESTOR) 40 MG Tab Take 1 tablet (40 mg total) by mouth every evening. 4/3/18   Valeria Mayen MD   SPIRIVA WITH HANDIHALER 18 mcg inhalation capsule inhale the contents of one capsule in the handihaler once daily 3/1/18   Miah Florence MD   traMADol (ULTRAM) 50 mg tablet take 1 tablet by mouth every 6 hours if needed 5/1/18   Valeria Mayen MD   VITAMIN D2 50,000 unit capsule TAKE 1 CAPSULE BY MOUTH EVERY 7 DAYS 1/27/18   Valeria Mayen MD     Vital Signs:  Temp:  [96.9 °F (36.1 °C)-99.3 °F (37.4 °C)]   Pulse:   []   Resp:  [16-18]   BP: (100-121)/(52-75)   SpO2:  [93 %-99 %]     Physical Exam:  Gen: Alert, calm, NAD   Head: MMM   Lungs: On O2 via NC, respirations unlabored   Chest wall: No tenderness or deformity   Extremities: Extremities normal, atraumatic   Skin: no rashes or lesions   Neurologic: Face unsymmetrical with left sided weakness      Mental Status Exam:  Appearance: older than stated age, disheveled, lying in bed   Behavior: reluctant to participate, uncooperative, eye contact minimal   Speech/Language: articulation error, delayed, increased latency of response, non-spontaneous   Mood: steady   Affect: mood congruent   Thought Process:  poverty of thought   Thought Content: poverty of content   Orientation: disoriented to self, place, sitation, date   Cognition: easily distracted, impaired   Insight: poor   Judgment: poor     Laboratory Data:  Recent Results (from the past 48 hour(s))   POCT glucose    Collection Time: 06/20/18  6:48 PM   Result Value Ref Range    POCT Glucose 98 70 - 110 mg/dL   POCT glucose    Collection Time: 06/20/18  9:40 PM   Result Value Ref Range    POCT Glucose 143 (H) 70 - 110 mg/dL   POCT glucose    Collection Time: 06/21/18 12:14 AM   Result Value Ref Range    POCT Glucose 128 (H) 70 - 110 mg/dL   POCT glucose    Collection Time: 06/21/18  5:33 AM   Result Value Ref Range    POCT Glucose 175 (H) 70 - 110 mg/dL   CBC auto differential    Collection Time: 06/21/18  5:47 AM   Result Value Ref Range    WBC 8.03 3.90 - 12.70 K/uL    RBC 3.67 (L) 4.60 - 6.20 M/uL    Hemoglobin 11.1 (L) 14.0 - 18.0 g/dL    Hematocrit 34.4 (L) 40.0 - 54.0 %    MCV 94 82 - 98 fL    MCH 30.2 27.0 - 31.0 pg    MCHC 32.3 32.0 - 36.0 g/dL    RDW 12.5 11.5 - 14.5 %    Platelets 461 (H) 150 - 350 K/uL    MPV 8.4 (L) 9.2 - 12.9 fL    Immature Granulocytes 1.9 (H) 0.0 - 0.5 %    Gran # (ANC) 5.7 1.8 - 7.7 K/uL    Immature Grans (Abs) 0.15 (H) 0.00 - 0.04 K/uL    Lymph # 1.1 1.0 - 4.8 K/uL    Mono # 0.8 0.3  - 1.0 K/uL    Eos # 0.2 0.0 - 0.5 K/uL    Baso # 0.04 0.00 - 0.20 K/uL    nRBC 0 0 /100 WBC    Gran% 71.5 38.0 - 73.0 %    Lymph% 13.9 (L) 18.0 - 48.0 %    Mono% 10.2 4.0 - 15.0 %    Eosinophil% 2.0 0.0 - 8.0 %    Basophil% 0.5 0.0 - 1.9 %    Differential Method Automated    Comprehensive metabolic panel    Collection Time: 06/21/18  5:47 AM   Result Value Ref Range    Sodium 134 (L) 136 - 145 mmol/L    Potassium 3.9 3.5 - 5.1 mmol/L    Chloride 95 95 - 110 mmol/L    CO2 30 (H) 23 - 29 mmol/L    Glucose 180 (H) 70 - 110 mg/dL    BUN, Bld 21 8 - 23 mg/dL    Creatinine 0.9 0.5 - 1.4 mg/dL    Calcium 9.5 8.7 - 10.5 mg/dL    Total Protein 6.7 6.0 - 8.4 g/dL    Albumin 2.3 (L) 3.5 - 5.2 g/dL    Total Bilirubin 0.2 0.1 - 1.0 mg/dL    Alkaline Phosphatase 82 55 - 135 U/L    AST 54 (H) 10 - 40 U/L    ALT 44 10 - 44 U/L    Anion Gap 9 8 - 16 mmol/L    eGFR if African American >60.0 >60 mL/min/1.73 m^2    eGFR if non African American >60.0 >60 mL/min/1.73 m^2   POCT glucose    Collection Time: 06/21/18  2:48 PM   Result Value Ref Range    POCT Glucose 214 (H) 70 - 110 mg/dL   POCT glucose    Collection Time: 06/21/18  6:23 PM   Result Value Ref Range    POCT Glucose 217 (H) 70 - 110 mg/dL   POCT glucose    Collection Time: 06/21/18  9:40 PM   Result Value Ref Range    POCT Glucose 209 (H) 70 - 110 mg/dL   CBC auto differential    Collection Time: 06/22/18  4:57 AM   Result Value Ref Range    WBC 8.00 3.90 - 12.70 K/uL    RBC 3.66 (L) 4.60 - 6.20 M/uL    Hemoglobin 11.0 (L) 14.0 - 18.0 g/dL    Hematocrit 34.6 (L) 40.0 - 54.0 %    MCV 95 82 - 98 fL    MCH 30.1 27.0 - 31.0 pg    MCHC 31.8 (L) 32.0 - 36.0 g/dL    RDW 12.5 11.5 - 14.5 %    Platelets 436 (H) 150 - 350 K/uL    MPV 8.6 (L) 9.2 - 12.9 fL    Immature Granulocytes 1.3 (H) 0.0 - 0.5 %    Gran # (ANC) 5.4 1.8 - 7.7 K/uL    Immature Grans (Abs) 0.10 (H) 0.00 - 0.04 K/uL    Lymph # 1.5 1.0 - 4.8 K/uL    Mono # 0.8 0.3 - 1.0 K/uL    Eos # 0.2 0.0 - 0.5 K/uL    Baso # 0.07  0.00 - 0.20 K/uL    nRBC 0 0 /100 WBC    Gran% 67.3 38.0 - 73.0 %    Lymph% 18.9 18.0 - 48.0 %    Mono% 9.6 4.0 - 15.0 %    Eosinophil% 2.0 0.0 - 8.0 %    Basophil% 0.9 0.0 - 1.9 %    Differential Method Automated    Comprehensive metabolic panel    Collection Time: 06/22/18  4:57 AM   Result Value Ref Range    Sodium 132 (L) 136 - 145 mmol/L    Potassium 5.0 3.5 - 5.1 mmol/L    Chloride 96 95 - 110 mmol/L    CO2 23 23 - 29 mmol/L    Glucose 186 (H) 70 - 110 mg/dL    BUN, Bld 17 8 - 23 mg/dL    Creatinine 0.9 0.5 - 1.4 mg/dL    Calcium 9.4 8.7 - 10.5 mg/dL    Total Protein 6.9 6.0 - 8.4 g/dL    Albumin 2.3 (L) 3.5 - 5.2 g/dL    Total Bilirubin 0.2 0.1 - 1.0 mg/dL    Alkaline Phosphatase 81 55 - 135 U/L    AST 42 (H) 10 - 40 U/L    ALT 38 10 - 44 U/L    Anion Gap 13 8 - 16 mmol/L    eGFR if African American >60.0 >60 mL/min/1.73 m^2    eGFR if non African American >60.0 >60 mL/min/1.73 m^2   POCT glucose    Collection Time: 06/22/18  5:34 AM   Result Value Ref Range    POCT Glucose 182 (H) 70 - 110 mg/dL   POCT glucose    Collection Time: 06/22/18  9:10 AM   Result Value Ref Range    POCT Glucose 235 (H) 70 - 110 mg/dL      No results found for: PHENYTOIN, PHENOBARB, VALPROATE, CBMZ  Imaging:  Imaging Results    None          Assessment:     Mendez Guthrie is a 68 y.o. male with a history of R MCA infarct with left sided weakness, facial droop, dysarthria and confusion. Right sided movement is minimal. Etiology likely atheroembolic vs cardioembolic due to EF of 15%. Hospital medicine following; sepsis, hypotension, hyponatremia now resolved, medicine de-escalated abx regimen. No longer likely that pt will require ICU step up.  Psychiatry was consulted for recommendations for management of agitated delirium.  Per chart review, patient received Risperdal 1 mg overnight for agitation.  Wife reports patient has been in soft restraints on his arm for 3 days after he removed his PEG tube.    Recommendations:     Delirium,  due to underlying medical condition, mixed  · Recommend continuing 1 mg Risperdal qhs.  May use Haldol 1 mg PO/IM q6h PRN non redirectable agitation. Monitor for QTc prolongation and adverse reactions daily QTc today was 457.  · Avoid benzodiazepines, antihistamines, anticholinergics, and minimize opiate use as these may worsen delirium.  · Recommend consult to PT/OT. Early mobility and exercise has been shown to decrease duration of delirium. Provide appropriate lighting and clear signage; a clock and calendar should be easily visible to the patient.  · Monitor environmental factors. Reduce light and noise at night (close shades, turn off lights, turn off TV, ect). Correct any alterations in sleep cycle.  · Reorient the patient to person, place, time and situation on each encounter.   · Correct sensory deficits if possible (replace eye glasses, hearing aids, ect).  · Avoid restraints if possible. Severely delirious patients benefit from constant observation by a sitter.  · Do not leave patient unattended.  · Continue medical workup to identify and correct possible medical causes of delirium.   · Patient does not have capacity for medical decision making     Case discussed with staff psychiatrist, Beena Rebolledo MD.  Will continue to follow and monitor progression of current psychiatric condition.    Cele Rocha MD  [facility] PGY2  [phone]                 Hospital Course: No notes on file    No new subjective & objective note has been filed under this hospital service since the last note was generated.    Assessment/Plan:     No notes have been filed under this hospital service.  Service: Psychiatry       Total Time:  45 minutes     Cele Rocha MD   Psychiatry  [facility]

## 2018-06-22 NOTE — PT/OT/SLP PROGRESS
Physical Therapy Treatment    Patient Name:  Mendez Guthrie   MRN:  [id number]  Admitting Diagnosis: Embolic stroke involving right middle cerebral artery  Recent Surgery: * No surgery found *      Recommendations:     Discharge Recommendations:  nursing facility, skilled   Discharge Equipment Recommendations: hospital bed, lift device   Barriers to discharge: Decreased caregiver support    Plan:     During this hospitalization, patient to be seen 4 x/week to address the above listed problems via gait training, therapeutic activities, therapeutic exercises, neuromuscular re-education  · Plan of Care Expires:  07/07/18   Plan of Care Reviewed with: patient, spouse    This Plan of care has been discussed with the patient who was involved in its development and understands and is in agreement with the identified goals and treatment plan    Subjective     Communicated with RN (Josie) prior to session.     Patient comments: Pt non-verbal during session  Pain/Comfort:  · Pain Rating 1: 0/10  · Pain Rating Post-Intervention 1: 0/10    Objective:     Patient found with: bed alarm, Condom Catheter, oxygen, PEG Tube, pressure relief boots, peripheral IV, restraints, SCD, telemetry    Patient found in semi R side lying upon PT entry to room, agreeable to treatment.  Wife present in the room.    General Precautions: Standard, Cardiac aphasia, aspiration, fall, NPO   Orthopedic Precautions:N/A   Braces: N/A       BED MOBILITY (vc's for hand placement sequencing of task):        Rolling to the L with max A with use of bedrail       Sup > sit at the EOB with max A for trunk/legs exiting on the R side        Sit > sup with max A trunk/legs       Scooting hips to the EOB upon sitting with max/mod A x2 scoots       Scooting hips along the EOB to the R requiring max A x3 scoots          SITTING AT THE EDGE OF THE BED (12 min)   Assistance Level Required: mod/max A (brief periods of CGA/SBA) for trunk/head control                      with R UE support and L UE in weight bearing position on firm surface     Postural deviations noted: flexed trunk, rounded shoulders, forward head, flexed cervical spine, decreased attention to the L   Encouraged: upright posture, forward gaze, attention to the L, symmetrical weight bearing through B UE's and LE's        TRANSFERS     Sit <> Stand Transfer from EOB NP 2* Pt not appropriate 2* decreased trunk control while sitting at the EOB and poor command following    THERAPEUTIC ACTIVITIES/EXERCISES  Pt receives PROM to L LE sup in bed (ankle DF, hip flex, hip add/abd, hip IR/ER)  x12 reps     EDUCATION  Education provided to pt regarding: postural control, weight shift, attention to the L, importance and benefits of performing exercises and functional mobility.    Whiteboard updated with correct mobility information. RN/PCT notified.  Pt appropriate for OOB transfer to medichair with RN/PCT via drawsheet    Patient left left sidelying, with family and RN (Josie) present    AM-PAC 6 CLICK MOBILITY  Turning over in bed (including adjusting bedclothes, sheets and blankets)?: 2  Sitting down on and standing up from a chair with arms (e.g., wheelchair, bedside commode, etc.): 1  Moving from lying on back to sitting on the side of the bed?: 2  Moving to and from a bed to a chair (including a wheelchair)?: 1  Need to walk in hospital room?: 1  Climbing 3-5 steps with a railing?: 1  Total Score: 8     Assessment:     Mendez Guthrie is a 68 y.o. male admitted with a medical diagnosis of Embolic stroke involving right middle cerebral artery.  He presents with the following impairments/functional limitations:  weakness, impaired endurance, impaired sensation, impaired self care skills, impaired functional mobilty, impaired balance, visual deficits, impaired cognition, decreased coordination, decreased upper extremity function, decreased lower extremity function, decreased safety awareness, abnormal tone, decreased ROM,  impaired coordination, impaired fine motor, edema. L hemiparesis requiring significant assistance and verbal cues for bed mob, scooting to/along the EOB and ex's 2* weakness, cognitive deficits, impaired balance, decreased attention to the L.   In light of pt's current functional level and deficits, it is anticipated that pt will need to participate in an intense rehab program consisting of PT, OT and ST in order to achieve full rehab potential to return to previous level of function and roles.  Pt will cont to benefit from skilled PT intervention to address deficits and improve functional mobility.     Rehab Prognosis:  Fair; patient would benefit from acute skilled PT services to address these deficits and reach maximum level of function.      GOALS:    Physical Therapy Goals        Problem: Physical Therapy Goal    Goal Priority Disciplines Outcome Goal Variances Interventions   Physical Therapy Goal     PT/OT, PT Ongoing (interventions implemented as appropriate)     Description:  Goals to be met by: 2018     Patient will increase functional independence with mobility by performin. Supine to sit with Moderate Assistance  2. Sit to supine with Moderate Assistance  3. Sit to stand transfer with Maximum Assistance  4. Bed to chair transfer with Maximum Assistance  5. Gait  x 10 feet with Maximum Assistance with or without appropriate AD   6. Sitting at edge of bed x10 minutes with Minimal Assistance  7. Lower extremity exercise program x15 reps per handout, with assistance as needed                       Time Tracking:     PT Received On: 18  PT Start Time: 1137     PT Stop Time: 1205  PT Total Time (min): 28 min     Billable Minutes: Therapeutic Activity 16 and Neuromuscular Re-education 12    Treatment Type: Treatment  PT/PTA: PTA     PTA Visit Number: 3       Lizett Ortiz PTA.  Pager [phone]    2018    .

## 2018-06-22 NOTE — PROGRESS NOTES
" [facility]  Adult Nutrition  Progress Note    SUMMARY       Recommendations    Recommendation/Intervention:   -Continue current ordered TF of Diabetisource at goal rate of 70mL/hr. Hold for residuals > 500mL. Continue ordered water flush of 200mL QID. RD following.     Goals: Meet > 90% EEN/EPN from TFs  Nutrition Goal Status: goal met  Communication of RD Recs: reviewed with RN    Reason for Assessment    Reason for Assessment: RD follow-up  Diagnosis: stroke/CVA  Relevant Medical History: DM2, COPD, HTN, CAD, hx lung cancer, CABG  General Information Comments: pt sleeping at visit, PEG pulled and replaced 6/19, TF of Diabetisource running at goal, family at bedside reports no issues  Nutrition Discharge Planning: D/c with TF likely.     Nutrition Risk Screen    Nutrition Risk Screen: no indicators present    Nutrition/Diet History    Typical Food/Fluid Intake: Tol[place] TF at 70mL/hr  Do you have any cultural, spiritual, Ep[employer]opalian conflicts, given your current situation?: none  Food Allergies: NKFA  Factors Affecting Nutritional Intake: NPO, difficulty/impaired swallowing    Anthropometrics    Temp: 97.1 °F (36.2 °C)  Height Method: Stated  Height: 5' 8" (172.7 cm)  Height (inches): 68 in  Weight Method: Standard Scale  Weight: 101.2 kg (223 lb 1.7 oz)  Weight (lb): 223.11 lb  Ideal Body Weight (IBW), Male: 154 lb  % Ideal Body Weight, Male (lb): 144.88 lb  BMI (Calculated): 34  BMI Grade: 30 - 34.9- obesity - grade I       Lab/Procedures/Meds    Pertinent Labs Reviewed: reviewed  Pertinent Labs Comments: Na 132, glu 186, A1C 9.4%  Pertinent Medications Reviewed: reviewed  Pertinent Medications Comments: insulin, statin, senna    Physical Findings/Assessment    Overall Physical Appearance: nourished, overweight  Tubes: gastrostomy tube  Skin: intact    Estimated/Assessed Needs    Weight Used For Calorie Calculations: 103 kg (227 lb 1.2 oz)  Energy Calorie Requirements (kcal): 2223  Energy Need " Method: [place] (PAL 1.25)  Protein Requirements: 103-123g (1.0-1.2g/kg)  Weight Used For Protein Calculations: 103 kg (227 lb 1.2 oz)     Fluid Need Method: RDA Method  RDA Method (mL): 2223  CHO Requirement: 45-50%      Nutrition Prescription Ordered    Current Diet Order: NPO   Current Nutrition Support Formula Ordered: Diabetisource AC  Current Nutrition Support Rate Ordered: 70 (ml)  Current Nutrition Support Frequency Ordered: mL/hr     Evaluation of Received Nutrient/Fluid Intake    Enteral Calories (kcal): 2016  Enteral Protein (gm): 101  Enteral (Free Water) Fluid (mL): 1374  Free Water Flush Fluid (mL): 800  % Kcal Needs: 92  % Protein Needs: 100  Energy Calories Required: meeting needs  Protein Required: meeting needs  Fluid Required: meeting needs  Comments: LBM 6/22  Tolerance: tolerating  % Intake of Estimated Energy Needs: 75 - 100 %  % Meal Intake: NPO    Nutrition Risk    Level of Risk/Frequency of Follow-up:  (f/u 1x week)     Assessment and Plan    Nutrition Problem  Inadequate oral intake    Related to (etiology):   NPO, dysphagia    Signs and Symptoms (as evidenced by):   Requiring TF to meet EEN/EPN    Interventions/Recommendations (treatment strategy):  See recs    Nutrition Diagnosis Status:   New           Monitor and Evaluation    Food and Nutrient Intake: enteral nutrition intake  Food and Nutrient Adminstration: enteral and parenteral nutrition administration  Physical Activity and Function: nutrition-related ADLs and IADLs  Anthropometric Measurements: weight, body mass index, weight change  Biochemical Data, Medical Tests and Procedures: electrolyte and renal panel, glucose/endocrine profile, lipid profile  Nutrition-Focused Physical Findings: overall appearance     Nutrition Follow-Up    RD Follow-up?: Yes

## 2018-06-22 NOTE — MEDICAL/APP STUDENT
[facility]  Psychiatry  Consult Note     Patient Name: Mendez Guthrie  MRN: [id number]  Patient Class: IP- Inpatient     Admission Date: 6/7/2018  Length of Stay: 15 days  Attending Physician: Joey Nunez MD  Primary Care Provider: Valeria Mayen MD      Chief Complaint / Reason for Consult:     Psych eval reasons: agitation, pulled out PEG on 6/17 and 6/18    Subjective:     History of Present Illness:   Mendez Guthrie is a 68 y.o. male with a history of lung cancer s/p chemo/radiation, coronary artery disease s/p coronary artery bypass graft, heart failure with reduced ejection fraction, HTN, DM2, COPD, GENIE and no past psych history who [place] [facility] [place] from [facility] due to right MCA embolic stroke. Psychiatry was consulted to address the patient's agitation (swatting at nurses, pulled out PEG twice).     Collateral:   Spoke to wife who was at bed side. She says that patient has been non verbal since being [place] [facility]. He has appeared agitation the last couple days and was swatting at nurses when they approach him. Wife reports that he has calmed down this morning. Overall, patient's wife reports that he has shown steady improvement as evidence by his apparent ability to recognize his family.    Hospital Course:    6/22/2018: I saw the patient this morning. He was lying in bed and was calm. During out interaction, patient did not demonstrate any agitated behavior. Patient demonstrates expressive aphasia. When I greeted him, he made eye contact, nodded his head, and extended his hand for a hand shake. When speaking to the patient, he sustains transient periods of eye contact, occasionally nods his head, but he is non verbal and does not respond to questions or commands that ask him to speak, produce physical movement, or track an item with his eyes. I presented him a pen and notebook. He wrote his name as well as symbols that were not legible. He failed to write  "specific words on command. I asked him to squeeze my fingers with his hand. He failed SAVEAHAART. He could not follow my commands, but he was able to squeeze my hand several times with his right hand after I squeezed his. He exhibits no ability to move his left arm or leg. He does not speak on command, but demonstrated the ability. He said "zoom zoom" numerous times during our interaction, both when I was and was not addressing him. He does appear to have some limited understanding of both verbal and body language. I waived and said good bye and he waived to me and said "good bye".    Psychiatric Review Of Systems - Is patient experiencing or having changes in:  sleep: no  appetite: patient on IV fluids  weight: no  energy/anergy: yes  interest/pleasure/anhedonia: unknown  somatic symptoms: unknown  libido: unknown  Anxiety/panic: unknown  guilty/hopelessness: unknown   concentration: unknown  S.I.B.s/risky behavior: unknown  any drugs: unknown   alcohol: unknown    Allergies:  Lisinopril and Norvasc [amlodipine]    Past Medical/Surgical History  Past Medical History:   Diagnosis Date    Arthritis     Asthma     COPD (chronic obstructive pulmonary disease)     Coronary artery disease     Diabetes mellitus     Embolic stroke involving right middle cerebral artery 6/6/2018    High cholesterol     Hypertension     Long term current use of antithrombotics/antiplatelets     Lung cancer     Stroke 6/6/2018      has a past medical history of Arthritis; Asthma; COPD (chronic obstructive pulmonary disease); Coronary artery disease; Diabetes mellitus; Embolic stroke involving right middle cerebral artery (6/6/2018); High cholesterol; Hypertension; Long term current use of antithrombotics/antiplatelets; Lung cancer; and Stroke (6/6/2018).  Past Surgical History:   Procedure Laterality Date    cardiac bypass      CARDIAC SURGERY  05/04/2016    PORTACATH PLACEMENT  2012    cancer treatment       Past Psychiatric " History:  Previous Medication Trials: no   Previous Psychiatric Hospitalizations: no   Previous Suicide Attempts: no   History of Violence: no  Outpatient Psychiatrist: no    Social History:  Marital Status:   Children: 8   Employment Status/Info: retired  Education: 10th grade  Special Ed: no  Housing Status: home  History of phys/sexual abuse: no  Access to gun: unknown    Substance Abuse History:  Recreational Drugs: none  Use of Alcohol: minimal use  Rehab History:no   Tobacco Use:no  Use of Caffeine: unknown  Use of OTC: unknown  Legal consequences of chemical use: no and no    Legal History:  Past Charges/Incarcerations:unknown  Pending charges:unknown     Family Psychiatric History:   No      Objective:     Current Medications:  Infusions:    Scheduled:   albuterol-ipratropium  3 mL Nebulization Q6H    apixaban  5 mg Per G Tube BID    citalopram  10 mg Per G Tube Daily    insulin detemir U-100  13 Units Subcutaneous BID    risperidone 1 mg/ml  1 mg Oral QHS    rosuvastatin  40 mg Per G Tube QHS    senna-docusate 8.6-50 mg  2 tablet Per G Tube BID    sodium chloride 0.9%  3 mL Intravenous Q8H     PRN:  acetaminophen, dextrose 50%, glucagon (human recombinant), HYDROcodone-acetaminophen, insulin aspart U-100, labetalol, lactulose, miconazole NITRATE 2 %, ondansetron    Home Medications:  Prior to Admission medications    Medication Sig Start Date End Date Taking? Authorizing Provider   aspirin 81 MG Chew Take 81 mg by mouth once daily.    Historical Provider, MD   blood sugar diagnostic Strp Inject 1 strip as directed 3 (three) times daily with meals. Dispense One Touch Ultra Strips Dx:E11.9 3/28/18   Valeria Mayen MD   carvedilol (COREG) 12.5 MG tablet Take 12.5 mg by mouth 2 (two) times daily with meals.    Historical Provider, MD   chlorthalidone (HYGROTEN) 25 MG Tab Take 25 mg by mouth once daily.    Historical Provider, MD   clopidogrel (PLAVIX) 75 mg tablet Take 75 mg by mouth once  daily. 5/1/18   Historical Provider, MD   diazePAM (VALIUM) 5 MG tablet Take 1 tablet (5 mg total) by mouth every 6 (six) hours as needed for Anxiety. 10/24/17 5/10/18  Frank Saunders MD   docusate sodium (COLACE) 100 MG capsule Take 100 mg by mouth 2 (two) times daily as needed.     Historical Provider, MD   doxazosin (CARDURA) 2 MG tablet Take 1 tablet (2 mg total) by mouth every evening. 1/28/15 5/10/18  Valeria Mayen MD   fluticasone-salmeterol 250-50 mcg/dose (ADVAIR DISKUS) 250-50 mcg/dose diskus inhaler Inhale 1 puff into the lungs 2 (two) times daily. Controller 3/28/18   Valeria Mayen MD   hydrocodone-acetaminophen 5-325mg (NORCO) 5-325 mg per tablet Take 1 tablet by mouth every 4 (four) hours as needed for Pain. 11/28/17   Valeria Mayen MD   insulin aspart U-100 (NOVOLOG) 100 unit/mL InPn pen Inject 10 Units into the skin before dinner. 3/28/18 3/28/19  Valeria Mayen MD   insulin detemir U-100 (LEVEMIR FLEXTOUCH U-100 INSULN) 100 unit/mL (3 mL) SubQ InPn pen Inject 15 Units into the skin every evening. Dispense with insulin pen needles 3/28/18 3/28/19  Valeria Mayen MD   linagliptin (TRADJENTA) 5 mg Tab tablet Take 1 tablet (5 mg total) by mouth once daily. 4/3/18 4/3/19  Valeria Mayen MD   losartan (COZAAR) 50 MG tablet Take 50 mg by mouth once daily.  3/7/15   Historical Provider, MD   metFORMIN (GLUCOPHAGE) 1000 MG tablet 'TAKE 1 TABLET BY MOUTH TWICE A DAY WITH MEALS 1/19/18   Valeria Mayen MD   nitroGLYCERIN (NITROSTAT) 0.4 MG SL tablet Place 1 tablet (0.4 mg total) under the tongue every 5 (five) minutes as needed for Chest pain. 8/22/16 5/10/18  Valeria Mayen MD   PROAIR HFA 90 mcg/actuation inhaler  12/26/17   Historical Provider, MD   rosuvastatin (CRESTOR) 40 MG Tab Take 1 tablet (40 mg total) by mouth every evening. 4/3/18   Valeria Mayen MD   SPIRIVA WITH HANDIHALER 18 mcg inhalation capsule inhale the contents  of one capsule in the handihaler once daily 3/1/18   Miah Florence MD   traMADol (ULTRAM) 50 mg tablet take 1 tablet by mouth every 6 hours if needed 5/1/18   Valeria Mayen MD   VITAMIN D2 50,000 unit capsule TAKE 1 CAPSULE BY MOUTH EVERY 7 DAYS 1/27/18   Valeria Mayen MD     Vital Signs:  Temp:  [96.9 °F (36.1 °C)-99.3 °F (37.4 °C)]   Pulse:  []   Resp:  [16-18]   BP: (100-121)/(52-75)   SpO2:  [93 %-99 %]     Mental Status Exam:  Appearance: well nourished, older than stated age, disheveled, lying in bed, overweight   Behavior: uncooperative, eye contact minimal   Speech/Language: mute   Mood: Unknown (patient is non verbal)   Affect: Blunted   Thought Process:  Unknown (patient is non verbal)   Thought Content: Unknown (patient is non verbal)   Orientation: Impaired   Cognition: Impaired due to MCA embolic stroke    Insight: Unknown (patient is non verbal)   Judgment: Unknown (patient is non verbal)     Laboratory Data:  Recent Results (from the past 48 hour(s))   POCT glucose    Collection Time: 06/20/18  6:48 PM   Result Value Ref Range    POCT Glucose 98 70 - 110 mg/dL   POCT glucose    Collection Time: 06/20/18  9:40 PM   Result Value Ref Range    POCT Glucose 143 (H) 70 - 110 mg/dL   POCT glucose    Collection Time: 06/21/18 12:14 AM   Result Value Ref Range    POCT Glucose 128 (H) 70 - 110 mg/dL   POCT glucose    Collection Time: 06/21/18  5:33 AM   Result Value Ref Range    POCT Glucose 175 (H) 70 - 110 mg/dL   CBC auto differential    Collection Time: 06/21/18  5:47 AM   Result Value Ref Range    WBC 8.03 3.90 - 12.70 K/uL    RBC 3.67 (L) 4.60 - 6.20 M/uL    Hemoglobin 11.1 (L) 14.0 - 18.0 g/dL    Hematocrit 34.4 (L) 40.0 - 54.0 %    MCV 94 82 - 98 fL    MCH 30.2 27.0 - 31.0 pg    MCHC 32.3 32.0 - 36.0 g/dL    RDW 12.5 11.5 - 14.5 %    Platelets 461 (H) 150 - 350 K/uL    MPV 8.4 (L) 9.2 - 12.9 fL    Immature Granulocytes 1.9 (H) 0.0 - 0.5 %    Gran # (ANC) 5.7 1.8 - 7.7 K/uL     Immature Grans (Abs) 0.15 (H) 0.00 - 0.04 K/uL    Lymph # 1.1 1.0 - 4.8 K/uL    Mono # 0.8 0.3 - 1.0 K/uL    Eos # 0.2 0.0 - 0.5 K/uL    Baso # 0.04 0.00 - 0.20 K/uL    nRBC 0 0 /100 WBC    Gran% 71.5 38.0 - 73.0 %    Lymph% 13.9 (L) 18.0 - 48.0 %    Mono% 10.2 4.0 - 15.0 %    Eosinophil% 2.0 0.0 - 8.0 %    Basophil% 0.5 0.0 - 1.9 %    Differential Method Automated    Comprehensive metabolic panel    Collection Time: 06/21/18  5:47 AM   Result Value Ref Range    Sodium 134 (L) 136 - 145 mmol/L    Potassium 3.9 3.5 - 5.1 mmol/L    Chloride 95 95 - 110 mmol/L    CO2 30 (H) 23 - 29 mmol/L    Glucose 180 (H) 70 - 110 mg/dL    BUN, Bld 21 8 - 23 mg/dL    Creatinine 0.9 0.5 - 1.4 mg/dL    Calcium 9.5 8.7 - 10.5 mg/dL    Total Protein 6.7 6.0 - 8.4 g/dL    Albumin 2.3 (L) 3.5 - 5.2 g/dL    Total Bilirubin 0.2 0.1 - 1.0 mg/dL    Alkaline Phosphatase 82 55 - 135 U/L    AST 54 (H) 10 - 40 U/L    ALT 44 10 - 44 U/L    Anion Gap 9 8 - 16 mmol/L    eGFR if African American >60.0 >60 mL/min/1.73 m^2    eGFR if non African American >60.0 >60 mL/min/1.73 m^2   POCT glucose    Collection Time: 06/21/18  2:48 PM   Result Value Ref Range    POCT Glucose 214 (H) 70 - 110 mg/dL   POCT glucose    Collection Time: 06/21/18  6:23 PM   Result Value Ref Range    POCT Glucose 217 (H) 70 - 110 mg/dL   POCT glucose    Collection Time: 06/21/18  9:40 PM   Result Value Ref Range    POCT Glucose 209 (H) 70 - 110 mg/dL   CBC auto differential    Collection Time: 06/22/18  4:57 AM   Result Value Ref Range    WBC 8.00 3.90 - 12.70 K/uL    RBC 3.66 (L) 4.60 - 6.20 M/uL    Hemoglobin 11.0 (L) 14.0 - 18.0 g/dL    Hematocrit 34.6 (L) 40.0 - 54.0 %    MCV 95 82 - 98 fL    MCH 30.1 27.0 - 31.0 pg    MCHC 31.8 (L) 32.0 - 36.0 g/dL    RDW 12.5 11.5 - 14.5 %    Platelets 436 (H) 150 - 350 K/uL    MPV 8.6 (L) 9.2 - 12.9 fL    Immature Granulocytes 1.3 (H) 0.0 - 0.5 %    Gran # (ANC) 5.4 1.8 - 7.7 K/uL    Immature Grans (Abs) 0.10 (H) 0.00 - 0.04 K/uL    Lymph  # 1.5 1.0 - 4.8 K/uL    Mono # 0.8 0.3 - 1.0 K/uL    Eos # 0.2 0.0 - 0.5 K/uL    Baso # 0.07 0.00 - 0.20 K/uL    nRBC 0 0 /100 WBC    Gran% 67.3 38.0 - 73.0 %    Lymph% 18.9 18.0 - 48.0 %    Mono% 9.6 4.0 - 15.0 %    Eosinophil% 2.0 0.0 - 8.0 %    Basophil% 0.9 0.0 - 1.9 %    Differential Method Automated    Comprehensive metabolic panel    Collection Time: 06/22/18  4:57 AM   Result Value Ref Range    Sodium 132 (L) 136 - 145 mmol/L    Potassium 5.0 3.5 - 5.1 mmol/L    Chloride 96 95 - 110 mmol/L    CO2 23 23 - 29 mmol/L    Glucose 186 (H) 70 - 110 mg/dL    BUN, Bld 17 8 - 23 mg/dL    Creatinine 0.9 0.5 - 1.4 mg/dL    Calcium 9.4 8.7 - 10.5 mg/dL    Total Protein 6.9 6.0 - 8.4 g/dL    Albumin 2.3 (L) 3.5 - 5.2 g/dL    Total Bilirubin 0.2 0.1 - 1.0 mg/dL    Alkaline Phosphatase 81 55 - 135 U/L    AST 42 (H) 10 - 40 U/L    ALT 38 10 - 44 U/L    Anion Gap 13 8 - 16 mmol/L    eGFR if African American >60.0 >60 mL/min/1.73 m^2    eGFR if non African American >60.0 >60 mL/min/1.73 m^2   POCT glucose    Collection Time: 06/22/18  5:34 AM   Result Value Ref Range    POCT Glucose 182 (H) 70 - 110 mg/dL   POCT glucose    Collection Time: 06/22/18  9:10 AM   Result Value Ref Range    POCT Glucose 235 (H) 70 - 110 mg/dL      No results found for: PHENYTOIN, PHENOBARB, VALPROATE, CBMZ  Imaging:  Imaging Results    None          Assessment:     Mendez Guthrie is a 68 y.o. male with a history of lung cancer s/p chemo/radiation, coronary artery disease s/p coronary artery bypass graft, heart failure with reduced ejection fraction, HTN, DM2, COPD, and GENIE, who [place] [facility] [place]18 from [facility] due to right MCA embolic stroke. Psychiatry was consulted to address the patients agitation.     Recommendations:     Delirium  - Risperidone (antipsychotic): 1 mg/ml oral solution 1 mg, QHS  - encourage adequate rest: open shades during day and close at night to promote normal sleep cycle  - continue to speak to patient, remind him of  the date, and encourage family to visit to facilitate orientation     Depression  - Citalopram (SSRI): 10 mg, QD: consider discontinuing as this may contribute to delirium     Amber Montana, MS 3

## 2018-06-22 NOTE — PLAN OF CARE
Problem: Physical Therapy Goal  Goal: Physical Therapy Goal  Goals to be met by: 2018     Patient will increase functional independence with mobility by performin. Supine to sit with Moderate Assistance  2. Sit to supine with Moderate Assistance  3. Sit to stand transfer with Maximum Assistance  4. Bed to chair transfer with Maximum Assistance  5. Gait  x 10 feet with Maximum Assistance with or without appropriate AD   6. Sitting at edge of bed x10 minutes with Minimal Assistance  7. Lower extremity exercise program x15 reps per handout, with assistance as needed        Discharge Recommendations: SNF    [facility]    Goals remain appropriate.     Lizett Ortiz, PTA.   [phone]   2018

## 2018-06-23 PROBLEM — F05 DELIRIUM DUE TO ANOTHER MEDICAL CONDITION: Status: ACTIVE | Noted: 2018-01-01

## 2018-06-23 NOTE — ASSESSMENT & PLAN NOTE
69 y/o male with R MCA infarct with left sided weakness, facial droop, dysarthria and confusion. Right sided movement is minimal. Etiology likely atheroembolic vs cardioembolic due to EF of 15%. EEG completed and no seizure activity noted. Pt started on Modafinil 6/13; wakefulness now improved.    Hospital medicine following; sepsis, hypotension, hyponatremia now resolved, medicine de-escalated abx regimen. No longer likely that pt will require ICU step up.    Pt pulled out PEG 6/17 & 6/18 evening; ford placed to temporarily maintain patency.  PEG replacement 6/19. Meds and Tube feedings resumed 6/20. Patient in right arm wrist restraint and abdominal binder. Psych consulted.  Increased risperidone to 1 mg qhs and limit use of narcotics. Patient now off restraints    Dc modafinil 6/19    Antithrombotics: Eliquis 5 mg BID and asa 81  Statins: Crestor 40 mg daily  Aggressive risk factor modification: HTN, DM, HLD, Diet, Exercise, Obesity, CAD  Rehab efforts: PT/OT/SLP to evaluate and treat, PM&R consult   Dispo: SNF  Diagnostics ordered/pending: NA  VTE prophylaxis:  SCDs + eliquis  BP parameters: Infarct: Avoid hypotension

## 2018-06-23 NOTE — SUBJECTIVE & OBJECTIVE
Neurologic Chief Complaint: R MCA infarct     Subjective:     Interval History: Patient is seen for follow-up neurological assessment and treatment recommendations: NAEON, patient remains off restraints, agitation improved      HPI, Past Medical, Family, and Social History remains the same as documented in the initial encounter.     Review of Systems   Constitutional: Negative for diaphoresis and fever.   HENT: Positive for trouble swallowing. Negative for nosebleeds.    Neurological: Positive for facial asymmetry, speech difficulty and weakness.   Psychiatric/Behavioral: Positive for agitation and confusion.     Scheduled Meds:   albuterol-ipratropium  3 mL Nebulization Q6H    apixaban  5 mg Per G Tube BID    aspirin  81 mg Oral Daily    citalopram  10 mg Per G Tube Daily    insulin aspart U-100  5 Units Subcutaneous Q6H    insulin detemir U-100  13 Units Subcutaneous BID    risperidone 1 mg/ml  1 mg Oral QHS    rosuvastatin  40 mg Per G Tube QHS    senna-docusate 8.6-50 mg  2 tablet Per G Tube BID    sodium chloride 0.9%  3 mL Intravenous Q8H     Continuous Infusions:    PRN Meds:acetaminophen, dextrose 50%, glucagon (human recombinant), HYDROcodone-acetaminophen, insulin aspart U-100, labetalol, lactulose, miconazole NITRATE 2 %, ondansetron    Objective:     Vital Signs (Most Recent):  Temp: 98.4 °F (36.9 °C) (06/23/18 1204)  Pulse: 100 (06/23/18 1244)  Resp: 18 (06/23/18 1244)  BP: 118/67 (06/23/18 1204)  SpO2: 97 % (06/23/18 1244)  BP Location: Right arm    Vital Signs Range (Last 24H):  Temp:  [97.6 °F (36.4 °C)-99 °F (37.2 °C)]   Pulse:  []   Resp:  [16-18]   BP: (116-131)/(63-72)   SpO2:  [93 %-99 %]   BP Location: Right arm    Physical Exam   Constitutional: He appears well-developed and well-nourished. No distress.   HENT:   Head: Normocephalic and atraumatic.   Eyes: Conjunctivae are normal. No scleral icterus.   Cardiovascular: Normal rate.    Pulmonary/Chest: Effort normal. No  respiratory distress.   Musculoskeletal: He exhibits no edema or tenderness.   Neurological: He is alert.   Skin: Skin is warm and dry.   Vitals reviewed.      Neurological Exam:   LOC: Drowsy  Attention Span: Good  Language: Global aphasia  Articulation: Dysarthria  Orientation: Untestable due to severe aphasia   Facial Movement (CN VII): Lower facial weakness on the Left  Vision: L hemianopsia  Gaze: R gaze preference  Motor: Arm left  1/5  Leg left  Paresis: 0/5  Arm right  Normal 4/5  Leg right Paresis: 3/5  Sensation: Intact to light touch, temperature      Laboratory:  CMP:     Recent Labs  Lab 06/23/18  0549   CALCIUM 9.2   ALBUMIN 2.2*   PROT 6.4   *   K 4.2   CO2 34*   CL 93*   BUN 16   CREATININE 0.8   ALKPHOS 83   ALT 31   AST 33   BILITOT 0.2     BMP:     Recent Labs  Lab 06/23/18  0549   *   K 4.2   CL 93*   CO2 34*   BUN 16   CREATININE 0.8   CALCIUM 9.2     CBC:     Recent Labs  Lab 06/23/18  0548   WBC 6.62   RBC 3.43*   HGB 10.3*   HCT 32.1*   *   MCV 94   MCH 30.0   MCHC 32.1     Lipid Panel:   No results for input(s): CHOL, LDLCALC, HDL, TRIG in the last 168 hours.  Coagulation:   No results for input(s): PT, INR, APTT in the last 168 hours.  Platelet Aggregation Study: No results for input(s): PLTAGG, PLTAGINTERP, PLTAGREGLACO, ADPPLTAGGREG in the last 168 hours.  Hgb A1C:   No results for input(s): HGBA1C in the last 168 hours.  TSH:   No results for input(s): TSH in the last 168 hours.      Diagnostic Results     Brain Imaging   CT 6/6   Large right MCA distribution infarct measuring approximately 7 x 3 cm including the right perisylvian region and extending to the frontal and parietal operculum with associated localized mass effect.    Vessel Imaging   US BUE 6/9/18  Deep venous thrombus in the left axillary vein with additional thrombus in the proximal segment of the branchial vein.  No evidence of DVT in the right upper extremity.    CTA 6/8  CTA head: Occlusion right M2  segment of the MCA within the proximal sylvian fissure..  Heavy atherosclerotic plaquing of the cavernous and supraclinoid ICAs with mild moderate right and mild left cavernous ICA stenosis.  There is diffuse small caliber right M1 segment of the MCA.  Irregularity and narrowing of the right distal vertebral artery concerning for atherosclerotic disease with mild moderate stenosis.  CTA neck: Atherosclerotic plaquing of the carotid bifurcations and proximal ICAs with less than 50% proximal ICA stenosis by NASCET criteria.  CT head: Evolving large right MCA distribution infarction.  Localized mass effect without significant midline shift or hydrocephalus.  No evidence for hemorrhagic conversion.  Clinical correlation and follow-up advised.        Cardiac Imaging   Echo 6/7  CONCLUSIONS     1 - Technically difficult study.     2 - Severely depressed left ventricular systolic function (EF 15-20%).     3 - Mildly to moderately depressed right ventricular systolic function .     4 - Impaired LV relaxation, normal LAP (grade 1 diastolic dysfunction).     5 - Mild aortic stenosis, WIL = 1.45 cm2, AVAi = 0.67 cm2/m2, peak velocity = 2.8 m/s, mean gradient = 18 mmHg.     6 - Mild mitral regurgitation.       Other Imaging  CT chest 6/14/18  1. Recent development consolidation in right middle and lower lobes consistent with pneumonia or aspiration.  Endobronchial material in right lower lobe airways with intermittent obstruction, could represent retained secretions or aspirated material.  Correlate clinically.  Recommend noncontrast chest CT follow-up in 3 months.  2. Chronic right upper lobe consolidation and volume loss consistent with radiation fibrosis in patient with history of previous lung cancer.  3. Chronic loculation of pleural fluid in the right sulcus is stable.  4. A few subcentimeter pulmonary nodules, stable and likely benign  5. Aortic and coronary atherosclerosis, status post CABG  6. Gastrostomy tube  7.  Other findings as above

## 2018-06-23 NOTE — SUBJECTIVE & OBJECTIVE
"Interval History: Pt mental status remains mostly unchanged. He is alert, sitting up in bed in NAD. Pt attempts to speak but makes speech is nonsensical. Able to squeeze hand on command, but has some apraxia when squeezing (does not squeeze in uniform fashion). Unable to attend to [facility]. Family at bedside report that he slept all night and has been awake all day today, which is an improvement for him. No agitation noted by family.     Family History     Problem Relation (Age of Onset)    Cancer Father    Diabetes Father, Mother, Brother, Son    No Known Problems Brother, Sister, Brother, Daughter, Daughter, Daughter, Daughter, Son, Son, Son    Pneumonia Sister, Sister        Social History Main Topics    Smoking status: Former Smoker     Packs/day: 1.00     Years: 39.00     Types: Cigarettes     Start date: 1/23/1972     Quit date: 5/23/2011    Smokeless tobacco: Never Used    Alcohol use 0.0 oz/week      Comment: occasionally    Drug use: No    Sexual activity: Not Currently     Psychotherapeutics     Start     Stop Route Frequency Ordered    06/23/18 0900  citalopram tablet 10 mg      -- PER G TUBE Daily 06/22/18 1509    06/21/18 2100  risperidone 1 mg/ml oral solution 1 mg      -- Oral Nightly 06/21/18 1335           Review of Systems  Objective:     Vital Signs (Most Recent):  Temp: 98.4 °F (36.9 °C) (06/23/18 1204)  Pulse: 107 (06/23/18 1403)  Resp: 18 (06/23/18 1244)  BP: 118/67 (06/23/18 1204)  SpO2: 97 % (06/23/18 1244) Vital Signs (24h Range):  Temp:  [97.6 °F (36.4 °C)-99 °F (37.2 °C)] 98.4 °F (36.9 °C)  Pulse:  [] 107  Resp:  [17-18] 18  SpO2:  [93 %-99 %] 97 %  BP: (116-131)/(63-72) 118/67     Height: 5' 8" (172.7 cm)  Weight: 101.2 kg (223 lb 1.7 oz)  Body mass index is 33.92 kg/m².      Intake/Output Summary (Last 24 hours) at 06/23/18 1529  Last data filed at 06/23/18 0600   Gross per 24 hour   Intake             1170 ml   Output              750 ml   Net              420 ml "       Physical Exam   Gen: Alert, calm, NAD   Head: MMM   Lungs: On O2 via NC, respirations unlabored   Chest wall: No tenderness or deformity   Extremities: Extremities normal, atraumatic   Skin: no rashes or lesions   Neurologic: Face unsymmetrical with left sided weakness      Mental Status Exam:  Appearance: older than stated age, disheveled, lying in bed   Behavior: Calm, resting in bed, squeezes hand on command, eye contact minimal   Speech/Language: articulation error, nonfluent, nonsensical   Mood: Unable to assess   Affect: mood congruent   Thought Process:  poverty of thought   Thought Content: poverty of content   Orientation: disoriented to self, place, sitation, date   Cognition: easily distracted, impaired   Insight: poor   Judgment: poor        Significant Labs: All pertinent labs within the past 24 hours have been reviewed.    Significant Imaging: I have reviewed all pertinent imaging results/findings within the past 24 hours.

## 2018-06-23 NOTE — PROGRESS NOTES
"[facility]  Psychiatry  Progress Note    Patient Name: Mendez Guthrie  MRN: [id number]   Code Status: Full Code  Admission Date: 6/7/2018  Hospital Length of Stay: 16 days  Expected Discharge Date: 6/25/2018  Attending Physician: Jaylan Morataya MD  Primary Care Provider: Valeria Mayen MD    Current Legal Status: N/A    Patient information was obtained from patient, spouse/SO, relative(s), past medical records and ER records.     Subjective:     Principal Problem:Embolic stroke involving right middle cerebral artery    Chief Complaint: delirium    HPI:   Consultation-Liaison Psychiatry Consult Note      Chief Complaint / Reason for Consult:     delirium     Subjective:     History of Present Illness:   Mendez Guthrie is a 68 y.o. male with a history of R MCA infarct with left sided weakness, facial droop, dysarthria and confusion. Right sided movement is minimal. Etiology likely atheroembolic vs cardioembolic due to EF of 15%. Hospital medicine following; sepsis, hypotension, hyponatremia now resolved, medicine de-escalated abx regimen. No longer likely that pt will require ICU step up.  Psychiatry was consulted for recommendations for management of agitated delirium.  Per chart review, patient received Risperdal 1 mg overnight for agitation.  Wife reports patient has been in soft restraints on his arm for 3 days after he removed his PEG tube.    Patient was seen this morning with wife at bedside.  Patient was unable to answer any questions and only repeated the word "zoom. Zzzzooooommm. Zooooooooooooom."  Patient unable to follow commands or complete CAM-ICU screen.  Wife states that patient has been able to articulate her names and the names of some of their children.  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  Wife states she has not been told by the medical team what her 's new baseline " functioning will be.  Wife states that patient did well overnight after he received his pain medication.  Was taking tramadol at home for arthritis related pain.    Medical Review Of Systems:  Pertinent items are noted in HPI.    Psychiatric Review Of Systems - Is patient experiencing or having changes in:  sleep: yes  appetite: yes  weight: yes  energy/anergy: yes  interest/pleasure/anhedonia: no  somatic symptoms: no  libido: no  anxiety/panic: yes  guilty/hopelessness: no  concentration: yes  S.I.B.s/risky behavior: no  any drugs: no  alcohol: no     Past Psychiatric History:  Previous Medication Trials: no   Previous Psychiatric Hospitalizations: no   Previous Suicide Attempts: no   History of Violence: no  Outpatient Psychiatrist: no    Social History:  Marital Status:   Children: 8   Employment Status/Info: retired, formerly a    Education: 10th grade  Special Ed: no  Housing Status: with wife in [place]   History of phys/sexual abuse: no  Access to gun: no    Substance Abuse History:  Recreational Drugs: denied  Use of Alcohol: denied  Rehab History:no   Tobacco Use:no  Use of Caffeine: denies use  Use of OTC: denied  Legal consequences of chemical use: no    Legal History:  Past Charges/Incarcerations:no   Pending charges:no     Family Psychiatric History:   denied    Hospital Course: 06/23/2018 - NAEON. Slept all night, no issues reported by family, no PRNs for agitation past 24h. Mental status mostly unchanged.    Interval History: Pt mental status remains mostly unchanged. He is alert, sitting up in bed in NAD. Pt attempts to speak but makes speech is nonsensical. Able to squeeze hand on command, but has some apraxia when squeezing (does not squeeze in uniform fashion). Unable to attend to [facility]. Family at bedside report that he slept all night and has been awake all day today, which is an improvement for him. No agitation noted by family.     Family History     Problem  "Relation (Age of Onset)    Cancer Father    Diabetes Father, Mother, Brother, Son    No Known Problems Brother, Sister, Brother, Daughter, Daughter, Daughter, Daughter, Son, Son, Son    Pneumonia Sister, Sister        Social History Main Topics    Smoking status: Former Smoker     Packs/day: 1.00     Years: 39.00     Types: Cigarettes     Start date: 1/23/1972     Quit date: 5/23/2011    Smokeless tobacco: Never Used    Alcohol use 0.0 oz/week      Comment: occasionally    Drug use: No    Sexual activity: Not Currently     Psychotherapeutics     Start     Stop Route Frequency Ordered    06/23/18 0900  citalopram tablet 10 mg      -- PER G TUBE Daily 06/22/18 1509    06/21/18 2100  risperidone 1 mg/ml oral solution 1 mg      -- Oral Nightly 06/21/18 1335           Review of Systems  Objective:     Vital Signs (Most Recent):  Temp: 98.4 °F (36.9 °C) (06/23/18 1204)  Pulse: 107 (06/23/18 1403)  Resp: 18 (06/23/18 1244)  BP: 118/67 (06/23/18 1204)  SpO2: 97 % (06/23/18 1244) Vital Signs (24h Range):  Temp:  [97.6 °F (36.4 °C)-99 °F (37.2 °C)] 98.4 °F (36.9 °C)  Pulse:  [] 107  Resp:  [17-18] 18  SpO2:  [93 %-99 %] 97 %  BP: (116-131)/(63-72) 118/67     Height: 5' 8" (172.7 cm)  Weight: 101.2 kg (223 lb 1.7 oz)  Body mass index is 33.92 kg/m².      Intake/Output Summary (Last 24 hours) at 06/23/18 1529  Last data filed at 06/23/18 0600   Gross per 24 hour   Intake             1170 ml   Output              750 ml   Net              420 ml       Physical Exam   Gen: Alert, calm, NAD   Head: MMM   Lungs: On O2 via NC, respirations unlabored   Chest wall: No tenderness or deformity   Extremities: Extremities normal, atraumatic   Skin: no rashes or lesions   Neurologic: Face unsymmetrical with left sided weakness      Mental Status Exam:  Appearance: older than stated age, disheveled, lying in bed   Behavior: Calm, resting in bed, squeezes hand on command, eye contact minimal   Speech/Language: articulation error, " nonfluent, nonsensical   Mood: Unable to assess   Affect: mood congruent   Thought Process:  poverty of thought   Thought Content: poverty of content   Orientation: disoriented to self, place, sitation, date   Cognition: easily distracted, impaired   Insight: poor   Judgment: poor        Significant Labs: All pertinent labs within the past 24 hours have been reviewed.    Significant Imaging: I have reviewed all pertinent imaging results/findings within the past 24 hours.    Assessment/Plan:     Delirium due to another medical condition    · Recommend discontinuing scheduled Risperdal. May use Haldol 1 mg PO qhs and Haldol 2 mg PO/IM q6h PRN non redirectable agitation. Monitor for QTc prolongation and adverse reactions.  QTc 6/22 was 457.  · Avoid benzodiazepines, antihistamines, anticholinergics, and minimize opiate use as these may worsen delirium.  · Recommend consult to PT/OT. Early mobility and exercise has been shown to decrease duration of delirium. Provide appropriate lighting and clear signage; a clock and calendar should be easily visible to the patient.  · Monitor environmental factors. Reduce light and noise at night (close shades, turn off lights, turn off TV, ect). Correct any alterations in sleep cycle.  · Reorient the patient to person, place, time and situation on each encounter.   · Correct sensory deficits if possible (replace eye glasses, hearing aids, ect).  · Avoid restraints if possible. Severely delirious patients benefit from constant observation by a sitter.  · Do not leave patient unattended.  · Continue medical workup to identify and correct possible medical causes of delirium.              Total time:  15 with greater than 50% of this time spent in counseling and/or coordination of care.       Giacomo Sanabria MD  [facility]  06/23/2018 3:40 PM

## 2018-06-23 NOTE — HOSPITAL COURSE
06/23/2018 - NAEON. Slept all night, no issues reported by family, no PRNs for agitation past 24h. Mental status mostly unchanged.    6/25/2018  Patient seen with family at bedside.  Patient remains unable to answer questions or follow commands.  Family denies agitation and reports he is sleeping well.     6/26/2018  Wife at bedside and reports patient is doing well.  He continues to have difficulty with following directions and conveying original thoughts.  Only speech is repetition of words stated by others.  No overnight agitation.

## 2018-06-23 NOTE — ASSESSMENT & PLAN NOTE
Patient agitated at night per wife  Pulled out PEG tube   Requiring right arm wrist restraint  Risperidone 1 mg qhs added   Psych consulted - appreciate recs  Patient off restraints

## 2018-06-23 NOTE — ASSESSMENT & PLAN NOTE
EF 15-20% - seen on echo completed 6/7  Cardiac history of CABG in 2016  Cardiology consulted - recommending AC and asa  Continue Eliquis 5 mg BID and asa 81

## 2018-06-23 NOTE — PROGRESS NOTES
[facility]  Vascular Neurology  Comprehensive Stroke Center  Progress Note    Assessment/Plan:     * Embolic stroke involving right middle cerebral artery    67 y/o male with R MCA infarct with left sided weakness, facial droop, dysarthria and confusion. Right sided movement is minimal. Etiology likely atheroembolic vs cardioembolic due to EF of 15%. EEG completed and no seizure activity noted. Pt started on Modafinil 6/13; wakefulness now improved.    Hospital medicine following; sepsis, hypotension, hyponatremia now resolved, medicine de-escalated abx regimen. No longer likely that pt will require ICU step up.    Pt pulled out PEG 6/17 & 6/18 evening; ford placed to temporarily maintain patency.  PEG replacement 6/19. Meds and Tube feedings resumed 6/20. Patient in right arm wrist restraint and abdominal binder. Psych consulted.  Increased risperidone to 1 mg qhs and limit use of narcotics. Patient now off restraints    Dc modafinil 6/19    Antithrombotics: Eliquis 5 mg BID and asa 81  Statins: Crestor 40 mg daily  Aggressive risk factor modification: HTN, DM, HLD, Diet, Exercise, Obesity, CAD  Rehab efforts: PT/OT/SLP to evaluate and treat, PM&R consult   Dispo: SNF  Diagnostics ordered/pending: NA  VTE prophylaxis:  SCDs + eliquis  BP parameters: Infarct: Avoid hypotension        Agitation    Patient agitated at night per wife  Pulled out PEG tube   Requiring right arm wrist restraint  Risperidone 1 mg qhs added   Psych consulted - appreciate recs  Patient off restraints        Acute on chronic respiratory failure with hypoxia    Initially with worsening O2 requirements  Consulted Pulm and Medicine; Appreciate recs  Pulm signed off; Rec outpatient follow-up once more stable  Broad-spectrum abx de-escalated to Unasyn (course completed)  Continue duonebs, CPT        Abnormal ventricular wall motion    Stroke risk factor  Evidence on echo  Pt on Eliquis        DVT of axillary vein, acute left     Eliquis started 6/13        Chronic systolic (congestive) heart failure    Seen by cardiology   EF 15-20%, diastolic dysfunction  Recommending AC - apixaban 5 mg BID   Meds currently held due to hypotension        Dysarthria    Result of stroke  Aggressive SLP         Decreased cardiac ejection fraction    EF 15-20% - seen on echo completed 6/7  Cardiac history of CABG in 2016  Cardiology consulted - recommending AC and asa  Continue Eliquis 5 mg BID and asa 81        Cytotoxic cerebral edema    Large area of cytotoxic cerebral edema identified when reviewing brain imaging in the territory of the R middle cerebral artery. There is not mass effect associated with it. We will continue to monitor the patients clinical exam for any worsening of symptoms which may indicate expansion of the stroke or the area of the edema resulting in the clinical change. The pattern is suggestive of atheroembolic etiology.        Left spastic hemiparesis    Due to stroke  Aggressive therapy        GENIE on CPAP    Stroke risk factor  Holding CPAP acutely due to risk aspiration, encephalopathy, pt nonverbal        Coronary artery disease involving native coronary artery of native heart without angina pectoris    Stoke risk factor  Eliquis 5 mg BID         Essential hypertension    Stroke risk factor  SBP <160  Home meds - coreg and losartan; Acutely held due to hypotension  Stable, no hypotension over last 24 hrs  (Allergy to lisinopril and Norvasc)        COPD (chronic obstructive pulmonary disease)    Stroke risk factor  Duonebs Q6 due to patient not being able to follow commands and RT not being able to administer inhalers   Currently maintaining O2 sats in the 90's on 2L NC  Medicine following; Appreciate recs  Pulmonary consulted, Signed off - f/u outpatient         History of lung cancer    Stroke risk factor  CT Chest this admission concerning  Pulmonology feels pt too ill for acute intervention; Signed off  Plan for outpatient  follow up with pulmonologist Dr Saunders ([facility]) for further workup of CT findings when acute illness has improved        Type 2 diabetes mellitus without complication, with long-term current use of insulin    Stroke risk factor  HgB A1C 9.4  Detemir 13U BID   Starting aspart 5U q6h             67 y/o male wth R MCA infarct. Had been at [facility] since 6-4-18 for AMS and weakness that improved and then worsened now with facial droop and left sided weakness. 6-6-18 fever so infectious w/u in process could be PNA as he was coughing with puree food at [facility]. He was placed on ABX Amp/Vanc/valcyclovir for possible meningitis this is stopped as he does not have meningitis. He moved around too much and was clausterphobic for MRI so will attempt MRI here.   6/8 - Urine studies ordered for hyponatremia. EEG results pending. Bilateral upper/lower extremities US ordered for fever. Procal WNL. Infectious workup negative so far. Cardiology consulted for EF 15-20%.   6-9-18 will give lasix 40 mg NG today as per cardiology recs. Discussion with family regarding anticoagulation due to low EF and DVT left axillary vein and brachial vein, discussion regarding feeding as well and likely bernard of patient swallowing is minimal so will need PEG next week as opposed to waiting and family is in agreement so will start heparin drip with no bolus parameters  6-10-18 once EEG read and no seizure activity may start Modafinil to help with wakefulness  6/11 - EEG to be read today and consider Modafinil. IR consulted for PEG placement tomorrow. Hospital medicine ordering urine studies for hyponatremia.   6/12 - PEG to be placed today. Heparin gtt currently held. Will resume once PEG is placed. AC to start tomorrow once PEG is able to be used. Hyponatremia slightly improving. Continue to hold fluids due to hyponatremia thought to be due to SIADH. Modafinil to be started tomorrow when PEG can be used.  6/13 - PEG placed yesterday. Heparin gtt  dc'ed and eliquis started. Modafinil started today. Continuing to restrict fluids (enteral water boluses) due to SIADH/hyponatremia and holding off on lasix that cardiology recommended.  6/14/18 - concern for sepsis today - medicine consulted - hypotensive, febrile, procal elevated.   6/15: Hypotensive to 88/36 overnight; good response to 250cc bolus. Hospital medicine adjusting insulin and antibiotics regimen. Wakefulness much improved since starting Modafinil. Mild bleeding noted at NC site.  6/16: Pt hypotensive, requiring another bolus yesterday; BP so far stable since. Modafinil held today as family felt made pt more aggressive; plan for decreased dose tomorrow. Added Lactulose PRN to bowel regimen.  6/17: Pt clinically improved from infection/respiratory perspective; medicine de-escalated abx regimen. No further hypotension episodes. Reduced-dose Modafinil today; will monitor response.  6/18: Pt pulled out PEG yesterday evening; ford placed to maintain patency. Attempted to place NGT but pt agitated, family refused. PO meds held for now, including Eliquis (per IR request). D/c'd scheduled suppository, pt on Senna.  6/19 - pulled ford from peg site last night - see note. Replaced in IR today. Patient in wrist restraint with abdominal binder. Family at bedside, educated on importance of protection of peg site. Dc modafinil   6/20 - Meds and TF resumed through PEG tube. Restraint still applied to right arm and abdominal binder applied. Unasyn completed.   6/21 - Patient continues to be agitated at times. Right arm restraint continues so patient will not pull out PEG. Psych consulted. They will complete full consult tomorrow. Suggested risperidone 1 mg qhs and decrease narcotic use.   6/22 - Psych to complete consult today. Appreciate recs. Sodium 132, enteral water bolus decreased to 800 mg four times daily. Continue to monitor. Goal is to dc restraint today after psych makes recommendations.  6/23 - agitation  improved, hyponatremia noted on labs but normal when corrected for glucose. It was noted that cardiology recommended aspirin and anticoagulation. Discussed with Dr. Gerardo and started patient on asa 81    STROKE DOCUMENTATION        NIH Scale:  1a. Level Of Consciousness: 2-->Not alert: requires repeated stimulation to attend, or is obtunded and requires strong or painful stimulation to make movements (not stereotyped)  1b. LOC Questions: 2-->Answers neither question correctly  1c. LOC Commands: 2-->Performs neither task correctly  2. Best Gaze: 1-->Partial gaze palsy: gaze is abnormal in one or both eyes, but forced deviation or total gaze paresis is not present  3. Visual: 2-->Complete hemianopia  4. Facial Palsy: 2-->Partial paralysis (total or near-total paralysis of lower face)  5a. Motor Arm, Left: 4-->No movement  5b. Motor Arm, Right: 2-->Some effort against gravity: limb cannot get to or maintain (if cued) 90 (or 45) degrees, drifts down to bed, but has some effort against gravity  6a. Motor Leg, Left: 4-->No movement  6b. Motor Leg, Right: 3-->No effort against gravity: leg falls to bed immediately  7. Limb Ataxia: 0-->Absent  8. Sensory: 0-->Normal: no sensory loss  9. Best Language: 2-->Severe aphasia: all communication is through fragmentary expression: great need for inference, questioning, and guessing by the listener. Range of information that can be exchanged is limited: listener carries burden of. . . (see row details)  10. Dysarthria: 2-->Severe dysarthria: patients speech is so slurred as to be unintelligible in the absence of or out of proportion to any dysphasia, or is mute/anarthric  11. Extinction and Inattention (formerly Neglect): 0-->No abnormality  Total (NIH Stroke Scale): 28       Modified Sweetser Score: 0  [place] Coma Scale:    ABCD2 Score:    [id number] Score:   HAS -BLED Score:   ICH Score:   Hunt & De Paz Classification:      Hemorrhagic change of an Ischemic Stroke: Does this  patient have an ischemic stroke with hemorrhagic changes? No     Neurologic Chief Complaint: R MCA infarct     Subjective:     Interval History: Patient is seen for follow-up neurological assessment and treatment recommendations: NAEON, patient remains off restraints, agitation improved      HPI, Past Medical, Family, and Social History remains the same as documented in the initial encounter.     Review of Systems   Constitutional: Negative for diaphoresis and fever.   HENT: Positive for trouble swallowing. Negative for nosebleeds.    Neurological: Positive for facial asymmetry, speech difficulty and weakness.   Psychiatric/Behavioral: Positive for agitation and confusion.     Scheduled Meds:   albuterol-ipratropium  3 mL Nebulization Q6H    apixaban  5 mg Per G Tube BID    aspirin  81 mg Oral Daily    citalopram  10 mg Per G Tube Daily    insulin aspart U-100  5 Units Subcutaneous Q6H    insulin detemir U-100  13 Units Subcutaneous BID    risperidone 1 mg/ml  1 mg Oral QHS    rosuvastatin  40 mg Per G Tube QHS    senna-docusate 8.6-50 mg  2 tablet Per G Tube BID    sodium chloride 0.9%  3 mL Intravenous Q8H     Continuous Infusions:    PRN Meds:acetaminophen, dextrose 50%, glucagon (human recombinant), HYDROcodone-acetaminophen, insulin aspart U-100, labetalol, lactulose, miconazole NITRATE 2 %, ondansetron    Objective:     Vital Signs (Most Recent):  Temp: 98.4 °F (36.9 °C) (06/23/18 1204)  Pulse: 100 (06/23/18 1244)  Resp: 18 (06/23/18 1244)  BP: 118/67 (06/23/18 1204)  SpO2: 97 % (06/23/18 1244)  BP Location: Right arm    Vital Signs Range (Last 24H):  Temp:  [97.6 °F (36.4 °C)-99 °F (37.2 °C)]   Pulse:  []   Resp:  [16-18]   BP: (116-131)/(63-72)   SpO2:  [93 %-99 %]   BP Location: Right arm    Physical Exam   Constitutional: He appears well-developed and well-nourished. No distress.   HENT:   Head: Normocephalic and atraumatic.   Eyes: Conjunctivae are normal. No scleral icterus.    Cardiovascular: Normal rate.    Pulmonary/Chest: Effort normal. No respiratory distress.   Musculoskeletal: He exhibits no edema or tenderness.   Neurological: He is alert.   Skin: Skin is warm and dry.   Vitals reviewed.      Neurological Exam:   LOC: Drowsy  Attention Span: Good  Language: Global aphasia  Articulation: Dysarthria  Orientation: Untestable due to severe aphasia   Facial Movement (CN VII): Lower facial weakness on the Left  Vision: L hemianopsia  Gaze: R gaze preference  Motor: Arm left  1/5  Leg left  Paresis: 0/5  Arm right  Normal 4/5  Leg right Paresis: 3/5  Sensation: Intact to light touch, temperature      Laboratory:  CMP:     Recent Labs  Lab 06/23/18  0549   CALCIUM 9.2   ALBUMIN 2.2*   PROT 6.4   *   K 4.2   CO2 34*   CL 93*   BUN 16   CREATININE 0.8   ALKPHOS 83   ALT 31   AST 33   BILITOT 0.2     BMP:     Recent Labs  Lab 06/23/18  0549   *   K 4.2   CL 93*   CO2 34*   BUN 16   CREATININE 0.8   CALCIUM 9.2     CBC:     Recent Labs  Lab 06/23/18  0548   WBC 6.62   RBC 3.43*   HGB 10.3*   HCT 32.1*   *   MCV 94   MCH 30.0   MCHC 32.1     Lipid Panel:   No results for input(s): CHOL, LDLCALC, HDL, TRIG in the last 168 hours.  Coagulation:   No results for input(s): PT, INR, APTT in the last 168 hours.  Platelet Aggregation Study: No results for input(s): PLTAGG, PLTAGINTERP, PLTAGREGLACO, ADPPLTAGGREG in the last 168 hours.  Hgb A1C:   No results for input(s): HGBA1C in the last 168 hours.  TSH:   No results for input(s): TSH in the last 168 hours.      Diagnostic Results     Brain Imaging   CT 6/6   Large right MCA distribution infarct measuring approximately 7 x 3 cm including the right perisylvian region and extending to the frontal and parietal operculum with associated localized mass effect.    Vessel Imaging   US BUE 6/9/18  Deep venous thrombus in the left axillary vein with additional thrombus in the proximal segment of the branchial vein.  No evidence of DVT in  the right upper extremity.    CTA 6/8  CTA head: Occlusion right M2 segment of the MCA within the proximal sylvian fissure..  Heavy atherosclerotic plaquing of the cavernous and supraclinoid ICAs with mild moderate right and mild left cavernous ICA stenosis.  There is diffuse small caliber right M1 segment of the MCA.  Irregularity and narrowing of the right distal vertebral artery concerning for atherosclerotic disease with mild moderate stenosis.  CTA neck: Atherosclerotic plaquing of the carotid bifurcations and proximal ICAs with less than 50% proximal ICA stenosis by NASCET criteria.  CT head: Evolving large right MCA distribution infarction.  Localized mass effect without significant midline shift or hydrocephalus.  No evidence for hemorrhagic conversion.  Clinical correlation and follow-up advised.        Cardiac Imaging   Echo 6/7  CONCLUSIONS     1 - Technically difficult study.     2 - Severely depressed left ventricular systolic function (EF 15-20%).     3 - Mildly to moderately depressed right ventricular systolic function .     4 - Impaired LV relaxation, normal LAP (grade 1 diastolic dysfunction).     5 - Mild aortic stenosis, WIL = 1.45 cm2, AVAi = 0.67 cm2/m2, peak velocity = 2.8 m/s, mean gradient = 18 mmHg.     6 - Mild mitral regurgitation.       Other Imaging  CT chest 6/14/18  1. Recent development consolidation in right middle and lower lobes consistent with pneumonia or aspiration.  Endobronchial material in right lower lobe airways with intermittent obstruction, could represent retained secretions or aspirated material.  Correlate clinically.  Recommend noncontrast chest CT follow-up in 3 months.  2. Chronic right upper lobe consolidation and volume loss consistent with radiation fibrosis in patient with history of previous lung cancer.  3. Chronic loculation of pleural fluid in the right sulcus is stable.  4. A few subcentimeter pulmonary nodules, stable and likely benign  5. Aortic and  coronary atherosclerosis, status post CABG  6. Gastrostomy tube  7. Other findings as above            Neisha Ch PA-C  Comprehensive Stroke Center  Department of Vascular Neurology   [facility]

## 2018-06-23 NOTE — PLAN OF CARE
Problem: Stroke (Ischemic) (Adult)  Intervention: Promote Effective Elimination  Condom catheter patency maintained. Bladder scan performed, residual 186 ml. Will continue to monitor as needed and necessary.

## 2018-06-23 NOTE — ASSESSMENT & PLAN NOTE
· Recommend discontinuing scheduled Risperdal. May use Haldol 1 mg PO qhs and Haldol 2 mg PO/IM q6h PRN non redirectable agitation. Monitor for QTc prolongation and adverse reactions.  QTc 6/22 was 457.  · Avoid benzodiazepines, antihistamines, anticholinergics, and minimize opiate use as these may worsen delirium.  · Recommend consult to PT/OT. Early mobility and exercise has been shown to decrease duration of delirium. Provide appropriate lighting and clear signage; a clock and calendar should be easily visible to the patient.  · Monitor environmental factors. Reduce light and noise at night (close shades, turn off lights, turn off TV, ect). Correct any alterations in sleep cycle.  · Reorient the patient to person, place, time and situation on each encounter.   · Correct sensory deficits if possible (replace eye glasses, hearing aids, ect).  · Avoid restraints if possible. Severely delirious patients benefit from constant observation by a sitter.  · Do not leave patient unattended.  · Continue medical workup to identify and correct possible medical causes of delirium.

## 2018-06-24 PROBLEM — J69.0 ASPIRATION PNEUMONIA: Status: RESOLVED | Noted: 2018-01-01 | Resolved: 2018-01-01

## 2018-06-24 NOTE — ASSESSMENT & PLAN NOTE
Seen by cardiology   EF 15-20%, diastolic dysfunction  Recommending AC - apixaban 5 mg BID   See above, started GDMT and monitoring for hypotension

## 2018-06-24 NOTE — ASSESSMENT & PLAN NOTE
EF 15-20% - seen on echo completed 6/7  Cardiac history of CABG in 2016  Cardiology consulted - recommending AC and asa  Continue Eliquis 5 mg BID and asa 81    For GDMT at home, patient was on coreg and losartan.   Due to hypotension, started metoprolol and lowest dose of losartan. Will monitor BP

## 2018-06-24 NOTE — SUBJECTIVE & OBJECTIVE
Neurologic Chief Complaint: R MCA infarct     Subjective:     Interval History: Patient is seen for follow-up neurological assessment and treatment recommendations: NAEON, patient remains off restraints, agitation improved, family report LLE pain overnight      HPI, Past Medical, Family, and Social History remains the same as documented in the initial encounter.     Review of Systems   Constitutional: Negative for diaphoresis and fever.   HENT: Positive for trouble swallowing. Negative for nosebleeds.    Neurological: Positive for facial asymmetry, speech difficulty and weakness.   Psychiatric/Behavioral: Positive for confusion. Negative for agitation.     Scheduled Meds:   albuterol-ipratropium  3 mL Nebulization Q6H    apixaban  5 mg Per G Tube BID    aspirin  81 mg Oral Daily    citalopram  10 mg Per G Tube Daily    insulin aspart U-100  10 Units Subcutaneous Q6H    insulin detemir U-100  20 Units Subcutaneous BID    risperidone 1 mg/ml  1 mg Oral QHS    rosuvastatin  40 mg Per G Tube QHS    senna-docusate 8.6-50 mg  2 tablet Per G Tube BID    sodium chloride 0.9%  3 mL Intravenous Q8H     Continuous Infusions:    PRN Meds:acetaminophen, dextrose 50%, glucagon (human recombinant), HYDROcodone-acetaminophen, insulin aspart U-100, labetalol, lactulose, miconazole NITRATE 2 %, ondansetron    Objective:     Vital Signs (Most Recent):  Temp: 99.1 °F (37.3 °C) (06/23/18 2258)  Pulse: 93 (06/24/18 0735)  Resp: 16 (06/24/18 0700)  BP: 137/68 (06/23/18 2258)  SpO2: 95 % (06/24/18 0700)  BP Location: Right arm    Vital Signs Range (Last 24H):  Temp:  [98 °F (36.7 °C)-99.5 °F (37.5 °C)]   Pulse:  []   Resp:  [16-20]   BP: (100-137)/(58-69)   SpO2:  [92 %-99 %]   BP Location: Right arm    Physical Exam   Constitutional: He appears well-developed and well-nourished. No distress.   HENT:   Head: Normocephalic and atraumatic.   Eyes: Conjunctivae are normal. No scleral icterus.   Cardiovascular: Normal rate.     Pulmonary/Chest: Effort normal. No respiratory distress.   Musculoskeletal: He exhibits no edema or tenderness.   Neurological: He is alert.   Skin: Skin is warm and dry.   Vitals reviewed.      Neurological Exam:   LOC: Alert  Attention Span: Poor  Language: Global aphasia  Articulation: Dysarthria  Orientation: Untestable due to severe aphasia   Facial Movement (CN VII): Lower facial weakness on the Left  Vision: L hemianopsia  Gaze: R gaze preference  Motor: Arm left  0/5  Leg left  Paresis: 0/5  Arm right  Normal 4/5  Leg right Paresis: 3/5  Sensation: Intact to light touch, temperature      Laboratory:  CMP:     Recent Labs  Lab 06/24/18 0453   CALCIUM 9.2   ALBUMIN 2.3*   PROT 6.3   *   K 4.2   CO2 29   CL 93*   BUN 15   CREATININE 0.8   ALKPHOS 81   ALT 24   AST 27   BILITOT 0.2     BMP:     Recent Labs  Lab 06/24/18 0453   *   K 4.2   CL 93*   CO2 29   BUN 15   CREATININE 0.8   CALCIUM 9.2     CBC:     Recent Labs  Lab 06/24/18 0453   WBC 7.12   RBC 3.34*   HGB 9.8*   HCT 31.1*   *   MCV 93   MCH 29.3   MCHC 31.5*     Lipid Panel:   No results for input(s): CHOL, LDLCALC, HDL, TRIG in the last 168 hours.  Coagulation:   No results for input(s): PT, INR, APTT in the last 168 hours.  Platelet Aggregation Study: No results for input(s): PLTAGG, PLTAGINTERP, PLTAGREGLACO, ADPPLTAGGREG in the last 168 hours.  Hgb A1C:   No results for input(s): HGBA1C in the last 168 hours.  TSH:   No results for input(s): TSH in the last 168 hours.      Diagnostic Results     Brain Imaging   CT 6/6   Large right MCA distribution infarct measuring approximately 7 x 3 cm including the right perisylvian region and extending to the frontal and parietal operculum with associated localized mass effect.    Vessel Imaging   US BUE 6/9/18  Deep venous thrombus in the left axillary vein with additional thrombus in the proximal segment of the branchial vein.  No evidence of DVT in the right upper extremity.    CTA  6/8  CTA head: Occlusion right M2 segment of the MCA within the proximal sylvian fissure..  Heavy atherosclerotic plaquing of the cavernous and supraclinoid ICAs with mild moderate right and mild left cavernous ICA stenosis.  There is diffuse small caliber right M1 segment of the MCA.  Irregularity and narrowing of the right distal vertebral artery concerning for atherosclerotic disease with mild moderate stenosis.  CTA neck: Atherosclerotic plaquing of the carotid bifurcations and proximal ICAs with less than 50% proximal ICA stenosis by NASCET criteria.  CT head: Evolving large right MCA distribution infarction.  Localized mass effect without significant midline shift or hydrocephalus.  No evidence for hemorrhagic conversion.  Clinical correlation and follow-up advised.        Cardiac Imaging   Echo 6/7  CONCLUSIONS     1 - Technically difficult study.     2 - Severely depressed left ventricular systolic function (EF 15-20%).     3 - Mildly to moderately depressed right ventricular systolic function .     4 - Impaired LV relaxation, normal LAP (grade 1 diastolic dysfunction).     5 - Mild aortic stenosis, WIL = 1.45 cm2, AVAi = 0.67 cm2/m2, peak velocity = 2.8 m/s, mean gradient = 18 mmHg.     6 - Mild mitral regurgitation.       Other Imaging  CT chest 6/14/18  1. Recent development consolidation in right middle and lower lobes consistent with pneumonia or aspiration.  Endobronchial material in right lower lobe airways with intermittent obstruction, could represent retained secretions or aspirated material.  Correlate clinically.  Recommend noncontrast chest CT follow-up in 3 months.  2. Chronic right upper lobe consolidation and volume loss consistent with radiation fibrosis in patient with history of previous lung cancer.  3. Chronic loculation of pleural fluid in the right sulcus is stable.  4. A few subcentimeter pulmonary nodules, stable and likely benign  5. Aortic and coronary atherosclerosis, status post  CABG  6. Gastrostomy tube  7. Other findings as above

## 2018-06-24 NOTE — ASSESSMENT & PLAN NOTE
Stroke risk factor  HgB A1C 9.4  Increased Detemir to 20U BID and aspart to 10U q6h  Glucose in 200s  Consider nutrition consult for change in tube feeding formula if hyperglycemia persists despite insulin adjustments

## 2018-06-24 NOTE — PROGRESS NOTES
[facility]  Vascular Neurology  Comprehensive Stroke Center  Progress Note    Assessment/Plan:     * Embolic stroke involving right middle cerebral artery    69 y/o male with R MCA infarct with left sided weakness, facial droop, dysarthria and confusion. Right sided movement is minimal. Etiology likely atheroembolic vs cardioembolic due to EF of 15%. EEG completed and no seizure activity noted. Pt started on Modafinil 6/13; wakefulness now improved.    Hospital medicine following; sepsis, hypotension, hyponatremia now resolved, medicine de-escalated abx regimen. No longer likely that pt will require ICU step up.    Pt pulled out PEG 6/17 & 6/18 evening; ford placed to temporarily maintain patency.  PEG replacement 6/19. Meds and Tube feedings resumed 6/20. Patient in right arm wrist restraint and abdominal binder. Psych consulted.  Increased risperidone to 1 mg qhs and limit use of narcotics. Patient now off restraints    Dc modafinil 6/19    Antithrombotics: Eliquis 5 mg BID and asa 81  Statins: Crestor 40 mg daily  Aggressive risk factor modification: HTN, DM, HLD, Diet, Exercise, Obesity, CAD  Rehab efforts: PT/OT/SLP to evaluate and treat, PM&R consult   Dispo: SNF  Diagnostics ordered/pending: NA  VTE prophylaxis:  SCDs + eliquis  BP parameters: Infarct: Avoid hypotension        Agitation    Patient agitated at night per wife  Pulled out PEG tube   Requiring right arm wrist restraint  Risperidone 1 mg qhs added   Psych consulted - appreciate recs  Patient off restraints        Acute on chronic respiratory failure with hypoxia    Initially with worsening O2 requirements  Consulted Pulm and Medicine; Appreciate recs  Pulm signed off; Rec outpatient follow-up once more stable  Broad-spectrum abx de-escalated to Unasyn (course completed)  Continue duonebs, CPT        Abnormal ventricular wall motion    Stroke risk factor  Evidence on echo  Pt on Eliquis        DVT of axillary vein, acute left     Eliquis started 6/13        Chronic systolic (congestive) heart failure    Seen by cardiology   EF 15-20%, diastolic dysfunction  Recommending AC - apixaban 5 mg BID   See above, started GDMT and monitoring for hypotension        Dysarthria    Result of stroke  Aggressive SLP         Decreased cardiac ejection fraction    EF 15-20% - seen on echo completed 6/7  Cardiac history of CABG in 2016  Cardiology consulted - recommending AC and asa  Continue Eliquis 5 mg BID and asa 81    For GDMT at home, patient was on coreg and losartan.   Due to hypotension, started metoprolol and lowest dose of losartan. Will monitor BP         Cytotoxic cerebral edema    Large area of cytotoxic cerebral edema identified when reviewing brain imaging in the territory of the R middle cerebral artery. There is not mass effect associated with it. We will continue to monitor the patients clinical exam for any worsening of symptoms which may indicate expansion of the stroke or the area of the edema resulting in the clinical change. The pattern is suggestive of atheroembolic etiology.        Left spastic hemiparesis    Due to stroke  Aggressive therapy        GENIE on CPAP    Stroke risk factor  Holding CPAP acutely due to risk aspiration, encephalopathy, pt nonverbal        Coronary artery disease involving native coronary artery of native heart without angina pectoris    Stoke risk factor  Eliquis 5 mg BID         Essential hypertension    Stroke risk factor  SBP <160  Home meds - coreg and losartan; Acutely held due to hypotension  Stable, no hypotension over last 24 hrs  (Allergy to lisinopril and Norvasc)        COPD (chronic obstructive pulmonary disease)    Stroke risk factor  Duonebs Q6 due to patient not being able to follow commands and RT not being able to administer inhalers   Currently maintaining O2 sats in the 90's on 2L NC  Medicine following; Appreciate recs  Pulmonary consulted, Signed off - f/u outpatient         History  of lung cancer    Stroke risk factor  CT Chest this admission concerning  Pulmonology feels pt too ill for acute intervention; Signed off  Plan for outpatient follow up with pulmonologist Dr Saunders ([facility]) for further workup of CT findings when acute illness has improved        Type 2 diabetes mellitus without complication, with long-term current use of insulin    Stroke risk factor  HgB A1C 9.4  Increased Detemir to 20U BID and aspart to 10U q6h  Glucose in 200s  Consider nutrition consult for change in tube feeding formula if hyperglycemia persists despite insulin adjustments             69 y/o male wth R MCA infarct. Had been at [facility] since 6-4-18 for AMS and weakness that improved and then worsened now with facial droop and left sided weakness. 6-6-18 fever so infectious w/u in process could be PNA as he was coughing with puree food at [facility]. He was placed on ABX Amp/Vanc/valcyclovir for possible meningitis this is stopped as he does not have meningitis. He moved around too much and was clausterphobic for MRI so will attempt MRI here.   6/8 - Urine studies ordered for hyponatremia. EEG results pending. Bilateral upper/lower extremities US ordered for fever. Procal WNL. Infectious workup negative so far. Cardiology consulted for EF 15-20%.   6-9-18 will give lasix 40 mg NG today as per cardiology recs. Discussion with family regarding anticoagulation due to low EF and DVT left axillary vein and brachial vein, discussion regarding feeding as well and likely bernard of patient swallowing is minimal so will need PEG next week as opposed to waiting and family is in agreement so will start heparin drip with no bolus parameters  6-10-18 once EEG read and no seizure activity may start Modafinil to help with wakefulness  6/11 - EEG to be read today and consider Modafinil. IR consulted for PEG placement tomorrow. Hospital medicine ordering urine studies for hyponatremia.   6/12 - PEG to be placed today. Heparin  gtt currently held. Will resume once PEG is placed. AC to start tomorrow once PEG is able to be used. Hyponatremia slightly improving. Continue to hold fluids due to hyponatremia thought to be due to SIADH. Modafinil to be started tomorrow when PEG can be used.  6/13 - PEG placed yesterday. Heparin gtt dc'ed and eliquis started. Modafinil started today. Continuing to restrict fluids (enteral water boluses) due to SIADH/hyponatremia and holding off on lasix that cardiology recommended.  6/14/18 - concern for sepsis today - medicine consulted - hypotensive, febrile, procal elevated.   6/15: Hypotensive to 88/36 overnight; good response to 250cc bolus. Hospital medicine adjusting insulin and antibiotics regimen. Wakefulness much improved since starting Modafinil. Mild bleeding noted at NC site.  6/16: Pt hypotensive, requiring another bolus yesterday; BP so far stable since. Modafinil held today as family felt made pt more aggressive; plan for decreased dose tomorrow. Added Lactulose PRN to bowel regimen.  6/17: Pt clinically improved from infection/respiratory perspective; medicine de-escalated abx regimen. No further hypotension episodes. Reduced-dose Modafinil today; will monitor response.  6/18: Pt pulled out PEG yesterday evening; ford placed to maintain patency. Attempted to place NGT but pt agitated, family refused. PO meds held for now, including Eliquis (per IR request). D/c'd scheduled suppository, pt on Senna.  6/19 - pulled ford from peg site last night - see note. Replaced in IR today. Patient in wrist restraint with abdominal binder. Family at bedside, educated on importance of protection of peg site. Dc modafinil   6/20 - Meds and TF resumed through PEG tube. Restraint still applied to right arm and abdominal binder applied. Unasyn completed.   6/21 - Patient continues to be agitated at times. Right arm restraint continues so patient will not pull out PEG. Psych consulted. They will complete full  consult tomorrow. Suggested risperidone 1 mg qhs and decrease narcotic use.   6/22 - Psych to complete consult today. Appreciate recs. Sodium 132, enteral water bolus decreased to 800 mg four times daily. Continue to monitor. Goal is to dc restraint today after psych makes recommendations.  6/23 - agitation improved, hyponatremia noted on labs but normal when corrected for glucose. It was noted that cardiology recommended aspirin and anticoagulation. Discussed with Dr. Gerardo and started patient on asa 81  6/24 - family reports patient had LLE pain, US LE pending, increased insulin, may need nutrition consult for change in tube feed formula if hyperglycemia persists    STROKE DOCUMENTATION        NIH Scale:  1a. Level Of Consciousness: 0-->Alert: keenly responsive  1b. LOC Questions: 2-->Answers neither question correctly  1c. LOC Commands: 2-->Performs neither task correctly  2. Best Gaze: 1-->Partial gaze palsy: gaze is abnormal in one or both eyes, but forced deviation or total gaze paresis is not present  3. Visual: 2-->Complete hemianopia  4. Facial Palsy: 2-->Partial paralysis (total or near-total paralysis of lower face)  5a. Motor Arm, Left: 4-->No movement  5b. Motor Arm, Right: 2-->Some effort against gravity: limb cannot get to or maintain (if cued) 90 (or 45) degrees, drifts down to bed, but has some effort against gravity  6a. Motor Leg, Left: 4-->No movement  6b. Motor Leg, Right: 3-->No effort against gravity: leg falls to bed immediately  7. Limb Ataxia: 0-->Absent  8. Sensory: 0-->Normal: no sensory loss  9. Best Language: 2-->Severe aphasia: all communication is through fragmentary expression: great need for inference, questioning, and guessing by the listener. Range of information that can be exchanged is limited: listener carries burden of. . . (see row details)  10. Dysarthria: 2-->Severe dysarthria: patients speech is so slurred as to be unintelligible in the absence of or out of proportion to  any dysphasia, or is mute/anarthric  11. Extinction and Inattention (formerly Neglect): 0-->No abnormality  Total (NIH Stroke Scale): 26       Modified David Score: 0  Berlin Coma Scale:    ABCD2 Score:    [id number] Score:   HAS -BLED Score:   ICH Score:   Hunt & De Paz Classification:      Hemorrhagic change of an Ischemic Stroke: Does this patient have an ischemic stroke with hemorrhagic changes? No     Neurologic Chief Complaint: R MCA infarct     Subjective:     Interval History: Patient is seen for follow-up neurological assessment and treatment recommendations: NAEON, patient remains off restraints, agitation improved, family report LLE pain overnight      HPI, Past Medical, Family, and Social History remains the same as documented in the initial encounter.     Review of Systems   Constitutional: Negative for diaphoresis and fever.   HENT: Positive for trouble swallowing. Negative for nosebleeds.    Neurological: Positive for facial asymmetry, speech difficulty and weakness.   Psychiatric/Behavioral: Positive for confusion. Negative for agitation.     Scheduled Meds:   albuterol-ipratropium  3 mL Nebulization Q6H    apixaban  5 mg Per G Tube BID    aspirin  81 mg Oral Daily    citalopram  10 mg Per G Tube Daily    insulin aspart U-100  10 Units Subcutaneous Q6H    insulin detemir U-100  20 Units Subcutaneous BID    risperidone 1 mg/ml  1 mg Oral QHS    rosuvastatin  40 mg Per G Tube QHS    senna-docusate 8.6-50 mg  2 tablet Per G Tube BID    sodium chloride 0.9%  3 mL Intravenous Q8H     Continuous Infusions:    PRN Meds:acetaminophen, dextrose 50%, glucagon (human recombinant), HYDROcodone-acetaminophen, insulin aspart U-100, labetalol, lactulose, miconazole NITRATE 2 %, ondansetron    Objective:     Vital Signs (Most Recent):  Temp: 99.1 °F (37.3 °C) (06/23/18 2258)  Pulse: 93 (06/24/18 0735)  Resp: 16 (06/24/18 0700)  BP: 137/68 (06/23/18 2258)  SpO2: 95 % (06/24/18 0700)  BP Location: Right  arm    Vital Signs Range (Last 24H):  Temp:  [98 °F (36.7 °C)-99.5 °F (37.5 °C)]   Pulse:  []   Resp:  [16-20]   BP: (100-137)/(58-69)   SpO2:  [92 %-99 %]   BP Location: Right arm    Physical Exam   Constitutional: He appears well-developed and well-nourished. No distress.   HENT:   Head: Normocephalic and atraumatic.   Eyes: Conjunctivae are normal. No scleral icterus.   Cardiovascular: Normal rate.    Pulmonary/Chest: Effort normal. No respiratory distress.   Musculoskeletal: He exhibits no edema or tenderness.   Neurological: He is alert.   Skin: Skin is warm and dry.   Vitals reviewed.      Neurological Exam:   LOC: Alert  Attention Span: Poor  Language: Global aphasia  Articulation: Dysarthria  Orientation: Untestable due to severe aphasia   Facial Movement (CN VII): Lower facial weakness on the Left  Vision: L hemianopsia  Gaze: R gaze preference  Motor: Arm left  0/5  Leg left  Paresis: 0/5  Arm right  Normal 4/5  Leg right Paresis: 3/5  Sensation: Intact to light touch, temperature      Laboratory:  CMP:     Recent Labs  Lab 06/24/18  0453   CALCIUM 9.2   ALBUMIN 2.3*   PROT 6.3   *   K 4.2   CO2 29   CL 93*   BUN 15   CREATININE 0.8   ALKPHOS 81   ALT 24   AST 27   BILITOT 0.2     BMP:     Recent Labs  Lab 06/24/18  0453   *   K 4.2   CL 93*   CO2 29   BUN 15   CREATININE 0.8   CALCIUM 9.2     CBC:     Recent Labs  Lab 06/24/18  0453   WBC 7.12   RBC 3.34*   HGB 9.8*   HCT 31.1*   *   MCV 93   MCH 29.3   MCHC 31.5*     Lipid Panel:   No results for input(s): CHOL, LDLCALC, HDL, TRIG in the last 168 hours.  Coagulation:   No results for input(s): PT, INR, APTT in the last 168 hours.  Platelet Aggregation Study: No results for input(s): PLTAGG, PLTAGINTERP, PLTAGREGLACO, ADPPLTAGGREG in the last 168 hours.  Hgb A1C:   No results for input(s): HGBA1C in the last 168 hours.  TSH:   No results for input(s): TSH in the last 168 hours.      Diagnostic Results     Brain Imaging   CT 6/6    Large right MCA distribution infarct measuring approximately 7 x 3 cm including the right perisylvian region and extending to the frontal and parietal operculum with associated localized mass effect.    Vessel Imaging   US BUE 6/9/18  Deep venous thrombus in the left axillary vein with additional thrombus in the proximal segment of the branchial vein.  No evidence of DVT in the right upper extremity.    CTA 6/8  CTA head: Occlusion right M2 segment of the MCA within the proximal sylvian fissure..  Heavy atherosclerotic plaquing of the cavernous and supraclinoid ICAs with mild moderate right and mild left cavernous ICA stenosis.  There is diffuse small caliber right M1 segment of the MCA.  Irregularity and narrowing of the right distal vertebral artery concerning for atherosclerotic disease with mild moderate stenosis.  CTA neck: Atherosclerotic plaquing of the carotid bifurcations and proximal ICAs with less than 50% proximal ICA stenosis by NASCET criteria.  CT head: Evolving large right MCA distribution infarction.  Localized mass effect without significant midline shift or hydrocephalus.  No evidence for hemorrhagic conversion.  Clinical correlation and follow-up advised.        Cardiac Imaging   Echo 6/7  CONCLUSIONS     1 - Technically difficult study.     2 - Severely depressed left ventricular systolic function (EF 15-20%).     3 - Mildly to moderately depressed right ventricular systolic function .     4 - Impaired LV relaxation, normal LAP (grade 1 diastolic dysfunction).     5 - Mild aortic stenosis, WIL = 1.45 cm2, AVAi = 0.67 cm2/m2, peak velocity = 2.8 m/s, mean gradient = 18 mmHg.     6 - Mild mitral regurgitation.       Other Imaging  CT chest 6/14/18  1. Recent development consolidation in right middle and lower lobes consistent with pneumonia or aspiration.  Endobronchial material in right lower lobe airways with intermittent obstruction, could represent retained secretions or aspirated material.   Correlate clinically.  Recommend noncontrast chest CT follow-up in 3 months.  2. Chronic right upper lobe consolidation and volume loss consistent with radiation fibrosis in patient with history of previous lung cancer.  3. Chronic loculation of pleural fluid in the right sulcus is stable.  4. A few subcentimeter pulmonary nodules, stable and likely benign  5. Aortic and coronary atherosclerosis, status post CABG  6. Gastrostomy tube  7. Other findings as above            Neisha Ch PA-C  Comprehensive Stroke Center  Department of Vascular Neurology   [facility]

## 2018-06-25 PROBLEM — J96.21 ACUTE ON CHRONIC RESPIRATORY FAILURE WITH HYPOXIA: Status: RESOLVED | Noted: 2018-01-01 | Resolved: 2018-01-01

## 2018-06-25 NOTE — ASSESSMENT & PLAN NOTE
Stroke risk factor  SBP < 160  Home meds - Coreg and Losartan; Acutely held due to hypotension  Restarted Metoprolol and low-dose Losartan alternatively; Continuing to monitor  (Allergy to lisinopril and Norvasc)

## 2018-06-25 NOTE — PLAN OF CARE
ZOIE sent clinical updates to [facility] [place]. ZOIE will follow up.    Estela Cagle, ARIN  [facility]  Ext. [phone]

## 2018-06-25 NOTE — PLAN OF CARE
Patient's discharge plan is to discharge to [facility]. Cm will continue to follow.     06/25/18 4686   Discharge Reassessment   Assessment Type Discharge Planning Reassessment   Provided patient/caregiver education on the expected discharge date and the discharge plan No   Do you have any problems affording any of your prescribed medications? No   Discharge Plan A Skilled Nursing Facility   Discharge Plan B New Nursing Home placement - jail care facility   Patient choice form signed by patient/caregiver N/A   Can the patient answer the patient profile reliably? No, cognitively impaired   How does the patient rate their overall health at the present time? Fair  (per previous Rn assessment)   Describe the patient's ability to walk at the present time. Major restrictions/daily assistance from another person   How often would a person be available to care for the patient? Often   Number of comorbid conditions (as recorded on the chart) Three   During the past month, has the patient often been bothered by feeling down, depressed or hopeless? No  (Per previous Rn assessment)   During the past month, has the patient often been bothered by little interest or pleasure in doing things? No  (Per previous Rn assessment)

## 2018-06-25 NOTE — SUBJECTIVE & OBJECTIVE
"Interval History: see hospital course     Family History     Problem Relation (Age of Onset)    Cancer Father    Diabetes Father, Mother, Brother, Son    No Known Problems Brother, Sister, Brother, Daughter, Daughter, Daughter, Daughter, Son, Son, Son    Pneumonia Sister, Sister        Social History Main Topics    Smoking status: Former Smoker     Packs/day: 1.00     Years: 39.00     Types: Cigarettes     Start date: 1/23/1972     Quit date: 5/23/2011    Smokeless tobacco: Never Used    Alcohol use 0.0 oz/week      Comment: occasionally    Drug use: No    Sexual activity: Not Currently     Psychotherapeutics     Start     Stop Route Frequency Ordered    06/25/18 2100  haloperidol 2 mg/mL solution 1 mg      -- PER G TUBE Nightly 06/25/18 0819 06/25/18 0921  haloperidol lactate injection 2 mg      -- IM Every 6 hours PRN 06/25/18 0821 06/23/18 0900  citalopram tablet 10 mg      -- PER G TUBE Daily 06/22/18 1509           Review of Systems  Objective:     Vital Signs (Most Recent):  Temp: 98.8 °F (37.1 °C) (06/25/18 0819)  Pulse: 103 (06/25/18 0823)  Resp: 18 (06/25/18 0819)  BP: 120/67 (06/25/18 0819)  SpO2: 96 % (06/25/18 0819) Vital Signs (24h Range):  Temp:  [96.1 °F (35.6 °C)-99.3 °F (37.4 °C)] 98.8 °F (37.1 °C)  Pulse:  [] 103  Resp:  [16-18] 18  SpO2:  [90 %-100 %] 96 %  BP: (120-134)/(56-76) 120/67     Height: 5' 8" (172.7 cm)  Weight: 101.2 kg (223 lb 1.7 oz)  Body mass index is 33.92 kg/m².      Intake/Output Summary (Last 24 hours) at 06/25/18 0938  Last data filed at 06/24/18 2200   Gross per 24 hour   Intake                0 ml   Output             1675 ml   Net            -1675 ml       Physical Exam    Mental Status Exam:  Appearance: older than stated age, disheveled, lying in bed   Behavior: Calm, resting in bed, eye contact minimal, unable to follow commands   Speech/Language: articulation error, nonfluent, nonsensical   Mood: Unable to assess   Affect: Unable to assess   Thought " Process:  poverty of thought   Thought Content: poverty of content   Orientation: disoriented to self, place, sitation, date   Cognition: easily distracted, impaired   Insight: poor   Judgment: poor       Significant Labs:   Last 24 Hours:   Recent Lab Results       06/25/18  0520 06/24/18 2045 06/24/18  1556      Immature Granulocytes 0.4       Immature Grans (Abs) 0.03  Comment:  Mild elevation in immature granulocytes is non specific and   can be seen in a variety of conditions including stress response,   acute inflammation, trauma and pregnancy. Correlation with other   laboratory and clinical findings is essential.         Albumin 2.5(L)       Alkaline Phosphatase 84       ALT 22       Anion Gap 8       AST 27       Baso # 0.04       Basophil% 0.5       Total Bilirubin 0.2  Comment:  For infants and newborns, interpretation of results should be based  on gestational age, weight and in agreement with clinical  observations.  Premature Infant recommended reference ranges:  Up to 24 hours.............<8.0 mg/dL  Up to 48 hours............<12.0 mg/dL  3-5 days..................<15.0 mg/dL  6-29 days.................<15.0 mg/dL         BUN, Bld 16       Calcium 10.0       Chloride 95       CO2 32(H)       Creatinine 0.8       Differential Method Automated       eGFR if  >60.0       eGFR if non  >60.0  Comment:  Calculation used to obtain the estimated glomerular filtration  rate (eGFR) is the CKD-EPI equation.          Eos # 0.1       Eosinophil% 1.9       Glucose 246(H)       Gran # (ANC) 5.2       Gran% 72.1       Hematocrit 33.8(L)       Hemoglobin 10.8(L)       Lymph # 1.1       Lymph% 15.5(L)       MCH 30.0       MCHC 32.0       MCV 94       Mono # 0.7       Mono% 9.6       MPV 9.0(L)       nRBC 0       Platelets 416(H)       POCT Glucose  227(H) 231(H)     Potassium 4.1       Total Protein 6.8       RBC 3.60(L)       RDW 12.5       Sodium 135(L)       WBC 7.28              Significant Imaging: None

## 2018-06-25 NOTE — SUBJECTIVE & OBJECTIVE
Neurologic Chief Complaint: R MCA infarct     Subjective:     Interval History: Patient is seen for follow-up neurological assessment and treatment recommendations:   NAEON. Psych adjusted med regimen. Pt to remain on Diabetisource, increased detemir. D/c eliquis, start coumadin for cost barriers. US LE negative.    HPI, Past Medical, Family, and Social History remains the same as documented in the initial encounter.     Review of Systems   Constitutional: Negative for diaphoresis and fever.   HENT: Positive for trouble swallowing. Negative for nosebleeds.    Neurological: Positive for facial asymmetry, speech difficulty and weakness.   Psychiatric/Behavioral: Positive for confusion. Negative for agitation.     Scheduled Meds:   albuterol-ipratropium  3 mL Nebulization Q6H    aspirin  81 mg Oral Daily    citalopram  10 mg Per G Tube Daily    haloperidol  1 mg Per G Tube QHS    insulin aspart U-100  10 Units Subcutaneous Q6H    insulin detemir U-100  28 Units Subcutaneous BID    losartan  12.5 mg Per G Tube Daily    metoprolol tartrate  25 mg Per G Tube BID    rosuvastatin  40 mg Per G Tube QHS    senna-docusate 8.6-50 mg  2 tablet Per G Tube BID    sodium chloride 0.9%  3 mL Intravenous Q8H     Continuous Infusions:    PRN Meds:acetaminophen, dextrose 50%, glucagon (human recombinant), haloperidol lactate, HYDROcodone-acetaminophen, insulin aspart U-100, labetalol, lactulose, miconazole NITRATE 2 %, ondansetron    Objective:     Vital Signs (Most Recent):  Temp: 98.8 °F (37.1 °C) (06/25/18 0819)  Pulse: 100 (06/25/18 1230)  Resp: 18 (06/25/18 1230)  BP: 120/67 (06/25/18 0819)  SpO2: 98 % (06/25/18 1230)  BP Location: Left arm    Vital Signs Range (Last 24H):  Temp:  [96.1 °F (35.6 °C)-99.3 °F (37.4 °C)]   Pulse:  []   Resp:  [16-18]   BP: (120-134)/(67-76)   SpO2:  [90 %-100 %]   BP Location: Left arm    Physical Exam   Constitutional: He appears well-developed and well-nourished. No distress.    HENT:   Head: Normocephalic and atraumatic.   Eyes: Conjunctivae are normal. No scleral icterus.   Cardiovascular: Normal rate.    Pulmonary/Chest: Effort normal. No respiratory distress.   Musculoskeletal: He exhibits no edema or tenderness.   Neurological: He is alert. A cranial nerve deficit is present. No sensory deficit.   Skin: Skin is warm and dry.   Psychiatric: His affect is blunt. He is noncommunicative.   Vitals reviewed.      Neurological Exam:   LOC: Alert  Attention Span: Poor  Language: Global aphasia  Articulation: Dysarthria  Orientation: Untestable due to severe aphasia   Facial Movement (CN VII): Lower facial weakness on the Left  Vision: L hemianopsia  Gaze: Mild R gaze preference  Motor: Arm left  0/5  Leg left  Paresis: 0/5  Arm right  Normal 4/5  Leg right Paresis: 3/5  Sensation: Intact to light touch, temperature      Laboratory:  CMP:     Recent Labs  Lab 06/25/18  0520   CALCIUM 10.0   ALBUMIN 2.5*   PROT 6.8   *   K 4.1   CO2 32*   CL 95   BUN 16   CREATININE 0.8   ALKPHOS 84   ALT 22   AST 27   BILITOT 0.2     BMP:     Recent Labs  Lab 06/25/18  0520   *   K 4.1   CL 95   CO2 32*   BUN 16   CREATININE 0.8   CALCIUM 10.0     CBC:     Recent Labs  Lab 06/25/18  0520   WBC 7.28   RBC 3.60*   HGB 10.8*   HCT 33.8*   *   MCV 94   MCH 30.0   MCHC 32.0     Lipid Panel:   No results for input(s): CHOL, LDLCALC, HDL, TRIG in the last 168 hours.  Coagulation:   No results for input(s): PT, INR, APTT in the last 168 hours.  Platelet Aggregation Study: No results for input(s): PLTAGG, PLTAGINTERP, PLTAGREGLACO, ADPPLTAGGREG in the last 168 hours.  Hgb A1C:   No results for input(s): HGBA1C in the last 168 hours.  TSH:   No results for input(s): TSH in the last 168 hours.      Diagnostic Results     Brain Imaging   CT 6/6   Large right MCA distribution infarct measuring approximately 7 x 3 cm including the right perisylvian region and extending to the frontal and parietal  operculum with associated localized mass effect.    Vessel Imaging   US BUE 6/9/18  Deep venous thrombus in the left axillary vein with additional thrombus in the proximal segment of the branchial vein.  No evidence of DVT in the right upper extremity.    CTA 6/8  CTA head: Occlusion right M2 segment of the MCA within the proximal sylvian fissure..  Heavy atherosclerotic plaquing of the cavernous and supraclinoid ICAs with mild moderate right and mild left cavernous ICA stenosis.  There is diffuse small caliber right M1 segment of the MCA.  Irregularity and narrowing of the right distal vertebral artery concerning for atherosclerotic disease with mild moderate stenosis.  CTA neck: Atherosclerotic plaquing of the carotid bifurcations and proximal ICAs with less than 50% proximal ICA stenosis by NASCET criteria.  CT head: Evolving large right MCA distribution infarction.  Localized mass effect without significant midline shift or hydrocephalus.  No evidence for hemorrhagic conversion.  Clinical correlation and follow-up advised.        Cardiac Imaging   Echo 6/7  CONCLUSIONS     1 - Technically difficult study.     2 - Severely depressed left ventricular systolic function (EF 15-20%).     3 - Mildly to moderately depressed right ventricular systolic function .     4 - Impaired LV relaxation, normal LAP (grade 1 diastolic dysfunction).     5 - Mild aortic stenosis, WIL = 1.45 cm2, AVAi = 0.67 cm2/m2, peak velocity = 2.8 m/s, mean gradient = 18 mmHg.     6 - Mild mitral regurgitation.       Other Imaging  CT chest 6/14/18  1. Recent development consolidation in right middle and lower lobes consistent with pneumonia or aspiration.  Endobronchial material in right lower lobe airways with intermittent obstruction, could represent retained secretions or aspirated material.  Correlate clinically.  Recommend noncontrast chest CT follow-up in 3 months.  2. Chronic right upper lobe consolidation and volume loss consistent with  radiation fibrosis in patient with history of previous lung cancer.  3. Chronic loculation of pleural fluid in the right sulcus is stable.  4. A few subcentimeter pulmonary nodules, stable and likely benign  5. Aortic and coronary atherosclerosis, status post CABG  6. Gastrostomy tube  7. Other findings as above

## 2018-06-25 NOTE — ASSESSMENT & PLAN NOTE
Stroke risk factor  Duonebs Q6 due to pt not being able to follow commands and RT not being able to administer inhalers   Currently maintaining O2 sats in the >90% on 2L NC  Pulmonary consulted, Signed off - f/u outpatient

## 2018-06-25 NOTE — MEDICAL/APP STUDENT
"6/25/2018 11:17 AM   Mendez Guthrie   1950   [id number]        Psychiatry Progress Note     SUBJECTIVE:   HPI:   Mendez Guthrie is a 68 y.o. male with a history of R MCA infarct with left sided weakness, facial droop, dysarthria and confusion. Right sided movement is minimal. Etiology likely atheroembolic vs cardioembolic due to EF of 15%. Hospital medicine following; sepsis, hypotension, hyponatremia now resolved, medicine de-escalated abx regimen. No longer likely that pt will require ICU step up.  Psychiatry was consulted for recommendations for management of agitated delirium.  Per chart review, patient received Risperdal 1 mg overnight for agitation.  Wife reports patient has been in soft restraints on his arm for 3 days after he removed his PEG tube.      Patient was seen this morning with wife at bedside.  Patient was unable to answer any questions and only repeated the word "zoom. Zzzzooooommm. Zooooooooooooom."  Patient unable to follow commands or complete CAM-ICU screen.  Wife states that patient has been able to articulate her names and the names of some of their children.  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  Wife states she has not been told by the medical team what her 's new baseline functioning will be.  Wife states that patient did well overnight after he received his pain medication.  Was taking tramadol at home for arthritis related pain.    Patient seen and examined   06/25/2018  History taken from family.  The patient has been comfortable overnight.  No new complaints.  Pt attempts to speak but is nonsensical.  No acute issues or agitations noted after medications change from Risperdal to Haldol performed by the weekend team.       Current Medications:   Scheduled Meds:    albuterol-ipratropium  3 mL Nebulization Q6H    apixaban  5 mg Per G Tube BID    aspirin  81 mg Oral Daily    citalopram "  10 mg Per G Tube Daily    haloperidol  1 mg Per G Tube QHS    insulin aspart U-100  10 Units Subcutaneous Q6H    insulin detemir U-100  28 Units Subcutaneous BID    losartan  12.5 mg Per G Tube Daily    metoprolol tartrate  25 mg Per G Tube BID    rosuvastatin  40 mg Per G Tube QHS    senna-docusate 8.6-50 mg  2 tablet Per G Tube BID    sodium chloride 0.9%  3 mL Intravenous Q8H      PRN Meds: acetaminophen, dextrose 50%, glucagon (human recombinant), haloperidol lactate, HYDROcodone-acetaminophen, insulin aspart U-100, labetalol, lactulose, miconazole NITRATE 2 %, ondansetron   Psychotherapeutics     Start     Stop Route Frequency Ordered    06/25/18 2100  haloperidol 2 mg/mL solution 1 mg      -- PER G TUBE Nightly 06/25/18 0819    06/25/18 0921  haloperidol lactate injection 2 mg      -- IM Every 6 hours PRN 06/25/18 0821    06/23/18 0900  citalopram tablet 10 mg      -- PER G TUBE Daily 06/22/18 1509          Allergies:   Review of patient's allergies indicates:   Allergen Reactions    Lisinopril Swelling    Norvasc [amlodipine] Swelling        OBJECTIVE:   Vitals   Vitals:    06/25/18 0823   BP:    Pulse: 103   Resp:    Temp:         Labs/Imaging/Studies:   Recent Results (from the past 36 hour(s))   CBC auto differential    Collection Time: 06/24/18  4:53 AM   Result Value Ref Range    WBC 7.12 3.90 - 12.70 K/uL    RBC 3.34 (L) 4.60 - 6.20 M/uL    Hemoglobin 9.8 (L) 14.0 - 18.0 g/dL    Hematocrit 31.1 (L) 40.0 - 54.0 %    MCV 93 82 - 98 fL    MCH 29.3 27.0 - 31.0 pg    MCHC 31.5 (L) 32.0 - 36.0 g/dL    RDW 12.5 11.5 - 14.5 %    Platelets 406 (H) 150 - 350 K/uL    MPV 8.2 (L) 9.2 - 12.9 fL    Immature Granulocytes 0.7 (H) 0.0 - 0.5 %    Gran # (ANC) 5.0 1.8 - 7.7 K/uL    Immature Grans (Abs) 0.05 (H) 0.00 - 0.04 K/uL    Lymph # 1.2 1.0 - 4.8 K/uL    Mono # 0.8 0.3 - 1.0 K/uL    Eos # 0.2 0.0 - 0.5 K/uL    Baso # 0.04 0.00 - 0.20 K/uL    nRBC 0 0 /100 WBC    Gran% 69.5 38.0 - 73.0 %    Lymph% 16.2 (L)  18.0 - 48.0 %    Mono% 10.8 4.0 - 15.0 %    Eosinophil% 2.2 0.0 - 8.0 %    Basophil% 0.6 0.0 - 1.9 %    Differential Method Automated    Comprehensive metabolic panel    Collection Time: 06/24/18  4:53 AM   Result Value Ref Range    Sodium 131 (L) 136 - 145 mmol/L    Potassium 4.2 3.5 - 5.1 mmol/L    Chloride 93 (L) 95 - 110 mmol/L    CO2 29 23 - 29 mmol/L    Glucose 234 (H) 70 - 110 mg/dL    BUN, Bld 15 8 - 23 mg/dL    Creatinine 0.8 0.5 - 1.4 mg/dL    Calcium 9.2 8.7 - 10.5 mg/dL    Total Protein 6.3 6.0 - 8.4 g/dL    Albumin 2.3 (L) 3.5 - 5.2 g/dL    Total Bilirubin 0.2 0.1 - 1.0 mg/dL    Alkaline Phosphatase 81 55 - 135 U/L    AST 27 10 - 40 U/L    ALT 24 10 - 44 U/L    Anion Gap 9 8 - 16 mmol/L    eGFR if African American >60.0 >60 mL/min/1.73 m^2    eGFR if non African American >60.0 >60 mL/min/1.73 m^2   POCT glucose    Collection Time: 06/24/18  5:16 AM   Result Value Ref Range    POCT Glucose 232 (H) 70 - 110 mg/dL   POCT glucose    Collection Time: 06/24/18  3:56 PM   Result Value Ref Range    POCT Glucose 231 (H) 70 - 110 mg/dL   POCT glucose    Collection Time: 06/24/18  8:45 PM   Result Value Ref Range    POCT Glucose 227 (H) 70 - 110 mg/dL   CBC auto differential    Collection Time: 06/25/18  5:20 AM   Result Value Ref Range    WBC 7.28 3.90 - 12.70 K/uL    RBC 3.60 (L) 4.60 - 6.20 M/uL    Hemoglobin 10.8 (L) 14.0 - 18.0 g/dL    Hematocrit 33.8 (L) 40.0 - 54.0 %    MCV 94 82 - 98 fL    MCH 30.0 27.0 - 31.0 pg    MCHC 32.0 32.0 - 36.0 g/dL    RDW 12.5 11.5 - 14.5 %    Platelets 416 (H) 150 - 350 K/uL    MPV 9.0 (L) 9.2 - 12.9 fL    Immature Granulocytes 0.4 0.0 - 0.5 %    Gran # (ANC) 5.2 1.8 - 7.7 K/uL    Immature Grans (Abs) 0.03 0.00 - 0.04 K/uL    Lymph # 1.1 1.0 - 4.8 K/uL    Mono # 0.7 0.3 - 1.0 K/uL    Eos # 0.1 0.0 - 0.5 K/uL    Baso # 0.04 0.00 - 0.20 K/uL    nRBC 0 0 /100 WBC    Gran% 72.1 38.0 - 73.0 %    Lymph% 15.5 (L) 18.0 - 48.0 %    Mono% 9.6 4.0 - 15.0 %    Eosinophil% 1.9 0.0 - 8.0 %     Basophil% 0.5 0.0 - 1.9 %    Differential Method Automated    Comprehensive metabolic panel    Collection Time: 06/25/18  5:20 AM   Result Value Ref Range    Sodium 135 (L) 136 - 145 mmol/L    Potassium 4.1 3.5 - 5.1 mmol/L    Chloride 95 95 - 110 mmol/L    CO2 32 (H) 23 - 29 mmol/L    Glucose 246 (H) 70 - 110 mg/dL    BUN, Bld 16 8 - 23 mg/dL    Creatinine 0.8 0.5 - 1.4 mg/dL    Calcium 10.0 8.7 - 10.5 mg/dL    Total Protein 6.8 6.0 - 8.4 g/dL    Albumin 2.5 (L) 3.5 - 5.2 g/dL    Total Bilirubin 0.2 0.1 - 1.0 mg/dL    Alkaline Phosphatase 84 55 - 135 U/L    AST 27 10 - 40 U/L    ALT 22 10 - 44 U/L    Anion Gap 8 8 - 16 mmol/L    eGFR if African American >60.0 >60 mL/min/1.73 m^2    eGFR if non African American >60.0 >60 mL/min/1.73 m^2        Mental Status Exam:   Arousal: Alert  Appearance: Disheveled  Behavior/Cooperation: Calm, resting in bed, squeezes hands on command, eye contact minimal   Speech: Articulation error, nonfluent, nonsensical   Mood: Unable to assesss   Affect: Mood congruent   Thought Process: Poverty of thought   Thought Content: poverty of contect  Orientation: Disoriented to self, place, situation, date  Cognition: Easily distracted, impaired  Insight: Poor   Judgment: Poor    ASSESSMENT/PLAN:   Mr. Mendez Guthrie is a 68 y.o. male with history of R MCA infarct with left sided weakness, facial droop, dysarthria and confusion presents with delirium    Plan:  Delirium due to another medical condition    Discontinued Risperdal for Haldol since QTc 6/22 was 457   Avoid benzo, antihistamines, anticholinergics, minimize opiates   PT/OT   Delirium precautions; environment, orientation, sensory, avoiding restraints if possible    Recommendations:  Continue recommendations from the primary team to find possible causes of delirium    Lemuel Hall MS3  Psychiatry

## 2018-06-25 NOTE — ASSESSMENT & PLAN NOTE
Seen by cardiology   EF 15-20%, diastolic dysfunction; Pt likely also with hx underlying CAD  Recommending combination therapy - Coumadin + ASA  See above, started GDMT and monitoring for hypotension

## 2018-06-25 NOTE — PT/OT/SLP PROGRESS
Physical Therapy Treatment    Patient Name:  Mendez Guthrie   MRN:  [id number]  Admitting Diagnosis: Embolic stroke involving right middle cerebral artery  Recent Surgery: * No surgery found *      Recommendations:     Discharge Recommendations:  nursing facility, skilled   Discharge Equipment Recommendations: wheelchair   Barriers to discharge: Decreased caregiver support    Plan:     During this hospitalization, patient to be seen 4 x/week to address the above listed problems via gait training, therapeutic activities, therapeutic exercises, neuromuscular re-education  · Plan of Care Expires:  07/07/18   Plan of Care Reviewed with: patient    This Plan of care has been discussed with the patient who was involved in its development and understands and is in agreement with the identified goals and treatment plan    Subjective     Communicated with RN (Tracey) prior to session.     Patient comments: Pt non-verbal  Pain/Comfort:  · Pain Rating 1:  (Pt unable to rate)  · Location - Side 1: Left  · Location - Orientation 1: generalized  · Location 1:  (uspecified location)  · Pain Addressed 1: Reposition, Distraction  · Pain Rating Post-Intervention 1:  (Pt unable to rate)    Objective:     Patient found with: bed alarm, Condom Catheter, oxygen, peripheral IV, pulse ox (continuous), SCD, telemetry (air mattress top)    Patient found sup in bed upon PT entry to room, agreeable to treatment.  NO family present in the room.    General Precautions: Standard, Cardiac aphasia, aspiration, fall, NPO   Orthopedic Precautions:N/A   Braces: N/A       BED MOBILITY (vc's for hand placement sequencing of task):        Rolling to the R with max A with no use of bedrail       Rolling to the L with max A with no use of bedrail       Sup > sit at the EOB with total A from L side lying        Sit > sup with total A       Scooting hips to the EOB upon sitting with mod/max A x3 asymmetrical scoots       Scooting hips along the EOB to the L  requiring max A x4 scoots          SITTING AT THE EDGE OF THE BED (15 min)   Assistance Level Required: mod < > min A for trunk/head control with R UE support and L UE in weight bearing position on firm surface     Postural deviations noted: flexed trunk, flexed cervical spine, PPT, rounded shoulders, forward head, decreased attention to the L, R preferential gaze   Encouraged: upright posture, neutral pelvis, attention to the L        TRANSFERS    Sit <> Stand Transfer from EOB NP 2* Pt not appropriate 2* decreased trunk control while sitting at the EOB and weakness     THERAPEUTIC ACTIVITIES/EXERCISES  Pt receives PROM to L LE sup in bed (ankle DF, hip flex, hip add/abd, hip IR/ER)  x12 reps     Pt receives PROM to L UE sup in bed (wrist flex/ext, elbow flex/ext, shoulder flex  with scapular support to maintain gleno-humeral rhythm) x10 reps     EDUCATION  Education provided to pt regarding: postural control, weight shift, attention to the L.    They were provided and educated on proper positioning in supine and in sitting with support of affected L UE in order to increase awareness of it and to decrease the effects of immobility, specifically edema and pain.     Whiteboard updated with correct mobility information. RN/PCT notified.  Pt appropriate to sit at the EOB with therapy ONLY at this time.   Pt appropriate for OOB transfer to [facility] with RN/PCT via drawsheet    Patient left supine, with L UE propped on pillow for scapular support and edema management with all lines intact, call button in reach, bed alarm on and RN present    AM-PAC 6 CLICK MOBILITY  Turning over in bed (including adjusting bedclothes, sheets and blankets)?: 2  Sitting down on and standing up from a chair with arms (e.g., wheelchair, bedside commode, etc.): 1  Moving from lying on back to sitting on the side of the bed?: 2  Moving to and from a bed to a chair (including a wheelchair)?: 1  Need to walk in hospital room?: 1  Climbing 3-5  steps with a railing?: 1  Total Score: 8     Assessment:     Mendez Guthrie is a 68 y.o. male admitted with a medical diagnosis of Embolic stroke involving right middle cerebral artery.  He presents with the following impairments/functional limitations:  weakness, impaired endurance, impaired sensation, impaired self care skills, impaired functional mobilty, impaired balance, impaired cognition, decreased coordination, decreased upper extremity function, decreased lower extremity function, decreased safety awareness, pain, abnormal tone, decreased ROM, impaired coordination, impaired fine motor, impaired skin, edema, impaired cardiopulmonary response to activity, impaired joint extensibility. L hemiparesis requiring significant assistance and verbal cues for bed mob, scooting to/along the EOB, sitting 2* weakness, impaired balance, cognitive deficits.   In light of pt's current functional level and deficits, it is anticipated that pt will need to participate in an intense rehab program consisting of PT, OT and ST in order to achieve full rehab potential to return to previous level of function and roles.  Pt will cont to benefit from skilled PT intervention to address deficits and improve functional mobility.     Rehab Prognosis:  Fair; patient would benefit from acute skilled PT services to address these deficits and reach maximum level of function.      GOALS:    Physical Therapy Goals        Problem: Physical Therapy Goal    Goal Priority Disciplines Outcome Goal Variances Interventions   Physical Therapy Goal     PT/OT, PT Ongoing (interventions implemented as appropriate)     Description:  Goals to be met by: 2018     Patient will increase functional independence with mobility by performin. Supine to sit with Moderate Assistance  2. Sit to supine with Moderate Assistance  3. Sit to stand transfer with Maximum Assistance  4. Bed to chair transfer with Maximum Assistance  5. Gait  x 10 feet with  Maximum Assistance with or without appropriate AD   6. Sitting at edge of bed x10 minutes with Minimal Assistance  7. Lower extremity exercise program x15 reps per handout, with assistance as needed                       Time Tracking:     PT Received On: 06/25/18  PT Start Time: 1142     PT Stop Time: 1220  PT Total Time (min): 38 min     Billable Minutes: Therapeutic Activity 13, Therapeutic Exercise 10 and Neuromuscular Re-education 15    Treatment Type: Treatment  PT/PTA: PTA     PTA Visit Number: 4       Lizett Ortiz PTA.  Pager [phone]    6/25/2018    .

## 2018-06-25 NOTE — PLAN OF CARE
Problem: Physical Therapy Goal  Goal: Physical Therapy Goal  Goals to be met by: 2018     Patient will increase functional independence with mobility by performin. Supine to sit with Moderate Assistance  2. Sit to supine with Moderate Assistance  3. Sit to stand transfer with Maximum Assistance  4. Bed to chair transfer with Maximum Assistance  5. Gait  x 10 feet with Maximum Assistance with or without appropriate AD   6. Sitting at edge of bed x10 minutes with Minimal Assistance  7. Lower extremity exercise program x15 reps per handout, with assistance as needed        Discharge Recommendations: SNF    Pt appropriate to sit at the EOB with therapy ONLY at this time.   [facility]    Goals remain appropriate.     Lizett Ortiz, PTA.   [phone]   2018

## 2018-06-25 NOTE — PROGRESS NOTES
"[facility]  Psychiatry  Progress Note    Patient Name: Mendez Guthrie  MRN: [id number]   Code Status: Full Code  Admission Date: 6/7/2018  Hospital Length of Stay: 18 days  Expected Discharge Date: 6/25/2018  Attending Physician: Jaylan Morataya MD  Primary Care Provider: Valeria Mayen MD    Current Legal Status: N/A    Patient information was obtained from patient.     Subjective:     Principal Problem:Embolic stroke involving right middle cerebral artery    Chief Complaint: Delirium     HPI:   Consultation-Liaison Psychiatry Consult Note      Chief Complaint / Reason for Consult:     delirium     Subjective:     History of Present Illness:   Mendez Guthrie is a 68 y.o. male with a history of R MCA infarct with left sided weakness, facial droop, dysarthria and confusion. Right sided movement is minimal. Etiology likely atheroembolic vs cardioembolic due to EF of 15%. Hospital medicine following; sepsis, hypotension, hyponatremia now resolved, medicine de-escalated abx regimen. No longer likely that pt will require ICU step up.  Psychiatry was consulted for recommendations for management of agitated delirium.  Per chart review, patient received Risperdal 1 mg overnight for agitation.  Wife reports patient has been in soft restraints on his arm for 3 days after he removed his PEG tube.    Patient was seen this morning with wife at bedside.  Patient was unable to answer any questions and only repeated the word "zoom. Zzzzooooommm. Zooooooooooooom."  Patient unable to follow commands or complete CAM-ICU screen.  Wife states that patient has been able to articulate her names and the names of some of their children.  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  Wife states she has not been told by the medical team what her 's new baseline functioning will be.  Wife states that patient did well overnight " after he received his pain medication.  Was taking tramadol at home for arthritis related pain.    Medical Review Of Systems:  Pertinent items are noted in HPI.    Psychiatric Review Of Systems - Is patient experiencing or having changes in:  sleep: yes  appetite: yes  weight: yes  energy/anergy: yes  interest/pleasure/anhedonia: no  somatic symptoms: no  libido: no  anxiety/panic: yes  guilty/hopelessness: no  concentration: yes  S.I.B.s/risky behavior: no  any drugs: no  alcohol: no     Past Psychiatric History:  Previous Medication Trials: no   Previous Psychiatric Hospitalizations: no   Previous Suicide Attempts: no   History of Violence: no  Outpatient Psychiatrist: no    Social History:  Marital Status:   Children: 8   Employment Status/Info: retired, formerly a    Education: 10th grade  Special Ed: no  Housing Status: with wife in [place]   History of phys/sexual abuse: no  Access to gun: no    Substance Abuse History:  Recreational Drugs: denied  Use of Alcohol: denied  Rehab History:no   Tobacco Use:no  Use of Caffeine: denies use  Use of OTC: denied  Legal consequences of chemical use: no    Legal History:  Past Charges/Incarcerations:no   Pending charges:no     Family Psychiatric History:   denied    Hospital Course: 06/23/2018 - [facility]. Slept all night, no issues reported by family, no PRNs for agitation past 24h. Mental status mostly unchanged.    6/25/2018  Patient seen with family at bedside.  Patient remains unable to answer questions or follow commands.  Family denies agitation and reports he is sleeping well.     Interval History: see hospital course     Family History     Problem Relation (Age of Onset)    Cancer Father    Diabetes Father, Mother, Brother, Son    No Known Problems Brother, Sister, Brother, Daughter, Daughter, Daughter, Daughter, Son, Son, Son    Pneumonia Sister, Sister        Social History Main Topics    Smoking status: Former Smoker     Packs/day: 1.00     " Years: 39.00     Types: Cigarettes     Start date: 1/23/1972     Quit date: 5/23/2011    Smokeless tobacco: Never Used    Alcohol use 0.0 oz/week      Comment: occasionally    Drug use: No    Sexual activity: Not Currently     Psychotherapeutics     Start     Stop Route Frequency Ordered    06/25/18 2100  haloperidol 2 mg/mL solution 1 mg      -- PER G TUBE Nightly 06/25/18 0819    06/25/18 0921  haloperidol lactate injection 2 mg      -- IM Every 6 hours PRN 06/25/18 0821    06/23/18 0900  citalopram tablet 10 mg      -- PER G TUBE Daily 06/22/18 1509           Review of Systems  Objective:     Vital Signs (Most Recent):  Temp: 98.8 °F (37.1 °C) (06/25/18 0819)  Pulse: 103 (06/25/18 0823)  Resp: 18 (06/25/18 0819)  BP: 120/67 (06/25/18 0819)  SpO2: 96 % (06/25/18 0819) Vital Signs (24h Range):  Temp:  [96.1 °F (35.6 °C)-99.3 °F (37.4 °C)] 98.8 °F (37.1 °C)  Pulse:  [] 103  Resp:  [16-18] 18  SpO2:  [90 %-100 %] 96 %  BP: (120-134)/(56-76) 120/67     Height: 5' 8" (172.7 cm)  Weight: 101.2 kg (223 lb 1.7 oz)  Body mass index is 33.92 kg/m².      Intake/Output Summary (Last 24 hours) at 06/25/18 0938  Last data filed at 06/24/18 2200   Gross per 24 hour   Intake                0 ml   Output             1675 ml   Net            -1675 ml       Physical Exam    Mental Status Exam:  Appearance: older than stated age, disheveled, lying in bed   Behavior: Calm, resting in bed, eye contact minimal, unable to follow commands   Speech/Language: articulation error, nonfluent, nonsensical   Mood: Unable to assess   Affect: Unable to assess   Thought Process:  poverty of thought   Thought Content: poverty of content   Orientation: disoriented to self, place, sitation, date   Cognition: easily distracted, impaired   Insight: poor   Judgment: poor       Significant Labs:   Last 24 Hours:   Recent Lab Results       06/25/18  0520 06/24/18  2045 06/24/18  1556      Immature Granulocytes 0.4       Immature Grans (Abs) " 0.03  Comment:  Mild elevation in immature granulocytes is non specific and   can be seen in a variety of conditions including stress response,   acute inflammation, trauma and pregnancy. Correlation with other   laboratory and clinical findings is essential.         Albumin 2.5(L)       Alkaline Phosphatase 84       ALT 22       Anion Gap 8       AST 27       Baso # 0.04       Basophil% 0.5       Total Bilirubin 0.2  Comment:  For infants and newborns, interpretation of results should be based  on gestational age, weight and in agreement with clinical  observations.  Premature Infant recommended reference ranges:  Up to 24 hours.............<8.0 mg/dL  Up to 48 hours............<12.0 mg/dL  3-5 days..................<15.0 mg/dL  6-29 days.................<15.0 mg/dL         BUN, Bld 16       Calcium 10.0       Chloride 95       CO2 32(H)       Creatinine 0.8       Differential Method Automated       eGFR if  >60.0       eGFR if non  >60.0  Comment:  Calculation used to obtain the estimated glomerular filtration  rate (eGFR) is the CKD-EPI equation.          Eos # 0.1       Eosinophil% 1.9       Glucose 246(H)       Gran # (ANC) 5.2       Gran% 72.1       Hematocrit 33.8(L)       Hemoglobin 10.8(L)       Lymph # 1.1       Lymph% 15.5(L)       MCH 30.0       MCHC 32.0       MCV 94       Mono # 0.7       Mono% 9.6       MPV 9.0(L)       nRBC 0       Platelets 416(H)       POCT Glucose  227(H) 231(H)     Potassium 4.1       Total Protein 6.8       RBC 3.60(L)       RDW 12.5       Sodium 135(L)       WBC 7.28             Significant Imaging: None    Assessment/Plan:     Delirium due to another medical condition    · Recommend discontinuing scheduled Haldol due to recent change in EKG, QTc 492 6/25. May use Risperdal 1 mg qhs + Haldol 2 mg PO/IM q6h PRN non redirectable agitation. Monitor for QTc prolongation and adverse reactions.  QTc 6/22 was 457.  Continue to monitor EKG.  · Avoid  benzodiazepines, antihistamines, anticholinergics, and minimize opiate use as these may worsen delirium.  · Recommend consult to PT/OT. Early mobility and exercise has been shown to decrease duration of delirium. Provide appropriate lighting and clear signage; a clock and calendar should be easily visible to the patient.  · Monitor environmental factors. Reduce light and noise at night (close shades, turn off lights, turn off TV, ect). Correct any alterations in sleep cycle.  · Reorient the patient to person, place, time and situation on each encounter.   · Correct sensory deficits if possible (replace eye glasses, hearing aids, ect).  · Avoid restraints if possible. Severely delirious patients benefit from constant observation by a sitter.  · Do not leave patient unattended.  · Continue medical workup to identify and correct possible medical causes of delirium.              Need for Continued Hospitalization:   No need for inpatient psychiatric hospitalization. Continue medical care as per the primary team.    Anticipated Disposition: Still a Patient     Total time:  15 with greater than 50% of this time spent in counseling and/or coordination of care.       Cele Rocha MD   Psychiatry  [facility]

## 2018-06-25 NOTE — ASSESSMENT & PLAN NOTE
EF 15-20% - seen on echo completed 6/7  Cardiac history of CABG in 2016  Cardiology consulted - recommending coumadin and asa    For GDMT at home, pt was on coreg and losartan, however due to hypotension, started metoprolol and lowest dose of losartan. Will monitor BP

## 2018-06-25 NOTE — ASSESSMENT & PLAN NOTE
Pt agitated at night per wife; Pulled out PEG tube x 2  Psych consulted - Appreciate recs  Adjusted med regimen over weekend, Daily EKGs to monitor QT interval  Pt remains off restraints

## 2018-06-25 NOTE — PROGRESS NOTES
[facility]  Vascular Neurology  Comprehensive Stroke Center  Progress Note    Assessment/Plan:     * Embolic stroke involving right middle cerebral artery    69 y/o male with R MCA infarct with left sided weakness, facial droop, dysarthria and confusion. Right sided movement is minimal. Etiology likely atheroembolic vs cardioembolic due to EF of 15%. EEG completed and no seizure activity noted. Pt started on Modafinil 6/13; wakefulness now improved.    Pt pulled out PEG 6/17 & 6/18 evening; PEG replacement 6/19.   Psych following for acute delirium, agitation; adjusted regimen 6/25, daily EKGs. Pt now off restraints. Dc'd Modafinil 6/19.    D/c eliquis, start coumadin 6/25 due to cost barriers.      Antithrombotics: Coumadin 5mg Daily and ASA 81  Statins: Crestor 40 mg daily  Aggressive risk factor modification: HTN, DM, HLD, Diet, Exercise, Obesity, CAD  Rehab efforts: PT/OT/SLP to evaluate and treat  Dispo: SNF  Diagnostics ordered/pending: NA  VTE prophylaxis:  SCDs + Coumadin  BP parameters: Infarct: Avoid hypotension        Delirium due to another medical condition    Pt agitated at night per wife; Pulled out PEG tube x 2  Psych consulted - Appreciate recs  Adjusted med regimen over weekend, Daily EKGs to monitor QT interval  Pt remains off restraints        DVT of axillary vein, acute left    Evidence on US 6/9  Heparin gtt initiated 6/9; Initially transitioned to Eliquis, however change to Coumadin 6/25 due to cost barriers        Cytotoxic cerebral edema    Large area of cytotoxic cerebral edema identified when reviewing brain imaging in the territory of the R middle cerebral artery. There is not mass effect associated with it. We will continue to monitor the patients clinical exam for any worsening of symptoms which may indicate expansion of the stroke or the area of the edema resulting in the clinical change. The pattern is suggestive of atheroembolic etiology.        Left spastic  hemiparesis    Due to stroke  Aggressive therapy        Type 2 diabetes mellitus without complication, with long-term current use of insulin    Stroke risk factor  HgB A1C 9.4  Increased Detemir to 28U BID and aspart to 10U q6h  Glucose in 200s  Pt to remain on Diabetisource for TFs, per Medicine co-management        Chronic systolic (congestive) heart failure    Seen by cardiology   EF 15-20%, diastolic dysfunction; Pt likely also with hx underlying CAD  Recommending combination therapy - Coumadin + ASA  See above, started GDMT and monitoring for hypotension        Decreased cardiac ejection fraction    EF 15-20% - seen on echo completed 6/7  Cardiac history of CABG in 2016  Cardiology consulted - recommending coumadin and asa    For GDMT at home, pt was on coreg and losartan, however due to hypotension, started metoprolol and lowest dose of losartan. Will monitor BP         Essential hypertension    Stroke risk factor  SBP < 160  Home meds - Coreg and Losartan; Acutely held due to hypotension  Restarted Metoprolol and low-dose Losartan alternatively; Continuing to monitor  (Allergy to lisinopril and Norvasc)        Abnormal ventricular wall motion    Stroke risk factor  Akinetic apex, depressed EF on echo  Coumadin, ASA per cards        Dysarthria    Result of stroke  Aggressive SLP         GENIE on CPAP    Stroke risk factor  Holding CPAP acutely due to risk aspiration, encephalopathy, pt nonverbal        Coronary artery disease involving native coronary artery of native heart without angina pectoris    Stoke risk factor  Coumadin, ASA per Cards  Needs referral for LHC at discharge        COPD (chronic obstructive pulmonary disease)    Stroke risk factor  Duonebs Q6 due to pt not being able to follow commands and RT not being able to administer inhalers   Currently maintaining O2 sats in the >90% on 2L NC  Pulmonary consulted, Signed off - f/u outpatient         History of lung cancer    Stroke risk factor  CT  Chest this admission concerning  Pulmonology feels pt too ill for acute intervention; Signed off  Plan for outpatient follow up with pulmonologist Dr Saunders ([facility]) for further workup of CT findings when acute illness has improved             69 y/o male wth R MCA infarct. Had been at [facility] since 6-4-18 for AMS and weakness that improved and then worsened now with facial droop and left sided weakness. 6-6-18 fever so infectious w/u in process could be PNA as he was coughing with puree food at [facility]. He was placed on ABX Amp/Vanc/valcyclovir for possible meningitis this is stopped as he does not have meningitis. He moved around too much and was clausterphobic for MRI so will attempt MRI here.   6/8 - Urine studies ordered for hyponatremia. EEG results pending. Bilateral upper/lower extremities US ordered for fever. Procal WNL. Infectious workup negative so far. Cardiology consulted for EF 15-20%.   6-9-18 will give lasix 40 mg NG today as per cardiology recs. Discussion with family regarding anticoagulation due to low EF and DVT left axillary vein and brachial vein, discussion regarding feeding as well and likely bernard of patient swallowing is minimal so will need PEG next week as opposed to waiting and family is in agreement so will start heparin drip with no bolus parameters  6-10-18 once EEG read and no seizure activity may start Modafinil to help with wakefulness  6/11 - EEG to be read today and consider Modafinil. IR consulted for PEG placement tomorrow. Hospital medicine ordering urine studies for hyponatremia.   6/12 - PEG to be placed today. Heparin gtt currently held. Will resume once PEG is placed. AC to start tomorrow once PEG is able to be used. Hyponatremia slightly improving. Continue to hold fluids due to hyponatremia thought to be due to SIADH. Modafinil to be started tomorrow when PEG can be used.  6/13 - PEG placed yesterday. Heparin gtt dc'ed and eliquis started. Modafinil started today.  Continuing to restrict fluids (enteral water boluses) due to SIADH/hyponatremia and holding off on lasix that cardiology recommended.  6/14/18 - concern for sepsis today - medicine consulted - hypotensive, febrile, procal elevated.   6/15: Hypotensive to 88/36 overnight; good response to 250cc bolus. Hospital medicine adjusting insulin and antibiotics regimen. Wakefulness much improved since starting Modafinil. Mild bleeding noted at NC site.  6/16: Pt hypotensive, requiring another bolus yesterday; BP so far stable since. Modafinil held today as family felt made pt more aggressive; plan for decreased dose tomorrow. Added Lactulose PRN to bowel regimen.  6/17: Pt clinically improved from infection/respiratory perspective; medicine de-escalated abx regimen. No further hypotension episodes. Reduced-dose Modafinil today; will monitor response.  6/18: Pt pulled out PEG yesterday evening; ford placed to maintain patency. Attempted to place NGT but pt agitated, family refused. PO meds held for now, including Eliquis (per IR request). D/c'd scheduled suppository, pt on Senna.  6/19 - pulled ford from peg site last night - see note. Replaced in IR today. Patient in wrist restraint with abdominal binder. Family at bedside, educated on importance of protection of peg site. Dc modafinil   6/20 - Meds and TF resumed through PEG tube. Restraint still applied to right arm and abdominal binder applied. Unasyn completed.   6/21 - Patient continues to be agitated at times. Right arm restraint continues so patient will not pull out PEG. Psych consulted. They will complete full consult tomorrow. Suggested risperidone 1 mg qhs and decrease narcotic use.   6/22 - Psych to complete consult today. Appreciate recs. Sodium 132, enteral water bolus decreased to 800 mg four times daily. Continue to monitor. Goal is to dc restraint today after psych makes recommendations.  6/23 - agitation improved, hyponatremia noted on labs but normal when  corrected for glucose. It was noted that cardiology recommended aspirin and anticoagulation. Discussed with Dr. Gerardo and started patient on asa 81  6/24 - family reports patient had LLE pain, US LE pending, increased insulin, may need nutrition consult for change in tube feed formula if hyperglycemia persists  6/25: Psych adjusted med regimen. Pt to remain on Diabetisource, increased detemir. D/c eliquis, start coumadin for cost barriers. US LE negative.    STROKE DOCUMENTATION        NIH Scale:  1a. Level Of Consciousness: 1-->Not alert: but arousable by minor stimulation to obey, answer, or respond  1b. LOC Questions: 2-->Answers neither question correctly  1c. LOC Commands: 2-->Performs neither task correctly  2. Best Gaze: 1-->Partial gaze palsy: gaze is abnormal in one or both eyes, but forced deviation or total gaze paresis is not present  3. Visual: 2-->Complete hemianopia  4. Facial Palsy: 2-->Partial paralysis (total or near-total paralysis of lower face)  5a. Motor Arm, Left: 4-->No movement  5b. Motor Arm, Right: 2-->Some effort against gravity: limb cannot get to or maintain (if cued) 90 (or 45) degrees, drifts down to bed, but has some effort against gravity  6a. Motor Leg, Left: 4-->No movement  6b. Motor Leg, Right: 3-->No effort against gravity: leg falls to bed immediately  7. Limb Ataxia: 0-->Absent  8. Sensory: 0-->Normal: no sensory loss  9. Best Language: 2-->Severe aphasia: all communication is through fragmentary expression: great need for inference, questioning, and guessing by the listener. Range of information that can be exchanged is limited: listener carries burden of. . . (see row details)  10. Dysarthria: 2-->Severe dysarthria: patients speech is so slurred as to be unintelligible in the absence of or out of proportion to any dysphasia, or is mute/anarthric  11. Extinction and Inattention (formerly Neglect): 0-->No abnormality  Total (NIH Stroke Scale): 27       Modified [place] Score:  0  [place] Coma Scale:    ABCD2 Score:    [id number] Score:   HAS -BLED Score:   ICH Score:   Hunt & De Paz Classification:      Hemorrhagic change of an Ischemic Stroke: Does this patient have an ischemic stroke with hemorrhagic changes? No     Neurologic Chief Complaint: R MCA infarct     Subjective:     Interval History: Patient is seen for follow-up neurological assessment and treatment recommendations:   CARMENON. Psych adjusted med regimen. Pt to remain on Diabetisource, increased detemir. D/c eliquis, start coumadin for cost barriers. US LE negative.    HPI, Past Medical, Family, and Social History remains the same as documented in the initial encounter.     Review of Systems   Constitutional: Negative for diaphoresis and fever.   HENT: Positive for trouble swallowing. Negative for nosebleeds.    Neurological: Positive for facial asymmetry, speech difficulty and weakness.   Psychiatric/Behavioral: Positive for confusion. Negative for agitation.     Scheduled Meds:   albuterol-ipratropium  3 mL Nebulization Q6H    aspirin  81 mg Oral Daily    citalopram  10 mg Per G Tube Daily    haloperidol  1 mg Per G Tube QHS    insulin aspart U-100  10 Units Subcutaneous Q6H    insulin detemir U-100  28 Units Subcutaneous BID    losartan  12.5 mg Per G Tube Daily    metoprolol tartrate  25 mg Per G Tube BID    rosuvastatin  40 mg Per G Tube QHS    senna-docusate 8.6-50 mg  2 tablet Per G Tube BID    sodium chloride 0.9%  3 mL Intravenous Q8H     Continuous Infusions:    PRN Meds:acetaminophen, dextrose 50%, glucagon (human recombinant), haloperidol lactate, HYDROcodone-acetaminophen, insulin aspart U-100, labetalol, lactulose, miconazole NITRATE 2 %, ondansetron    Objective:     Vital Signs (Most Recent):  Temp: 98.8 °F (37.1 °C) (06/25/18 0819)  Pulse: 100 (06/25/18 1230)  Resp: 18 (06/25/18 1230)  BP: 120/67 (06/25/18 0819)  SpO2: 98 % (06/25/18 1230)  BP Location: Left arm    Vital Signs Range (Last  24H):  Temp:  [96.1 °F (35.6 °C)-99.3 °F (37.4 °C)]   Pulse:  []   Resp:  [16-18]   BP: (120-134)/(67-76)   SpO2:  [90 %-100 %]   BP Location: Left arm    Physical Exam   Constitutional: He appears well-developed and well-nourished. No distress.   HENT:   Head: Normocephalic and atraumatic.   Eyes: Conjunctivae are normal. No scleral icterus.   Cardiovascular: Normal rate.    Pulmonary/Chest: Effort normal. No respiratory distress.   Musculoskeletal: He exhibits no edema or tenderness.   Neurological: He is alert. A cranial nerve deficit is present. No sensory deficit.   Skin: Skin is warm and dry.   Psychiatric: His affect is blunt. He is noncommunicative.   Vitals reviewed.      Neurological Exam:   LOC: Alert  Attention Span: Poor  Language: Global aphasia  Articulation: Dysarthria  Orientation: Untestable due to severe aphasia   Facial Movement (CN VII): Lower facial weakness on the Left  Vision: L hemianopsia  Gaze: Mild R gaze preference  Motor: Arm left  0/5  Leg left  Paresis: 0/5  Arm right  Normal 4/5  Leg right Paresis: 3/5  Sensation: Intact to light touch, temperature      Laboratory:  CMP:     Recent Labs  Lab 06/25/18  0520   CALCIUM 10.0   ALBUMIN 2.5*   PROT 6.8   *   K 4.1   CO2 32*   CL 95   BUN 16   CREATININE 0.8   ALKPHOS 84   ALT 22   AST 27   BILITOT 0.2     BMP:     Recent Labs  Lab 06/25/18  0520   *   K 4.1   CL 95   CO2 32*   BUN 16   CREATININE 0.8   CALCIUM 10.0     CBC:     Recent Labs  Lab 06/25/18  0520   WBC 7.28   RBC 3.60*   HGB 10.8*   HCT 33.8*   *   MCV 94   MCH 30.0   MCHC 32.0     Lipid Panel:   No results for input(s): CHOL, LDLCALC, HDL, TRIG in the last 168 hours.  Coagulation:   No results for input(s): PT, INR, APTT in the last 168 hours.  Platelet Aggregation Study: No results for input(s): PLTAGG, PLTAGINTERP, PLTAGREGLACO, ADPPLTAGGREG in the last 168 hours.  Hgb A1C:   No results for input(s): HGBA1C in the last 168 hours.  TSH:   No results  for input(s): TSH in the last 168 hours.      Diagnostic Results     Brain Imaging   CT 6/6   Large right MCA distribution infarct measuring approximately 7 x 3 cm including the right perisylvian region and extending to the frontal and parietal operculum with associated localized mass effect.    Vessel Imaging   US BUE 6/9/18  Deep venous thrombus in the left axillary vein with additional thrombus in the proximal segment of the branchial vein.  No evidence of DVT in the right upper extremity.    CTA 6/8  CTA head: Occlusion right M2 segment of the MCA within the proximal sylvian fissure..  Heavy atherosclerotic plaquing of the cavernous and supraclinoid ICAs with mild moderate right and mild left cavernous ICA stenosis.  There is diffuse small caliber right M1 segment of the MCA.  Irregularity and narrowing of the right distal vertebral artery concerning for atherosclerotic disease with mild moderate stenosis.  CTA neck: Atherosclerotic plaquing of the carotid bifurcations and proximal ICAs with less than 50% proximal ICA stenosis by NASCET criteria.  CT head: Evolving large right MCA distribution infarction.  Localized mass effect without significant midline shift or hydrocephalus.  No evidence for hemorrhagic conversion.  Clinical correlation and follow-up advised.        Cardiac Imaging   Echo 6/7  CONCLUSIONS     1 - Technically difficult study.     2 - Severely depressed left ventricular systolic function (EF 15-20%).     3 - Mildly to moderately depressed right ventricular systolic function .     4 - Impaired LV relaxation, normal LAP (grade 1 diastolic dysfunction).     5 - Mild aortic stenosis, WIL = 1.45 cm2, AVAi = 0.67 cm2/m2, peak velocity = 2.8 m/s, mean gradient = 18 mmHg.     6 - Mild mitral regurgitation.       Other Imaging  CT chest 6/14/18  1. Recent development consolidation in right middle and lower lobes consistent with pneumonia or aspiration.  Endobronchial material in right lower lobe  airways with intermittent obstruction, could represent retained secretions or aspirated material.  Correlate clinically.  Recommend noncontrast chest CT follow-up in 3 months.  2. Chronic right upper lobe consolidation and volume loss consistent with radiation fibrosis in patient with history of previous lung cancer.  3. Chronic loculation of pleural fluid in the right sulcus is stable.  4. A few subcentimeter pulmonary nodules, stable and likely benign  5. Aortic and coronary atherosclerosis, status post CABG  6. Gastrostomy tube  7. Other findings as above      Negrita Whitaker PA-C  [facility]  Department of Vascular Neurology   [facility]

## 2018-06-25 NOTE — ASSESSMENT & PLAN NOTE
69 y/o male with R MCA infarct with left sided weakness, facial droop, dysarthria and confusion. Right sided movement is minimal. Etiology likely atheroembolic vs cardioembolic due to EF of 15%. EEG completed and no seizure activity noted. Pt started on Modafinil 6/13; wakefulness now improved.    Pt pulled out PEG 6/17 & 6/18 evening; PEG replacement 6/19.   Psych following for acute delirium, agitation; adjusted regimen 6/25, daily EKGs. Pt now off restraints. Dc'd Modafinil 6/19.    D/c eliquis, start coumadin 6/25 due to cost barriers.      Antithrombotics: Coumadin 5mg Daily and ASA 81  Statins: Crestor 40 mg daily  Aggressive risk factor modification: HTN, DM, HLD, Diet, Exercise, Obesity, CAD  Rehab efforts: PT/OT/SLP to evaluate and treat  Dispo: SNF  Diagnostics ordered/pending: NA  VTE prophylaxis:  SCDs + Coumadin  BP parameters: Infarct: Avoid hypotension

## 2018-06-25 NOTE — ASSESSMENT & PLAN NOTE
· Recommend discontinuing scheduled Haldol. May use Haldol 2 mg PO/IM q6h PRN non redirectable agitation. Monitor for QTc prolongation and adverse reactions.  QTc 6/22 was 457.  · Avoid benzodiazepines, antihistamines, anticholinergics, and minimize opiate use as these may worsen delirium.  · Recommend consult to PT/OT. Early mobility and exercise has been shown to decrease duration of delirium. Provide appropriate lighting and clear signage; a clock and calendar should be easily visible to the patient.  · Monitor environmental factors. Reduce light and noise at night (close shades, turn off lights, turn off TV, ect). Correct any alterations in sleep cycle.  · Reorient the patient to person, place, time and situation on each encounter.   · Correct sensory deficits if possible (replace eye glasses, hearing aids, ect).  · Avoid restraints if possible. Severely delirious patients benefit from constant observation by a sitter.  · Do not leave patient unattended.  · Continue medical workup to identify and correct possible medical causes of delirium.

## 2018-06-25 NOTE — ASSESSMENT & PLAN NOTE
Evidence on US 6/9  Heparin gtt initiated 6/9; Initially transitioned to Eliquis, however change to Coumadin 6/25 due to cost barriers

## 2018-06-25 NOTE — ASSESSMENT & PLAN NOTE
Stroke risk factor  HgB A1C 9.4  Increased Detemir to 28U BID and aspart to 10U q6h  Glucose in 200s  Pt to remain on Diabetisource for TFs, per Medicine co-management

## 2018-06-25 NOTE — ASSESSMENT & PLAN NOTE
Pt agitated at night per wife; Pulled out PEG tube x 2  Psych consulted - appreciate recs  Patient remains off restraints

## 2018-06-25 NOTE — MEDICAL/APP STUDENT
"Subjective:       Patient ID: Mendez Guthrie is a 68 y.o. male.    Chief Complaint: No chief complaint on file.    Mendez Guthrie is a 68 y.o. Male admit for of R MCA infarct with left sided weakness, facial droop, dysarthria and confusion.  Vascular neurology following, Psychiatry consulted for management of delirium. Patient restrained due to pulling on objects and people, removing the peg.    Pt. Appeared in bed with family at bedside.  Vascular neurology appreciating recommendations for medication management of delirium/agitation, context being the grabbing and pulling.  Patient unable to follow commands and does not respond to his name.  Patient does fixate gaze and track objects with eyes.  Patient still exhibits reaching and grasping behavior.  Family indicates that he is "doing better..pointing at objects in the room."      Review of Systems   Unable to perform ROS: Patient unresponsive       Objective:      Physical Exam   Constitutional: He appears well-developed and well-nourished.   Neurological: He is unresponsive. GCS eye subscore is 4. GCS verbal subscore is 3. GCS motor subscore is 5.   Psychiatric: He is slowed. Cognition and memory are impaired. He expresses impulsivity. He is noncommunicative.   Patient appeared in bed with altered mental status, unresponsive to verbal communication.  Patient immediately fixated and tracking on my face, at times smiling.  Did not respond to verbal commands or the voices from anyone else in the room.  Will mimic hand gestures and sometimes responds with "yes."  Exhibits reaching and grasping behavior with right arm.  Pt is unrestrained. He is inattentive.   Nursing note and vitals reviewed.      Assessment:       1. Stroke    2. Acute ischemic left MCA stroke    3. Embolic stroke involving right middle cerebral artery    4. Coronary artery disease involving coronary bypass graft of native heart without angina pectoris    5. Essential hypertension    6. Left " spastic hemiparesis    7. Type 2 diabetes mellitus with hyperglycemia, with long-term current use of insulin    8. Cerebrovascular accident (CVA), unspecified mechanism    9. DVT of axillary vein, acute left    10. Dysarthria    11. Fever of unknown origin    12. Hyponatremia    13. Abnormal chest CT    14. Chronic obstructive pulmonary disease, unspecified COPD type    15. Decreased cardiac ejection fraction    16. History of lung cancer    17. Aspiration pneumonia of right lower lobe, unspecified aspiration pneumonia type    18. Sepsis, due to unspecified organism    19. Acute on chronic respiratory failure with hypoxia    20. MEGGAN (acute kidney injury)    21. GENIE on CPAP    22. Coronary artery disease involving native coronary artery of native heart without angina pectoris    23. Type 2 diabetes mellitus without complication, with long-term current use of insulin    24. Chronic systolic (congestive) heart failure    25. HTN (hypertension)    26. Acute encephalopathy    27. Delirium due to another medical condition    28. QT prolongation        Plan:        Delirium due to another medical condition     · Haldol 1 mg PO qhs and Haldol 2 mg PO/IM q6h PRN: non redirectable agitation or interactions with hallucinations  · Avoid benzodiazepines, antihistamines, anticholinergics, and minimize opiate use as these may worsen delirium.  · Continue medical workup to identify and correct possible medical causes of delirium.

## 2018-06-26 PROBLEM — F32.9 REACTIVE DEPRESSION: Status: ACTIVE | Noted: 2018-01-01

## 2018-06-26 PROBLEM — F05 DELIRIUM DUE TO ANOTHER MEDICAL CONDITION: Status: ACTIVE | Noted: 2018-01-01

## 2018-06-26 NOTE — PLAN OF CARE
ZOIE sent discharge orders to Steffi with [facility] for review.     ZOIE will follow up.    Estela Cagle LMSW  [facility]  Ext. [phone]

## 2018-06-26 NOTE — ASSESSMENT & PLAN NOTE
· Recommend discontinuing scheduled Haldol. May use Risperdal 1 mg qhs and Haldol 2 mg PO/IM q6h PRN non redirectable agitation. Monitor for QTc prolongation and adverse reactions.  QTc 6/22 was 457. Otc 6/26 485.  · Avoid benzodiazepines, antihistamines, anticholinergics, and minimize opiate use as these may worsen delirium.  · Recommend consult to PT/OT. Early mobility and exercise has been shown to decrease duration of delirium. Provide appropriate lighting and clear signage; a clock and calendar should be easily visible to the patient.  · Monitor environmental factors. Reduce light and noise at night (close shades, turn off lights, turn off TV, ect). Correct any alterations in sleep cycle.  · Reorient the patient to person, place, time and situation on each encounter.   · Correct sensory deficits if possible (replace eye glasses, hearing aids, ect).  · Avoid restraints if possible. Severely delirious patients benefit from constant observation by a sitter.  · Do not leave patient unattended.  · Continue medical workup to identify and correct possible medical causes of delirium.

## 2018-06-26 NOTE — PLAN OF CARE
RN can call report to the [facility] to [place] at [phone].    Sw arranged transport with [facility] for pickup within the hour.     JUAN JOSÉ parsons.     Estela Cagle LMSW  [facility]  Ext. [phone]

## 2018-06-26 NOTE — SUBJECTIVE & OBJECTIVE
Neurologic Chief Complaint: R MCA infarct     Subjective:     Interval History: Patient is seen for follow-up neurological assessment and treatment recommendations:   NAEON. INR 1.0; Lovenox bridge to Coumadin to continue at SNF. Increased Detemir dosing. Corrected Na WNL. D/c to SNF today.    HPI, Past Medical, Family, and Social History remains the same as documented in the initial encounter.     Review of Systems   Constitutional: Negative for diaphoresis and fever.   HENT: Positive for trouble swallowing. Negative for nosebleeds.    Neurological: Positive for facial asymmetry, speech difficulty and weakness.   Psychiatric/Behavioral: Positive for confusion. Negative for agitation.     Scheduled Meds:   albuterol-ipratropium  3 mL Nebulization Q6H    aspirin  81 mg Per G Tube Daily    citalopram  10 mg Per G Tube Daily    enoxaparin  1 mg/kg Subcutaneous Q12H    insulin aspart U-100  10 Units Subcutaneous Q6H    insulin detemir U-100  32 Units Subcutaneous BID    losartan  12.5 mg Per G Tube Daily    metoprolol tartrate  25 mg Per G Tube BID    risperidone 1 mg/ml  1 mg Per G Tube QHS    rosuvastatin  40 mg Per G Tube QHS    senna-docusate 8.6-50 mg  2 tablet Per G Tube BID    sodium chloride 0.9%  3 mL Intravenous Q8H    warfarin  7.5 mg Per G Tube Daily     Continuous Infusions:    PRN Meds:acetaminophen, dextrose 50%, glucagon (human recombinant), haloperidol lactate, HYDROcodone-acetaminophen, insulin aspart U-100, labetalol, lactulose, miconazole NITRATE 2 %, ondansetron    Objective:     Vital Signs (Most Recent):  Temp: 98.8 °F (37.1 °C) (06/26/18 1137)  Pulse: 87 (06/26/18 1256)  Resp: 17 (06/26/18 1256)  BP: (!) 108/59 (06/26/18 1137)  SpO2: 95 % (06/26/18 1256)  BP Location: Right arm    Vital Signs Range (Last 24H):  Temp:  [98.6 °F (37 °C)-98.8 °F (37.1 °C)]   Pulse:  []   Resp:  [16-19]   BP: (101-126)/(54-73)   SpO2:  [93 %-100 %]   BP Location: Right arm    Physical Exam    Constitutional: He appears well-developed and well-nourished. No distress.   HENT:   Head: Normocephalic and atraumatic.   Eyes: Conjunctivae are normal. No scleral icterus.   Cardiovascular: Normal rate.    Pulmonary/Chest: Effort normal. No respiratory distress.   Musculoskeletal: He exhibits no edema or tenderness.   Neurological: He is alert. A cranial nerve deficit is present. No sensory deficit.   Skin: Skin is warm and dry.   Psychiatric: His affect is blunt. He is noncommunicative.   Vitals reviewed.      Neurological Exam:   LOC: Alert  Attention Span: Poor  Language: Global aphasia  Articulation: Dysarthria  Orientation: Untestable due to severe aphasia   Facial Movement (CN VII): Lower facial weakness on the Left  Vision: L hemianopsia  Gaze: Mild R gaze preference  Motor: Arm left  0/5  Leg left  Paresis: 0/5  Arm right  Normal 4/5  Leg right Paresis: 3/5  Sensation: Intact to light touch, temperature      Laboratory:  CMP:     Recent Labs  Lab 06/26/18  0509   CALCIUM 9.9   ALBUMIN 2.4*   PROT 6.7   *   K 4.1   CO2 33*   CL 93*   BUN 17   CREATININE 0.7   ALKPHOS 80   ALT 20   AST 24   BILITOT 0.2     BMP:     Recent Labs  Lab 06/26/18  0509   *   K 4.1   CL 93*   CO2 33*   BUN 17   CREATININE 0.7   CALCIUM 9.9     CBC:     Recent Labs  Lab 06/26/18  0509   WBC 7.61   RBC 3.71*   HGB 11.0*   HCT 34.9*   *   MCV 94   MCH 29.6   MCHC 31.5*     Lipid Panel:   No results for input(s): CHOL, LDLCALC, HDL, TRIG in the last 168 hours.  Coagulation:     Recent Labs  Lab 06/26/18  0509   INR 1.0     Platelet Aggregation Study: No results for input(s): PLTAGG, PLTAGINTERP, PLTAGREGLACO, ADPPLTAGGREG in the last 168 hours.  Hgb A1C:   No results for input(s): HGBA1C in the last 168 hours.  TSH:   No results for input(s): TSH in the last 168 hours.      Diagnostic Results     Brain Imaging   CT 6/6   Large right MCA distribution infarct measuring approximately 7 x 3 cm including the right  perisylvian region and extending to the frontal and parietal operculum with associated localized mass effect.    Vessel Imaging   US BUE 6/9/18  Deep venous thrombus in the left axillary vein with additional thrombus in the proximal segment of the branchial vein.  No evidence of DVT in the right upper extremity.    CTA 6/8  CTA head: Occlusion right M2 segment of the MCA within the proximal sylvian fissure..  Heavy atherosclerotic plaquing of the cavernous and supraclinoid ICAs with mild moderate right and mild left cavernous ICA stenosis.  There is diffuse small caliber right M1 segment of the MCA.  Irregularity and narrowing of the right distal vertebral artery concerning for atherosclerotic disease with mild moderate stenosis.  CTA neck: Atherosclerotic plaquing of the carotid bifurcations and proximal ICAs with less than 50% proximal ICA stenosis by NASCET criteria.  CT head: Evolving large right MCA distribution infarction.  Localized mass effect without significant midline shift or hydrocephalus.  No evidence for hemorrhagic conversion.  Clinical correlation and follow-up advised.        Cardiac Imaging   Echo 6/7  CONCLUSIONS     1 - Technically difficult study.     2 - Severely depressed left ventricular systolic function (EF 15-20%).     3 - Mildly to moderately depressed right ventricular systolic function .     4 - Impaired LV relaxation, normal LAP (grade 1 diastolic dysfunction).     5 - Mild aortic stenosis, WIL = 1.45 cm2, AVAi = 0.67 cm2/m2, peak velocity = 2.8 m/s, mean gradient = 18 mmHg.     6 - Mild mitral regurgitation.       Other Imaging  CT chest 6/14/18  1. Recent development consolidation in right middle and lower lobes consistent with pneumonia or aspiration.  Endobronchial material in right lower lobe airways with intermittent obstruction, could represent retained secretions or aspirated material.  Correlate clinically.  Recommend noncontrast chest CT follow-up in 3 months.  2. Chronic  right upper lobe consolidation and volume loss consistent with radiation fibrosis in patient with history of previous lung cancer.  3. Chronic loculation of pleural fluid in the right sulcus is stable.  4. A few subcentimeter pulmonary nodules, stable and likely benign  5. Aortic and coronary atherosclerosis, status post CABG  6. Gastrostomy tube  7. Other findings as above

## 2018-06-26 NOTE — ASSESSMENT & PLAN NOTE
69 y/o male with R MCA infarct with left sided weakness, facial droop, dysarthria and confusion. Right sided movement is minimal. Etiology likely atheroembolic vs cardioembolic due to EF of 15%. EEG completed and no seizure activity noted. Pt started on Modafinil 6/13; wakefulness now improved.    Pt pulled out PEG 6/17 & 6/18 evening; PEG replacement 6/19. Dc'd Modafinil 6/19.  Psych following for acute delirium, agitation; adjusted regimen 6/26. Pt remains off restraints.     D/c'd eliquis, started coumadin 6/25 due to cost barriers. INR 1.0 on 6/27; INR-adjusted target Lovenox bridge to Coumadin to continue at SNF.     D/c to SNF 6/26.      Antithrombotics: Lovenox bridge to Coumadin 7.5mg Daily (INR-adjusted target) and ASA 81  Statins: Crestor 40 mg daily  Aggressive risk factor modification: HTN, DM, HLD, Diet, Exercise, Obesity, CAD  Rehab efforts: PT/OT/SLP to evaluate and treat  Dispo: SNF  Diagnostics ordered/pending: NA  VTE prophylaxis:  SCDs + Lovenox/Coumadin  BP parameters: Infarct: Avoid hypotension

## 2018-06-26 NOTE — ASSESSMENT & PLAN NOTE
Evidence on US 6/9  Heparin gtt initiated 6/9; Initially transitioned to Eliquis, however changed to Coumadin 6/25 due to cost barriers  SNF to continue Lovenox bridge to Coumadin for therapeutic INR

## 2018-06-26 NOTE — NURSING
Pt discharged to [facility] per MD order.  PIV d/c'd prior to discharge; telemetry and continuous pulse oximetry monitoring discontinued. Incontinence care done.  Tube feedings stopped at this time; PEG tube flushed and clamped.  Report called to JUAN JOSÉ Stahl at [facility].  [facility]cher[facility]; no distress noted.  ZOIE Palmer informed RN she would notify family of pt's transfer.

## 2018-06-26 NOTE — PROGRESS NOTES
[facility]  Vascular Neurology  Comprehensive Stroke Center  Progress Note    Assessment/Plan:     * Embolic stroke involving right middle cerebral artery    67 y/o male with R MCA infarct with left sided weakness, facial droop, dysarthria and confusion. Right sided movement is minimal. Etiology likely atheroembolic vs cardioembolic due to EF of 15%. EEG completed and no seizure activity noted. Pt started on Modafinil 6/13; wakefulness now improved.    Pt pulled out PEG 6/17 & 6/18 evening; PEG replacement 6/19. Dc'd Modafinil 6/19.  Psych following for acute delirium, agitation; adjusted regimen 6/26. Pt remains off restraints.     D/c'd eliquis, started coumadin 6/25 due to cost barriers. INR 1.0 on 6/27; INR-adjusted target Lovenox bridge to Coumadin to continue at SNF.     D/c to SNF 6/26.      Antithrombotics: Lovenox bridge to Coumadin 7.5mg Daily (INR-adjusted target) and ASA 81  Statins: Crestor 40 mg daily  Aggressive risk factor modification: HTN, DM, HLD, Diet, Exercise, Obesity, CAD  Rehab efforts: PT/OT/SLP to evaluate and treat  Dispo: SNF  Diagnostics ordered/pending: NA  VTE prophylaxis:  SCDs + Lovenox/Coumadin  BP parameters: Infarct: Avoid hypotension        Delirium due to another medical condition    Pt agitated at night per wife; Pulled out PEG tube x 2  Psych consulted - Appreciate recs  Pt remains off restraints  Psych adjusted med regimen, however pt stable without any anti-psychotic meds over last 24 hrs; Psych recommended changing regimen to PRN only  Discussed with SNF; they asked that we d/c our psych med regimen so they could re-evaluate the pt and make own recommendations based on pt's status; D/c'd PRN Risperdal at discharge        DVT of axillary vein, acute left    Evidence on US 6/9  Heparin gtt initiated 6/9; Initially transitioned to Eliquis, however changed to Coumadin 6/25 due to cost barriers  SNF to continue Lovenox bridge to Coumadin for therapeutic INR         Type 2 diabetes mellitus without complication, with long-term current use of insulin    Stroke risk factor  HgB A1C 9.4%  Increased Detemir to 32U BID and aspart to 10U q6h  Pt to remain on Diabetisource for TFs, per Medicine co-management  SSI, further adjustments to continue at SNF  Low Na calculation corrects in setting of hyperglycemia        Reactive depression    Pt with flat affect; Result of stroke  Started Citalopram 10mg Daily        Chronic systolic (congestive) heart failure    Seen by cardiology, appreciate recs  EF 15-20%, diastolic dysfunction; Pt likely also with hx underlying CAD  Recommending combination therapy - Coumadin + ASA  See above, started GDMT; no recent hypotension  Cardiology referral at d/c        Decreased cardiac ejection fraction    EF 15-20% - seen on echo completed 6/7  Cardiac history of CABG in 2016  Cardiology consulted - recommending coumadin and asa  For GDMT at home, pt was on coreg and losartan, however due to hypotension, started metoprolol and lowest dose of losartan. Will monitor BP   Cards outpatient follow-up        Cytotoxic cerebral edema    Large area of cytotoxic cerebral edema identified when reviewing brain imaging in the territory of the R middle cerebral artery. There is not mass effect associated with it. We will continue to monitor the patients clinical exam for any worsening of symptoms which may indicate expansion of the stroke or the area of the edema resulting in the clinical change. The pattern is suggestive of atheroembolic etiology.        Left spastic hemiparesis    Due to stroke  Aggressive therapy        Essential hypertension    Stroke risk factor  SBP < 160  Home meds - Coreg and Losartan; Acutely held due to hypotension  Restarted Metoprolol and low-dose Losartan alternatively; Pt remained stable on this regimen  (Allergy to lisinopril and Norvasc)        Abnormal ventricular wall motion    Stroke risk factor  Akinetic apex, depressed EF on  echo  Coumadin, ASA per cards        Dysarthria    Result of stroke  Aggressive SLP         GENIE on CPAP    Stroke risk factor  Holding CPAP acutely due to risk aspiration, encephalopathy, pt nonverbal        Coronary artery disease involving native coronary artery of native heart without angina pectoris    Stoke risk factor  Coumadin, ASA per Cards  Needs referral for [facility] at discharge        COPD (chronic obstructive pulmonary disease)    Stroke risk factor  Duonebs Q6 due to pt not being able to follow commands and RT not being able to administer inhalers   Currently maintaining O2 sats in the >90% on 2L NC  Pulmonary consulted, Signed off - f/u outpatient   Continued duonebs at dc to SNF        History of lung cancer    Stroke risk factor  CT Chest this admission concerning  Pulmonology feels pt too ill for acute intervention; Signed off  Plan for outpatient follow up with pulmonologist Dr Saunders ([facility]) for further workup of CT findings when acute illness has improved             67 y/o male wth R MCA infarct. Had been at [facility] since 6-4-18 for AMS and weakness that improved and then worsened now with facial droop and left sided weakness. 6-6-18 fever so infectious w/u in process could be PNA as he was coughing with puree food at [facility]. He was placed on ABX Amp/Vanc/valcyclovir for possible meningitis this is stopped as he does not have meningitis. He moved around too much and was clausterphobic for MRI so will attempt MRI here.   6/8 - Urine studies ordered for hyponatremia. EEG results pending. Bilateral upper/lower extremities US ordered for fever. Procal WNL. Infectious workup negative so far. Cardiology consulted for EF 15-20%.   6-9-18 will give lasix 40 mg NG today as per cardiology recs. Discussion with family regarding anticoagulation due to low EF and DVT left axillary vein and brachial vein, discussion regarding feeding as well and likely bernard of patient swallowing is minimal so will need  PEG next week as opposed to waiting and family is in agreement so will start heparin drip with no bolus parameters  6-10-18 once EEG read and no seizure activity may start Modafinil to help with wakefulness  6/11 - EEG to be read today and consider Modafinil. IR consulted for PEG placement tomorrow. Hospital medicine ordering urine studies for hyponatremia.   6/12 - PEG to be placed today. Heparin gtt currently held. Will resume once PEG is placed. AC to start tomorrow once PEG is able to be used. Hyponatremia slightly improving. Continue to hold fluids due to hyponatremia thought to be due to SIADH. Modafinil to be started tomorrow when PEG can be used.  6/13 - PEG placed yesterday. Heparin gtt dc'ed and eliquis started. Modafinil started today. Continuing to restrict fluids (enteral water boluses) due to SIADH/hyponatremia and holding off on lasix that cardiology recommended.  6/14/18 - concern for sepsis today - medicine consulted - hypotensive, febrile, procal elevated.   6/15: Hypotensive to 88/36 overnight; good response to 250cc bolus. Hospital medicine adjusting insulin and antibiotics regimen. Wakefulness much improved since starting Modafinil. Mild bleeding noted at NC site.  6/16: Pt hypotensive, requiring another bolus yesterday; BP so far stable since. Modafinil held today as family felt made pt more aggressive; plan for decreased dose tomorrow. Added Lactulose PRN to bowel regimen.  6/17: Pt clinically improved from infection/respiratory perspective; medicine de-escalated abx regimen. No further hypotension episodes. Reduced-dose Modafinil today; will monitor response.  6/18: Pt pulled out PEG yesterday evening; ford placed to maintain patency. Attempted to place NGT but pt agitated, family refused. PO meds held for now, including Eliquis (per IR request). D/c'd scheduled suppository, pt on Senna.  6/19 - pulled ford from peg site last night - see note. Replaced in IR today. Patient in wrist  restraint with abdominal binder. Family at bedside, educated on importance of protection of peg site. Dc modafinil   6/20 - Meds and TF resumed through PEG tube. Restraint still applied to right arm and abdominal binder applied. Unasyn completed.   6/21 - Patient continues to be agitated at times. Right arm restraint continues so patient will not pull out PEG. Psych consulted. They will complete full consult tomorrow. Suggested risperidone 1 mg qhs and decrease narcotic use.   6/22 - Psych to complete consult today. Appreciate recs. Sodium 132, enteral water bolus decreased to 800 mg four times daily. Continue to monitor. Goal is to dc restraint today after psych makes recommendations.  6/23 - agitation improved, hyponatremia noted on labs but normal when corrected for glucose. It was noted that cardiology recommended aspirin and anticoagulation. Discussed with Dr. Gerardo and started patient on asa 81  6/24 - family reports patient had LLE pain, US LE pending, increased insulin, may need nutrition consult for change in tube feed formula if hyperglycemia persists  6/25: Psych adjusted med regimen. Pt to remain on Diabetisource, increased detemir. D/c eliquis, start coumadin for cost barriers. US LE negative.  6/26: INR 1.0; INR-adjusted target Lovenox bridge to Coumadin to continue at SNF. Increased Detemir dosing. Corrected Na WNL. D/c to SNF today.    STROKE DOCUMENTATION        NIH Scale:  1a. Level Of Consciousness: 0-->Alert: keenly responsive  1b. LOC Questions: 2-->Answers neither question correctly  1c. LOC Commands: 2-->Performs neither task correctly  2. Best Gaze: 1-->Partial gaze palsy: gaze is abnormal in one or both eyes, but forced deviation or total gaze paresis is not present  3. Visual: 2-->Complete hemianopia  4. Facial Palsy: 2-->Partial paralysis (total or near-total paralysis of lower face)  5a. Motor Arm, Left: 4-->No movement  5b. Motor Arm, Right: 2-->Some effort against gravity: limb cannot  get to or maintain (if cued) 90 (or 45) degrees, drifts down to bed, but has some effort against gravity  6a. Motor Leg, Left: 4-->No movement  6b. Motor Leg, Right: 3-->No effort against gravity: leg falls to bed immediately  7. Limb Ataxia: 0-->Absent  8. Sensory: 0-->Normal: no sensory loss  9. Best Language: 2-->Severe aphasia: all communication is through fragmentary expression: great need for inference, questioning, and guessing by the listener. Range of information that can be exchanged is limited: listener carries burden of. . . (see row details)  10. Dysarthria: 2-->Severe dysarthria: patients speech is so slurred as to be unintelligible in the absence of or out of proportion to any dysphasia, or is mute/anarthric  11. Extinction and Inattention (formerly Neglect): 0-->No abnormality  Total (NIH Stroke Scale): 26       Modified David Score: 0  Beni Coma Scale:    ABCD2 Score:    [id number] Score:   HAS -BLED Score:   ICH Score:   Hunt & De Paz Classification:      Hemorrhagic change of an Ischemic Stroke: Does this patient have an ischemic stroke with hemorrhagic changes? No     Neurologic Chief Complaint: R MCA infarct     Subjective:     Interval History: Patient is seen for follow-up neurological assessment and treatment recommendations:   NAEON. INR 1.0; Lovenox bridge to Coumadin to continue at SNF. Increased Detemir dosing. Corrected Na WNL. D/c to SNF today.    HPI, Past Medical, Family, and Social History remains the same as documented in the initial encounter.     Review of Systems   Constitutional: Negative for diaphoresis and fever.   HENT: Positive for trouble swallowing. Negative for nosebleeds.    Neurological: Positive for facial asymmetry, speech difficulty and weakness.   Psychiatric/Behavioral: Positive for confusion. Negative for agitation.     Scheduled Meds:   albuterol-ipratropium  3 mL Nebulization Q6H    aspirin  81 mg Per G Tube Daily    citalopram  10 mg Per G Tube Daily     enoxaparin  1 mg/kg Subcutaneous Q12H    insulin aspart U-100  10 Units Subcutaneous Q6H    insulin detemir U-100  32 Units Subcutaneous BID    losartan  12.5 mg Per G Tube Daily    metoprolol tartrate  25 mg Per G Tube BID    risperidone 1 mg/ml  1 mg Per G Tube QHS    rosuvastatin  40 mg Per G Tube QHS    senna-docusate 8.6-50 mg  2 tablet Per G Tube BID    sodium chloride 0.9%  3 mL Intravenous Q8H    warfarin  7.5 mg Per G Tube Daily     Continuous Infusions:    PRN Meds:acetaminophen, dextrose 50%, glucagon (human recombinant), haloperidol lactate, HYDROcodone-acetaminophen, insulin aspart U-100, labetalol, lactulose, miconazole NITRATE 2 %, ondansetron    Objective:     Vital Signs (Most Recent):  Temp: 98.8 °F (37.1 °C) (06/26/18 1137)  Pulse: 87 (06/26/18 1256)  Resp: 17 (06/26/18 1256)  BP: (!) 108/59 (06/26/18 1137)  SpO2: 95 % (06/26/18 1256)  BP Location: Right arm    Vital Signs Range (Last 24H):  Temp:  [98.6 °F (37 °C)-98.8 °F (37.1 °C)]   Pulse:  []   Resp:  [16-19]   BP: (101-126)/(54-73)   SpO2:  [93 %-100 %]   BP Location: Right arm    Physical Exam   Constitutional: He appears well-developed and well-nourished. No distress.   HENT:   Head: Normocephalic and atraumatic.   Eyes: Conjunctivae are normal. No scleral icterus.   Cardiovascular: Normal rate.    Pulmonary/Chest: Effort normal. No respiratory distress.   Musculoskeletal: He exhibits no edema or tenderness.   Neurological: He is alert. A cranial nerve deficit is present. No sensory deficit.   Skin: Skin is warm and dry.   Psychiatric: His affect is blunt. He is noncommunicative.   Vitals reviewed.      Neurological Exam:   LOC: Alert  Attention Span: Poor  Language: Global aphasia  Articulation: Dysarthria  Orientation: Untestable due to severe aphasia   Facial Movement (CN VII): Lower facial weakness on the Left  Vision: L hemianopsia  Gaze: Mild R gaze preference  Motor: Arm left  0/5  Leg left  Paresis: 0/5  Arm right   Normal 4/5  Leg right Paresis: 3/5  Sensation: Intact to light touch, temperature      Laboratory:  CMP:     Recent Labs  Lab 06/26/18  0509   CALCIUM 9.9   ALBUMIN 2.4*   PROT 6.7   *   K 4.1   CO2 33*   CL 93*   BUN 17   CREATININE 0.7   ALKPHOS 80   ALT 20   AST 24   BILITOT 0.2     BMP:     Recent Labs  Lab 06/26/18  0509   *   K 4.1   CL 93*   CO2 33*   BUN 17   CREATININE 0.7   CALCIUM 9.9     CBC:     Recent Labs  Lab 06/26/18  0509   WBC 7.61   RBC 3.71*   HGB 11.0*   HCT 34.9*   *   MCV 94   MCH 29.6   MCHC 31.5*     Lipid Panel:   No results for input(s): CHOL, LDLCALC, HDL, TRIG in the last 168 hours.  Coagulation:     Recent Labs  Lab 06/26/18  0509   INR 1.0     Platelet Aggregation Study: No results for input(s): PLTAGG, PLTAGINTERP, PLTAGREGLACO, ADPPLTAGGREG in the last 168 hours.  Hgb A1C:   No results for input(s): HGBA1C in the last 168 hours.  TSH:   No results for input(s): TSH in the last 168 hours.      Diagnostic Results     Brain Imaging   CT 6/6   Large right MCA distribution infarct measuring approximately 7 x 3 cm including the right perisylvian region and extending to the frontal and parietal operculum with associated localized mass effect.    Vessel Imaging   US BUE 6/9/18  Deep venous thrombus in the left axillary vein with additional thrombus in the proximal segment of the branchial vein.  No evidence of DVT in the right upper extremity.    CTA 6/8  CTA head: Occlusion right M2 segment of the MCA within the proximal sylvian fissure..  Heavy atherosclerotic plaquing of the cavernous and supraclinoid ICAs with mild moderate right and mild left cavernous ICA stenosis.  There is diffuse small caliber right M1 segment of the MCA.  Irregularity and narrowing of the right distal vertebral artery concerning for atherosclerotic disease with mild moderate stenosis.  CTA neck: Atherosclerotic plaquing of the carotid bifurcations and proximal ICAs with less than 50% proximal  ICA stenosis by NASCET criteria.  CT head: Evolving large right MCA distribution infarction.  Localized mass effect without significant midline shift or hydrocephalus.  No evidence for hemorrhagic conversion.  Clinical correlation and follow-up advised.        Cardiac Imaging   Echo 6/7  CONCLUSIONS     1 - Technically difficult study.     2 - Severely depressed left ventricular systolic function (EF 15-20%).     3 - Mildly to moderately depressed right ventricular systolic function .     4 - Impaired LV relaxation, normal LAP (grade 1 diastolic dysfunction).     5 - Mild aortic stenosis, WIL = 1.45 cm2, AVAi = 0.67 cm2/m2, peak velocity = 2.8 m/s, mean gradient = 18 mmHg.     6 - Mild mitral regurgitation.       Other Imaging  CT chest 6/14/18  1. Recent development consolidation in right middle and lower lobes consistent with pneumonia or aspiration.  Endobronchial material in right lower lobe airways with intermittent obstruction, could represent retained secretions or aspirated material.  Correlate clinically.  Recommend noncontrast chest CT follow-up in 3 months.  2. Chronic right upper lobe consolidation and volume loss consistent with radiation fibrosis in patient with history of previous lung cancer.  3. Chronic loculation of pleural fluid in the right sulcus is stable.  4. A few subcentimeter pulmonary nodules, stable and likely benign  5. Aortic and coronary atherosclerosis, status post CABG  6. Gastrostomy tube  7. Other findings as above      Negrita Whitaker PA-C  Comprehensive Stroke Center  Department of Vascular Neurology   [facility]

## 2018-06-26 NOTE — SUBJECTIVE & OBJECTIVE
"Interval History: see hospital course     Family History     Problem Relation (Age of Onset)    Cancer Father    Diabetes Father, Mother, Brother, Son    No Known Problems Brother, Sister, Brother, Daughter, Daughter, Daughter, Daughter, Son, Son, Son    Pneumonia Sister, Sister        Social History Main Topics    Smoking status: Former Smoker     Packs/day: 1.00     Years: 39.00     Types: Cigarettes     Start date: 1/23/1972     Quit date: 5/23/2011    Smokeless tobacco: Never Used    Alcohol use 0.0 oz/week      Comment: occasionally    Drug use: No    Sexual activity: Not Currently     Psychotherapeutics     Start     Stop Route Frequency Ordered    06/26/18 2100  risperidone 1 mg/ml oral solution 1 mg      -- PER G TUBE Nightly 06/26/18 0948    06/25/18 0921  haloperidol lactate injection 2 mg      -- IM Every 6 hours PRN 06/25/18 0821    06/23/18 0900  citalopram tablet 10 mg      -- PER G TUBE Daily 06/22/18 1509           Review of Systems  Objective:     Vital Signs (Most Recent):  Temp: 98.7 °F (37.1 °C) (06/26/18 0824)  Pulse: 88 (06/26/18 0826)  Resp: 19 (06/26/18 0824)  BP: 112/60 (06/26/18 0824)  SpO2: 96 % (06/26/18 0824) Vital Signs (24h Range):  Temp:  [97.6 °F (36.4 °C)-98.7 °F (37.1 °C)] 98.7 °F (37.1 °C)  Pulse:  [] 88  Resp:  [16-19] 19  SpO2:  [94 %-100 %] 96 %  BP: (101-126)/(54-73) 112/60     Height: 5' 8" (172.7 cm)  Weight: 101.2 kg (223 lb 1.7 oz)  Body mass index is 33.92 kg/m².      Intake/Output Summary (Last 24 hours) at 06/26/18 0956  Last data filed at 06/26/18 0600   Gross per 24 hour   Intake             1066 ml   Output                0 ml   Net             1066 ml       Physical Exam       Mental Status Exam:  Appearance: older than stated age, disheveled, lying in bed   Behavior: Calm, resting in bed, eye contact minimal, unable to follow commands   Speech/Language: articulation error, nonfluent, nonsensical   Mood: Unable to assess   Affect: Unable to assess "   Thought Process:  poverty of thought   Thought Content: poverty of content   Orientation: disoriented to self, place, sitation, date   Cognition: easily distracted, impaired   Insight: poor   Judgment: poor       Significant Labs:   Last 24 Hours:   Recent Lab Results       06/26/18  0542 06/26/18  0509 06/26/18  0214 06/25/18  2136 06/25/18  1717      Immature Granulocytes  0.7(H)        Immature Grans (Abs)  0.05  Comment:  Mild elevation in immature granulocytes is non specific and   can be seen in a variety of conditions including stress response,   acute inflammation, trauma and pregnancy. Correlation with other   laboratory and clinical findings is essential.  (H)        Albumin  2.4(L)        Alkaline Phosphatase  80        ALT  20        Anion Gap  7(L)        AST  24        Baso #  0.06        Basophil%  0.8        Total Bilirubin  0.2  Comment:  For infants and newborns, interpretation of results should be based  on gestational age, weight and in agreement with clinical  observations.  Premature Infant recommended reference ranges:  Up to 24 hours.............<8.0 mg/dL  Up to 48 hours............<12.0 mg/dL  3-5 days..................<15.0 mg/dL  6-29 days.................<15.0 mg/dL          BUN, Bld  17        Calcium  9.9        Chloride  93(L)        CO2  33(H)        Creatinine  0.7        Differential Method  Automated        eGFR if   >60.0        eGFR if non   >60.0  Comment:  Calculation used to obtain the estimated glomerular filtration  rate (eGFR) is the CKD-EPI equation.           Eos #  0.1        Eosinophil%  1.8        Glucose  200(H)        Gran # (ANC)  5.4        Gran%  71.0        Hematocrit  34.9(L)        Hemoglobin  11.0(L)        Coumadin Monitoring INR  1.0  Comment:  Coumadin Therapy:  2.0 - 3.0 for INR for all indicators except mechanical heart valves  and antiphospholipid syndromes which should use 2.5 - 3.5.          Lymph #  1.2        Lymph%   16.2(L)        MCH  29.6        MCHC  31.5(L)        MCV  94        Mono #  0.7        Mono%  9.5        MPV  8.9(L)        nRBC  0        Platelets  400(H)        POCT Glucose 233(H)  240(H) 189(H) 188(H)     Potassium  4.1        Total Protein  6.7        Protime  10.3        RBC  3.71(L)        RDW  12.6        Sodium  133(L)        WBC  7.61                    06/25/18  1411 06/25/18  1202      Immature Granulocytes       Immature Grans (Abs)       Albumin       Alkaline Phosphatase       ALT       Anion Gap       AST       Baso #       Basophil%       Total Bilirubin       BUN, Bld       Calcium       Chloride       CO2       Creatinine       Differential Method       eGFR if        eGFR if non        Eos #       Eosinophil%       Glucose       Gran # (ANC)       Gran%       Hematocrit       Hemoglobin       Coumadin Monitoring INR 1.0  Comment:  Coumadin Therapy:  2.0 - 3.0 for INR for all indicators except mechanical heart valves  and antiphospholipid syndromes which should use 2.5 - 3.5.        Lymph #       Lymph%       MCH       MCHC       MCV       Mono #       Mono%       MPV       nRBC       Platelets       POCT Glucose  296(H)     Potassium       Total Protein       Protime 10.6      RBC       RDW       Sodium       WBC             Significant Imaging: None

## 2018-06-26 NOTE — PT/OT/SLP PROGRESS
"Speech Language Pathology Treatment    Patient Name:  Mendez Guthrie   MRN:  [id number]  Admitting Diagnosis: Embolic stroke involving right middle cerebral artery    Recommendations:     General Recommendations:  Dysphagia therapy, Speech/language therapy and Cognitive-linguistic therapy  Diet recommendations:  NPO, Liquid Diet Level: NPO   Aspiration Precautions: Continue alternate means of nutrition and Strict aspiration precautions   General Precautions: Standard, aphasia, aspiration, fall, NPO  Communication strategies:  provide increased time to answer and go to room if call light pushed    Subjective     "So do you think he can swallow again if he gets stronger?"     Pain/Comfort:  · Pain Rating 1:  (NR, NAD)  · Pain Rating Post-Intervention 1: 0/10    Objective:     Has the patient been evaluated by SLP for swallowing?   Yes  Keep patient NPO? Yes   Current Respiratory Status: nasal cannula      Pt seen bedside with family present.  Max cues to rouse initially though sustained alertness with min-mod stim. Pt vocalized "ah" in response to model consistently though no other vocalizations achieved.  Simple commands followed x5 given max cues, model and tactile cue to initiate.  Pt's daughter with recording of pt consistently repeats single words with model, mostly family names though counting to 10 and repeating names of body parts as well.  Daughter and spouse with questions re: prognosis, continued SLP services upon discharge.  All questions addressed and additional information provided on swallowing mechanism, aspiration risk associated with swallow delay and oral motor weakness, improved oral acceptance, appropriate language stim and POC.  They verbalized understanding. Oral care provided with yaunker suction, no swallow initiated though pt with active tongue pumping.  Pt presented with thin liquids x5 via tspn and puree via tspn x3.  Anterior loss noted with delayed a-p transit.  Swallow response was mildly " delayed.  No overt s/s aspiration.  Yaunker suction provided to clear oral stasis from L buccal cavity with puree.  Increasing delay with progression of puree trials.  Pt self presented puree using R hand x2.  Pt resting comfortable at end of session.      Assessment:     Mendez Guthrie is a 68 y.o. male with an SLP diagnosis of Aphasia, Dysphagia, Dysarthria and Cognitive-Linguistic Impairment.    Goals:    SLP Goals        Problem: SLP Goal    Goal Priority Disciplines Outcome   SLP Goal     SLP Ongoing (interventions implemented as appropriate)   Description:  Speech Language Pathology Goals  Goals expected to be met by 6/21-all goals remain appropriate continue 6/28  1. Pt will participate in ongoing assessment of swallow.   2. Pt will answer simple y/n questions with 60% accy given repeats.  3. Pt will follow simple commands with 60% accy given mod A.  4. Pt will complete auto speech tasks with 50% accy with max A.                            Plan:     · Patient to be seen:  3 x/week   · Plan of Care expires:  07/06/18  · Plan of Care reviewed with:  patient, spouse, daughter   · SLP Follow-Up:  Yes       Discharge recommendations:  nursing facility, skilled   Barriers to Discharge:  Level of Skilled Assistance Needed      Time Tracking:     SLP Treatment Date:   06/26/18  Speech Start Time:  0830  Speech Stop Time:  0855     Speech Total Time (min):  25 min    Billable Minutes: Speech Therapy Individual 10 and Treatment Swallowing Dysfunction 15    MADDI Webb, CCC-SLP  06/26/2018

## 2018-06-26 NOTE — PLAN OF CARE
[facility]     Department of Hospital Medicine     [address] [place] [address]     [phone] after hours  [phone] fax       NURSING HOME ORDERS    06/26/2018    Admit to Nursing Home:   Skilled Bed                                                Diagnoses:  Active Hospital Problems    Diagnosis  POA    *Embolic stroke involving right middle cerebral artery [I63.411]  Yes     Priority: 1 - High    Delirium due to another medical condition [F05]  No     Priority: 2     DVT of axillary vein, acute left [I82.A12]  Yes     Priority: 2     Left spastic hemiparesis [G81.14]  Yes     Priority: 2     Cytotoxic cerebral edema [G93.6]  Yes     Priority: 2     Type 2 diabetes mellitus without complication, with long-term current use of insulin [E11.9, Z79.4]  Not Applicable     Priority: 2     Decreased cardiac ejection fraction [R93.1]  Yes     Priority: 3     Chronic systolic (congestive) heart failure [I50.22]  Yes     Priority: 3     Essential hypertension [I10]  Yes     Priority: 3     Abnormal ventricular wall motion [R93.8]  Yes     Priority: 4     Dysarthria [R47.1]  Yes     Priority: 4     GENIE on CPAP [G47.33, Z99.89]  Not Applicable     Priority: 4     COPD (chronic obstructive pulmonary disease) [J44.9]  Yes     Priority: 4     Coronary artery disease involving native coronary artery of native heart without angina pectoris [I25.10]  Yes     Priority: 4     History of lung cancer [Z85.118]  Not Applicable     Priority: 4     Reactive depression [F32.9]  Unknown    Acute encephalopathy [G93.40]  Yes    Alteration in skin integrity [R23.9]  Yes    Abnormal chest CT [R93.8]  Yes      Resolved Hospital Problems    Diagnosis Date Resolved POA    Aspiration pneumonia [J69.0] 06/24/2018 No    Sepsis [A41.9] 06/17/2018 No    Acute on chronic respiratory failure with hypoxia [J96.21] 06/25/2018 No    Hyponatremia [E87.1] 06/17/2018 Yes     Fever of unknown origin [R50.9] 06/15/2018 Yes    MEGGAN (acute kidney injury) [N17.9] 06/17/2018 Yes       Patient is homebound due to:  Embolic stroke involving right middle cerebral artery    Allergies:  Review of patient's allergies indicates:   Allergen Reactions    Lisinopril Swelling    Norvasc [amlodipine] Swelling       Vitals:  Every shift    Diet: STRICT NPO   Tube Feeding:   Diabetisource    - Continuous at 70 mL per hour (Hold for residuals > 500mL)    - Flush tube with 200 mL water every 6 hours    Acitivities:  As tolerated     LABS:  Daily INR; Bridge Lovenox to Warfarin, INR-adjusted target (goal 2-3); Per facility protocol    Nursing Precautions:   - Aspiration precautions:             - Total assistance with meals            -  Upright 90 degrees befor during and after meals             -  Suction at bedside          - Fall precautions per nursing home protocol   - Seizure precaution per [facility] protocol   - Decubitus precautions:        -  for positioning   - Pressure reducing foam mattress   - Turn patient every two hours. Use wedge pillows to anchor patient    CONSULTS:     Physical Therapy to evaluate and treat     Occupational Therapy to evaluate and treat     Speech Therapy  to evaluate and treat     Nutrition to evaluate and recommend diet     Psychiatry to evaluate and follow patients for delirium    MISCELLANEOUS CARE:      PEG Care:  Clean site every 24 hours     Routine Skin for Bedridden Patients:  Apply moisture barrier cream to all    skin folds and wet areas in perineal area daily and after baths and                           all bowel movements.      WOUND CARE:  Wound spray or saline for wound cleaning with all dressing changes.    All wounds to be measured with first dressing changes and every week.    Pressure Ulcer(s) Stage I    Location:  Perirectal         Apply Miconzazole:  Zinc barrier ointment                      Frequency:  qShift and as needed (Remove only  soiled cream then reapply more)         Pressure relief measure:  for pressure redistribution, turn q2hrs                                                  A/C Boots          DIABETES CARE:        Check blood sugar:   Fingerstick blood sugar every 6 hours      Report CBG < 60 or > 400 to physician.                                          Insulin Sliding Scale or equivalent           Glucose  Novolog Insulin Subcutaneous        0 - 60   Orange juice or glucose tablet, hold insulin      No insulin   201-250  2 units   251-300  4 units   301-350  6 units   351-400  8 units   >400   10 units then call physician      Medications: Discontinue all previous medication orders, if any. See new list below.     Mendez Guthrie   Home Medication Instructions DLIAN:[id number]    Printed on:06/26/18 7474   Medication Information                      albuterol-ipratropium (DUO-NEB) 2.5 mg-0.5 mg/3 mL nebulizer solution  Take 3 mLs by nebulization every 6 (six) hours. Rescue             aspirin 81 MG Chew  1 tablet (81 mg total) by Per G Tube route once daily.             citalopram (CELEXA) 10 MG tablet  1 tablet (10 mg total) by Per G Tube route once daily.             enoxaparin (LOVENOX) 100 mg/mL Syrg  Inject 1 mL (100 mg total) into the skin every 12 (twelve) hours.             insulin detemir U-100 (LEVEMIR FLEXTOUCH) 100 unit/mL (3 mL) SubQ InPn pen  Inject 32 Units into the skin 2 (two) times daily.             insulin lispro (HUMALOG U-100 INSULIN) 100 unit/mL injection  Inject 10 Units into the skin every 6 (six) hours.             losartan (COZAAR) 25 MG tablet  0.5 tablets (12.5 mg total) by Per G Tube route once daily.             metoprolol tartrate (LOPRESSOR) 25 MG tablet  1 tablet (25 mg total) by Per G Tube route 2 (two) times daily.             miconazole NITRATE 2 % (MICOTIN) 2 % top powder  Apply topically 2 (two) times daily as needed (for rashes upper legs).             rosuvastatin (CRESTOR) 40  MG Tab  1 tablet (40 mg total) by Per G Tube route every evening.             senna-docusate 8.6-50 mg (PERICOLACE) 8.6-50 mg per tablet  2 tablets by Per G Tube route 2 (two) times daily.             warfarin (COUMADIN) 7.5 MG tablet  1 tablet (7.5 mg total) by Per G Tube route Daily.               ** If SNF considers starting Haldol for non-redirectable agitation, please monitor regular EKGs for any evidence of QT prologtation.      FOLLOW-UP:    Follow-up With  Details  Why  Contact Info   Valeria Mayen MD  In 1 week  Follow-up with your PCP within 1 week of hospital discharge  [address] [employer] [facility] [id number] [phone]   [facility] VASCULAR NEUROLOGY    Someone from our office will call you to set up a follow-up appt within 4-6 weeks of hospital discharge  [address] [facility] [id number] [phone]   Frank Saunders MD  In 4 weeks  Abnormal CT Chest findings; Hx lung CA  [address] [facility] [id number] [phone]   [facility] CARDIOLOGY  In 4 weeks  CM workup; LHC to be performed as an outpatient  [address] [facility] [id number] [phone]                 _________________________________  Negrita Whitaker PA-C  06/26/2018

## 2018-06-26 NOTE — PLAN OF CARE
06/26/18 1224   Final Note   Assessment Type Final Discharge Note   Discharge Disposition SNF     Patient is discharged to [facility]. Sw to arrange transportation to the facility.

## 2018-06-26 NOTE — ASSESSMENT & PLAN NOTE
Stroke risk factor  SBP < 160  Home meds - Coreg and Losartan; Acutely held due to hypotension  Restarted Metoprolol and low-dose Losartan alternatively; Pt remained stable on this regimen  (Allergy to lisinopril and Norvasc)

## 2018-06-26 NOTE — PT/OT/SLP PROGRESS
Physical Therapy Treatment    Patient Name:  Mendez Guthrie   MRN:  [id number]  Admitting Diagnosis: Embolic stroke involving right middle cerebral artery  Recent Surgery: * No surgery found *      Recommendations:     Discharge Recommendations:  nursing facility, skilled   Discharge Equipment Recommendations: wheelchair   Barriers to discharge: Decreased caregiver support    Plan:     During this hospitalization, patient to be seen 4 x/week to address the above listed problems via gait training, therapeutic activities, therapeutic exercises, neuromuscular re-education  · Plan of Care Expires:  07/07/18   Plan of Care Reviewed with: patient    This Plan of care has been discussed with the patient who was involved in its development and understands and is in agreement with the identified goals and treatment plan    Subjective     Communicated with RN (Miranda) prior to session.     Patient comments: Pt non-verbal  Pain/Comfort:  · Pain Rating 1: 0/10  · Pain Rating Post-Intervention 1: 0/10    Objective:     Patient found with: bed alarm, oxygen, PEG Tube, pressure relief boots, peripheral IV, SCD, telemetry (air mattress top)    Patient found in semi- R side lying upon PT entry to room, agreeable to treatment.  No family present in the room.    General Precautions: Standard, Cardiac aphasia, aspiration, fall, NPO   Orthopedic Precautions:N/A   Braces: N/A       BED MOBILITY (vc's for hand placement sequencing of task):        Rolling to the R with max A with no use of bedrail       Sup > sit at the EOB with max A from R side lying        Sit > sup with max A       Scooting hips to the EOB upon sitting with max A x2-3 asymmetrical scoots       Scooting hips along the EOB to the R requiring max A x3 scoots            SITTING AT THE EDGE OF THE BED (13-15 min)   Assistance Level Required: mod A for trunk/head control with R UE support and L UE in weight bearing position on firm surface     Postural deviations noted:  flexed trunk, PPT, rounded shoulders, forward head, flexed cervical spine   Encouraged: upright posture, neutral pelvis, midline orientation        TRANSFERS  (vc's for hand placement, sequencing of task and safety)   Patient completed Sit <> Stand Transfer from EOB with max/total A for hip elevation, B LE, balance and L UE                    with no assistive device x2 trials.  Pt does not achieve full erect posture   Patient completed Stand <> Sit Transfer to EOB with max A for controlled descent with no assistive device       STANDING (15-20 sec)       Pt tolerates standing with no AD requiring total A for balance, hip ext with vc's.  Pt demonstrates FFP    THERAPEUTIC ACTIVITIES/EXERCISES  Pt receives PROM to L LE sup in bed (ankle DF, hip flex, hip add/abd, hip IR/ER)  x12 reps     Pt performs R LE AAROM ther ex's while sup in bed x8 reps with vc's    EDUCATION  Education provided to Pt regarding: postural control, weight shift, attention to the L.    Whiteboard updated with correct mobility information. RN/PCT notified.  Pt appropriate for OOB transfer to medichair with RN/PCT via drawsheet    Patient left right sidelying, with L UE propped on pillow for scapular support and edema management with all lines intact, call button in reach, bed alarm on and RN notified    AM-PAC 6 CLICK MOBILITY  Turning over in bed (including adjusting bedclothes, sheets and blankets)?: 2  Sitting down on and standing up from a chair with arms (e.g., wheelchair, bedside commode, etc.): 1  Moving from lying on back to sitting on the side of the bed?: 2  Moving to and from a bed to a chair (including a wheelchair)?: 1  Need to walk in hospital room?: 1  Climbing 3-5 steps with a railing?: 1  Basic Mobility Total Score: 8     Assessment:     Mendez Guthrie is a 68 y.o. male admitted with a medical diagnosis of Embolic stroke involving right middle cerebral artery.  He presents with the following impairments/functional limitations:   weakness, impaired endurance, impaired sensation, impaired self care skills, impaired functional mobilty, impaired balance, visual deficits, impaired cognition, decreased coordination, decreased upper extremity function, decreased lower extremity function, decreased safety awareness, abnormal tone, decreased ROM, impaired coordination, impaired fine motor, edema. L hemiparesis requiring significant assistance and verbal cues for bed mob, scooting to/along the EOB, sit < > stand, standing 2* weakness, cognitive deficits, pushing with L side.   In light of pt's current functional level and deficits, it is anticipated that pt will need to participate in an intense rehab program consisting of PT, OT and ST in order to achieve full rehab potential to return to previous level of function and roles.      Rehab Prognosis:  Fair; patient would benefit from acute skilled PT services to address these deficits and reach maximum level of function.      GOALS:    Physical Therapy Goals     Not on file          Multidisciplinary Problems (Resolved)        Problem: Physical Therapy Goal    Goal Priority Disciplines Outcome Goal Variances Interventions   Physical Therapy Goal   (Resolved)     PT/OT, PT Outcome(s) achieved     Description:  Goals to be met by: 2018     Patient will increase functional independence with mobility by performin. Supine to sit with Moderate Assistance   NOT MET  2. Sit to supine with Moderate Assistance   NOT MET  3. Sit to stand transfer with Maximum Assistance   NOT MET  4. Bed to chair transfer with Maximum Assistance   NOT MET  5. Gait  x 10 feet with Maximum Assistance with or without appropriate AD  NOT MET    6. Sitting at edge of bed x10 minutes with Minimal Assistance   NOT MET  7. Lower extremity exercise program x15 reps per handout, with assistance as needed   NOT MET                        Time Tracking:     PT Received On: 18  PT Start Time: 1058     PT Stop Time: 1133  PT  Total Time (min): 35 min     Billable Minutes: Therapeutic Activity 20 and Neuromuscular Re-education 15    Treatment Type: Treatment  PT/PTA: PTA     PTA Visit Number: 5       Lizett Ortiz PTA.  Pager [phone]    6/26/2018    .

## 2018-06-26 NOTE — PLAN OF CARE
Problem: Physical Therapy Goal  Goal: Physical Therapy Goal  Goals to be met by: 2018     Patient will increase functional independence with mobility by performin. Supine to sit with Moderate Assistance   NOT MET  2. Sit to supine with Moderate Assistance   NOT MET  3. Sit to stand transfer with Maximum Assistance   NOT MET  4. Bed to chair transfer with Maximum Assistance   NOT MET  5. Gait  x 10 feet with Maximum Assistance with or without appropriate AD  NOT MET    6. Sitting at edge of bed x10 minutes with Minimal Assistance   NOT MET  7. Lower extremity exercise program x15 reps per handout, with assistance as needed   NOT MET        Discharge Recommendations: SNF    [facility]    Goals remain appropriate.     Lizett Ortiz, PTA.   [phone]   2018

## 2018-06-26 NOTE — ASSESSMENT & PLAN NOTE
Stroke risk factor  HgB A1C 9.4%  Increased Detemir to 32U BID and aspart to 10U q6h  Pt to remain on Diabetisource for TFs, per Medicine co-management  SSI, further adjustments to continue at SNF  Low Na calculation corrects in setting of hyperglycemia

## 2018-06-26 NOTE — ASSESSMENT & PLAN NOTE
Stroke risk factor  Duonebs Q6 due to pt not being able to follow commands and RT not being able to administer inhalers   Currently maintaining O2 sats in the >90% on 2L NC  Pulmonary consulted, Signed off - f/u outpatient   Continued duonebs at dc to SNF

## 2018-06-26 NOTE — NURSING
Plan of care reviewed with patient and wife at bedside, demonstrated understanding. Patient rested well, delirium precautions maintained. Feedings continued at 70 mL/hr. VSS, neuro status stable, NAEON, bed alarm active, will continue to monitor.

## 2018-06-26 NOTE — ASSESSMENT & PLAN NOTE
EF 15-20% - seen on echo completed 6/7  Cardiac history of CABG in 2016  Cardiology consulted - recommending coumadin and asa  For GDMT at home, pt was on coreg and losartan, however due to hypotension, started metoprolol and lowest dose of losartan. Will monitor BP   Cards outpatient follow-up

## 2018-06-26 NOTE — ASSESSMENT & PLAN NOTE
Seen by cardiology, appreciate recs  EF 15-20%, diastolic dysfunction; Pt likely also with hx underlying CAD  Recommending combination therapy - Coumadin + ASA  See above, started GDMT; no recent hypotension  Cardiology referral at d/c

## 2018-06-26 NOTE — MEDICAL/APP STUDENT
"6/26/2018 9:15 AM   Mendez Guthrie   1950   [id number]        Psychiatry Progress Note     SUBJECTIVE:   HPI:              Mendez Guthrie is a 68 y.o. male with a history of R MCA infarct with left sided weakness, facial droop, dysarthria and confusion. Right sided movement is minimal. Etiology likely atheroembolic vs cardioembolic due to EF of 15%. Hospital medicine following; sepsis, hypotension, hyponatremia now resolved, medicine de-escalated abx regimen. No longer likely that pt will require ICU step up.  Psychiatry was consulted for recommendations for management of agitated delirium.  Per chart review, patient received Risperdal 1 mg overnight for agitation.  Wife reports patient has been in soft restraints on his arm for 3 days after he removed his PEG tube.                 Patient was seen this morning with wife at bedside.  Patient was unable to answer any questions and only repeated the word "zoom. Zzzzooooommm. Zooooooooooooom."  Patient unable to follow commands or complete CAM-ICU screen.  Wife states that patient has been able to articulate her names and the names of some of their children.  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  Wife states she has not been told by the medical team what her 's new baseline functioning will be.  Wife states that patient did well overnight after he received his pain medication.  Was taking tramadol at home for arthritis related pain.     Patient seen and examined   06/26/2018  History taken from family and nurse.  The patient has been comfortable since yesterday with no episodes of agitation after discontinuation of Haldol.  The family believes that the patient is steadily improving.  He is able to repeat some phrases, and calls out his wife's and daughter's names.       Current Medications:   Scheduled Meds:    albuterol-ipratropium  3 mL Nebulization Q6H    aspirin  " 81 mg Per G Tube Daily    citalopram  10 mg Per G Tube Daily    enoxaparin  1 mg/kg Subcutaneous Q12H    insulin aspart U-100  10 Units Subcutaneous Q6H    insulin detemir U-100  28 Units Subcutaneous BID    losartan  12.5 mg Per G Tube Daily    metoprolol tartrate  25 mg Per G Tube BID    rosuvastatin  40 mg Per G Tube QHS    senna-docusate 8.6-50 mg  2 tablet Per G Tube BID    sodium chloride 0.9%  3 mL Intravenous Q8H    warfarin  7.5 mg Per G Tube Daily      PRN Meds: acetaminophen, dextrose 50%, glucagon (human recombinant), haloperidol lactate, HYDROcodone-acetaminophen, insulin aspart U-100, labetalol, lactulose, miconazole NITRATE 2 %, ondansetron   Psychotherapeutics     Start     Stop Route Frequency Ordered    06/25/18 0921  haloperidol lactate injection 2 mg      -- IM Every 6 hours PRN 06/25/18 0821    06/23/18 0900  citalopram tablet 10 mg      -- PER G TUBE Daily 06/22/18 1509          Allergies:   Review of patient's allergies indicates:   Allergen Reactions    Lisinopril Swelling    Norvasc [amlodipine] Swelling        OBJECTIVE:   Vitals   Vitals:    06/26/18 0826   BP:    Pulse: 88   Resp:    Temp:         Labs/Imaging/Studies:   Recent Results (from the past 36 hour(s))   CBC auto differential    Collection Time: 06/25/18  5:20 AM   Result Value Ref Range    WBC 7.28 3.90 - 12.70 K/uL    RBC 3.60 (L) 4.60 - 6.20 M/uL    Hemoglobin 10.8 (L) 14.0 - 18.0 g/dL    Hematocrit 33.8 (L) 40.0 - 54.0 %    MCV 94 82 - 98 fL    MCH 30.0 27.0 - 31.0 pg    MCHC 32.0 32.0 - 36.0 g/dL    RDW 12.5 11.5 - 14.5 %    Platelets 416 (H) 150 - 350 K/uL    MPV 9.0 (L) 9.2 - 12.9 fL    Immature Granulocytes 0.4 0.0 - 0.5 %    Gran # (ANC) 5.2 1.8 - 7.7 K/uL    Immature Grans (Abs) 0.03 0.00 - 0.04 K/uL    Lymph # 1.1 1.0 - 4.8 K/uL    Mono # 0.7 0.3 - 1.0 K/uL    Eos # 0.1 0.0 - 0.5 K/uL    Baso # 0.04 0.00 - 0.20 K/uL    nRBC 0 0 /100 WBC    Gran% 72.1 38.0 - 73.0 %    Lymph% 15.5 (L) 18.0 - 48.0 %    Mono%  9.6 4.0 - 15.0 %    Eosinophil% 1.9 0.0 - 8.0 %    Basophil% 0.5 0.0 - 1.9 %    Differential Method Automated    Comprehensive metabolic panel    Collection Time: 06/25/18  5:20 AM   Result Value Ref Range    Sodium 135 (L) 136 - 145 mmol/L    Potassium 4.1 3.5 - 5.1 mmol/L    Chloride 95 95 - 110 mmol/L    CO2 32 (H) 23 - 29 mmol/L    Glucose 246 (H) 70 - 110 mg/dL    BUN, Bld 16 8 - 23 mg/dL    Creatinine 0.8 0.5 - 1.4 mg/dL    Calcium 10.0 8.7 - 10.5 mg/dL    Total Protein 6.8 6.0 - 8.4 g/dL    Albumin 2.5 (L) 3.5 - 5.2 g/dL    Total Bilirubin 0.2 0.1 - 1.0 mg/dL    Alkaline Phosphatase 84 55 - 135 U/L    AST 27 10 - 40 U/L    ALT 22 10 - 44 U/L    Anion Gap 8 8 - 16 mmol/L    eGFR if African American >60.0 >60 mL/min/1.73 m^2    eGFR if non African American >60.0 >60 mL/min/1.73 m^2   POCT glucose    Collection Time: 06/25/18 12:02 PM   Result Value Ref Range    POCT Glucose 296 (H) 70 - 110 mg/dL   Protime-INR    Collection Time: 06/25/18  2:11 PM   Result Value Ref Range    Prothrombin Time 10.6 9.0 - 12.5 sec    INR 1.0 0.8 - 1.2   POCT glucose    Collection Time: 06/25/18  5:17 PM   Result Value Ref Range    POCT Glucose 188 (H) 70 - 110 mg/dL   POCT glucose    Collection Time: 06/25/18  9:36 PM   Result Value Ref Range    POCT Glucose 189 (H) 70 - 110 mg/dL   POCT glucose    Collection Time: 06/26/18  2:14 AM   Result Value Ref Range    POCT Glucose 240 (H) 70 - 110 mg/dL   CBC auto differential    Collection Time: 06/26/18  5:09 AM   Result Value Ref Range    WBC 7.61 3.90 - 12.70 K/uL    RBC 3.71 (L) 4.60 - 6.20 M/uL    Hemoglobin 11.0 (L) 14.0 - 18.0 g/dL    Hematocrit 34.9 (L) 40.0 - 54.0 %    MCV 94 82 - 98 fL    MCH 29.6 27.0 - 31.0 pg    MCHC 31.5 (L) 32.0 - 36.0 g/dL    RDW 12.6 11.5 - 14.5 %    Platelets 400 (H) 150 - 350 K/uL    MPV 8.9 (L) 9.2 - 12.9 fL    Immature Granulocytes 0.7 (H) 0.0 - 0.5 %    Gran # (ANC) 5.4 1.8 - 7.7 K/uL    Immature Grans (Abs) 0.05 (H) 0.00 - 0.04 K/uL    Lymph # 1.2  "1.0 - 4.8 K/uL    Mono # 0.7 0.3 - 1.0 K/uL    Eos # 0.1 0.0 - 0.5 K/uL    Baso # 0.06 0.00 - 0.20 K/uL    nRBC 0 0 /100 WBC    Gran% 71.0 38.0 - 73.0 %    Lymph% 16.2 (L) 18.0 - 48.0 %    Mono% 9.5 4.0 - 15.0 %    Eosinophil% 1.8 0.0 - 8.0 %    Basophil% 0.8 0.0 - 1.9 %    Differential Method Automated    Comprehensive metabolic panel    Collection Time: 06/26/18  5:09 AM   Result Value Ref Range    Sodium 133 (L) 136 - 145 mmol/L    Potassium 4.1 3.5 - 5.1 mmol/L    Chloride 93 (L) 95 - 110 mmol/L    CO2 33 (H) 23 - 29 mmol/L    Glucose 200 (H) 70 - 110 mg/dL    BUN, Bld 17 8 - 23 mg/dL    Creatinine 0.7 0.5 - 1.4 mg/dL    Calcium 9.9 8.7 - 10.5 mg/dL    Total Protein 6.7 6.0 - 8.4 g/dL    Albumin 2.4 (L) 3.5 - 5.2 g/dL    Total Bilirubin 0.2 0.1 - 1.0 mg/dL    Alkaline Phosphatase 80 55 - 135 U/L    AST 24 10 - 40 U/L    ALT 20 10 - 44 U/L    Anion Gap 7 (L) 8 - 16 mmol/L    eGFR if African American >60.0 >60 mL/min/1.73 m^2    eGFR if non African American >60.0 >60 mL/min/1.73 m^2   Protime-INR    Collection Time: 06/26/18  5:09 AM   Result Value Ref Range    Prothrombin Time 10.3 9.0 - 12.5 sec    INR 1.0 0.8 - 1.2   POCT glucose    Collection Time: 06/26/18  5:42 AM   Result Value Ref Range    POCT Glucose 233 (H) 70 - 110 mg/dL        Mental Status Exam:   Arousal: Alert  Appearance: Disheveled  Behavior/Cooperation: Calm, resting in bed, squeezes hands on command, eye contact minimal   Speech: Articulation error, nonfluent, nonsensical   Mood: Unable to assesss   Affect: Mood congruent   Thought Process: Poverty of thought   Thought Content: poverty of content  Attention/ Concentration: Patient repeats "ahh", follows command to "take a bite,  spoon to feed self, puts spoon back"  Orientation: Disoriented to self, place, situation, date  Cognition: Easily distracted, impaired  Insight: Poor   Judgment: Poor       ASSESSMENT/PLAN:   Mr. Mendez Guthrie is a 68 y.o. male with history of R MCA infarct " with left sided weakness, facial droop, dysarthria and confusion presents with delirium     Plan:  Delirium due to another medical condition               Discontinued Haldol since QTc was 492 on 6/25. Note QTc 6/22 was 457. Follow QTc   May use Risperdal 1mg qhs and Haldol 2mg PO/IM q6h PRN for agitation              Avoid benzo, antihistamines, anticholinergics, minimize opiates              PT/OT              Delirium precautions; environment, orientation, sensory, avoiding restraints if possible     Recommendations:  Continue recommendations from the primary team to find possible causes of delirium    Lemuel Hall MS3  Psychiatry

## 2018-06-26 NOTE — PROGRESS NOTES
"[facility]  Psychiatry  Progress Note    Patient Name: Mendez Guthrie  MRN: [id number]   Code Status: Full Code  Admission Date: 6/7/2018  Hospital Length of Stay: 19 days  Expected Discharge Date: 6/26/2018  Attending Physician: Jaylan Morataya MD  Primary Care Provider: Valeria Mayen MD    Current Legal Status: N/A    Patient information was obtained from patient.     Subjective:     Principal Problem:Embolic stroke involving right middle cerebral artery    Chief Complaint: Delirium    HPI:   Consultation-Liaison Psychiatry Consult Note      Chief Complaint / Reason for Consult:     delirium     Subjective:     History of Present Illness:   Mendez Guthrie is a 68 y.o. male with a history of R MCA infarct with left sided weakness, facial droop, dysarthria and confusion. Right sided movement is minimal. Etiology likely atheroembolic vs cardioembolic due to EF of 15%. Hospital medicine following; sepsis, hypotension, hyponatremia now resolved, medicine de-escalated abx regimen. No longer likely that pt will require ICU step up.  Psychiatry was consulted for recommendations for management of agitated delirium.  Per chart review, patient received Risperdal 1 mg overnight for agitation.  Wife reports patient has been in soft restraints on his arm for 3 days after he removed his PEG tube.    Patient was seen this morning with wife at bedside.  Patient was unable to answer any questions and only repeated the word "zoom. Zzzzooooommm. Zooooooooooooom."  Patient unable to follow commands or complete CAM-ICU screen.  Wife states that patient has been able to articulate her names and the names of some of their children.  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  Wife states she has not been told by the medical team what her 's new baseline functioning will be.  Wife states that patient did well overnight after " he received his pain medication.  Was taking tramadol at home for arthritis related pain.    Medical Review Of Systems:  Pertinent items are noted in HPI.    Psychiatric Review Of Systems - Is patient experiencing or having changes in:  sleep: yes  appetite: yes  weight: yes  energy/anergy: yes  interest/pleasure/anhedonia: no  somatic symptoms: no  libido: no  anxiety/panic: yes  guilty/hopelessness: no  concentration: yes  S.I.B.s/risky behavior: no  any drugs: no  alcohol: no     Past Psychiatric History:  Previous Medication Trials: no   Previous Psychiatric Hospitalizations: no   Previous Suicide Attempts: no   History of Violence: no  Outpatient Psychiatrist: no    Social History:  Marital Status:   Children: 8   Employment Status/Info: retired, formerly a    Education: 10th grade  Special Ed: no  Housing Status: with wife in [place]   History of phys/sexual abuse: no  Access to gun: no    Substance Abuse History:  Recreational Drugs: denied  Use of Alcohol: denied  Rehab History:no   Tobacco Use:no  Use of Caffeine: denies use  Use of OTC: denied  Legal consequences of chemical use: no    Legal History:  Past Charges/Incarcerations:no   Pending charges:no     Family Psychiatric History:   denied    Hospital Course: 06/23/2018 - [facility]. Slept all night, no issues reported by family, no PRNs for agitation past 24h. Mental status mostly unchanged.    6/25/2018  Patient seen with family at bedside.  Patient remains unable to answer questions or follow commands.  Family denies agitation and reports he is sleeping well.     6/26/2018  Wife at bedside and reports patient is doing well.  He continues to have difficulty with following directions and conveying original thoughts.  Only speech is repetition of words stated by others.  No overnight agitation.     Interval History: see hospital course     Family History     Problem Relation (Age of Onset)    Cancer Father    Diabetes Father, Mother,  "Brother, Son    No Known Problems Brother, Sister, Brother, Daughter, Daughter, Daughter, Daughter, Son, Son, Son    Pneumonia Sister, Sister        Social History Main Topics    Smoking status: Former Smoker     Packs/day: 1.00     Years: 39.00     Types: Cigarettes     Start date: 1/23/1972     Quit date: 5/23/2011    Smokeless tobacco: Never Used    Alcohol use 0.0 oz/week      Comment: occasionally    Drug use: No    Sexual activity: Not Currently     Psychotherapeutics     Start     Stop Route Frequency Ordered    06/26/18 2100  risperidone 1 mg/ml oral solution 1 mg      -- PER G TUBE Nightly 06/26/18 0948    06/25/18 0921  haloperidol lactate injection 2 mg      -- IM Every 6 hours PRN 06/25/18 0821    06/23/18 0900  citalopram tablet 10 mg      -- PER G TUBE Daily 06/22/18 1509           Review of Systems  Objective:     Vital Signs (Most Recent):  Temp: 98.7 °F (37.1 °C) (06/26/18 0824)  Pulse: 88 (06/26/18 0826)  Resp: 19 (06/26/18 0824)  BP: 112/60 (06/26/18 0824)  SpO2: 96 % (06/26/18 0824) Vital Signs (24h Range):  Temp:  [97.6 °F (36.4 °C)-98.7 °F (37.1 °C)] 98.7 °F (37.1 °C)  Pulse:  [] 88  Resp:  [16-19] 19  SpO2:  [94 %-100 %] 96 %  BP: (101-126)/(54-73) 112/60     Height: 5' 8" (172.7 cm)  Weight: 101.2 kg (223 lb 1.7 oz)  Body mass index is 33.92 kg/m².      Intake/Output Summary (Last 24 hours) at 06/26/18 0956  Last data filed at 06/26/18 0600   Gross per 24 hour   Intake             1066 ml   Output                0 ml   Net             1066 ml       Physical Exam       Mental Status Exam:  Appearance: older than stated age, disheveled, lying in bed   Behavior: Calm, resting in bed, eye contact minimal, unable to follow commands   Speech/Language: articulation error, nonfluent, nonsensical   Mood: Unable to assess   Affect: Unable to assess   Thought Process:  poverty of thought   Thought Content: poverty of content   Orientation: disoriented to self, place, sitation, date "   Cognition: easily distracted, impaired   Insight: poor   Judgment: poor       Significant Labs:   Last 24 Hours:   Recent Lab Results       06/26/18  0542 06/26/18  0509 06/26/18  0214 06/25/18  2136 06/25/18  1717      Immature Granulocytes  0.7(H)        Immature Grans (Abs)  0.05  Comment:  Mild elevation in immature granulocytes is non specific and   can be seen in a variety of conditions including stress response,   acute inflammation, trauma and pregnancy. Correlation with other   laboratory and clinical findings is essential.  (H)        Albumin  2.4(L)        Alkaline Phosphatase  80        ALT  20        Anion Gap  7(L)        AST  24        Baso #  0.06        Basophil%  0.8        Total Bilirubin  0.2  Comment:  For infants and newborns, interpretation of results should be based  on gestational age, weight and in agreement with clinical  observations.  Premature Infant recommended reference ranges:  Up to 24 hours.............<8.0 mg/dL  Up to 48 hours............<12.0 mg/dL  3-5 days..................<15.0 mg/dL  6-29 days.................<15.0 mg/dL          BUN, Bld  17        Calcium  9.9        Chloride  93(L)        CO2  33(H)        Creatinine  0.7        Differential Method  Automated        eGFR if   >60.0        eGFR if non   >60.0  Comment:  Calculation used to obtain the estimated glomerular filtration  rate (eGFR) is the CKD-EPI equation.           Eos #  0.1        Eosinophil%  1.8        Glucose  200(H)        Gran # (ANC)  5.4        Gran%  71.0        Hematocrit  34.9(L)        Hemoglobin  11.0(L)        Coumadin Monitoring INR  1.0  Comment:  Coumadin Therapy:  2.0 - 3.0 for INR for all indicators except mechanical heart valves  and antiphospholipid syndromes which should use 2.5 - 3.5.          Lymph #  1.2        Lymph%  16.2(L)        MCH  29.6        MCHC  31.5(L)        MCV  94        Mono #  0.7        Mono%  9.5        MPV  8.9(L)        nRBC  0         Platelets  400(H)        POCT Glucose 233(H)  240(H) 189(H) 188(H)     Potassium  4.1        Total Protein  6.7        Protime  10.3        RBC  3.71(L)        RDW  12.6        Sodium  133(L)        WBC  7.61                    06/25/18  1411 06/25/18  1202      Immature Granulocytes       Immature Grans (Abs)       Albumin       Alkaline Phosphatase       ALT       Anion Gap       AST       Baso #       Basophil%       Total Bilirubin       BUN, Bld       Calcium       Chloride       CO2       Creatinine       Differential Method       eGFR if        eGFR if non        Eos #       Eosinophil%       Glucose       Gran # (ANC)       Gran%       Hematocrit       Hemoglobin       Coumadin Monitoring INR 1.0  Comment:  Coumadin Therapy:  2.0 - 3.0 for INR for all indicators except mechanical heart valves  and antiphospholipid syndromes which should use 2.5 - 3.5.        Lymph #       Lymph%       MCH       MCHC       MCV       Mono #       Mono%       MPV       nRBC       Platelets       POCT Glucose  296(H)     Potassium       Total Protein       Protime 10.6      RBC       RDW       Sodium       WBC             Significant Imaging: None    Assessment/Plan:     Delirium due to another medical condition    · Recommend discontinuing scheduled Haldol. May use Risperdal 0.5 mg PRN. Monitor for QTc prolongation and adverse reactions.  QTc 6/22 was 457. Otc 6/26 485.  · Avoid benzodiazepines, antihistamines, anticholinergics, and minimize opiate use as these may worsen delirium.  · Recommend consult to PT/OT. Early mobility and exercise has been shown to decrease duration of delirium. Provide appropriate lighting and clear signage; a clock and calendar should be easily visible to the patient.  · Monitor environmental factors. Reduce light and noise at night (close shades, turn off lights, turn off TV, ect). Correct any alterations in sleep cycle.  · Reorient the patient to person, place,  time and situation on each encounter.   · Correct sensory deficits if possible (replace eye glasses, hearing aids, ect).  · Avoid restraints if possible. Severely delirious patients benefit from constant observation by a sitter.  · Do not leave patient unattended.  · Continue medical workup to identify and correct possible medical causes of delirium.            Psychiatry to sign off.   Need for Continued Hospitalization:   No need for inpatient psychiatric hospitalization. Continue medical care as per the primary team.    Anticipated Disposition: Still a Patient     Total time:  15 with greater than 50% of this time spent in counseling and/or coordination of care.       Cele Rocha MD   Psychiatry  [facility]

## 2018-06-26 NOTE — ASSESSMENT & PLAN NOTE
Pt agitated at night per wife; Pulled out PEG tube x 2  Psych consulted - Appreciate recs  Pt remains off restraints  Psych adjusted med regimen, however pt stable without any anti-psychotic meds over last 24 hrs; Psych recommended changing regimen to PRN only  Discussed with SNF; they asked that we d/c our psych med regimen so they could re-evaluate the pt and make own recommendations based on pt's status; D/c'd PRN Risperdal at discharge

## 2018-06-26 NOTE — PLAN OF CARE
Problem: SLP Goal  Goal: SLP Goal  Speech Language Pathology Goals  Goals expected to be met by 6/21-all goals remain appropriate continue 6/28  1. Pt will participate in ongoing assessment of swallow.   2. Pt will answer simple y/n questions with 60% accy given repeats.  3. Pt will follow simple commands with 60% accy given mod A.  4. Pt will complete auto speech tasks with 50% accy with max A.           Outcome: Ongoing (interventions implemented as appropriate)  Pt with improved acceptance with po trials though recs remain for npo with strict aspiration precautions. MADDI Webb, CCC/SLP  6/26/2018

## 2018-06-27 PROBLEM — F32.9 REACTIVE DEPRESSION: Status: ACTIVE | Noted: 2018-01-01

## 2018-06-27 NOTE — ASSESSMENT & PLAN NOTE
Stroke risk factor  SBP < 140 long-term  Home meds Coreg and Losartan had been acutely held due to hypotension  Later restarted Metoprolol and low-dose Losartan alternatively; Pt remained stable on this regimen  (Allergy to lisinopril and Norvasc)

## 2018-06-27 NOTE — ASSESSMENT & PLAN NOTE
Seen by cardiology, appreciate recs  EF 15-20%, diastolic dysfunction; Pt likely also with hx underlying CAD  Recommending combination therapy - Coumadin + ASA  See above, started GDMT; no recent hypotension  Ambulatory referral to Cardiology placed

## 2018-06-27 NOTE — ASSESSMENT & PLAN NOTE
Stoke risk factor  Coumadin, ASA per Cards  Needs referral for LHC at discharge  Ambulatory referral to Cardiology

## 2018-06-27 NOTE — ASSESSMENT & PLAN NOTE
Stroke risk factor  Held CPAP acutely due to risk aspiration, encephalopathy, pt nonverbal  To reconsider at SNF

## 2018-06-27 NOTE — ASSESSMENT & PLAN NOTE
EF 15-20% - seen on echo completed 6/7  Cardiac history of CABG in 2016  Cardiology consulted - recommending coumadin and asa  For GDMT at home, pt was on coreg and losartan, however due to hypotension, started metoprolol and lowest dose of losartan. Will monitor BP   Ambulatory referral to Cardiology

## 2018-06-27 NOTE — ASSESSMENT & PLAN NOTE
Stroke risk factor  Evidence on US 6/9  Heparin gtt initiated 6/9; Initially transitioned to Eliquis, however changed to Coumadin 6/25 due to cost barriers  SNF to continue Lovenox bridge to Coumadin for therapeutic INR

## 2018-06-27 NOTE — DISCHARGE SUMMARY
"[facility]  Vascular Neurology  Comprehensive Stroke Center  Discharge Summary     Summary:     Admit Date: 6/7/2018  3:40 AM    Discharge Date and Time: 6/26/2018  3:28 PM    Attending Physician: Monika Gerardo MD    Discharge Provider: Negrita Whitaker PA-C    History of Present Illness: 69 y/o male who Family reports at 8:15 PM on 6-4-18,  he became confused, had right sided weakness in both upper and lower extremity where he almost fell, drooling out of right side of mouth, and right sided facial droop.  On arrival to ED at [facility], pt. Is oriented only to person and place, has difficulty following commands, repetitive speech, refusing all medical care or interventions, trying to leave hospital.  He is unable to give any history and continually says "I'm fine."  Family states he drank beer last night, and one beer this morning.  He stated he felt bad this morning, but otherwise was his normal self. Denies any recent illness, head trauma, h/o CVA or ICH, seizure activity, recent hospitalizations.  Family reports complete compliance with all medications including ASA and Plavix.  While in ED, patient's mentation slowly improved, but later worsened to where he was only AAO x 1.  Family did report that patient has no history of dementia, but did have cough and fever on 6/1/18 as well as HA's for 1 week prior to admission.  Telestroke consulted and suspicion of TIA; will be admitted for further evaluation and treatment.  CT with old microvascular ischemic changes. Attempted carotid US and MRI, however patient could not tolerate. Unable to perform LP, low suspicion for meningitis as patient was initially stable without antibiotics. Patient developed a fever 6/6/18 and was started on vanc/rocephin/ampicillin/acyclovir. Patient had some dysphagia at lunch 6/6/18, and a repeat CT was ordered after he was reported to have worsening facial droop. Repeat CT showed large infarct in R MCA division " with possible mass effect. Telestroke was contacted and on re-review of the case, given his evolving neurological symptoms and presence of new massive stroke over two days, patient was transferred for higher level of care  Risk factors are HTN, HLP, CAD, DM, COPD on Plavix    Hospital Course (synopsis of major diagnoses, care, treatment, and services provided during the course of the hospital stay): 6/8 - Urine studies ordered for hyponatremia. EEG results pending. Bilateral upper/lower extremities US ordered for fever. Procal WNL. Infectious workup negative so far. Cardiology consulted for EF 15-20%.   6-9-18 will give lasix 40 mg NG today as per cardiology recs. Discussion with family regarding anticoagulation due to low EF and DVT left axillary vein and brachial vein, discussion regarding feeding as well and likely bernard of patient swallowing is minimal so will need PEG next week as opposed to waiting and family is in agreement so will start heparin drip with no bolus parameters  6-10-18 once EEG read and no seizure activity may start Modafinil to help with wakefulness  6/11 - EEG to be read today and consider Modafinil. IR consulted for PEG placement tomorrow. Hospital medicine ordering urine studies for hyponatremia.   6/12 - PEG to be placed today. Heparin gtt currently held. Will resume once PEG is placed. AC to start tomorrow once PEG is able to be used. Hyponatremia slightly improving. Continue to hold fluids due to hyponatremia thought to be due to SIADH. Modafinil to be started tomorrow when PEG can be used.  6/13 - PEG placed yesterday. Heparin gtt dc'ed and eliquis started. Modafinil started today. Continuing to restrict fluids (enteral water boluses) due to SIADH/hyponatremia and holding off on lasix that cardiology recommended.  6/14/18 - concern for sepsis today - medicine consulted - hypotensive, febrile, procal elevated.   6/15: Hypotensive to 88/36 overnight; good response to 250cc bolus. [facility]  medicine adjusting insulin and antibiotics regimen. Wakefulness much improved since starting Modafinil. Mild bleeding noted at NC site.  6/16: Pt hypotensive, requiring another bolus yesterday; BP so far stable since. Modafinil held today as family felt made pt more aggressive; plan for decreased dose tomorrow. Added Lactulose PRN to bowel regimen.  6/17: Pt clinically improved from infection/respiratory perspective; medicine de-escalated abx regimen. No further hypotension episodes. Reduced-dose Modafinil today; will monitor response.  6/18: Pt pulled out PEG yesterday evening; ford placed to maintain patency. Attempted to place NGT but pt agitated, family refused. PO meds held for now, including Eliquis (per IR request). D/c'd scheduled suppository, pt on Senna.  6/19 - pulled ford from peg site last night - see note. Replaced in IR today. Patient in wrist restraint with abdominal binder. Family at bedside, educated on importance of protection of peg site. Dc modafinil   6/20 - Meds and TF resumed through PEG tube. Restraint still applied to right arm and abdominal binder applied. Unasyn completed.   6/21 - Patient continues to be agitated at times. Right arm restraint continues so patient will not pull out PEG. Psych consulted. They will complete full consult tomorrow. Suggested risperidone 1 mg qhs and decrease narcotic use.   6/22 - Psych to complete consult today. Appreciate recs. Sodium 132, enteral water bolus decreased to 800 mg four times daily. Continue to monitor. Goal is to dc restraint today after psych makes recommendations.  6/23 - agitation improved, hyponatremia noted on labs but normal when corrected for glucose. It was noted that cardiology recommended aspirin and anticoagulation. Discussed with Dr. Gerardo and started patient on asa 81  6/24 - family reports patient had LLE pain, US LE pending, increased insulin, may need nutrition consult for change in tube feed formula if hyperglycemia  persists  6/25: Psych adjusted med regimen. Pt to remain on Diabetisource, increased detemir. D/c eliquis, start coumadin for cost barriers. US LE negative.  6/26: INR 1.0; INR-adjusted target Lovenox bridge to Coumadin to continue at SNF. Increased Detemir dosing. Corrected Na WNL. D/c to SNF today.      Please see above for detailed hospital course.     Patient seen by the following consultants during stay -  Cardiology (HFrF, CAD), Pulmonology (Right endobronchial lesion on CT Chest), Interventional Radiology (PEG placement), Psychiatry (acute delerium) and hospital medicine co-management for co-morbidities, infection sources/treatment, and discharge recommendations.     Patient discharged to [facility]; family by phone amenable to plan.     Inpatient acute stroke work-up completed and patient is stable for discharge. Please see all appropriate medication changes, imaging results, and necessary follow-up below.    STROKE DOCUMENTATION         NIH Scale:  1a. Level Of Consciousness: 0-->Alert: keenly responsive  1b. LOC Questions: 2-->Answers neither question correctly  1c. LOC Commands: 2-->Performs neither task correctly  2. Best Gaze: 1-->Partial gaze palsy: gaze is abnormal in one or both eyes, but forced deviation or total gaze paresis is not present  3. Visual: 2-->Complete hemianopia  4. Facial Palsy: 2-->Partial paralysis (total or near-total paralysis of lower face)  5a. Motor Arm, Left: 4-->No movement  5b. Motor Arm, Right: 2-->Some effort against gravity: limb cannot get to or maintain (if cued) 90 (or 45) degrees, drifts down to bed, but has some effort against gravity  6a. Motor Leg, Left: 4-->No movement  6b. Motor Leg, Right: 3-->No effort against gravity: leg falls to bed immediately  7. Limb Ataxia: 0-->Absent  8. Sensory: 0-->Normal: no sensory loss  9. Best Language: 2-->Severe aphasia: all communication is through fragmentary expression: great need for inference, questioning, and  guessing by the listener. Range of information that can be exchanged is limited: listener carries burden of. . . (see row details)  10. Dysarthria: 2-->Severe dysarthria: patients speech is so slurred as to be unintelligible in the absence of or out of proportion to any dysphasia, or is mute/anarthric  11. Extinction and Inattention (formerly Neglect): 0-->No abnormality  Total (NIH Stroke Scale): 26        Modified Saunders Score: 5  Beni Coma Scale:    ABCD2 Score:    [id number] Score:   HAS -BLED Score:   ICH Score:   Hunt & De Paz Classification:       Assessment/Plan:       Diagnostic Results:      Brain Imaging   CT 6/6   Large right MCA distribution infarct measuring approximately 7 x 3 cm including the right perisylvian region and extending to the frontal and parietal operculum with associated localized mass effect.     Vessel Imaging   US BUE 6/9/18  Deep venous thrombus in the left axillary vein with additional thrombus in the proximal segment of the branchial vein.  No evidence of DVT in the right upper extremity.     CTA 6/8  CTA head: Occlusion right M2 segment of the MCA within the proximal sylvian fissure..  Heavy atherosclerotic plaquing of the cavernous and supraclinoid ICAs with mild moderate right and mild left cavernous ICA stenosis.  There is diffuse small caliber right M1 segment of the MCA.  Irregularity and narrowing of the right distal vertebral artery concerning for atherosclerotic disease with mild moderate stenosis.  CTA neck: Atherosclerotic plaquing of the carotid bifurcations and proximal ICAs with less than 50% proximal ICA stenosis by NASCET criteria.  CT head: Evolving large right MCA distribution infarction.  Localized mass effect without significant midline shift or hydrocephalus.  No evidence for hemorrhagic conversion.  Clinical correlation and follow-up advised.          Cardiac Imaging   Echo 6/7  CONCLUSIONS     1 - Technically difficult study.     2 - Severely depressed  left ventricular systolic function (EF 15-20%).     3 - Mildly to moderately depressed right ventricular systolic function .     4 - Impaired LV relaxation, normal LAP (grade 1 diastolic dysfunction).     5 - Mild aortic stenosis, WIL = 1.45 cm2, AVAi = 0.67 cm2/m2, peak velocity = 2.8 m/s, mean gradient = 18 mmHg.     6 - Mild mitral regurgitation.         Other Imaging  CT chest 6/14/18  1. Recent development consolidation in right middle and lower lobes consistent with pneumonia or aspiration.  Endobronchial material in right lower lobe airways with intermittent obstruction, could represent retained secretions or aspirated material.  Correlate clinically.  Recommend noncontrast chest CT follow-up in 3 months.  2. Chronic right upper lobe consolidation and volume loss consistent with radiation fibrosis in patient with history of previous lung cancer.  3. Chronic loculation of pleural fluid in the right sulcus is stable.  4. A few subcentimeter pulmonary nodules, stable and likely benign  5. Aortic and coronary atherosclerosis, status post CABG  6. Gastrostomy tube  7. Other findings as above        Interventions: None    Complications: None    Disposition: Skilled Nursing Facility [facility]    Final Active Diagnoses:    Diagnosis Date Noted POA    PRINCIPAL PROBLEM:  Embolic stroke involving right middle cerebral artery [I63.411] 06/07/2018 Yes    Delirium due to another medical condition [F05] 06/26/2018 No    DVT of axillary vein, acute left [I82.A12] 06/09/2018 Yes    Type 2 diabetes mellitus without complication, with long-term current use of insulin [E11.9, Z79.4] 10/24/2014 Not Applicable    Reactive depression [F32.9] 06/26/2018 Unknown    Decreased cardiac ejection fraction [R93.1] 06/08/2018 Yes    Chronic systolic (congestive) heart failure [I50.22] 06/08/2018 Yes    Left spastic hemiparesis [G81.14] 06/07/2018 Yes    Cytotoxic cerebral edema [G93.6] 06/07/2018 Yes    Essential  hypertension [I10] 10/24/2014 Yes    Abnormal ventricular wall motion [R93.8] 06/10/2018 Yes    Dysarthria [R47.1] 06/08/2018 Yes    GENIE on CPAP [G47.33, Z99.89] 11/30/2017 Not Applicable    COPD (chronic obstructive pulmonary disease) [J44.9] 10/24/2014 Yes    Coronary artery disease involving native coronary artery of native heart without angina pectoris [I25.10] 10/24/2014 Yes    History of lung cancer [Z85.118] 10/24/2014 Not Applicable    Acute encephalopathy [G93.40] 06/18/2018 Yes    Alteration in skin integrity [R23.9] 06/15/2018 Yes    Abnormal chest CT [R93.8] 11/30/2017 Yes      Problems Resolved During this Admission:    Diagnosis Date Noted Date Resolved POA    Aspiration pneumonia [J69.0] 06/16/2018 06/24/2018 No    Sepsis [A41.9] 06/14/2018 06/17/2018 No    Acute on chronic respiratory failure with hypoxia [J96.21] 06/14/2018 06/25/2018 No    Hyponatremia [E87.1] 06/08/2018 06/17/2018 Yes    Fever of unknown origin [R50.9] 06/08/2018 06/15/2018 Yes    MEGGAN (acute kidney injury) [N17.9] 07/06/2014 06/17/2018 Yes     * Embolic stroke involving right middle cerebral artery    69 y/o male with R MCA infarct with left sided weakness, facial droop, dysarthria and confusion. Right sided movement is minimal. Etiology likely atheroembolic vs cardioembolic due to EF of 15%. EEG completed and no seizure activity noted. Modafinil showed improved wakefulness; however later d/c'd due to pt agitation, for which Psych was consulted.  Pt pulled out PEG 6/17 & 6/18 evenings; PEG replacement 6/19. Pt was able to remain off restraints.     D/c'd eliquis, started coumadin due to cost barriers. INR 1.0 on 6/27; INR-adjusted target Lovenox bridge to Coumadin to continue at SNF. D/c 6/26.      Antithrombotics: Lovenox bridge to Coumadin 7.5mg Daily (INR-adjusted target) and ASA 81  Statins: Crestor 40 mg daily  Aggressive risk factor modification: HTN, DM, HLD, Diet, Exercise, Obesity, CAD  Rehab efforts:  SNF  BP parameters: Infarct: Avoid hypotension        Delirium due to another medical condition    Pt agitated at night per wife; Pulled out PEG tube x 2  Psych consulted - Appreciate recs  Pt remains off restraints  Psych adjusted med regimen, however pt stable without any anti-psychotic meds over last 24 hrs; Psych recommended changing regimen to PRN only  Discussed with SNF; they asked that we d/c our psych med regimen so they could re-evaluate the pt and make own recommendations based on pt's status; D/c'd PRN Risperdal at discharge        DVT of axillary vein, acute left    Stroke risk factor  Evidence on US 6/9  Heparin gtt initiated 6/9; Initially transitioned to Eliquis, however changed to Coumadin 6/25 due to cost barriers  SNF to continue Lovenox bridge to Coumadin for therapeutic INR        Type 2 diabetes mellitus without complication, with long-term current use of insulin    Stroke risk factor  HgB A1C 9.4%  Increased Detemir to 32U BID and aspart to 10U q6h  Pt to remain on Diabetisource for TFs, per Medicine co-management  SSI, further adjustments to continue at SNF  Low Na calculation corrects in setting of hyperglycemia        Reactive depression    Pt with flat affect; Likely a result of stroke  Started Citalopram 10mg Daily        Chronic systolic (congestive) heart failure    Seen by cardiology, appreciate recs  EF 15-20%, diastolic dysfunction; Pt likely also with hx underlying CAD  Recommending combination therapy - Coumadin + ASA  See above, started GDMT; no recent hypotension  Ambulatory referral to Cardiology placed        Decreased cardiac ejection fraction    EF 15-20% - seen on echo completed 6/7  Cardiac history of CABG in 2016  Cardiology consulted - recommending coumadin and asa  For GDMT at home, pt was on coreg and losartan, however due to hypotension, started metoprolol and lowest dose of losartan. Will monitor BP   Ambulatory referral to Cardiology        Cytotoxic cerebral edema     Large area of cytotoxic cerebral edema identified when reviewing brain imaging in the territory of the R middle cerebral artery. There is not mass effect associated with it. We will continue to monitor the patients clinical exam for any worsening of symptoms which may indicate expansion of the stroke or the area of the edema resulting in the clinical change. The pattern is suggestive of atheroembolic etiology.        Left spastic hemiparesis    Due to stroke  Continue aggressive therapy at SNF        Essential hypertension    Stroke risk factor  SBP < 140 long-term  Home meds Coreg and Losartan had been acutely held due to hypotension  Later restarted Metoprolol and low-dose Losartan alternatively; Pt remained stable on this regimen  (Allergy to lisinopril and Norvasc)        Abnormal ventricular wall motion    Stroke risk factor  Akinetic apex, depressed EF on echo  Coumadin, ASA per cards  Ambulatory referral to Cardiology        Dysarthria    Result of stroke  Continue SLP at SNF        GENIE on CPAP    Stroke risk factor  Held CPAP acutely due to risk aspiration, encephalopathy, pt nonverbal  To reconsider at SNF        Coronary artery disease involving native coronary artery of native heart without angina pectoris    Stoke risk factor  Coumadin, ASA per Cards  Needs referral for [facility] at discharge  Ambulatory referral to Cardiology        COPD (chronic obstructive pulmonary disease)    Stroke risk factor  Duonebs Q6 due to pt not being able to follow commands and RT not being able to administer inhalers   Currently maintaining O2 sats in the >90% on 2L NC  Pulmonary consulted, Signed off - f/u outpatient   Continued duonebs at d/c to [facility]        History of lung cancer    Stroke risk factor  CT Chest this admission with concerning findings  Pulmonology feels pt too ill for acute intervention; Signed off  Plan for outpatient follow up with pulmonologist Dr Saunders ([facility]) for further workup of CT findings  when acute illness has improved            Recommendations:     Post-discharge complication risks: Falls, Pneumonia, Seizure, Skin breakdown, Urinary tract infections    Stroke Education given to: patient and family    Follow-up in Stroke Clinic in 4-6 weeks.     Discharge Plan:  Antithrombotics: Aspirin 81mg, Coumadin   Statin: Rosuvastatin 40mg  Anticoagulant: Warfarin  Aggresive risk factor modification:  Hypertension  Diabetes  High Cholesterol  Diet  Exercise  Obesity  Carotid Artery disease    Follow Up:  Follow-up Information     Valeria Mayen MD In 1 week.    Specialty:  Internal Medicine  Why:  Follow-up with your PCP within 1 week of hospital discharge  Contact information:  [address] [facility] [address]  [phone]             [facility] VASCULAR NEUROLOGY.    Specialty:  Vascular Neurology  Why:  Someone from our office will call you to set up a follow-up appt within 4-6 weeks of hospital discharge  Contact information:  [address] [id number] [phone]           Frnak Saunders MD In 4 weeks.    Specialty:  Pulmonary Disease  Why:  Abnormal CT Chest findings; Hx lung CA  Contact information:  [address] [facility] [address]  [phone]             [facility] CARDIOLOGY In 4 weeks.    Specialty:  Cardiology  Why:  CM workup; LHC to be performed as an outpatient  Contact information:  [address] [facility] [id number] [phone]                 Patient Instructions:   No discharge procedures on file.    Medications:  Reconciled Home Medications:      Medication List      START taking these medications    albuterol-ipratropium 2.5 mg-0.5 mg/3 mL nebulizer solution  Commonly known as:  DUO-NEB  Take 3 mLs by nebulization every 6 (six) hours. Rescue     citalopram 10 MG tablet  Commonly known as:  CELEXA  1 tablet (10 mg total) by Per G Tube route once daily.     enoxaparin 100 mg/mL Syrg  Commonly known as:  LOVENOX  Inject 1 mL (100 mg  total) into the skin every 12 (twelve) hours.     insulin lispro 100 unit/mL injection  Commonly known as:  HumaLOG U-100 Insulin  Inject 10 Units into the skin every 6 (six) hours.     metoprolol tartrate 25 MG tablet  Commonly known as:  LOPRESSOR  1 tablet (25 mg total) by Per G Tube route 2 (two) times daily.     miconazole NITRATE 2 % 2 % top powder  Commonly known as:  MICOTIN  Apply topically 2 (two) times daily as needed (for rashes upper legs).     risperidone 1 mg/ml 1 mg/mL Soln  Commonly known as:  RISPERDAL  0.5 mLs (0.5 mg total) by Per G Tube route nightly as needed.     senna-docusate 8.6-50 mg 8.6-50 mg per tablet  Commonly known as:  PERICOLACE  2 tablets by Per G Tube route 2 (two) times daily.     warfarin 7.5 MG tablet  Commonly known as:  COUMADIN  1 tablet (7.5 mg total) by Per G Tube route Daily.        CHANGE how you take these medications    aspirin 81 MG Chew  1 tablet (81 mg total) by Per G Tube route once daily.  What changed:  how to take this     ergocalciferol 50,000 unit Cap  Commonly known as:  VITAMIN D2  1 capsule (50,000 Units total) by Per G Tube route every 7 days.  What changed:  See the new instructions.     insulin detemir U-100 100 unit/mL (3 mL) Inpn pen  Commonly known as:  LEVEMIR FLEXTOUCH  Inject 32 Units into the skin 2 (two) times daily.  What changed:  · how much to take  · when to take this  · additional instructions     losartan 25 MG tablet  Commonly known as:  COZAAR  0.5 tablets (12.5 mg total) by Per G Tube route once daily.  What changed:  · medication strength  · how much to take  · how to take this     rosuvastatin 40 MG Tab  Commonly known as:  CRESTOR  1 tablet (40 mg total) by Per G Tube route every evening.  What changed:  how to take this        STOP taking these medications    blood sugar diagnostic Strp     carvedilol 12.5 MG tablet  Commonly known as:  COREG     chlorthalidone 25 MG Tab  Commonly known as:  HYGROTEN     clopidogrel 75 mg  tablet  Commonly known as:  PLAVIX     diazePAM 5 MG tablet  Commonly known as:  VALIUM     docusate sodium 100 MG capsule  Commonly known as:  COLACE     doxazosin 2 MG tablet  Commonly known as:  CARDURA     fluticasone-salmeterol 250-50 mcg/dose 250-50 mcg/dose diskus inhaler  Commonly known as:  ADVAIR DISKUS     HYDROcodone-acetaminophen 5-325 mg per tablet  Commonly known as:  NORCO     insulin aspart U-100 100 unit/mL Inpn pen  Commonly known as:  NovoLOG     linagliptin 5 mg Tab tablet  Commonly known as:  TRADJENTA     metFORMIN 1000 MG tablet  Commonly known as:  GLUCOPHAGE     nitroGLYCERIN 0.4 MG SL tablet  Commonly known as:  NITROSTAT     PROAIR HFA 90 mcg/actuation inhaler  Generic drug:  albuterol     SPIRIVA WITH HANDIHALER 18 mcg inhalation capsule  Generic drug:  tiotropium     traMADol 50 mg tablet  Commonly known as:  ULTRAM            Negrita Whitaker PA-C  Comprehensive Stroke Center  Department of Vascular Neurology   [facility]

## 2018-06-27 NOTE — ASSESSMENT & PLAN NOTE
69 y/o male with R MCA infarct with left sided weakness, facial droop, dysarthria and confusion. Right sided movement is minimal. Etiology likely atheroembolic vs cardioembolic due to EF of 15%. EEG completed and no seizure activity noted. Modafinil showed improved wakefulness; however later d/c'd due to pt agitation, for which Psych was consulted.  Pt pulled out PEG 6/17 & 6/18 evenings; PEG replacement 6/19. Pt was able to remain off restraints.     D/c'd eliquis, started coumadin due to cost barriers. INR 1.0 on 6/27; INR-adjusted target Lovenox bridge to Coumadin to continue at SNF. D/c 6/26.      Antithrombotics: Lovenox bridge to Coumadin 7.5mg Daily (INR-adjusted target) and ASA 81  Statins: Crestor 40 mg daily  Aggressive risk factor modification: HTN, DM, HLD, Diet, Exercise, Obesity, CAD  Rehab efforts: SNF  BP parameters: Infarct: Avoid hypotension

## 2018-06-27 NOTE — ASSESSMENT & PLAN NOTE
Stroke risk factor  Akinetic apex, depressed EF on echo  Coumadin, ASA per cards  Ambulatory referral to Cardiology

## 2018-06-27 NOTE — ASSESSMENT & PLAN NOTE
Stroke risk factor  Duonebs Q6 due to pt not being able to follow commands and RT not being able to administer inhalers   Currently maintaining O2 sats in the >90% on 2L NC  Pulmonary consulted, Signed off - f/u outpatient   Continued duonebs at d/c to SNF

## 2018-06-30 PROBLEM — G93.5 BRAIN COMPRESSION: Status: ACTIVE | Noted: 2018-01-01

## 2018-06-30 PROBLEM — I61.9 CEREBRAL PARENCHYMAL HEMORRHAGE: Status: ACTIVE | Noted: 2018-01-01

## 2018-06-30 PROBLEM — E78.2 MIXED HYPERLIPIDEMIA: Status: ACTIVE | Noted: 2018-01-01

## 2018-06-30 NOTE — ASSESSMENT & PLAN NOTE
-- Neurosurgery consulted  -- 06/30: CTH pending  -- SBP goal < 160  -- PT/OT/Speech  -- Continue Neuro checks q 1hr   -- CXR pending  -- Keppra 500 Bid for prophylaxis   -- Vascular Neurology consulted  -- Daily CXR  -- Daily ABGs

## 2018-06-30 NOTE — PROGRESS NOTES
Pt arrived via ambulance intubated with an 8.0 ETT secured at 26 at the lip on documented settings. Will continue to monitor.

## 2018-06-30 NOTE — ASSESSMENT & PLAN NOTE
Seen on echo 6/7  Cardiology previously consulted   Recommended ASA and AC  Patient discharged on Lovenox with coumadin bridge

## 2018-06-30 NOTE — ASSESSMENT & PLAN NOTE
-- Continue to monitor HR and BP   -- SBP goal < 160  -- 06/07/18 2D echo:    1 - Technically difficult study.     2 - Severely depressed left ventricular systolic function (EF 15-20%).     3 - Mildly to moderately depressed right ventricular systolic function .     4 - Impaired LV relaxation, normal LAP (grade 1 diastolic dysfunction).     5 - Mild aortic stenosis, WIL = 1.45 cm2, AVAi = 0.67 cm2/m2, peak velocity = 2.8 m/s, mean gradient = 18 mmHg.     6 - Mild mitral regurgitation.   -- Labetalol Prn  -- Cardene Prn

## 2018-06-30 NOTE — H&P
[facility]  Neurocritical Care  History & Physical    Admit Date: 6/30/2018  Service Date: 06/30/2018  Length of Stay: 0    Subjective:     Chief Complaint: Cerebral parenchymal hemorrhage    History of Present Illness: Mr Guthrie is a 68 y.o. male with signification pmhx of Aspiration PNA(06/16/18), Acute on Chronic resp fail with hypoxia(06/14/18), Asthma, COPD, CAD, DM, RMCA(06/06/18), High Cholesterol, Lung CA, HTN, and Long term Antithrombotic/Antiplatelet therapy who presents to [facility] for a higher level of care s/p Large Hemorrhagic conversion of ischemic stroke. The patient was initially seen at [facility] ED after being found unresponsive at [facility]. MAR indicates pt to be on Coumadin (7.5 mg qD), Lovenox (100mg bid) and ASA (81mg qD). Received KCentra and protamine at OSH.     Past Medical History:   Diagnosis Date    Acute on chronic respiratory failure with hypoxia 6/14/2018    Arthritis     Aspiration pneumonia 6/16/2018    Asthma     COPD (chronic obstructive pulmonary disease)     Coronary artery disease     Diabetes mellitus     Embolic stroke involving right middle cerebral artery 6/6/2018    High cholesterol     History of lung cancer 10/24/2014    Hypertension     Long term current use of antithrombotics/antiplatelets     Stroke 6/6/2018     Past Surgical History:   Procedure Laterality Date    cardiac bypass      CARDIAC SURGERY  05/04/2016    PORTACATH PLACEMENT  2012    cancer treatment      Current Facility-Administered Medications on File Prior to Encounter   Medication Dose Route Frequency Provider Last Rate Last Dose    [COMPLETED] human prothrombin complex (PCC) (KCENTRA) 500 unit (400-620 unit) injection 5,000 Units  5,000 Units Intravenous ED 1 Time Saul Rodriguez MD   5,000 Units at 06/30/18 1424    [COMPLETED] lorazepam injection 2 mg  2 mg Intravenous ED 1 Time Saul Rodriguez MD   2 mg at 06/30/18 4798     [COMPLETED] lorazepam injection   Intravenous Code/trauma/sedation Med Saul Rodriguez MD   1 mg at 06/30/18 1343    [COMPLETED] mannitol 25% injection 50 g  50 g Intravenous ED 1 Time Saul Rodriguez MD   Stopped at 06/30/18 1510    [COMPLETED] propofol (DIPRIVAN) 10 mg/mL infusion  20 mcg/kg/min Intravenous ED 1 Time Saul Rodriguez MD 8.6 mL/hr at 06/30/18 1459 15 mcg/kg/min at 06/30/18 1459    [COMPLETED] sodium chloride 0.9% 50 mL with protamine 100 mg   Intravenous Once Saul Rodriguez MD        [COMPLETED] succinylcholine injection   Intravenous Code/trauma/sedation Med Saul Rodriguez MD   100 mg at 06/30/18 1341    [DISCONTINUED] human prothrombin complex (PCC) (KCENTRA) 500 unit (400-620 unit) injection 5,000 Units  5,000 Units Intravenous Once Saul Rodriguez MD        [DISCONTINUED] human prothrombin complex (PCC) (KCENTRA) 500 unit (400-620 unit) injection 5,000 Units  5,000 Units Intravenous Once Saul Rodriguez MD        [DISCONTINUED] norepinephrine 4 mg in dextrose 5 % 250 mL infusion  0.02 mcg/kg/min Intravenous Continuous Saul Rodriguez MD 17.9 mL/hr at 06/30/18 1550 0.05 mcg/kg/min at 06/30/18 1550    [DISCONTINUED] protamine injection 100 mg  100 mg Intravenous Once Saul Rodriguez MD        [DISCONTINUED] sodium chloride 0.9% 100 mL with protamine 100 mg   Intravenous Once Saul Rodriguez MD         Current Outpatient Prescriptions on File Prior to Encounter   Medication Sig Dispense Refill    albuterol-ipratropium (DUO-NEB) 2.5 mg-0.5 mg/3 mL nebulizer solution Take 3 mLs by nebulization every 6 (six) hours. Rescue 1 Box 0    aspirin 81 MG Chew 1 tablet (81 mg total) by Per G Tube route once daily.  0    citalopram (CELEXA) 10 MG tablet 1 tablet (10 mg total) by Per G Tube route once daily. 30 tablet 11    enoxaparin (LOVENOX) 100 mg/mL Syrg Inject 1 mL (100 mg total) into the skin every 12 (twelve) hours.      ergocalciferol (VITAMIN D2)  50,000 unit Cap 1 capsule (50,000 Units total) by Per G Tube route every 7 days. 8 capsule 3    insulin detemir U-100 (LEVEMIR FLEXTOUCH) 100 unit/mL (3 mL) SubQ InPn pen Inject 32 Units into the skin 2 (two) times daily.  0    insulin lispro (HUMALOG U-100 INSULIN) 100 unit/mL injection Inject 10 Units into the skin every 6 (six) hours.      losartan (COZAAR) 25 MG tablet 0.5 tablets (12.5 mg total) by Per G Tube route once daily. 45 tablet 3    metoprolol tartrate (LOPRESSOR) 25 MG tablet 1 tablet (25 mg total) by Per G Tube route 2 (two) times daily. 60 tablet 11    miconazole NITRATE 2 % (MICOTIN) 2 % top powder Apply topically 2 (two) times daily as needed (for rashes upper legs).  0    risperidone 1 mg/ml (RISPERDAL) 1 mg/mL Soln 0.5 mLs (0.5 mg total) by Per G Tube route nightly as needed. 30 mL 11    rosuvastatin (CRESTOR) 40 MG Tab 1 tablet (40 mg total) by Per G Tube route every evening. 90 tablet 3    senna-docusate 8.6-50 mg (PERICOLACE) 8.6-50 mg per tablet 2 tablets by Per G Tube route 2 (two) times daily.      warfarin (COUMADIN) 7.5 MG tablet 1 tablet (7.5 mg total) by Per G Tube route Daily. 30 tablet 11      Allergies: Lisinopril and Norvasc [amlodipine]    Family History   Problem Relation Age of Onset    Cancer Father     Diabetes Father     Diabetes Mother     Pneumonia Sister     No Known Problems Brother     Pneumonia Sister     No Known Problems Sister     No Known Problems Brother     Diabetes Brother     No Known Problems Daughter     No Known Problems Daughter     No Known Problems Daughter     No Known Problems Daughter     Diabetes Son     No Known Problems Son     No Known Problems Son     No Known Problems Son      Social History   Substance Use Topics    Smoking status: Former Smoker     Packs/day: 1.00     Years: 39.00     Types: Cigarettes     Start date: 1/23/1972     Quit date: 5/23/2011    Smokeless tobacco: Never Used    Alcohol use 0.0 oz/week       Comment: occasionally     Review of Systems   Unable to assess due to LOC  Objective:   GA: Comfortable, no acute distress.   HEENT: No scleral icterus or JVD.   Pulmonary: Clear to auscultation A/L. No wheezing, crackles, or rhonchi.  Cardiac: RRR S1 & S2 w/o rubs/murmurs/gallops.   Abdominal: Bowel sounds present x 4. No appreciable hepatosplenomegaly.  Skin: No jaundice, rashes, or visible lesions.  Neuro:  --GCS: E1 V1 M4  --Mental Status: Comatose off sedation.   --Pupils RT 6mm fixed, LT 3mm sluggish   --Corneal reflex, cough intact, no gag.  --LUE strength: 0/5  --LLE strength: 1/5  --RUE strength: 1/5  --RLE strength: 1/5  Vitals:         Pulse  Min: 93  Max: 128  BP  Min: 79/53  Max: 198/94  MAP (mmHg)  Min: 59  Max: 151  Resp  Min: 0  Max: 28  SpO2  Min: 100 %  Max: 100 %  Oxygen Concentration (%)  Min: 50  Max: 100    No intake/output data recorded.           Today I personally reviewed pertinent medications, lines/drains/airways, imaging, cardiology, lab results, microbiology results, notably:        Assessment/Plan:     Neuro   * Cerebral parenchymal hemorrhage    -- Neurosurgery consulted  -- 06/30: CTH pending  -- SBP goal < 160  -- PT/OT/Speech  -- Continue Neuro checks q 1hr   -- CXR pending  -- Keppra 500 Bid for prophylaxis   -- Vascular Neurology consulted  -- Daily CXR  -- Daily ABGs         Brain compression    -- See Cerebral parenchymal hemorrhage        Cardiac/Vascular   Decreased cardiac ejection fraction    -- See Essential Htn        Essential hypertension    -- Continue to monitor HR and BP   -- SBP goal < 160  -- 06/07/18 2D echo:    1 - Technically difficult study.     2 - Severely depressed left ventricular systolic function (EF 15-20%).     3 - Mildly to moderately depressed right ventricular systolic function .     4 - Impaired LV relaxation, normal LAP (grade 1 diastolic dysfunction).     5 - Mild aortic stenosis, WIL = 1.45 cm2, AVAi = 0.67 cm2/m2, peak velocity = 2.8 m/s, mean  gradient = 18 mmHg.     6 - Mild mitral regurgitation.   -- Labetalol Prn  -- Cardene Prn        Hematology   DVT of axillary vein, acute left    -- hx of  -- UE U/S pending        Endocrine   Type 2 diabetes mellitus without complication, with long-term current use of insulin    -- Continue sliding scale  -- POCT q 6            Prophylaxis:  Venous Thromboembolism: mechanical  Stress Ulcer: H2B  Ventilator Pneumonia: no     Activity Orders          None        Full Code  Uninterrupted Critical Care/Counseling Time (not including procedures): 60 min  Mario Shine NP  Neurocritical Care  [facility]

## 2018-06-30 NOTE — HOSPITAL COURSE
06/30: Admit to [facility], [facility], Cardene  07/03: worsening mass effect, volume of blood stable,new ROOSEVELT and PCA imfarcts, prognosis communicated to family  7/4 Continues on levophed gtt to keep MAP >65. Na 160 2% decreased. To 35% then weaned to off as Na stayed elevated. Blood glucoses > 200 shceduled aspar 2 units q4h  7/5: Large R -MCA stroke with hemorrhagic conversion after anticoagulation for DVTs. No events overnight. Decerebrate posture. HTS  cc q 6hrs for serum NA < 155. Last CT head with significant R to L sub falcine shift, uncal herniation and brain stem ischemia.   Patient has a severe cardiomyopathy. Stroke team and I had a nice conversation with his family about his state and prognosis. Planing to wean Levophed off. SC Heparin and ASA were started. Plan discussed with stroke team and the patient's family.   7/6: Discussion with family at bedside. Decision made to withdraw care once son arrives. We will continue supportive care up until that point.   7/7: No acute change in patent status. Waiting for patients son to arrive. Family voices no needs or concerns at this time,.

## 2018-06-30 NOTE — SUBJECTIVE & OBJECTIVE
Past Medical History:   Diagnosis Date    Acute on chronic respiratory failure with hypoxia 6/14/2018    Arthritis     Aspiration pneumonia 6/16/2018    Asthma     COPD (chronic obstructive pulmonary disease)     Coronary artery disease     Diabetes mellitus     Embolic stroke involving right middle cerebral artery 6/6/2018    High cholesterol     History of lung cancer 10/24/2014    Hypertension     Long term current use of antithrombotics/antiplatelets     Stroke 6/6/2018     Past Surgical History:   Procedure Laterality Date    cardiac bypass      CARDIAC SURGERY  05/04/2016    PORTACATH PLACEMENT  2012    cancer treatment     Family History   Problem Relation Age of Onset    Cancer Father     Diabetes Father     Diabetes Mother     Pneumonia Sister     No Known Problems Brother     Pneumonia Sister     No Known Problems Sister     No Known Problems Brother     Diabetes Brother     No Known Problems Daughter     No Known Problems Daughter     No Known Problems Daughter     No Known Problems Daughter     Diabetes Son     No Known Problems Son     No Known Problems Son     No Known Problems Son      Social History   Substance Use Topics    Smoking status: Former Smoker     Packs/day: 1.00     Years: 39.00     Types: Cigarettes     Start date: 1/23/1972     Quit date: 5/23/2011    Smokeless tobacco: Never Used    Alcohol use 0.0 oz/week      Comment: occasionally     Review of patient's allergies indicates:   Allergen Reactions    Lisinopril Swelling    Norvasc [amlodipine] Swelling       Medications: I have reviewed the current medication administration record.    Prescriptions Prior to Admission   Medication Sig Dispense Refill Last Dose    albuterol-ipratropium (DUO-NEB) 2.5 mg-0.5 mg/3 mL nebulizer solution Take 3 mLs by nebulization every 6 (six) hours. Rescue 1 Box 0     aspirin 81 MG Chew 1 tablet (81 mg total) by Per G Tube route once daily.  0     citalopram  (CELEXA) 10 MG tablet 1 tablet (10 mg total) by Per G Tube route once daily. 30 tablet 11     enoxaparin (LOVENOX) 100 mg/mL Syrg Inject 1 mL (100 mg total) into the skin every 12 (twelve) hours.       ergocalciferol (VITAMIN D2) 50,000 unit Cap 1 capsule (50,000 Units total) by Per G Tube route every 7 days. 8 capsule 3     insulin detemir U-100 (LEVEMIR FLEXTOUCH) 100 unit/mL (3 mL) SubQ InPn pen Inject 32 Units into the skin 2 (two) times daily.  0     insulin lispro (HUMALOG U-100 INSULIN) 100 unit/mL injection Inject 10 Units into the skin every 6 (six) hours.       losartan (COZAAR) 25 MG tablet 0.5 tablets (12.5 mg total) by Per G Tube route once daily. 45 tablet 3     metoprolol tartrate (LOPRESSOR) 25 MG tablet 1 tablet (25 mg total) by Per G Tube route 2 (two) times daily. 60 tablet 11     miconazole NITRATE 2 % (MICOTIN) 2 % top powder Apply topically 2 (two) times daily as needed (for rashes upper legs).  0     risperidone 1 mg/ml (RISPERDAL) 1 mg/mL Soln 0.5 mLs (0.5 mg total) by Per G Tube route nightly as needed. 30 mL 11     rosuvastatin (CRESTOR) 40 MG Tab 1 tablet (40 mg total) by Per G Tube route every evening. 90 tablet 3     senna-docusate 8.6-50 mg (PERICOLACE) 8.6-50 mg per tablet 2 tablets by Per G Tube route 2 (two) times daily.       warfarin (COUMADIN) 7.5 MG tablet 1 tablet (7.5 mg total) by Per G Tube route Daily. 30 tablet 11        Review of Systems   Constitutional: Negative for fever.   HENT: Positive for trouble swallowing.    Respiratory: Positive for shortness of breath.    Cardiovascular: Negative for chest pain.   Gastrointestinal: Negative for nausea and vomiting.   Genitourinary: Negative for urgency.   Neurological: Positive for speech difficulty and weakness.        Unresponsive    Psychiatric/Behavioral: Negative for agitation.     Objective:     Vital Signs (Most Recent):  Pulse: 104 (06/30/18 1749)  Resp: 16 (06/30/18 1749)  BP: (!) 141/82 (06/30/18  1749)  SpO2: 100 % (06/30/18 1749)    Vital Signs Range (Last 24H):  Pulse:  []   Resp:  [0-28]   BP: ()/()   SpO2:  [100 %]     Physical Exam   Constitutional: He appears well-developed.   HENT:   Head: Normocephalic and atraumatic.   Eyes:   Right pupil 5 sluggish  Left pupil 3 reactive    Neck: Normal range of motion. No tracheal deviation present.   Cardiovascular: Normal rate.    Pulmonary/Chest: Effort normal.   Intubated   Abdominal: Soft.   Musculoskeletal:   Flaccid throughout    Skin: Skin is warm.   Vitals reviewed.      Neurological Exam: Patient currently on sedation   LOC: alert and obtunded  Attention Span: Intubated  Language: No aphasia, Intubated  Articulation: Untestable due to intubation  Orientation: Person, Place, Time , Untestable due to intubation  Visual Fields: Full  Hemianopsia left  EOM (CN III, IV, VI): Oculocephalic Reflex  Normal  Pupils (CN II, III): PERRL  Anisocoria Side: R pupil 5 mm sluggish   Facial Movement (CN VII): Unable to test   Motor: Arm left  Plegia 0/5  Leg left  Plegia 0/5  Arm right  Plegia 0/5  Leg right Plegia 0/5  Cebellar: No evidence of appendicular or axial ataxia  Sensation: Not responding to pain in all extremities       Laboratory:  CMP:   Recent Labs  Lab 06/30/18  1343   CALCIUM 9.9   ALBUMIN 4.1   PROT 8.1   *   K 5.2*   CO2 37*   CL 93*   BUN 26*   CREATININE 0.60*   ALKPHOS 91   ALT 31   AST 52   BILITOT 0.7     CBC:   Recent Labs  Lab 06/30/18  1829   WBC 11.42   RBC 3.99*   HGB 11.9*   HCT 37.1*      MCV 93   MCH 29.8   MCHC 32.1     Lipid Panel: No results for input(s): CHOL, LDLCALC, HDL, TRIG in the last 168 hours.  Coagulation:   Recent Labs  Lab 06/26/18  0509   INR 1.0     Hgb A1C: No results for input(s): HGBA1C in the last 168 hours.  TSH: No results for input(s): TSH in the last 168 hours.    Diagnostic Results:      Brain imaging:  CT 6/30  Large amount of parenchymal hemorrhage throughout the right cerebral  hemisphere with diffuse mass effect and 1.5 cm of right to left midline shift.      Vessel Imaging:  CTA 6/8  CTA head: Occlusion right M2 segment of the MCA within the proximal sylvian fissure..  Heavy atherosclerotic plaquing of the cavernous and supraclinoid ICAs with mild moderate right and mild left cavernous ICA stenosis.  There is diffuse small caliber right M1 segment of the MCA.  Irregularity and narrowing of the right distal vertebral artery concerning for atherosclerotic disease with mild moderate stenosis.  CTA neck: Atherosclerotic plaquing of the carotid bifurcations and proximal ICAs with less than 50% proximal ICA stenosis by NASCET criteria.  CT head: Evolving large right MCA distribution infarction.  Localized mass effect without significant midline shift or hydrocephalus.  No evidence for hemorrhagic conversion.  Clinical correlation and follow-up advised.    Cardiac Evaluation:   Echo 6/7    CONCLUSIONS     1 - Technically difficult study.     2 - Severely depressed left ventricular systolic function (EF 15-20%).     3 - Mildly to moderately depressed right ventricular systolic function .     4 - Impaired LV relaxation, normal LAP (grade 1 diastolic dysfunction).     5 - Mild aortic stenosis, WIL = 1.45 cm2, AVAi = 0.67 cm2/m2, peak velocity = 2.8 m/s, mean gradient = 18 mmHg.     6 - Mild mitral regurgitation.

## 2018-06-30 NOTE — CONSULTS
[facility]  Neurosurgery  Consult Note    Inpatient consult to Neurosurgery  Consult performed by: HARSHAL MARIE  Consult ordered by: CALI JARVIS        Subjective:     Chief Complaint/Reason for Admission: hemorrhagic conversion of stroke    History of Present Illness: 68 M s/p right M2 occlusion on 6/8/18 presents with acute worsening in mental status.  He had left hemiparesis from prior stroke but was found unresponsive at nursing home.  Pt is currently on asa/coumdain/lovenox.  NSGY consulted for possible intervention.  Pt has received kaycentra and protamine for reversal agents.    PTA Medications   Medication Sig    albuterol-ipratropium (DUO-NEB) 2.5 mg-0.5 mg/3 mL nebulizer solution Take 3 mLs by nebulization every 6 (six) hours. Rescue    aspirin 81 MG Chew 1 tablet (81 mg total) by Per G Tube route once daily.    citalopram (CELEXA) 10 MG tablet 1 tablet (10 mg total) by Per G Tube route once daily.    enoxaparin (LOVENOX) 100 mg/mL Syrg Inject 1 mL (100 mg total) into the skin every 12 (twelve) hours.    ergocalciferol (VITAMIN D2) 50,000 unit Cap 1 capsule (50,000 Units total) by Per G Tube route every 7 days.    insulin detemir U-100 (LEVEMIR FLEXTOUCH) 100 unit/mL (3 mL) SubQ InPn pen Inject 32 Units into the skin 2 (two) times daily.    insulin lispro (HUMALOG U-100 INSULIN) 100 unit/mL injection Inject 10 Units into the skin every 6 (six) hours.    losartan (COZAAR) 25 MG tablet 0.5 tablets (12.5 mg total) by Per G Tube route once daily.    metoprolol tartrate (LOPRESSOR) 25 MG tablet 1 tablet (25 mg total) by Per G Tube route 2 (two) times daily.    miconazole NITRATE 2 % (MICOTIN) 2 % top powder Apply topically 2 (two) times daily as needed (for rashes upper legs).    risperidone 1 mg/ml (RISPERDAL) 1 mg/mL Soln 0.5 mLs (0.5 mg total) by Per G Tube route nightly as needed.    rosuvastatin (CRESTOR) 40 MG Tab 1 tablet (40 mg total) by Per G Tube route  every evening.    senna-docusate 8.6-50 mg (PERICOLACE) 8.6-50 mg per tablet 2 tablets by Per G Tube route 2 (two) times daily.    warfarin (COUMADIN) 7.5 MG tablet 1 tablet (7.5 mg total) by Per G Tube route Daily.       Review of patient's allergies indicates:   Allergen Reactions    Lisinopril Swelling    Norvasc [amlodipine] Swelling       Past Medical History:   Diagnosis Date    Acute on chronic respiratory failure with hypoxia 6/14/2018    Arthritis     Aspiration pneumonia 6/16/2018    Asthma     COPD (chronic obstructive pulmonary disease)     Coronary artery disease     Diabetes mellitus     Embolic stroke involving right middle cerebral artery 6/6/2018    High cholesterol     History of lung cancer 10/24/2014    Hypertension     Long term current use of antithrombotics/antiplatelets     Stroke 6/6/2018     Past Surgical History:   Procedure Laterality Date    cardiac bypass      CARDIAC SURGERY  05/04/2016    PORTACATH PLACEMENT  2012    cancer treatment     Family History     Problem Relation (Age of Onset)    Cancer Father    Diabetes Father, Mother, Brother, Son    No Known Problems Brother, Sister, Brother, Daughter, Daughter, Daughter, Daughter, Son, Son, Son    Pneumonia Sister, Sister        Social History Main Topics    Smoking status: Former Smoker     Packs/day: 1.00     Years: 39.00     Types: Cigarettes     Start date: 1/23/1972     Quit date: 5/23/2011    Smokeless tobacco: Never Used    Alcohol use 0.0 oz/week      Comment: occasionally    Drug use: No    Sexual activity: Not Currently     Review of Systems  Objective:        There is no height or weight on file to calculate BMI.  Vital Signs (Most Recent):  Pulse: 104 (06/30/18 1749)  Resp: 16 (06/30/18 1749)  BP: (!) 141/82 (06/30/18 1749)  SpO2: 100 % (06/30/18 1749) Vital Signs (24h Range):  Pulse:  [] 104  Resp:  [0-28] 16  SpO2:  [100 %] 100 %  BP: ()/() 141/82                 Vent Mode:  A/C  Oxygen Concentration (%):  [] 50  Resp Rate Total:  [16 br/min-17 br/min] 16 br/min  Vt Set:  [500 mL] 500 mL  PEEP/CPAP:  [5 cmH20] 5 cmH20  Mean Airway Pressure:  [11 cmH20-15 cmH20] 13 cmH20         NG/OG Tube 06/30/18 1355 16 Fr. Center mouth (Active)            Gastrostomy/Enterostomy 06/19/18 0940 Gastrostomy tube w/ balloon LUQ feeding (Active)       Neurosurgery Physical Exam   E1VTM2  Right pupil fixed/dilated, left pupil sluggish.  +corneals, cough/gag  Extensor in BUE  Triple flexion in BLE    Significant Labs:    Recent Labs  Lab 06/30/18  1343   *   *   K 5.2*   CL 93*   CO2 37*   BUN 26*   CREATININE 0.60*   CALCIUM 9.9       Recent Labs  Lab 06/30/18  1343   WBC 12.60   HGB 12.5*   HCT 37.7*   *     No results for input(s): LABPT, INR, APTT in the last 48 hours.  Microbiology Results (last 7 days)     ** No results found for the last 168 hours. **          Significant Diagnostics:  CT head: reviewed    Assessment/Plan:     Active Diagnoses:    Diagnosis Date Noted POA    PRINCIPAL PROBLEM:  Cerebral parenchymal hemorrhage [I61.9] 06/30/2018 Yes      Problems Resolved During this Admission:    Diagnosis Date Noted Date Resolved POA     68 M s/p right M2 occlusion now complicated by hemorrhagic conversion.  -Due to pts very poor clinical exam and size and extent of hemorrhagic stroke pt is a poor surgical candidate.  Surgery would offer no meaningful quality of life.  -Discussed with family at bedside  -Continue care per NCC  -Discussed with Dr. Olmstead.      Ariel Hillman,   Neurosurgery  [facility]

## 2018-06-30 NOTE — SUBJECTIVE & OBJECTIVE
Past Medical History:   Diagnosis Date    Acute on chronic respiratory failure with hypoxia 6/14/2018    Arthritis     Aspiration pneumonia 6/16/2018    Asthma     COPD (chronic obstructive pulmonary disease)     Coronary artery disease     Diabetes mellitus     Embolic stroke involving right middle cerebral artery 6/6/2018    High cholesterol     History of lung cancer 10/24/2014    Hypertension     Long term current use of antithrombotics/antiplatelets     Stroke 6/6/2018     Past Surgical History:   Procedure Laterality Date    cardiac bypass      CARDIAC SURGERY  05/04/2016    PORTACATH PLACEMENT  2012    cancer treatment      Current Facility-Administered Medications on File Prior to Encounter   Medication Dose Route Frequency Provider Last Rate Last Dose    [COMPLETED] human prothrombin complex (PCC) (KCENTRA) 500 unit (400-620 unit) injection 5,000 Units  5,000 Units Intravenous ED 1 Time Saul Rodriguez MD   5,000 Units at 06/30/18 1425    [COMPLETED] lorazepam injection 2 mg  2 mg Intravenous ED 1 Time Saul Rodriguez MD   2 mg at 06/30/18 1509    [COMPLETED] lorazepam injection   Intravenous Code/trauma/sedation Med Saul Rodriguez MD   1 mg at 06/30/18 1343    [COMPLETED] mannitol 25% injection 50 g  50 g Intravenous ED 1 Time Saul Rodriguez MD   Stopped at 06/30/18 1510    [COMPLETED] propofol (DIPRIVAN) 10 mg/mL infusion  20 mcg/kg/min Intravenous ED 1 Time Saul Rodriguez MD 8.6 mL/hr at 06/30/18 1459 15 mcg/kg/min at 06/30/18 1459    [COMPLETED] sodium chloride 0.9% 50 mL with protamine 100 mg   Intravenous Once Saul Rodriguez MD        [COMPLETED] succinylcholine injection   Intravenous Code/trauma/sedation Med Saul Rodriguez MD   100 mg at 06/30/18 1341    [DISCONTINUED] human prothrombin complex (PCC) (KCENTRA) 500 unit (400-620 unit) injection 5,000 Units  5,000 Units Intravenous Once Saul Rodriguez MD        [DISCONTINUED] human  prothrombin complex (PCC) (KCENTRA) 500 unit (400-620 unit) injection 5,000 Units  5,000 Units Intravenous Once Saul Rodriguez MD        [DISCONTINUED] norepinephrine 4 mg in dextrose 5 % 250 mL infusion  0.02 mcg/kg/min Intravenous Continuous Saul Rodriguez MD 17.9 mL/hr at 06/30/18 1550 0.05 mcg/kg/min at 06/30/18 1550    [DISCONTINUED] protamine injection 100 mg  100 mg Intravenous Once Saul Rodriguez MD        [DISCONTINUED] sodium chloride 0.9% 100 mL with protamine 100 mg   Intravenous Once Saul Rodriguez MD         Current Outpatient Prescriptions on File Prior to Encounter   Medication Sig Dispense Refill    albuterol-ipratropium (DUO-NEB) 2.5 mg-0.5 mg/3 mL nebulizer solution Take 3 mLs by nebulization every 6 (six) hours. Rescue 1 Box 0    aspirin 81 MG Chew 1 tablet (81 mg total) by Per G Tube route once daily.  0    citalopram (CELEXA) 10 MG tablet 1 tablet (10 mg total) by Per G Tube route once daily. 30 tablet 11    enoxaparin (LOVENOX) 100 mg/mL Syrg Inject 1 mL (100 mg total) into the skin every 12 (twelve) hours.      ergocalciferol (VITAMIN D2) 50,000 unit Cap 1 capsule (50,000 Units total) by Per G Tube route every 7 days. 8 capsule 3    insulin detemir U-100 (LEVEMIR FLEXTOUCH) 100 unit/mL (3 mL) SubQ InPn pen Inject 32 Units into the skin 2 (two) times daily.  0    insulin lispro (HUMALOG U-100 INSULIN) 100 unit/mL injection Inject 10 Units into the skin every 6 (six) hours.      losartan (COZAAR) 25 MG tablet 0.5 tablets (12.5 mg total) by Per G Tube route once daily. 45 tablet 3    metoprolol tartrate (LOPRESSOR) 25 MG tablet 1 tablet (25 mg total) by Per G Tube route 2 (two) times daily. 60 tablet 11    miconazole NITRATE 2 % (MICOTIN) 2 % top powder Apply topically 2 (two) times daily as needed (for rashes upper legs).  0    risperidone 1 mg/ml (RISPERDAL) 1 mg/mL Soln 0.5 mLs (0.5 mg total) by Per G Tube route nightly as needed. 30 mL 11    rosuvastatin  (CRESTOR) 40 MG Tab 1 tablet (40 mg total) by Per G Tube route every evening. 90 tablet 3    senna-docusate 8.6-50 mg (PERICOLACE) 8.6-50 mg per tablet 2 tablets by Per G Tube route 2 (two) times daily.      warfarin (COUMADIN) 7.5 MG tablet 1 tablet (7.5 mg total) by Per G Tube route Daily. 30 tablet 11      Allergies: Lisinopril and Norvasc [amlodipine]    Family History   Problem Relation Age of Onset    Cancer Father     Diabetes Father     Diabetes Mother     Pneumonia Sister     No Known Problems Brother     Pneumonia Sister     No Known Problems Sister     No Known Problems Brother     Diabetes Brother     No Known Problems Daughter     No Known Problems Daughter     No Known Problems Daughter     No Known Problems Daughter     Diabetes Son     No Known Problems Son     No Known Problems Son     No Known Problems Son      Social History   Substance Use Topics    Smoking status: Former Smoker     Packs/day: 1.00     Years: 39.00     Types: Cigarettes     Start date: 1/23/1972     Quit date: 5/23/2011    Smokeless tobacco: Never Used    Alcohol use 0.0 oz/week      Comment: occasionally     Review of Systems   Unable to assess due to LOC  Objective:   GA: Comfortable, no acute distress.   HEENT: No scleral icterus or JVD.   Pulmonary: Clear to auscultation A/L. No wheezing, crackles, or rhonchi.  Cardiac: RRR S1 & S2 w/o rubs/murmurs/gallops.   Abdominal: Bowel sounds present x 4. No appreciable hepatosplenomegaly.  Skin: No jaundice, rashes, or visible lesions.  Neuro:  --GCS: E1 V1 M4  --Mental Status: Comatose off sedation.   --Pupils RT 6mm sluggish, LT 3mm sluggish   --Corneal reflex, cough intact, no gag.  --LUE strength: 0/5  --LLE strength: 1/5  --RUE strength: 1/5  --RLE strength: 1/5  Vitals:         Pulse  Min: 93  Max: 128  BP  Min: 79/53  Max: 198/94  MAP (mmHg)  Min: 59  Max: 151  Resp  Min: 0  Max: 28  SpO2  Min: 100 %  Max: 100 %  Oxygen Concentration (%)  Min: 50  Max:  100    No intake/output data recorded.           Physical Exam    Today I personally reviewed pertinent medications, lines/drains/airways, imaging, cardiology, lab results, microbiology results, notably:

## 2018-07-01 PROBLEM — G93.5 BRAIN COMPRESSION: Status: ACTIVE | Noted: 2018-01-01

## 2018-07-01 PROBLEM — G93.5 BRAIN HERNIATION: Status: ACTIVE | Noted: 2018-01-01

## 2018-07-01 PROBLEM — D68.32 WARFARIN-INDUCED COAGULOPATHY: Status: ACTIVE | Noted: 2018-01-01

## 2018-07-01 PROBLEM — E78.2 MIXED HYPERLIPIDEMIA: Status: ACTIVE | Noted: 2018-01-01

## 2018-07-01 PROBLEM — R40.2430 GLASGOW COMA SCALE TOTAL SCORE 3-8: Status: ACTIVE | Noted: 2018-01-01

## 2018-07-01 PROBLEM — R57.8 NEUROGENIC SHOCK: Status: ACTIVE | Noted: 2018-01-01

## 2018-07-01 PROBLEM — T45.515A WARFARIN-INDUCED COAGULOPATHY: Status: ACTIVE | Noted: 2018-01-01

## 2018-07-01 NOTE — PROCEDURES
"Mendez Guthrie is a 68 y.o. male patient.    Temp: 99.1 °F (37.3 °C) (06/30/18 1730)  Pulse: 101 (06/30/18 2100)  Resp: 14 (06/30/18 2100)  BP: (!) 140/82 (06/30/18 2100)  SpO2: 97 % (06/30/18 2100)       Arterial Line  Date/Time: 6/30/2018 10:21 PM  Performed by: HARSHAL HUSSEIN  Authorized by: HARSHAL HUSSEIN   Consent Done: Yes  Consent: Written consent obtained.  Risks and benefits: risks, benefits and alternatives were discussed  Consent given by: spouse  Required items: required blood products, implants, devices, and special equipment available  Patient identity confirmed: MRN and name  Time out: Immediately prior to procedure a "time out" was called to verify the correct patient, procedure, equipment, support staff and site/side marked as required.  Indications: multiple ABGs, respiratory failure and hemodynamic monitoring  Location: right radial  Chay's test normal: yes  Needle gauge: 20  Number of attempts: 1  Complications: No  Estimated blood loss (mL): 5  Specimens: No  Implants: No  Post-procedure: dressing applied  Post-procedure CMS: normal and unchanged  Patient tolerance: Patient tolerated the procedure well with no immediate complications          Harshal Hussein  6/30/2018  "

## 2018-07-01 NOTE — SUBJECTIVE & OBJECTIVE
Interval History: NAEON.     Medications:  Continuous Infusions:   nicardipine      norepinephrine bitartrate-D5W 0.06 mcg/kg/min (07/01/18 1051)    sodium chloride 3% 75 mL/hr (07/01/18 1051)     Scheduled Meds:   chlorhexidine  15 mL Mouth/Throat QID    famotidine  20 mg Per NG tube BID    levetiracetam (KEPPRA) IVPB 100ml  500 mg Intravenous Q12H    sodium chloride 0.9%  3 mL Intravenous Q8H     PRN Meds:acetaminophen, dextrose 50%, glucagon (human recombinant), insulin aspart U-100, labetalol, magnesium oxide, magnesium oxide, ondansetron, potassium chloride 10%, potassium chloride 10%, potassium chloride 10%, potassium, sodium phosphates, potassium, sodium phosphates, potassium, sodium phosphates     Review of Systems  Objective:     Weight: 91.3 kg (201 lb 4.5 oz)  Body mass index is 29.21 kg/m².  Vital Signs (Most Recent):  Temp: 99.3 °F (37.4 °C) (07/01/18 0705)  Pulse: 93 (07/01/18 1124)  Resp: 18 (07/01/18 1124)  BP: 129/66 (07/01/18 0500)  SpO2: 100 % (07/01/18 1124) Vital Signs (24h Range):  Temp:  [98.2 °F (36.8 °C)-99.3 °F (37.4 °C)] 99.3 °F (37.4 °C)  Pulse:  [] 93  Resp:  [0-28] 18  SpO2:  [96 %-100 %] 100 %  BP: ()/() 129/66       Date 07/01/18 0700 - 07/02/18 0659   Shift 4917-4972 2238-0828 0817-9623 24 Hour Total   I  N  T  A  K  E   I.V.  (mL/kg) 1115  (12.2)   1115  (12.2)    Shift Total  (mL/kg) 1115  (12.2)   1115  (12.2)   O  U  T  P  U  T   Urine  (mL/kg/hr) 325   325    Shift Total  (mL/kg) 325  (3.6)   325  (3.6)   Weight (kg) 91.3 91.3 91.3 91.3            Vent Mode: A/C  Oxygen Concentration (%):  [] 100  Resp Rate Total:  [14 br/min-18 br/min] 18 br/min  Vt Set:  [500 mL] 500 mL  PEEP/CPAP:  [5 cmH20] 5 cmH20  Mean Airway Pressure:  [11 cmH20-15 cmH20] 13 cmH20         Gastrostomy/Enterostomy 06/19/18 0940 Gastrostomy tube w/ balloon LUQ feeding (Active)            Urethral Catheter 07/01/18 0116 (Active)   Site Assessment Clean;Intact 7/1/2018  7:05 AM  "  Collection Container Urimeter 7/1/2018  7:05 AM   Securement Method secured to top of thigh w/ adhesive device 7/1/2018  7:05 AM   Catheter Care Performed yes 7/1/2018  7:05 AM   Reason for Continuing Urinary Catheterization Urinary retention;Critically ill in ICU requiring intensive monitoring 7/1/2018  7:05 AM   CAUTI Prevention Bundle StatLock in place w 1" slack;Intact seal between catheter & drainage tubing;Drainage bag off the floor;Green sheeting clip in use;No dependent loops or kinks;Drainage bag not overfilled (<2/3 full);Drainage bag below bladder 7/1/2018  7:05 AM   Output (mL) 175 mL 7/1/2018  9:00 AM       Neurosurgery Physical Exam    E1VTM2  BL pupils fixed.  +corneals, + cough/gag  Extensor in BUE  Triple flexion in BLE    Significant Labs:    Recent Labs  Lab 06/30/18 1343 06/30/18 1829 07/01/18  0233 07/01/18  0618 07/01/18  1059   * 166*  --  132*  --   --    * 136  < > 144 144 148*   K 5.2* 4.1  --  3.7  --   --    CL 93* 93*  --  105  --   --    CO2 37* 34*  --  30*  --   --    BUN 26* 22  --  22  --   --    CREATININE 0.60* 0.9  --  0.7  --   --    CALCIUM 9.9 10.5  --  10.0  --   --    MG  --  1.7  --  1.7  --   --    < > = values in this interval not displayed.    Recent Labs  Lab 06/30/18 1343 06/30/18 1829 07/01/18  0233   WBC 12.60 11.42 9.83   HGB 12.5* 11.9* 10.8*   HCT 37.7* 37.1* 34.3*   * 333 319       Recent Labs  Lab 06/30/18 1829 07/01/18  0233   INR 1.2 1.2     Microbiology Results (last 7 days)     ** No results found for the last 168 hours. **        All pertinent labs from the last 24 hours have been reviewed.    Significant Diagnostics:  I have reviewed all pertinent imaging results/findings within the past 24 hours.  "

## 2018-07-01 NOTE — NURSING
Patient arrived to [facility] from [facility] by EMS    Type of stroke/diagnosis: cerebral parenchymal hemorrhage    TPA start and end time: N/A  Thrombectomy start and end time N/A    Skin assessment done: Y  Wounds noted: skin breakdown to genitals    NCC notified: Mario Shine NP

## 2018-07-01 NOTE — PLAN OF CARE
Problem: Patient Care Overview  Goal: Plan of Care Review  Outcome: Ongoing (interventions implemented as appropriate)     Recommendations     1. TF recommendations: Glucerna 1.5 @ 55 mL/hr to provide 1980 calories (102% EEN), 109 g of protein (99% EPN), 1002 mL fluid.               - Hold for residuals >500 mL; additional fluid per MD.   2. RD to monitor & follow-up.

## 2018-07-01 NOTE — PROGRESS NOTES
Dr. Mely MD at bedside to explain to pt's spouse prognosis and POC options. CT scans reviewed with spouse. Pt's spouse stated she did not have any further questions and that she would talk with daughters.

## 2018-07-01 NOTE — PT/OT/SLP PROGRESS
Speech Language Pathology      Mendez Munguiaslow   [id number] A    MRN: [id number]    SLP orders received and acknowledged. Thank you. Patient not seen today secondary to Other (Comment) (Pt intubated. ). SLP to d/c orders. Please re-consult if warranted when pt medically stable s/p extubation.     MADDI Barton, CCC-SLP  [phone]  7/1/2018

## 2018-07-01 NOTE — SUBJECTIVE & OBJECTIVE
Neurologic Chief Complaint: L weakness     Subjective:     Interval History: Patient is seen for follow-up neurological assessment and treatment recommendations: comatose. DNR today. Goals of care discussed with primary team.     HPI, Past Medical, Family, and Social History remains the same as documented in the initial encounter.     Review of Systems   Unable to perform ROS: Intubated   Constitutional: Negative for fever.   HENT: Positive for trouble swallowing.    Respiratory: Positive for shortness of breath.    Cardiovascular: Negative for chest pain.   Gastrointestinal: Negative for nausea and vomiting.   Genitourinary: Negative for urgency.   Neurological: Positive for speech difficulty and weakness.        Unresponsive    Psychiatric/Behavioral: Negative for agitation.     Scheduled Meds:   chlorhexidine  15 mL Mouth/Throat QID    famotidine  20 mg Per NG tube BID    levetiracetam (KEPPRA) IVPB 100ml  500 mg Intravenous Q12H    sodium chloride 0.9%  3 mL Intravenous Q8H     Continuous Infusions:   nicardipine      norepinephrine bitartrate-D5W 0.06 mcg/kg/min (07/01/18 1051)    sodium chloride 3% 75 mL/hr (07/01/18 1051)     PRN Meds:acetaminophen, dextrose 50%, glucagon (human recombinant), insulin aspart U-100, labetalol, magnesium oxide, magnesium oxide, ondansetron, potassium chloride 10%, potassium chloride 10%, potassium chloride 10%, potassium, sodium phosphates, potassium, sodium phosphates, potassium, sodium phosphates    Objective:     Vital Signs (Most Recent):  Temp: 99.3 °F (37.4 °C) (07/01/18 0705)  Pulse: 84 (07/01/18 1351)  Resp: 19 (07/01/18 1351)  BP: 129/66 (07/01/18 0500)  SpO2: 100 % (07/01/18 1351)       Vital Signs Range (Last 24H):  Temp:  [98.2 °F (36.8 °C)-99.3 °F (37.4 °C)]   Pulse:  []   Resp:  [12-24]   BP: ()/(47-94)   SpO2:  [96 %-100 %]        Physical Exam    Constitutional: He appears well-developed.   HENT:   Head: Normocephalic and atraumatic.   Eyes:    Right pupil 4 sluggish  Left pupil 3 fixed   Neck: Normal range of motion. No tracheal deviation present.   Cardiovascular: Normal rate.    Pulmonary/Chest: Effort normal.   Intubated   Abdominal: Soft.   Musculoskeletal:   Flaccid throughout    Skin: Skin is warm.   Vitals reviewed.      Neurological Exam: Patient currently on sedation   LOC: alert and obtunded  Attention Span: Intubated  Language: No aphasia, Intubated  Articulation: Untestable due to intubation  Orientation: Person, Place, Time , Untestable due to intubation  Visual Fields: Full  Hemianopsia left  EOM (CN III, IV, VI): Oculocephalic Reflex  Normal  Pupils (CN II, III): PERRL  Anisocoria Side: R pupil 5 mm sluggish   Facial Movement (CN VII): Unable to test   Motor: Arm left  Plegia 0/5  Leg left  Plegia 0/5  Arm right  Plegia 0/5  Leg right Plegia 0/5  Cebellar: No evidence of appendicular or axial ataxia  Sensation: Not responding to pain in all extremities       Laboratory:  CMP:   Recent Labs  Lab 07/01/18 0233 07/01/18  1059   CALCIUM 10.0  --   --    ALBUMIN 2.5*  --   --    PROT 6.8  --   --      < > 148*   K 3.7  --   --    CO2 30*  --   --      --   --    BUN 22  --   --    CREATININE 0.7  --   --    ALKPHOS 67  --   --    ALT 17  --   --    AST 22  --   --    BILITOT 0.4  --   --    < > = values in this interval not displayed.  BMP:   Recent Labs  Lab 07/01/18 0233 07/01/18  1059     < > 148*   K 3.7  --   --      --   --    CO2 30*  --   --    BUN 22  --   --    CREATININE 0.7  --   --    CALCIUM 10.0  --   --    < > = values in this interval not displayed.  CBC:   Recent Labs  Lab 07/01/18 0233   WBC 9.83   RBC 3.65*   HGB 10.8*   HCT 34.3*      MCV 94   MCH 29.6   MCHC 31.5*     Lipid Panel:   Recent Labs  Lab 06/30/18  1829   CHOL 127   LDLCALC 59.2*   HDL 53   TRIG 74     Coagulation:   Recent Labs  Lab 07/01/18 0233   INR 1.2     Platelet Aggregation Study: No results for input(s): PLTAGG,  PLTAGINTERP, PLTAGREGLACO, ADPPLTAGGREG in the last 168 hours.  Hgb A1C:   Recent Labs  Lab 06/30/18 1829   HGBA1C 9.3*     TSH:   Recent Labs  Lab 06/30/18 1829   TSH 0.962       Diagnostic Results         Brain imaging:  CT 6/30  Large amount of parenchymal hemorrhage throughout the right cerebral hemisphere with diffuse mass effect and 1.5 cm of right to left midline shift.       Vessel Imaging:  CTA 6/8  CTA head: Occlusion right M2 segment of the MCA within the proximal sylvian fissure..  Heavy atherosclerotic plaquing of the cavernous and supraclinoid ICAs with mild moderate right and mild left cavernous ICA stenosis.  There is diffuse small caliber right M1 segment of the MCA.  Irregularity and narrowing of the right distal vertebral artery concerning for atherosclerotic disease with mild moderate stenosis.  CTA neck: Atherosclerotic plaquing of the carotid bifurcations and proximal ICAs with less than 50% proximal ICA stenosis by NASCET criteria.  CT head: Evolving large right MCA distribution infarction.  Localized mass effect without significant midline shift or hydrocephalus.  No evidence for hemorrhagic conversion.  Clinical correlation and follow-up advised.     Cardiac Evaluation:   Echo 6/7    CONCLUSIONS     1 - Technically difficult study.     2 - Severely depressed left ventricular systolic function (EF 15-20%).     3 - Mildly to moderately depressed right ventricular systolic function .     4 - Impaired LV relaxation, normal LAP (grade 1 diastolic dysfunction).     5 - Mild aortic stenosis, WIL = 1.45 cm2, AVAi = 0.67 cm2/m2, peak velocity = 2.8 m/s, mean gradient = 18 mmHg.     6 - Mild mitral regurgitation.

## 2018-07-01 NOTE — CONSULTS
[facility]  Vascular Neurology  Comprehensive Stroke Center  Consult Note    Inpatient consult to Vascular (Stroke) Neurology  Consult performed by: YASSINE MUJICA  Consult ordered by: CALI JARVIS  Reason for consult: Stroke         Assessment/Plan:     Patient is a 68 y.o. year old male with:    * Right-sided nontraumatic intracerebral hemorrhage    Outside facility CT revealed large amount of parenchymal hemorrhage throughout the right cerebral hemisphere with diffuse mass effect and 1.5 cm of right to left midline shift.     Patient was recently admitted to [facility] due to R MCA infarct. Infarct thought likely to be cardioembolic due to EF 15-20%.  Cardiology was consulted and recommended patient to be placed on AC. During his hospital stay patient also had Bilateral upper extremity DVTs. Patient was discharged to skilled nursing facility on Lovenox with Coumadin bridge. INR pending. Kcentra and protamine was administered at outside facility for reversal agents. Patient was intubated at outside facility. [place] [facility] [place] higher level of care and Neurosurgery evaluation. Etiology possibly hemorrhagic transformation of ischemic stroke.    Antithrombotics for secondary stroke prevention:  On hold for ICH    Statins for secondary stroke prevention and hyperlipidemia, if present:   Statins:  Crestor- 40 mg daily    Aggressive risk factor modification: HTN, DM, HLD, Diet     Rehab efforts: PT/OT/SLP to evaluate and treat    Diagnostics ordered/pending: CT scan of head without contrast to asses brain parenchyma, MRI head without contrast to assess brain parenchyma; NS consult     VTE prophylaxis: Holding due to ICH    BP parameters: ICH: SBP <140            Mixed hyperlipidemia    Stroke risk factor  Crestor 40 mg         DVT of axillary vein, acute left    Was placed on Coumadin  Currently held         Decreased cardiac ejection fraction    Seen on echo 6/7  Cardiology  previously consulted   Recommended ASA and AC  Patient discharged on Lovenox with coumadin bridge         Essential hypertension    Stroke risk factor  Cardene ordered  SBP<140        Type 2 diabetes mellitus without complication, with long-term current use of insulin    Stroke risk factor  Management per primary             STROKE DOCUMENTATION          NIH Scale:  1a. Level Of Consciousness: 3-->Responds only with reflex motor or autonomic effects or totally unresponsive, flaccid, and areflexic  1b. LOC Questions: 2-->Answers neither question correctly  1c. LOC Commands: 2-->Performs neither task correctly  2. Best Gaze: 0-->Normal  3. Visual: 2-->Complete hemianopia  4. Facial Palsy: 0-->Normal symmetrical movements  5a. Motor Arm, Left: 4-->No movement  5b. Motor Arm, Right: 4-->No movement  6a. Motor Leg, Left: 4-->No movement  6b. Motor Leg, Right: 4-->No movement  7. Limb Ataxia: 0-->Absent  8. Sensory: 2-->Severe to total sensory loss: patient is not aware of being touched in the face, arm, and leg  9. Best Language: 3-->Mute, global aphasia: no usable speech or auditory comprehension  10. Dysarthria: (UN) Intubated or other physical barrier  11. Extinction and Inattention (formerly Neglect): 0-->No abnormality  Total (NIH Stroke Scale): 30    Modified Raleigh Score: 2  Beni Coma Scale:3   ABCD2 Score:    [id number] Score:   HAS -BLED Score:   ICH Score:3  Hunt & De Paz Classification:       Thrombolysis Candidate? No, CT findings (ICH, SAH, etc)       Interventional Revascularization Candidate?   Is the patient eligible for mechanical endovascular reperfusion (SERAFIN)?  No; No large vessel occlusion      Hemorrhagic change of an Ischemic Stroke: Does this patient have an ischemic stroke with hemorrhagic changes? Yes, Grading Scale: PH Type 2 (PH-2) = was defined as dense hematoma > 30% of the infarcted area with substantial space-occupying effect, any extension into the intraventricular space, or any  hemorrhagic lesion outside the infarcted area.  Is this a symptomatic change?  Yes - NIHSS increase of 4 or more points directly related to imaging changes.    Subjective:     History of Present Illness:  68 year old male with past medical history HLD, HTN, hx DVTs, CHF, DM, and previous RMCA stroke with residual left sided weakness transferred [place] [facility] [place] family found him unresponsive at [facility]. Patient was seen today at [facility] and CT revealed large amount of parenchymal hemorrhage throughout the right cerebral hemisphere with diffuse mass effect and 1.5 cm of right to left midline shift. Patient was recently admitted to [facility] due to R MCA infarct. Infarct thought likely to be cardioembolic due to EF 15-20%.  Cardiology was consulted and recommended patient to be placed on AC. During his hospital stay patient also had Bilateral upper extremity DVTs. Patient was discharged to skilled nursing facility on Lovenox with Coumadin bridge. INR from today is pending. Kcentra was administered. Patient was intubated at outside facility. Transferred to [facility] [place] higher level of care.        Past Medical History:   Diagnosis Date    Acute on chronic respiratory failure with hypoxia 6/14/2018    Arthritis     Aspiration pneumonia 6/16/2018    Asthma     COPD (chronic obstructive pulmonary disease)     Coronary artery disease     Diabetes mellitus     Embolic stroke involving right middle cerebral artery 6/6/2018    High cholesterol     History of lung cancer 10/24/2014    Hypertension     Long term current use of antithrombotics/antiplatelets     Stroke 6/6/2018     Past Surgical History:   Procedure Laterality Date    cardiac bypass      CARDIAC SURGERY  05/04/2016    PORTACATH PLACEMENT  2012    cancer treatment     Family History   Problem Relation Age of Onset    Cancer Father     Diabetes Father     Diabetes Mother     Pneumonia Sister     No Known  Problems Brother     Pneumonia Sister     No Known Problems Sister     No Known Problems Brother     Diabetes Brother     No Known Problems Daughter     No Known Problems Daughter     No Known Problems Daughter     No Known Problems Daughter     Diabetes Son     No Known Problems Son     No Known Problems Son     No Known Problems Son      Social History   Substance Use Topics    Smoking status: Former Smoker     Packs/day: 1.00     Years: 39.00     Types: Cigarettes     Start date: 1/23/1972     Quit date: 5/23/2011    Smokeless tobacco: Never Used    Alcohol use 0.0 oz/week      Comment: occasionally     Review of patient's allergies indicates:   Allergen Reactions    Lisinopril Swelling    Norvasc [amlodipine] Swelling       Medications: I have reviewed the current medication administration record.    Prescriptions Prior to Admission   Medication Sig Dispense Refill Last Dose    albuterol-ipratropium (DUO-NEB) 2.5 mg-0.5 mg/3 mL nebulizer solution Take 3 mLs by nebulization every 6 (six) hours. Rescue 1 Box 0     aspirin 81 MG Chew 1 tablet (81 mg total) by Per G Tube route once daily.  0     citalopram (CELEXA) 10 MG tablet 1 tablet (10 mg total) by Per G Tube route once daily. 30 tablet 11     enoxaparin (LOVENOX) 100 mg/mL Syrg Inject 1 mL (100 mg total) into the skin every 12 (twelve) hours.       ergocalciferol (VITAMIN D2) 50,000 unit Cap 1 capsule (50,000 Units total) by Per G Tube route every 7 days. 8 capsule 3     insulin detemir U-100 (LEVEMIR FLEXTOUCH) 100 unit/mL (3 mL) SubQ InPn pen Inject 32 Units into the skin 2 (two) times daily.  0     insulin lispro (HUMALOG U-100 INSULIN) 100 unit/mL injection Inject 10 Units into the skin every 6 (six) hours.       losartan (COZAAR) 25 MG tablet 0.5 tablets (12.5 mg total) by Per G Tube route once daily. 45 tablet 3     metoprolol tartrate (LOPRESSOR) 25 MG tablet 1 tablet (25 mg total) by Per G Tube route 2 (two) times daily. 60  tablet 11     miconazole NITRATE 2 % (MICOTIN) 2 % top powder Apply topically 2 (two) times daily as needed (for rashes upper legs).  0     risperidone 1 mg/ml (RISPERDAL) 1 mg/mL Soln 0.5 mLs (0.5 mg total) by Per G Tube route nightly as needed. 30 mL 11     rosuvastatin (CRESTOR) 40 MG Tab 1 tablet (40 mg total) by Per G Tube route every evening. 90 tablet 3     senna-docusate 8.6-50 mg (PERICOLACE) 8.6-50 mg per tablet 2 tablets by Per G Tube route 2 (two) times daily.       warfarin (COUMADIN) 7.5 MG tablet 1 tablet (7.5 mg total) by Per G Tube route Daily. 30 tablet 11        Review of Systems   Constitutional: Negative for fever.   HENT: Positive for trouble swallowing.    Respiratory: Positive for shortness of breath.    Cardiovascular: Negative for chest pain.   Gastrointestinal: Negative for nausea and vomiting.   Genitourinary: Negative for urgency.   Neurological: Positive for speech difficulty and weakness.        Unresponsive    Psychiatric/Behavioral: Negative for agitation.     Objective:     Vital Signs (Most Recent):  Pulse: 104 (06/30/18 1749)  Resp: 16 (06/30/18 1749)  BP: (!) 141/82 (06/30/18 1749)  SpO2: 100 % (06/30/18 1749)    Vital Signs Range (Last 24H):  Pulse:  []   Resp:  [0-28]   BP: ()/()   SpO2:  [100 %]     Physical Exam   Constitutional: He appears well-developed.   HENT:   Head: Normocephalic and atraumatic.   Eyes:   Right pupil 5 sluggish  Left pupil 3 reactive    Neck: Normal range of motion. No tracheal deviation present.   Cardiovascular: Normal rate.    Pulmonary/Chest: Effort normal.   Intubated   Abdominal: Soft.   Musculoskeletal:   Flaccid throughout    Skin: Skin is warm.   Vitals reviewed.      Neurological Exam: Patient currently on sedation   LOC: alert and obtunded  Attention Span: Intubated  Language: No aphasia, Intubated  Articulation: Untestable due to intubation  Orientation: Person, Place, Time , Untestable due to intubation  Visual  Fields: Full  Hemianopsia left  EOM (CN III, IV, VI): Oculocephalic Reflex  Normal  Pupils (CN II, III): PERRL  Anisocoria Side: R pupil 5 mm sluggish   Facial Movement (CN VII): Unable to test   Motor: Arm left  Plegia 0/5  Leg left  Plegia 0/5  Arm right  Plegia 0/5  Leg right Plegia 0/5  Cebellar: No evidence of appendicular or axial ataxia  Sensation: Not responding to pain in all extremities       Laboratory:  CMP:   Recent Labs  Lab 06/30/18  1343   CALCIUM 9.9   ALBUMIN 4.1   PROT 8.1   *   K 5.2*   CO2 37*   CL 93*   BUN 26*   CREATININE 0.60*   ALKPHOS 91   ALT 31   AST 52   BILITOT 0.7     CBC:   Recent Labs  Lab 06/30/18  1829   WBC 11.42   RBC 3.99*   HGB 11.9*   HCT 37.1*      MCV 93   MCH 29.8   MCHC 32.1     Lipid Panel: No results for input(s): CHOL, LDLCALC, HDL, TRIG in the last 168 hours.  Coagulation:   Recent Labs  Lab 06/26/18  0509   INR 1.0     Hgb A1C: No results for input(s): HGBA1C in the last 168 hours.  TSH: No results for input(s): TSH in the last 168 hours.    Diagnostic Results:      Brain imaging:  CT 6/30  Large amount of parenchymal hemorrhage throughout the right cerebral hemisphere with diffuse mass effect and 1.5 cm of right to left midline shift.      Vessel Imaging:  CTA 6/8  CTA head: Occlusion right M2 segment of the MCA within the proximal sylvian fissure..  Heavy atherosclerotic plaquing of the cavernous and supraclinoid ICAs with mild moderate right and mild left cavernous ICA stenosis.  There is diffuse small caliber right M1 segment of the MCA.  Irregularity and narrowing of the right distal vertebral artery concerning for atherosclerotic disease with mild moderate stenosis.  CTA neck: Atherosclerotic plaquing of the carotid bifurcations and proximal ICAs with less than 50% proximal ICA stenosis by NASCET criteria.  CT head: Evolving large right MCA distribution infarction.  Localized mass effect without significant midline shift or hydrocephalus.  No  evidence for hemorrhagic conversion.  Clinical correlation and follow-up advised.    Cardiac Evaluation:   Echo 6/7    CONCLUSIONS     1 - Technically difficult study.     2 - Severely depressed left ventricular systolic function (EF 15-20%).     3 - Mildly to moderately depressed right ventricular systolic function .     4 - Impaired LV relaxation, normal LAP (grade 1 diastolic dysfunction).     5 - Mild aortic stenosis, WIL = 1.45 cm2, AVAi = 0.67 cm2/m2, peak velocity = 2.8 m/s, mean gradient = 18 mmHg.     6 - Mild mitral regurgitation.       Colleen Hannah NP  Comprehensive Stroke Center  Department of Vascular Neurology   [facility]

## 2018-07-01 NOTE — PROGRESS NOTES
[facility]  Vascular Neurology  Comprehensive Stroke Center  Progress Note    Assessment/Plan:     * Right-sided nontraumatic intracerebral hemorrhage    68 year old male with past medical history HLD, HTN, hx DVTs, CHF, DM, and previous RMCA stroke with residual left sided weakness presented to [facility] today after family found him unresponsive at [facility]. CT revealed large amount of parenchymal hemorrhage throughout the right cerebral hemisphere with diffuse mass effect and 1.5 cm of right to left midline shift.     Patient was recently admitted to [facility] due to R MCA infarct. Infarct thought likely to be cardioembolic due to EF 15-20%.  Cardiology was consulted and recommended patient to be placed on AC. During his hospital stay patient also had Bilateral upper extremity DVTs. Patient was discharged to skilled nursing facility on Lovenox with Coumadin bridge. INR from today is pending. Kcentra was administered at outside facility. Patient was intubated at outside facility. Transferred to [facility] for higher level of care and Neurosurgery evaluation. Etiology possibly hemorrhagic transformation of ischemic stroke. Patient made DNR on 7/1/18.     Antithrombotics for secondary stroke prevention:  On hold for ICH    Statins for secondary stroke prevention and hyperlipidemia, if present:   Statins:  Crestor- 40 mg daily    Aggressive risk factor modification: HTN, DM, HLD, Diet     Rehab efforts: PT/OT/SLP to evaluate and treat    Diagnostics ordered/pending: CT scan of head without contrast to asses brain parenchyma, MRI head without contrast to assess brain parenchyma; NS consulted and signed off as there is no surgical intervention they can offer.    VTE prophylaxis: Holding due to ICH    BP parameters: ICH: SBP <140            Mixed hyperlipidemia    Stroke risk factor  Crestor 40 mg         DVT of axillary vein, acute left    Was placed on Coumadin  Currently held          Decreased cardiac ejection fraction    Seen on echo 6/7  Cardiology previously consulted   Recommended ASA and AC  Patient discharged on Lovenox with coumadin bridge         Essential hypertension    Stroke risk factor  Cardene ordered  SBP<140        Type 2 diabetes mellitus without complication, with long-term current use of insulin    Stroke risk factor  Management per primary              No notes on file    STROKE DOCUMENTATION        NIH Scale:  1a. Level Of Consciousness: 3-->Responds only with reflex motor or autonomic effects or totally unresponsive, flaccid, and areflexic  1b. LOC Questions: 2-->Answers neither question correctly  1c. LOC Commands: 2-->Performs neither task correctly  2. Best Gaze: 0-->Normal  3. Visual: 2-->Complete hemianopia  4. Facial Palsy: 0-->Normal symmetrical movements  5a. Motor Arm, Left: 4-->No movement  5b. Motor Arm, Right: 4-->No movement  6a. Motor Leg, Left: 4-->No movement  6b. Motor Leg, Right: 4-->No movement  7. Limb Ataxia: 0-->Absent  8. Sensory: 2-->Severe to total sensory loss: patient is not aware of being touched in the face, arm, and leg  9. Best Language: 3-->Mute, global aphasia: no usable speech or auditory comprehension  10. Dysarthria: (UN) Intubated or other physical barrier  11. Extinction and Inattention (formerly Neglect): 0-->No abnormality  Total (NIH Stroke Scale): 30       Modified Griggs Score: 2  Beni Coma Scale:4   ABCD2 Score:    [id number] Score:   HAS -BLED Score:   ICH Score:3  Hunt & De Paz Classification:      Hemorrhagic change of an Ischemic Stroke: Does this patient have an ischemic stroke with hemorrhagic changes? Yes, Grading Scale: PH Type 2 (PH-2) = was defined as dense hematoma > 30% of the infarcted area with substantial space-occupying effect, any extension into the intraventricular space, or any hemorrhagic lesion outside the infarcted area.  Is this a symptomatic change?  Yes - NIHSS increase of 4 or more points directly  related to imaging changes.    Neurologic Chief Complaint: L weakness     Subjective:     Interval History: Patient is seen for follow-up neurological assessment and treatment recommendations: comatose. DNR today. Goals of care discussed with primary team.     HPI, Past Medical, Family, and Social History remains the same as documented in the initial encounter.     Review of Systems   Unable to perform ROS: Intubated   Constitutional: Negative for fever.   HENT: Positive for trouble swallowing.    Respiratory: Positive for shortness of breath.    Cardiovascular: Negative for chest pain.   Gastrointestinal: Negative for nausea and vomiting.   Genitourinary: Negative for urgency.   Neurological: Positive for speech difficulty and weakness.        Unresponsive    Psychiatric/Behavioral: Negative for agitation.     Scheduled Meds:   chlorhexidine  15 mL Mouth/Throat QID    famotidine  20 mg Per NG tube BID    levetiracetam (KEPPRA) IVPB 100ml  500 mg Intravenous Q12H    sodium chloride 0.9%  3 mL Intravenous Q8H     Continuous Infusions:   nicardipine      norepinephrine bitartrate-D5W 0.06 mcg/kg/min (07/01/18 1051)    sodium chloride 3% 75 mL/hr (07/01/18 1051)     PRN Meds:acetaminophen, dextrose 50%, glucagon (human recombinant), insulin aspart U-100, labetalol, magnesium oxide, magnesium oxide, ondansetron, potassium chloride 10%, potassium chloride 10%, potassium chloride 10%, potassium, sodium phosphates, potassium, sodium phosphates, potassium, sodium phosphates    Objective:     Vital Signs (Most Recent):  Temp: 99.3 °F (37.4 °C) (07/01/18 0705)  Pulse: 84 (07/01/18 1351)  Resp: 19 (07/01/18 1351)  BP: 129/66 (07/01/18 0500)  SpO2: 100 % (07/01/18 1351)       Vital Signs Range (Last 24H):  Temp:  [98.2 °F (36.8 °C)-99.3 °F (37.4 °C)]   Pulse:  []   Resp:  [12-24]   BP: ()/(47-94)   SpO2:  [96 %-100 %]        Physical Exam    Constitutional: He appears well-developed.   HENT:   Head:  Normocephalic and atraumatic.   Eyes:   Right pupil 4 sluggish  Left pupil 3 fixed   Neck: Normal range of motion. No tracheal deviation present.   Cardiovascular: Normal rate.    Pulmonary/Chest: Effort normal.   Intubated   Abdominal: Soft.   Musculoskeletal:   Flaccid throughout    Skin: Skin is warm.   Vitals reviewed.      Neurological Exam: Patient currently on sedation   LOC: alert and obtunded  Attention Span: Intubated  Language: No aphasia, Intubated  Articulation: Untestable due to intubation  Orientation: Person, Place, Time , Untestable due to intubation  Visual Fields: Full  Hemianopsia left  EOM (CN III, IV, VI): Oculocephalic Reflex  Normal  Pupils (CN II, III): PERRL  Anisocoria Side: R pupil 5 mm sluggish   Facial Movement (CN VII): Unable to test   Motor: Arm left  Plegia 0/5  Leg left  Plegia 0/5  Arm right  Plegia 0/5  Leg right Plegia 0/5  Cebellar: No evidence of appendicular or axial ataxia  Sensation: Not responding to pain in all extremities       Laboratory:  CMP:   Recent Labs  Lab 07/01/18 0233 07/01/18  1059   CALCIUM 10.0  --   --    ALBUMIN 2.5*  --   --    PROT 6.8  --   --      < > 148*   K 3.7  --   --    CO2 30*  --   --      --   --    BUN 22  --   --    CREATININE 0.7  --   --    ALKPHOS 67  --   --    ALT 17  --   --    AST 22  --   --    BILITOT 0.4  --   --    < > = values in this interval not displayed.  BMP:   Recent Labs  Lab 07/01/18 0233 07/01/18  1059     < > 148*   K 3.7  --   --      --   --    CO2 30*  --   --    BUN 22  --   --    CREATININE 0.7  --   --    CALCIUM 10.0  --   --    < > = values in this interval not displayed.  CBC:   Recent Labs  Lab 07/01/18 0233   WBC 9.83   RBC 3.65*   HGB 10.8*   HCT 34.3*      MCV 94   MCH 29.6   MCHC 31.5*     Lipid Panel:   Recent Labs  Lab 06/30/18  1829   CHOL 127   LDLCALC 59.2*   HDL 53   TRIG 74     Coagulation:   Recent Labs  Lab 07/01/18  0233   INR 1.2     Platelet Aggregation  Study: No results for input(s): PLTAGG, PLTAGINTERP, PLTAGREGLACO, ADPPLTAGGREG in the last 168 hours.  Hgb A1C:   Recent Labs  Lab 06/30/18 1829   HGBA1C 9.3*     TSH:   Recent Labs  Lab 06/30/18 1829   TSH 0.962       Diagnostic Results         Brain imaging:  CT 6/30  Large amount of parenchymal hemorrhage throughout the right cerebral hemisphere with diffuse mass effect and 1.5 cm of right to left midline shift.       Vessel Imaging:  CTA 6/8  CTA head: Occlusion right M2 segment of the MCA within the proximal sylvian fissure..  Heavy atherosclerotic plaquing of the cavernous and supraclinoid ICAs with mild moderate right and mild left cavernous ICA stenosis.  There is diffuse small caliber right M1 segment of the MCA.  Irregularity and narrowing of the right distal vertebral artery concerning for atherosclerotic disease with mild moderate stenosis.  CTA neck: Atherosclerotic plaquing of the carotid bifurcations and proximal ICAs with less than 50% proximal ICA stenosis by NASCET criteria.  CT head: Evolving large right MCA distribution infarction.  Localized mass effect without significant midline shift or hydrocephalus.  No evidence for hemorrhagic conversion.  Clinical correlation and follow-up advised.     Cardiac Evaluation:   Echo 6/7    CONCLUSIONS     1 - Technically difficult study.     2 - Severely depressed left ventricular systolic function (EF 15-20%).     3 - Mildly to moderately depressed right ventricular systolic function .     4 - Impaired LV relaxation, normal LAP (grade 1 diastolic dysfunction).     5 - Mild aortic stenosis, WIL = 1.45 cm2, AVAi = 0.67 cm2/m2, peak velocity = 2.8 m/s, mean gradient = 18 mmHg.     6 - Mild mitral regurgitation.         Dejan Hernandez MD  Comprehensive Stroke Center  Department of Vascular Neurology   [facility]

## 2018-07-01 NOTE — PLAN OF CARE
Problem: Patient Care Overview  Goal: Plan of Care Review  Outcome: Ongoing (interventions implemented as appropriate)  POC reviewed with pt and family at 1800. Pt unable to verbalize understanding due to intubation/nonresponsive state. Pt's family verbalized understanding. Questions and concerns addressed. Will continue to monitor. See flowsheets for full assessment and VS info.

## 2018-07-01 NOTE — PROGRESS NOTES
NCC team, Ariel Hussein NP notified of pt's R and L pupil fixed per pupillometer. Order to administer 50g mannitol. Will administer and continue to monitor.

## 2018-07-01 NOTE — ASSESSMENT & PLAN NOTE
68 M s/p right M2 occlusion now complicated by hemorrhagic conversion.    -Due to pts very poor clinical exam and size and extent of hemorrhagic stroke pt is a poor surgical candidate.  Surgery would offer no meaningful quality of life.  -Discussed with family at bedside  -Continue care per NCC  -No further neurosurgical needs at this time, will sign off, please call with questions.

## 2018-07-01 NOTE — PROGRESS NOTES
[facility]  Neurosurgery  Progress Note    Subjective:     History of Present Illness: No notes on file    Post-Op Info:  * No surgery found *         Interval History: NAEON.     Medications:  Continuous Infusions:   nicardipine      norepinephrine bitartrate-D5W 0.06 mcg/kg/min (07/01/18 1051)    sodium chloride 3% 75 mL/hr (07/01/18 1051)     Scheduled Meds:   chlorhexidine  15 mL Mouth/Throat QID    famotidine  20 mg Per NG tube BID    levetiracetam (KEPPRA) IVPB 100ml  500 mg Intravenous Q12H    sodium chloride 0.9%  3 mL Intravenous Q8H     PRN Meds:acetaminophen, dextrose 50%, glucagon (human recombinant), insulin aspart U-100, labetalol, magnesium oxide, magnesium oxide, ondansetron, potassium chloride 10%, potassium chloride 10%, potassium chloride 10%, potassium, sodium phosphates, potassium, sodium phosphates, potassium, sodium phosphates     Review of Systems  Objective:     Weight: 91.3 kg (201 lb 4.5 oz)  Body mass index is 29.21 kg/m².  Vital Signs (Most Recent):  Temp: 99.3 °F (37.4 °C) (07/01/18 0705)  Pulse: 93 (07/01/18 1124)  Resp: 18 (07/01/18 1124)  BP: 129/66 (07/01/18 0500)  SpO2: 100 % (07/01/18 1124) Vital Signs (24h Range):  Temp:  [98.2 °F (36.8 °C)-99.3 °F (37.4 °C)] 99.3 °F (37.4 °C)  Pulse:  [] 93  Resp:  [0-28] 18  SpO2:  [96 %-100 %] 100 %  BP: ()/() 129/66       Date 07/01/18 0700 - 07/02/18 0659   Shift 4938-5846 8790-8789 1907-2679 24 Hour Total   I  N  T  A  K  E   I.V.  (mL/kg) 1115  (12.2)   1115  (12.2)    Shift Total  (mL/kg) 1115  (12.2)   1115  (12.2)   O  U  T  P  U  T   Urine  (mL/kg/hr) 325   325    Shift Total  (mL/kg) 325  (3.6)   325  (3.6)   Weight (kg) 91.3 91.3 91.3 91.3            Vent Mode: A/C  Oxygen Concentration (%):  [] 100  Resp Rate Total:  [14 br/min-18 br/min] 18 br/min  Vt Set:  [500 mL] 500 mL  PEEP/CPAP:  [5 cmH20] 5 cmH20  Mean Airway Pressure:  [11 cmH20-15 cmH20] 13 cmH20          "Gastrostomy/Enterostomy 06/19/18 0940 Gastrostomy tube w/ balloon LUQ feeding (Active)            Urethral Catheter 07/01/18 0116 (Active)   Site Assessment Clean;Intact 7/1/2018  7:05 AM   Collection Container Urimeter 7/1/2018  7:05 AM   Securement Method secured to top of thigh w/ adhesive device 7/1/2018  7:05 AM   Catheter Care Performed yes 7/1/2018  7:05 AM   Reason for Continuing Urinary Catheterization Urinary retention;Critically ill in ICU requiring intensive monitoring 7/1/2018  7:05 AM   CAUTI Prevention Bundle StatLock in place w 1" slack;Intact seal between catheter & drainage tubing;Drainage bag off the floor;Green sheeting clip in use;No dependent loops or kinks;Drainage bag not overfilled (<2/3 full);Drainage bag below bladder 7/1/2018  7:05 AM   Output (mL) 175 mL 7/1/2018  9:00 AM       Neurosurgery Physical Exam    E1VTM2  BL pupils fixed.  +corneals,  + cough/gag  Extensor in BUE  Triple flexion in BLE    Significant Labs:    Recent Labs  Lab 06/30/18  1343 06/30/18 1829 07/01/18  0233 07/01/18 0618 07/01/18  1059   * 166*  --  132*  --   --    * 136  < > 144 144 148*   K 5.2* 4.1  --  3.7  --   --    CL 93* 93*  --  105  --   --    CO2 37* 34*  --  30*  --   --    BUN 26* 22  --  22  --   --    CREATININE 0.60* 0.9  --  0.7  --   --    CALCIUM 9.9 10.5  --  10.0  --   --    MG  --  1.7  --  1.7  --   --    < > = values in this interval not displayed.    Recent Labs  Lab 06/30/18  1343 06/30/18 1829 07/01/18  0233   WBC 12.60 11.42 9.83   HGB 12.5* 11.9* 10.8*   HCT 37.7* 37.1* 34.3*   * 333 319       Recent Labs  Lab 06/30/18 1829 07/01/18  0233   INR 1.2 1.2     Microbiology Results (last 7 days)     ** No results found for the last 168 hours. **        All pertinent labs from the last 24 hours have been reviewed.    Significant Diagnostics:  I have reviewed all pertinent imaging results/findings within the past 24 hours.    Assessment/Plan:     Brain compression    " 68 M s/p right M2 occlusion now complicated by hemorrhagic conversion.    -Due to pts very poor clinical exam and size and extent of hemorrhagic stroke pt is a poor surgical candidate.  Surgery would offer no meaningful quality of life.  -Discussed with family at bedside  -Continue care per NCC  -No further neurosurgical needs at this time, will sign off, please call with questions.               Kannan Shelley MD  Neurosurgery  [facility]

## 2018-07-01 NOTE — ASSESSMENT & PLAN NOTE
68 year old male with past medical history HLD, HTN, hx DVTs, CHF, DM, and previous RMCA stroke with residual left sided weakness presented to [facility] today after family found him unresponsive at [facility]. CT revealed large amount of parenchymal hemorrhage throughout the right cerebral hemisphere with diffuse mass effect and 1.5 cm of right to left midline shift.     Patient was recently admitted to [facility] Vascular Neurology due to R MCA infarct. Infarct thought likely to be cardioembolic due to EF 15-20%.  Cardiology was consulted and recommended patient to be placed on AC. During his hospital stay patient also had Bilateral upper extremity DVTs. Patient was discharged to skilled nursing facility on Lovenox with Coumadin bridge. INR from today is pending. Kcentra was administered at outside facility. Patient was intubated at outside facility. Transferred to [facility] for higher level of care and Neurosurgery evaluation. Etiology possibly hemorrhagic transformation of ischemic stroke.    Antithrombotics for secondary stroke prevention:  On hold for ICH    Statins for secondary stroke prevention and hyperlipidemia, if present:   Statins:  Crestor- 40 mg daily    Aggressive risk factor modification: HTN, DM, HLD, Diet     Rehab efforts: PT/OT/SLP to evaluate and treat    Diagnostics ordered/pending: CT scan of head without contrast to asses brain parenchyma, MRI head without contrast to assess brain parenchyma; NS consult     VTE prophylaxis: Holding due to ICH    BP parameters: ICH: SBP <140

## 2018-07-01 NOTE — PROCEDURES
"Mendez Guthrie is a 68 y.o. male patient.    Temp: 99.1 °F (37.3 °C) (06/30/18 1730)  Pulse: 101 (06/30/18 2100)  Resp: 14 (06/30/18 2100)  BP: (!) 140/82 (06/30/18 2100)  SpO2: 97 % (06/30/18 2100)       Central Line  Date/Time: 6/30/2018 10:22 PM  Performed by: HARSHAL HUSSEIN  Supervising provider: willow Hagen NP   Consent Done: Yes  Time out: Immediately prior to procedure a "time out" was called to verify the correct patient, procedure, equipment, support staff and site/side marked as required.  Indications: med administration  Anesthesia: local infiltration    Anesthesia:  Local Anesthetic: lidocaine 1% without epinephrine  Anesthetic total: 5 mL  Preparation: skin prepped with ChloraPrep  Skin prep agent dried: skin prep agent completely dried prior to procedure  Sterile barriers: all five maximum sterile barriers used - cap, mask, sterile gown, sterile gloves, and large sterile sheet  Hand hygiene: hand hygiene performed prior to central venous catheter insertion  Location details: right femoral  Site selection rationale: vascular access   Catheter type: triple lumen  Catheter size: 7 Fr  Catheter Length: 20cm    Ultrasound guidance: yes  Vessel Caliber: medium, patent, compressibility normal  Needle advanced into vessel with real time Ultrasound guidance.  Guidewire confirmed in vessel.  Sterile sheath used.  Manometry: Yes  Number of attempts: 1  Complications: none  Estimated blood loss (mL): 10  Specimens: No  Implants: No  Post-procedure: line sutured,  chlorhexidine patch,  sterile dressing applied and blood return through all ports  Complications: No          Harshal Hussein  6/30/2018  "

## 2018-07-01 NOTE — CONSULTS
"  [facility]  Adult Nutrition  Consult Note    SUMMARY     Recommendations    1. TF recommendations: Glucerna 1.5 @ 55 mL/hr to provide 1980 calories (102% EEN), 109 g of protein (99% EPN), 1002 mL fluid.    - Hold for residuals >500 mL; additional fluid per MD.   2. RD to monitor & follow-up.    Goals: Meet % EEN, EPN  Nutrition Goal Status: new  Communication of RD Recs: reviewed with RN    Reason for Assessment    Reason for Assessment: consult  Diagnosis: other (see comments) (R sided nontraumatic ICH)  Relevant Medical History: DM, HTN, Ca, PEG placement  Interdisciplinary Rounds: did not attend    General Information Comments: Pt intubated, sedated w/ no family at bedside. PEG present; unsure of pt's usual TF regimen.  Nutrition Discharge Planning: Tolerance of TF    Nutrition/Diet History    Patient Reported Diet/Restrictions/Preferences: other (see comments) ([place])  Factors Affecting Nutritional Intake: NPO, on mechanical ventilation    Anthropometrics    Temp: 99.3 °F (37.4 °C)  Height: 5' 9.6" (176.8 cm)  Height (inches): 69.6 in  Weight Method: Bed Scale  Weight: 91.3 kg (201 lb 4.5 oz)  Weight (lb): 201.28 lb  Ideal Body Weight (IBW), Male: 163.6 lb  % Ideal Body Weight, Male (lb): 123.03 lb  BMI (Calculated): 29.3  BMI Grade: 25 - 29.9 - overweight  Usual Body Weight (UBW), k kg (Per chart review in 2018)  % Usual Body Weight: 90.59  % Weight Change From Usual Weight: -9.6 %    Lab/Procedures/Meds    Pertinent Labs Reviewed: reviewed  Pertinent Labs Comments: Gluc 132, A1C 9.3  Pertinent Medications Reviewed: reviewed  Pertinent Medications Comments: Levophed    Physical Findings/Assessment    Overall Physical Appearance: loss of subcutaneous fat, loss of muscle mass, nourished, on ventilator support  Tubes: gastrostomy tube  Oral/Mouth Cavity: WDL  Skin: intact    Estimated/Assessed Needs    Weight Used For Calorie Calculations: 91.3 kg (201 lb 4.5 oz)     Energy " Calorie Requirements (kcal): 1940 kcal/d  Energy Need Method: [facility]     Protein Requirements: 110-137 g/d (1.2-1.5 g/kg)  Weight Used For Protein Calculations: 91.3 kg (201 lb 4.5 oz)     Fluid Need Method: other (see comments) (Per MD or 1 mL/kcal)     CHO Requirement: 50% total kcals    Nutrition Prescription Ordered    Current Diet Order: NPO    Evaluation of Received Nutrient/Fluid Intake    Comments: LBM: 6/30    Nutrition Risk    Level of Risk/Frequency of Follow-up:  (2x/week)     Assessment and Plan    Nutrition Problem  Inadequate energy intake    Related to (etiology):   Inability to consume sufficient energy    Signs and Symptoms (as evidenced by):   NPO with no alternate means of nutrition     Nutrition Diagnosis Status:   New    Monitor and Evaluation    Food and Nutrient Intake: enteral nutrition intake  Food and Nutrient Adminstration: enteral and parenteral nutrition administration  Physical Activity and Function: nutrition-related ADLs and IADLs  Anthropometric Measurements: weight, weight change  Biochemical Data, Medical Tests and Procedures: lipid profile, inflammatory profile, glucose/endocrine profile, gastrointestinal profile, electrolyte and renal panel  Nutrition-Focused Physical Findings: overall appearance     Nutrition Follow-Up    RD Follow-up?: Yes

## 2018-07-02 NOTE — PT/OT/SLP PROGRESS
Physical Therapy      Patient Name:  Mendez Guthrie   MRN:  [id number]    PT consult received and acknowledged.  The patient is currently intubated and appropriate for only one therapy discipline at this time.  OT will follow this patient and notify PT when the patient is ready for mobilization.  PT will follow up when patient is ready for more than one therapy discipline.    Cele Diggs, PT  7/2/2018  [phone] (pager)

## 2018-07-02 NOTE — PROGRESS NOTES
[facility]  Vascular Neurology  Comprehensive Stroke Center  Progress Note    Assessment/Plan:     * Right-sided nontraumatic intracerebral hemorrhage    68 year old male with past medical history HLD, HTN, hx DVTs, CHF, DM, and previous RMCA stroke with residual left sided weakness presented to [facility] today after family found him unresponsive at [facility]. CT revealed large amount of parenchymal hemorrhage throughout the right cerebral hemisphere with diffuse mass effect and 1.5 cm of right to left midline shift.     Patient was recently admitted to [facility] Neurology due to R MCA infarct. Infarct thought likely to be cardioembolic due to EF 15-20%.  Cardiology was consulted and recommended patient to be placed on AC. During his hospital stay patient also had Bilateral upper extremity DVTs. Patient was discharged to skilled nursing facility on Lovenox with Coumadin bridge. INR from today is pending. Kcentra was administered at outside facility. Patient was intubated at outside facility. Transferred to [facility] for higher level of care and Neurosurgery evaluation. Etiology possibly hemorrhagic transformation of ischemic stroke. Patient made DNR on 7/1/18.     Antithrombotics for secondary stroke prevention:  On hold for ICH    Statins for secondary stroke prevention and hyperlipidemia, if present:   Statins:  Crestor- 40 mg daily    Aggressive risk factor modification: HTN, DM, HLD, Diet     Rehab efforts: PT/OT/SLP to evaluate and treat    Diagnostics ordered/pending: CT scan of head without contrast to asses brain parenchyma, MRI head without contrast to assess brain parenchyma; NS consulted and signed off as there is no surgical intervention they can offer.    VTE prophylaxis: Holding due to ICH    BP parameters: ICH: SBP <140            Mixed hyperlipidemia    Stroke risk factor  Crestor 40 mg         DVT of axillary vein, acute left    Was placed on Coumadin  Currently held          Decreased cardiac ejection fraction    Seen on echo 6/7  Cardiology previously consulted   Recommended ASA and AC  Patient discharged on Lovenox with coumadin bridge         Essential hypertension    Stroke risk factor  Cardene ordered  SBP<140        Type 2 diabetes mellitus without complication, with long-term current use of insulin    Stroke risk factor  Management per primary              No notes on file    STROKE DOCUMENTATION        NIH Scale:  1a. Level Of Consciousness: 3-->Responds only with reflex motor or autonomic effects or totally unresponsive, flaccid, and areflexic  1b. LOC Questions: 2-->Answers neither question correctly  1c. LOC Commands: 2-->Performs neither task correctly  2. Best Gaze: 0-->Normal  3. Visual: 2-->Complete hemianopia  4. Facial Palsy: 0-->Normal symmetrical movements  5a. Motor Arm, Left: 4-->No movement  5b. Motor Arm, Right: 4-->No movement  6a. Motor Leg, Left: 4-->No movement  6b. Motor Leg, Right: 4-->No movement  7. Limb Ataxia: 0-->Absent  8. Sensory: 2-->Severe to total sensory loss: patient is not aware of being touched in the face, arm, and leg  9. Best Language: 3-->Mute, global aphasia: no usable speech or auditory comprehension  10. Dysarthria: (UN) Intubated or other physical barrier  11. Extinction and Inattention (formerly Neglect): 0-->No abnormality  Total (NIH Stroke Scale): 30       Modified [place] Score: 2  Beni Coma Scale:4   ABCD2 Score:    [id number] Score:   HAS -BLED Score:   ICH Score:3  Hunt & De Paz Classification:      Hemorrhagic change of an Ischemic Stroke: Does this patient have an ischemic stroke with hemorrhagic changes? Yes, Grading Scale: PH Type 2 (PH-2) = was defined as dense hematoma > 30% of the infarcted area with substantial space-occupying effect, any extension into the intraventricular space, or any hemorrhagic lesion outside the infarcted area.  Is this a symptomatic change?  Yes - NIHSS increase of 4 or more points directly  related to imaging changes.    Neurologic Chief Complaint: L weakness     Subjective:     Interval History: Patient is seen for follow-up neurological assessment and treatment recommendations: comatose. Patient denies any acute events overnight.      HPI, Past Medical, Family, and Social History remains the same as documented in the initial encounter.     Review of Systems   Unable to perform ROS: Intubated   HENT: Negative for rhinorrhea.    Neurological:        Unresponsive      Scheduled Meds:   chlorhexidine  15 mL Mouth/Throat QID    famotidine  20 mg Per NG tube BID    levetiracetam (KEPPRA) IVPB 100ml  500 mg Intravenous Q12H    mannitol 25%  50 g Intravenous Once    sodium chloride 0.9%  3 mL Intravenous Q8H     Continuous Infusions:   nicardipine      norepinephrine bitartrate-D5W      sodium chloride 3% 50 mL/hr (07/02/18 1000)     PRN Meds:acetaminophen, dextrose 50%, glucagon (human recombinant), insulin aspart U-100, labetalol, magnesium oxide, magnesium oxide, ondansetron, potassium chloride 10%, potassium chloride 10%, potassium chloride 10%, potassium, sodium phosphates, potassium, sodium phosphates, potassium, sodium phosphates    Objective:     Vital Signs (Most Recent):  Temp: 99.8 °F (37.7 °C) (07/02/18 0701)  Pulse: 84 (07/02/18 1001)  Resp: 18 (07/02/18 1001)  BP: (!) 143/87 (07/02/18 0300)  SpO2: 100 % (07/02/18 1001)       Vital Signs Range (Last 24H):  Temp:  [98.8 °F (37.1 °C)-100.3 °F (37.9 °C)]   Pulse:  []   Resp:  [14-26]   BP: (141-143)/(60-87)   SpO2:  [100 %]   Arterial Line BP: (118-159)/(55-67)        Physical Exam      Constitutional: He appears well-developed.   HENT:   Head: Normocephalic and atraumatic.   Eyes:   Fixed pupils  Neck: Normal range of motion. No tracheal deviation present.   Cardiovascular: Normal rate.    Pulmonary/Chest: Effort normal.   Intubated   Abdominal: Soft.   Musculoskeletal:   Flaccid throughout    Skin: Skin is warm.   Vitals  reviewed.      Neurological Exam: Patient currently on sedation   LOC: alert and obtunded  Attention Span: Intubated  Language: No aphasia, Intubated  Articulation: Untestable due to intubation  Orientation: Person, Place, Time , Untestable due to intubation  Visual Fields: Full  Hemianopsia left  EOM (CN III, IV, VI): Oculocephalic Reflex  Normal  Pupils (CN II, III): PERRL  Anisocoria Side: R pupil 5 mm sluggish   Facial Movement (CN VII): Unable to test   Motor: Arm left  Plegia 0/5  Leg left  Plegia 0/5  Arm right  Plegia 0/5  Leg right Plegia 0/5  Cebellar: No evidence of appendicular or axial ataxia  Sensation: Not responding to pain in all extremities       Laboratory:  CMP:     Recent Labs  Lab 07/02/18 0157 07/02/18 0558   CALCIUM 8.9  --    ALBUMIN 2.1*  --    PROT 5.9*  --    * 158*   K 3.7  --    CO2 24  --    *  --    BUN 18  --    CREATININE 0.8  --    ALKPHOS 64  --    ALT 18  --    AST 20  --    BILITOT 0.3  --      BMP:     Recent Labs  Lab 07/02/18 0157 07/02/18  0558   * 158*   K 3.7  --    *  --    CO2 24  --    BUN 18  --    CREATININE 0.8  --    CALCIUM 8.9  --      CBC:     Recent Labs  Lab 07/02/18 0157   WBC 9.17   RBC 3.02*   HGB 9.0*   HCT 30.0*      MCV 99*   MCH 29.8   MCHC 30.0*     Lipid Panel:     Recent Labs  Lab 06/30/18  1829   CHOL 127   LDLCALC 59.2*   HDL 53   TRIG 74     Coagulation:     Recent Labs  Lab 07/02/18  0558   INR 2.3*   APTT 23.1     Platelet Aggregation Study: No results for input(s): PLTAGG, PLTAGINTERP, PLTAGREGLACO, ADPPLTAGGREG in the last 168 hours.  Hgb A1C:     Recent Labs  Lab 06/30/18 1829   HGBA1C 9.3*     TSH:     Recent Labs  Lab 06/30/18 1829   TSH 0.962       Diagnostic Results         Brain imaging:  CT 6/30  Large amount of parenchymal hemorrhage throughout the right cerebral hemisphere with diffuse mass effect and 1.5 cm of right to left midline shift.       Vessel Imaging:  CTA 6/8  CTA head: Occlusion right M2  segment of the MCA within the proximal sylvian fissure..  Heavy atherosclerotic plaquing of the cavernous and supraclinoid ICAs with mild moderate right and mild left cavernous ICA stenosis.  There is diffuse small caliber right M1 segment of the MCA.  Irregularity and narrowing of the right distal vertebral artery concerning for atherosclerotic disease with mild moderate stenosis.  CTA neck: Atherosclerotic plaquing of the carotid bifurcations and proximal ICAs with less than 50% proximal ICA stenosis by NASCET criteria.  CT head: Evolving large right MCA distribution infarction.  Localized mass effect without significant midline shift or hydrocephalus.  No evidence for hemorrhagic conversion.  Clinical correlation and follow-up advised.     Cardiac Evaluation:   Echo 6/7    CONCLUSIONS     1 - Technically difficult study.     2 - Severely depressed left ventricular systolic function (EF 15-20%).     3 - Mildly to moderately depressed right ventricular systolic function .     4 - Impaired LV relaxation, normal LAP (grade 1 diastolic dysfunction).     5 - Mild aortic stenosis, WIL = 1.45 cm2, AVAi = 0.67 cm2/m2, peak velocity = 2.8 m/s, mean gradient = 18 mmHg.     6 - Mild mitral regurgitation.         Dejan Hernandez MD  Comprehensive Stroke Center  Department of Vascular Neurology   [facility]

## 2018-07-02 NOTE — PLAN OF CARE
[facility] - [place] - [address]  [address]  [place] [address]  Phone: [phone] Fax: [phone]    RONNI THOMAS [facility] - [place], [place] - [address]  [address]  [place] [address]  Phone: [phone] Fax: [phone]    [facility] - [place] - [address]  [address]  [place] [address]  Phone: [phone] Fax: [phone]    This CM spoke with patient spouse at bedside. Patient was at [facility].       07/02/18 0855   Discharge Assessment   Assessment Type Discharge Planning Assessment   Confirmed/corrected address and phone number on facesheet? No   Assessment information obtained from? Caregiver   Expected Length of Stay (days) 3   Communicated expected length of stay with patient/caregiver no   Prior to hospitilization cognitive status: Alert/Oriented   Prior to hospitalization functional status: Assistive Equipment;Needs Assistance   Current cognitive status: Unable to Assess   Current Functional Status: Completely Dependent   Facility Arrived From: (transfer from [facility])   Lives With facility resident   Able to Return to Prior Arrangements unable to determine at this time (comments)   Is patient able to care for self after discharge? Unable to determine at this time (comments)   Who are your caregiver(s) and their phone number(s)? (spouse Jeniffer Guthrie [phone])   Patient's perception of discharge disposition other (comments)  (sheba)   Readmission Within The Last 30 Days current reason for admission unrelated to previous admission   If yes, most recent facility name: ([facility])   Patient currently being followed by outpatient case management? No   Patient currently receives home health services? No   Patient currently receives any other outside agency services? No   Equipment Currently Used at Home none   Do you have any problems affording any of your prescribed medications?  No   Is the patient taking medications as prescribed? yes   Does the patient have transportation home? Yes   Transportation Available other (see comments)  (TBD)   Does the patient receive services at the [facility]? No   Discharge Plan A Skilled Nursing Facility   Discharge Plan B Other  (TBD)   Patient/Family In Agreement With Plan yes   Readmission Questionnaire   At the time of your discharge, did someone talk to you about what your health problems were? Yes   At the time of discharge, did someone talk to you about what to watch out for regarding worsening of your health problem? Yes   At the time of discharge, did someone talk to you about what to do if you experienced worsening of your health problem? Yes   At the time of discharge, did someone talk to you about which medication to take when you left the hospital and which ones to stop taking? Yes   At the time of discharge, did someone talk to you about when and where to follow up with a doctor after you left the hospital? Yes   What do you believe caused you to be sick enough to be re-admitted? (cerebral hemorrhage)   How often do you need to have someone help you when you read instructions, pamphlets, or other written material from your doctor or pharmacy? Often   Do you have problems taking your medications as prescribed? No   Do you have any problems affording any of  your prescribed medications? No   Do you have problems obtaining/receiving your medications? No   Does the patient have transportation to healthcare appointments? Yes   Living Arrangements house   Does the patient have family/friends to help with healtcare needs after discharge? yes   Does your caregiver provide all the help you need? Yes   Are you currently feeling confused? (sheba)   Have you felt down, depressed, or hopeless? Unable to Assess   Have you felt little interest or pleasure in doing things? Unable to assess   In the last 7 days, my sleep quality was: (sheba)     Lorelei  Fabio RN/BSN/SALAZAR  [phone]  [facility]

## 2018-07-02 NOTE — PROGRESS NOTES
Wound care consulted for genital skin breakdown.  The skin is intact/moist near the groin areas.  Plan of care discussed with patient/family who verbalized understanding.     07/02/18 1100       Wound 07/02/18 1100 Moisture associated dermatitis Groin   Date First Assessed/Time First Assessed: 07/02/18 1100   Pre-existing: No  Primary Wound Type: Moisture associated dermatitis  Side: (c)   Location: Groin   Wound WDL ex   Dressing Appearance Open to air;No dressing   Drainage Amount None   Appearance Pink;Moist   Tissue loss description Not applicable   Periwound Area Pink;Moist    Recommendations:  Miconazole powder to groin BID   ANGELA Lu RN, CWCN  [id number]

## 2018-07-02 NOTE — PT/OT/SLP EVAL
"Occupational Therapy   Evaluation    Name: Mendez Guthrie  MRN: [id number]  Admitting Diagnosis:  Right-sided nontraumatic intracerebral hemorrhage      Recommendations:     Discharge Recommendations:  (pending extubation)  Discharge Equipment Recommendations:  hospital bed  Barriers to discharge:  Inaccessible home environment, Decreased caregiver support    History:     Occupational Profile:  Per wife:  Patient resides in [place] with wife and dtg in one story home with 3 steps to enter, no rail.  Prior to recent stroke (R MCA) patient was independent with ADLs including driving.  Currently owns no DME.  Patient is right handed.  Retired: .  Hobbies:  Working around the house.  Roles/Responsibilities:  Father, , grandfather, uncle, cutting the grass, cooking.  Patient was recently discharged from the hospital on 6/23 to [facility] and was at Max assist level with UB dressing, total assist with LB dressing, mod assist with sitting EOB.       Past Medical History:   Diagnosis Date    Acute on chronic respiratory failure with hypoxia 6/14/2018    Arthritis     Aspiration pneumonia 6/16/2018    Asthma     COPD (chronic obstructive pulmonary disease)     Coronary artery disease     Diabetes mellitus     Embolic stroke involving right middle cerebral artery 6/6/2018    High cholesterol     History of lung cancer 10/24/2014    Hypertension     Long term current use of antithrombotics/antiplatelets     Stroke 6/6/2018       Past Surgical History:   Procedure Laterality Date    cardiac bypass      CARDIAC SURGERY  05/04/2016    PORTACATH PLACEMENT  2012    cancer treatment       Subjective     Patient: Nonverbal; intubated  Wife: "Friday, he had a good day.  He kept his eyes open and he worked with therapy.  He talks but only a few words.  He was rubbing my hand and my shoulders.  Then on Saturday, he wasn't responding."  Communicated with: Nurse prior to " session.  Pain/Comfort:  · Pain Rating 1: 0/10  · Pain Rating Post-Intervention 1: 0/10    Patients cultural, spiritual, Roman Catholic conflicts given the current situation: Yazidism    Objective:     Patient found with: central line, PEG Tube, ford catheter, ventilator, arterial line, blood pressure cuff, peripheral IV, telemetry, SCD, pulse ox (continuous), pressure relief boots  Wife present  General Precautions: Standard, aspiration, fall, NPO   Orthopedic Precautions:N/A   Braces: N/A     Occupational Performance:    Bed Mobility:    · Patient completed Rolling/Turning to Left with  total assistance  · Patient completed Rolling/Turning to Right with total assistance    Functional Mobility/Transfers:  · Dependent drawsheet transfers    Activities of Daily Living:  · Feeding:  NPO    · Grooming: total assistance while supine    Cognitive/Visual Perceptual:  Cognitive/Psychosocial Skills:     -       Oriented to: Daily orientation provided  -       Follows Commands/attention: Unable to follow commands  -       Communication:Nonverbal; intubated    Physical Exam:  Postural examination/scapula alignment:    -       Rounded shoulders  Skin integrity: Visible skin intact  Edema:  None noted  Upper Extremity Range of Motion: PROM WNL bilaterally    Patient left supine with all lines intact, call button in reach and bed alarm on    AMPAC 6 Click:  AMPA Total Score: 6    Treatment & Education:  Patient/ Family education provided for stroke warning signs, prevention guidelines and personal risk factors.  Patient/ Family verbalizing understanding via teach back method.  Patient education provided on role of OT, daily orientation, ROM and positioning.  Continued education, patient/ family training recommended.  Daily orientation provided.  PROM performed bilateral UE/LEs one set x 10 rep in all planes of motion with stretches provided at end range; sustained stretch provided for ankle dorsiflexion.  Assistance and  "facilitation provided for upward rotation of the scapula during shoulder flexion and abduction.  Patient kept eyes closed 100% of the session.  Patient unable to follow commands.  Positioning provided for midline orientation with bilateral UEs elevated and heels lifted off mattress.  Gentle cervical rotation provided.   Family present during the session.  White board updated in patient's room.Education:    Assessment:     Mendez Guthrie is a 68 y.o. male with a medical diagnosis of Right-sided nontraumatic intracerebral hemorrhage.  He presents with performance deficits affecting function are weakness, impaired self care skills, impaired balance, impaired endurance, impaired functional mobilty, impaired sensation, gait instability, impaired cognition, decreased lower extremity function, decreased upper extremity function, decreased coordination, decreased safety awareness, abnormal tone, impaired fine motor, impaired coordination, impaired cardiopulmonary response to activity, decreased ROM.    These performance deficits have resulted in activity limitations including but not limited to:   bed mobility, transfers, ascending/ descending stairs, walking short and long distances, walking around obstacles, transitional movement patterns (kneeling, bending); eating, upper body dressing, lower body dressing, brushing teeth, toileting, bathing, and carrying objects.   Patient's role as , father, grandfather and independent caretaker for self has been affected. Patient will benefit from skilled OT services to maximize level of independence with self-care skills and functional mobility.      Rehab Prognosis:  Fair; patient would benefit from acute skilled OT services to address these deficits and reach maximum level of function.         Clinical Decision Making:     3.  OT High:  "Pt evaluation falls under high complexity for evaluation category due to 5+ performance deficits identified with comprehensive assessments " "and significant modifications/assistance required. An expanded review of history and occupational profile completed in addition to expanded review of physical, cognitive and psychosocial history. Several treatment options considered in care."     Plan:     Patient to be seen 3 x/week to address the above listed problems via self-care/home management, neuromuscular re-education, cognitive retraining, sensory integration, therapeutic activities, therapeutic exercises  · Plan of Care Expires: 07/29/18  · Plan of Care Reviewed with: patient, spouse    This Plan of care has been discussed with the patient who was involved in its development and understands and is in agreement with the identified goals and treatment plan    GOALS:    Occupational Therapy Goals        Problem: Occupational Therapy Goal    Goal Priority Disciplines Outcome Interventions   Occupational Therapy Goal     OT, PT/OT     Description:  Goals set 7/2 to be addressed for 14 days with expiration date, 7/16:  Patient will increase functional independence with ADLs by performing:    Patient will demonstrate rolling to the right with max assist.  Not met   Patient will demonstrate rolling to the left with max assist.   Not met  Patient will demonstrate supine -sit with max assist.   Not met  Patient will demonstrate grooming while seated with max assist.   Not met  Patient will demonstrate upper body dressing with max assist while seated EOB.   Not met  Patient will demonstrate lower body dressing with max assist while seated EOB.   Not met  Patient will demonstrate ability to follow 1/3 commands.   Not met  Patient's family / caregiver will demonstrate independence and safety with assisting patient with self-care skills and functional mobility.     Not met  Patient's family / caregiver will demonstrate independence with providing ROM and changes in bed positioning.   Not met  Patient and/or patient's family will verbalize understanding of stroke " prevention guidelines, personal risk factors and stroke warning signs via teachback method.  Not met                           Time Tracking:     OT Date of Treatment: 07/02/18  OT Start Time: 0512  OT Stop Time: 0534  OT Total Time (min): 22 min    Billable Minutes:Evaluation 10  Therapeutic Exercise 12    YOSEPH Gonsalves  7/2/2018

## 2018-07-02 NOTE — PLAN OF CARE
Problem: Patient Care Overview  Goal: Plan of Care Review  Outcome: Ongoing (interventions implemented as appropriate)  POC reviewed with pt and family at 1500. Pt unable to verbalize understanding. Pt's spouse and family verbalized understanding. Questions and concerns addressed. 3% continued at 75ml/hr. Levo titrated throughout shift to maintain MAPs > 80. Family meeting today with Dr. Mely MD. Family decision for DNR. Will continue to monitor. See flowsheets for full assessment and VS info.

## 2018-07-02 NOTE — SUBJECTIVE & OBJECTIVE
Neurologic Chief Complaint: L weakness     Subjective:     Interval History: Patient is seen for follow-up neurological assessment and treatment recommendations: comatose. Patient denies any acute events overnight.      HPI, Past Medical, Family, and Social History remains the same as documented in the initial encounter.     Review of Systems   Unable to perform ROS: Intubated   HENT: Negative for rhinorrhea.    Neurological:        Unresponsive      Scheduled Meds:   chlorhexidine  15 mL Mouth/Throat QID    famotidine  20 mg Per NG tube BID    levetiracetam (KEPPRA) IVPB 100ml  500 mg Intravenous Q12H    mannitol 25%  50 g Intravenous Once    sodium chloride 0.9%  3 mL Intravenous Q8H     Continuous Infusions:   nicardipine      norepinephrine bitartrate-D5W      sodium chloride 3% 50 mL/hr (07/02/18 1000)     PRN Meds:acetaminophen, dextrose 50%, glucagon (human recombinant), insulin aspart U-100, labetalol, magnesium oxide, magnesium oxide, ondansetron, potassium chloride 10%, potassium chloride 10%, potassium chloride 10%, potassium, sodium phosphates, potassium, sodium phosphates, potassium, sodium phosphates    Objective:     Vital Signs (Most Recent):  Temp: 99.8 °F (37.7 °C) (07/02/18 0701)  Pulse: 84 (07/02/18 1001)  Resp: 18 (07/02/18 1001)  BP: (!) 143/87 (07/02/18 0300)  SpO2: 100 % (07/02/18 1001)       Vital Signs Range (Last 24H):  Temp:  [98.8 °F (37.1 °C)-100.3 °F (37.9 °C)]   Pulse:  []   Resp:  [14-26]   BP: (141-143)/(60-87)   SpO2:  [100 %]   Arterial Line BP: (118-159)/(55-67)        Physical Exam      Constitutional: He appears well-developed.   HENT:   Head: Normocephalic and atraumatic.   Eyes:   Fixed pupils  Neck: Normal range of motion. No tracheal deviation present.   Cardiovascular: Normal rate.    Pulmonary/Chest: Effort normal.   Intubated   Abdominal: Soft.   Musculoskeletal:   Flaccid throughout    Skin: Skin is warm.   Vitals reviewed.      Neurological Exam: Patient  currently on sedation   LOC: alert and obtunded  Attention Span: Intubated  Language: No aphasia, Intubated  Articulation: Untestable due to intubation  Orientation: Person, Place, Time , Untestable due to intubation  Visual Fields: Full  Hemianopsia left  EOM (CN III, IV, VI): Oculocephalic Reflex  Normal  Pupils (CN II, III): PERRL  Anisocoria Side: R pupil 5 mm sluggish   Facial Movement (CN VII): Unable to test   Motor: Arm left  Plegia 0/5  Leg left  Plegia 0/5  Arm right  Plegia 0/5  Leg right Plegia 0/5  Cebellar: No evidence of appendicular or axial ataxia  Sensation: Not responding to pain in all extremities       Laboratory:  CMP:     Recent Labs  Lab 07/02/18 0157 07/02/18 0558   CALCIUM 8.9  --    ALBUMIN 2.1*  --    PROT 5.9*  --    * 158*   K 3.7  --    CO2 24  --    *  --    BUN 18  --    CREATININE 0.8  --    ALKPHOS 64  --    ALT 18  --    AST 20  --    BILITOT 0.3  --      BMP:     Recent Labs  Lab 07/02/18 0157 07/02/18  0558   * 158*   K 3.7  --    *  --    CO2 24  --    BUN 18  --    CREATININE 0.8  --    CALCIUM 8.9  --      CBC:     Recent Labs  Lab 07/02/18 0157   WBC 9.17   RBC 3.02*   HGB 9.0*   HCT 30.0*      MCV 99*   MCH 29.8   MCHC 30.0*     Lipid Panel:     Recent Labs  Lab 06/30/18 1829   CHOL 127   LDLCALC 59.2*   HDL 53   TRIG 74     Coagulation:     Recent Labs  Lab 07/02/18  0558   INR 2.3*   APTT 23.1     Platelet Aggregation Study: No results for input(s): PLTAGG, PLTAGINTERP, PLTAGREGLACO, ADPPLTAGGREG in the last 168 hours.  Hgb A1C:     Recent Labs  Lab 06/30/18 1829   HGBA1C 9.3*     TSH:     Recent Labs  Lab 06/30/18 1829   TSH 0.962       Diagnostic Results         Brain imaging:  CT 6/30  Large amount of parenchymal hemorrhage throughout the right cerebral hemisphere with diffuse mass effect and 1.5 cm of right to left midline shift.       Vessel Imaging:  CTA 6/8  CTA head: Occlusion right M2 segment of the MCA within the proximal  sylvian fissure..  Heavy atherosclerotic plaquing of the cavernous and supraclinoid ICAs with mild moderate right and mild left cavernous ICA stenosis.  There is diffuse small caliber right M1 segment of the MCA.  Irregularity and narrowing of the right distal vertebral artery concerning for atherosclerotic disease with mild moderate stenosis.  CTA neck: Atherosclerotic plaquing of the carotid bifurcations and proximal ICAs with less than 50% proximal ICA stenosis by NASCET criteria.  CT head: Evolving large right MCA distribution infarction.  Localized mass effect without significant midline shift or hydrocephalus.  No evidence for hemorrhagic conversion.  Clinical correlation and follow-up advised.     Cardiac Evaluation:   Echo 6/7    CONCLUSIONS     1 - Technically difficult study.     2 - Severely depressed left ventricular systolic function (EF 15-20%).     3 - Mildly to moderately depressed right ventricular systolic function .     4 - Impaired LV relaxation, normal LAP (grade 1 diastolic dysfunction).     5 - Mild aortic stenosis, WIL = 1.45 cm2, AVAi = 0.67 cm2/m2, peak velocity = 2.8 m/s, mean gradient = 18 mmHg.     6 - Mild mitral regurgitation.

## 2018-07-02 NOTE — PLAN OF CARE
Problem: Patient Care Overview  Goal: Plan of Care Review  Outcome: Ongoing (interventions implemented as appropriate)  POC reviewed with pt and family. Family verbalized understanding. Questions and concerns addressed. Levo gtt titrated to maintain MAPs >65. CTH scheduled for the AM. 3% gtt d/c'd. No acute events. Will continue to monitor. Refer to flowsheets for full assessment and VS info.

## 2018-07-02 NOTE — PROGRESS NOTES
ICU Progress Note  Neurocritical Care    Admit Date: 6/30/2018  LOS: 2    CC: Right-sided nontraumatic intracerebral hemorrhage    Code Status: DNR     SUBJECTIVE:     Interval History/Significant Events:   No acute events overnight        Medications:  Continuous Infusions:   nicardipine      norepinephrine bitartrate-D5W 0.07 mcg/kg/min (07/02/18 1405)     Scheduled Meds:   chlorhexidine  15 mL Mouth/Throat QID    famotidine  20 mg Per NG tube BID    insulin detemir U-100  10 Units Subcutaneous BID    levetiracetam (KEPPRA) IVPB 100ml  500 mg Intravenous Q12H    mannitol 25%  50 g Intravenous Once    miconazole NITRATE 2 %   Topical (Top) BID    senna-docusate 8.6-50 mg  1 tablet Oral Daily    sodium chloride 0.9%  3 mL Intravenous Q8H     PRN Meds:.acetaminophen, dextrose 50%, glucagon (human recombinant), insulin aspart U-100, labetalol, magnesium oxide, magnesium oxide, ondansetron, potassium chloride 10%, potassium chloride 10%, potassium chloride 10%, potassium, sodium phosphates, potassium, sodium phosphates, potassium, sodium phosphates    OBJECTIVE:   Vital Signs (Most Recent):   Temp: 100.2 °F (37.9 °C) (07/02/18 1101)  Pulse: 80 (07/02/18 1304)  Resp: 18 (07/02/18 1304)  BP: (!) 143/87 (07/02/18 0300)  SpO2: 100 % (07/02/18 1304)    Vital Signs (24h Range):   Temp:  [98.8 °F (37.1 °C)-100.3 °F (37.9 °C)] 100.2 °F (37.9 °C)  Pulse:  [] 80  Resp:  [14-26] 18  SpO2:  [100 %] 100 %  BP: (141-143)/(60-87) 143/87  Arterial Line BP: (124-159)/(56-67) 129/56    ICP/CPP (Last 24h):        I & O (Last 24h):   Intake/Output Summary (Last 24 hours) at 07/02/18 1405  Last data filed at 07/02/18 1231   Gross per 24 hour   Intake          2373.96 ml   Output             1320 ml   Net          1053.96 ml     Physical Exam:  Comatose  No jaundice  Lungs are clear to auscultation, good air entry bilateral  Normal S1/S2, no murmurs.  Abdomen is soft, lax, +ve BS.      Neuro:  Comatose  No spontaneous  movements  Doesn't follow commands  Pupils are fixed bilateral  Intact corneal reflexes bilateral  Intact cough reflex  Extensor posturing of RUE and flexor of LUE to noxious stimuli  Triple flexions in LEs bilateral         Vent Data:   Vent Mode: A/C  Oxygen Concentration (%):  [] 41  Resp Rate Total:  [18 br/min-21 br/min] 18 br/min  Vt Set:  [500 mL] 500 mL  PEEP/CPAP:  [5 cmH20] 5 cmH20  Mean Airway Pressure:  [12 dwJ15-99 cmH20] 12 cmH20    Lines/Drains/Airway:        Airway - Non-Surgical 06/30/18 1342 Endotracheal Tube (Active)   Secured at 26 cm 7/2/2018 11:17 AM   Measured At Lips 7/2/2018 11:17 AM   Secured Location Right 7/2/2018 11:17 AM   Secured by Commercial tube jack 7/2/2018 11:17 AM   Bite Block right;secure and patent 7/2/2018 11:17 AM   Site Condition Cool;Dry 7/2/2018 11:17 AM   Status Intact;Secured;Patent 7/2/2018 11:17 AM   Site Assessment Clean;Dry 7/2/2018 11:17 AM   Cuff Pressure 28 cm H2O 7/1/2018 11:24 AM           Percutaneous Central Line Insertion/Assessment - triple lumen  06/30/18 2115 left femoral vein (Active)   Dressing biopatch in place;dressing dry and intact 7/2/2018 11:01 AM   Securement secured w/ sutures 7/2/2018 11:01 AM   Distal Patency/Care flushed w/o difficulty 7/2/2018 11:01 AM   Medial Patency/Care flushed w/o difficulty 7/2/2018 11:01 AM   Proximal Patency/Care flushed w/o difficulty 7/2/2018 11:01 AM   Waveform normal 7/2/2018  3:00 AM   Dressing Change Due 07/07/18 7/2/2018 11:01 AM   Daily Line Review Performed 7/2/2018 11:01 AM           Arterial Line 06/30/18 2030 Right Radial (Active)   Site Assessment Clean;Dry;Intact;No redness;No swelling 7/2/2018 11:01 AM   Line Status Pulsatile blood flow 7/2/2018 11:01 AM   Art Line Waveform Appropriate;Square wave test performed 7/2/2018 11:01 AM   Arterial Line Interventions Zeroed and calibrated;Leveled 7/2/2018 11:01 AM   Color/Movement/Sensation Capillary refill less than 3 sec 7/2/2018 11:01 AM   Dressing  "Type [place] 7/2/2018 11:01 AM   Dressing Status Biopatch in place;Clean;Dry;Intact 7/2/2018 11:01 AM   Dressing Intervention New dressing 7/2/2018 11:01 AM   Dressing Change Due 07/08/18 7/2/2018 11:01 AM           Gastrostomy/Enterostomy 06/19/18 0940 Gastrostomy tube w/ balloon LUQ feeding (Active)   Securement taped to abdomen 7/2/2018 11:01 AM   Interventions Prior to Feeding patency checked;residual checked;residual returned 7/2/2018 11:01 AM   Clamp Status/Tolerance clamped 7/2/2018 11:01 AM   Dressing dry and intact 7/2/2018 11:01 AM   Insertion Site dry 7/2/2018 11:01 AM   Site Care device rotatated 7/2/2018 11:01 AM   Flush/Irrigation flushed w/;water;no resistance met 7/2/2018 11:01 AM   Intake (mL) 60 mL 7/2/2018  7:01 AM            Urethral Catheter 07/01/18 0116 (Active)   Site Assessment Clean;Intact 7/2/2018 11:01 AM   Collection Container Urimeter 7/2/2018 11:01 AM   Securement Method secured to top of thigh w/ adhesive device 7/2/2018 11:01 AM   Catheter Care Performed yes 7/2/2018 11:01 AM   Reason for Continuing Urinary Catheterization Urinary retention 7/2/2018 11:01 AM   CAUTI Prevention Bundle StatLock in place w 1" slack;Intact seal between catheter & drainage tubing;Green sheeting clip in use;Drainage bag off the floor;No dependent loops or kinks;Drainage bag not overfilled (<2/3 full);Drainage bag below bladder 7/2/2018  7:01 AM   Output (mL) 60 mL 7/2/2018 12:06 PM   Labs:  ABG:   Recent Labs  Lab 07/02/18  0359   PH 7.462*   PO2 164*   PCO2 37.2   HCO3 26.5   POCSATURATED 100   BE 3     BMP:  Recent Labs  Lab 07/02/18  0157  07/02/18  1216   *  < > 158*   K 3.7  --   --    *  --   --    CO2 24  --   --    BUN 18  --   --    CREATININE 0.8  --   --    *  --   --    MG 1.9  --   --    PHOS 1.7*  --   --    < > = values in this interval not displayed.  LFT: Lab Results   Component Value Date    AST 20 07/02/2018    ALT 18 07/02/2018    ALKPHOS 64 07/02/2018    BILITOT " 0.3 07/02/2018    ALBUMIN 2.1 (L) 07/02/2018    PROT 5.9 (L) 07/02/2018     CBC:   Lab Results   Component Value Date    WBC 9.17 07/02/2018    HGB 9.0 (L) 07/02/2018    HCT 30.0 (L) 07/02/2018    MCV 99 (H) 07/02/2018     07/02/2018     Microbiology x 7d:   Microbiology Results (last 7 days)     ** No results found for the last 168 hours. **            ASSESSMENT/PLAN:     Patient Active Problem List   Diagnosis    Type 2 diabetes mellitus without complication, with long-term current use of insulin    History of lung cancer    COPD (chronic obstructive pulmonary disease)    Essential hypertension    Coronary artery disease involving native coronary artery of native heart without angina pectoris    Vitamin D deficiency    [place]STON (dyspnea on exertion)    Osteoarthritis of both knees    GENIE on CPAP    Abnormal chest CT    Embolic stroke involving right middle cerebral artery    Left spastic hemiparesis    Cytotoxic cerebral edema    Decreased cardiac ejection fraction    Dysarthria    Chronic systolic heart failure    DVT of axillary vein, acute left    Abnormal ventricular wall motion    Alteration in skin integrity    Acute encephalopathy    Delirium due to another medical condition    Reactive depression    Right-sided nontraumatic intracerebral hemorrhage    Brain compression    Mixed hyperlipidemia    Brain herniation    [place] coma scale total score 3-8    Warfarin-induced coagulopathy    Neurogenic shock     A/P:  - Poor neurological exam   - Not a surgical candidate  - Repeat CTH in am  - Continue current vent settings and f/u ABG and CXR  - MAP>65 and SBP<160  - Na goal > 155  - Start TF  - Start bowel regimen  - Start scheduled insuline  - Give PCC and vit K, Goal INR<1.5  - SCDs  - Discussed the poor prognosis with wife and daughter at bedside  - DNR/DNI    Uninterrupted Critical Care/Counseling Time (not including procedures): 35 minutes

## 2018-07-02 NOTE — PLAN OF CARE
Problem: Occupational Therapy Goal  Goal: Occupational Therapy Goal  OT evaluation completed.  YOSEPH Gonsalves  7/2/2018

## 2018-07-02 NOTE — PHYSICIAN QUERY
PT Name: Mendez Guthrie  MR #: [id number]    Physician Query Form -Present on Admission (POA) Diagnosis Clarification     CDS: Soila Shea RN, CCDS       Contact information: [email or web address]    This form is a permanent document in the medical record.     Query Date: July 2, 2018    By submitting this query, we are merely seeking further clarification of documentation. Please utilize your independent clinical judgment when addressing the question(s) below.       The Medical record contains the following:    Diagnosis      Supporting Clinical Information   Location in Medical Record     Aspiration pneumonia         On arrival to ED at [facility], pt. Is oriented only to person and place, has difficulty following commands, repetitive speech, refusing all medical care or interventions, trying to leave hospital.  Family did report that patient has no history of dementia, but did have cough and fever on 6/1/18 as well as HA's for 1 week prior to admission.      Patient developed a fever 6/6/18 and was started on vanc/rocephin/ampicillin/acyclovir. Patient had some dysphagia at lunch 6/6/18, and a repeat CT was ordered after he was reported to have worsening facial droop. Repeat CT showed large infarct in R MCA division with possible mass effect. Telestroke was contacted and on re-review of the case, given his evolving neurological symptoms and presence of new massive stroke over two days, patient was transferred for higher level of care.    Had been at [facility] since 6-4-18 for AMS and weakness that improved and then worsened now with facial droop and left sided weakness. 6-6-18 fever so infectious w/u in process could be PNA as he was coughing with puree food at [facility]. He was placed on ABX Amp/Vanc?valcyclovir for possible meningitis this is stopped as he does not have meningitis.    Procalcitonin=0.08  Procalcitonin=3.03    Temp=98.9, 101.6  Temp=98.2, 101.9  Temp=100.7, 98.1   6/26-D/C  Summary              6/26-D/C Summary                  6/8-PN                6/8-Labs  6/13-Labs    6/12-Flowsheet  6/13-Flowsheet  6/14-Flowsheet             Doctor, Please specify Present On Admission (POA) status of ___Aspiration pneumonia____.    [x  ] Present on Admission   [  ] Not Present on Admission  [  ] Clinically undetermined

## 2018-07-03 PROBLEM — R57.8 NEUROGENIC SHOCK: Status: RESOLVED | Noted: 2018-01-01 | Resolved: 2018-01-01

## 2018-07-03 NOTE — PROGRESS NOTES
[facility]  Neurocritical Care  Progress Note    Admit Date: 6/30/2018  Service Date: 07/03/2018  Length of Stay: 3    Subjective:     Chief Complaint: Right-sided nontraumatic intracerebral hemorrhage    History of Present Illness: Mr Guthrie is a 68 y.o. male with signification pmhx of Aspiration PNA(06/16/18), Acute on Chronic resp fail with hypoxia(06/14/18), Asthma, COPD, CAD, DM, RMCA(06/06/18), High Cholesterol, Lung CA, HTN, and Long term Antithrombotic/Antiplatelet therapy who presents to [facility] for a higher level of care s/p Large Hemorrhagic conversion of ischemic stroke. The patient was initially seen at [facility] ED after being found unresponsive at [facility]. MAR indicates pt to be on Coumadin (7.5 mg qD), Lovenox (100mg bid) and ASA (81mg qD). Received KCentra and protamine at OSH.     Hospital Course: 06/30: Admit to [facility], [facility], [facility]  07/03: worsening mass effect, volume of blood stable,new ROOSEVELT and PCA imfarcts, prognosis communicated to family        Review of Systems   Unable to perform ROS: Intubated       Objective:     Vitals:  Temp: 98.6 °F (37 °C)  Pulse: 76  Rhythm: normal sinus rhythm  BP: (!) 108/56  MAP (mmHg): 76  Resp: 18  SpO2: 100 %  Oxygen Concentration (%): 41  O2 Device (Oxygen Therapy): ventilator  Vent Mode: A/C  Set Rate: 18 bmp  Vt Set: 500 mL  PEEP/CPAP: 5 cmH20  Peak Airway Pressure: 39 cmH2O  Mean Airway Pressure: 13 cmH20  Plateau Pressure: 0 cmH20    Temp  Min: 98.5 °F (36.9 °C)  Max: 99.2 °F (37.3 °C)  Pulse  Min: 69  Max: 98  BP  Min: 106/63  Max: 149/72  MAP (mmHg)  Min: 75  Max: 104  Resp  Min: 6  Max: 19  SpO2  Min: 100 %  Max: 100 %  Oxygen Concentration (%)  Min: 40  Max: 100    07/02 0701 - 07/03 0700  In: 1345.4 [I.V.:565.4]  Out: 1570 [Urine:1570]           Physical Exam   Constitutional: He appears well-developed.   HENT:   Head: Normocephalic.   Eyes:   Pupils 5mm and fixed   Neck: No JVD present.   Cardiovascular:  An irregularly irregular rhythm present.   Pulmonary/Chest: Tachypnea noted.   Abdominal: Soft. Bowel sounds are normal.   Musculoskeletal: He exhibits no edema.   Neurological:   Coma,GCS 3T,  Bilateral extension posturing arms and legs   Skin: Skin is warm.         Medications:  Continuous  nicardipine    norepinephrine bitartrate-D5W Last Rate: 0.02 mcg/kg/min (07/03/18 1200)   Sodium Chloride 2%    Scheduled  chlorhexidine 15 mL QID   famotidine 20 mg BID   insulin detemir U-100 10 Units BID   levetiracetam oral soln 500 mg BID   miconazole NITRATE 2 %  BID   [START ON 7/4/2018] senna-docusate 8.6-50 mg 1 tablet Daily   sodium chloride 0.9% 3 mL Q8H   PRN  acetaminophen 650 mg Q6H PRN   dextrose 50% 12.5 g PRN   glucagon (human recombinant) 1 mg PRN   insulin aspart U-100 1-10 Units Q6H PRN   labetalol 10 mg Q4H PRN   magnesium oxide 800 mg PRN   magnesium oxide 800 mg PRN   ondansetron 4 mg Q6H PRN   potassium chloride 10% 40 mEq PRN   potassium chloride 10% 40 mEq PRN   potassium chloride 10% 60 mEq PRN   potassium, sodium phosphates 2 packet PRN   potassium, sodium phosphates 2 packet PRN   potassium, sodium phosphates 2 packet PRN     Today I personally reviewed pertinent medications, lines/drains/airways, imaging, lab results, notably:    Diet  Diet NPO  Diet NPO        Assessment/Plan:     Neuro   * Right-sided nontraumatic intracerebral hemorrhage    -- Neurosurgery consulted  -- 06/30: CTH pending  -- SBP goal < 160  -- PT/OT/Speech  -- Continue Neuro checks q 1hr   -- CXR pending  -- Keppra 500 Bid for prophylaxis   -- Vascular Neurology consulted  -- Daily CXR  -- Daily ABGs   Mass effect worsening despite optimized osmotic tx, possibility of progression to brain death discussed        Brain compression    -- See Cerebral parenchymal hemorrhage        Cardiac/Vascular   Essential hypertension    -- Continue to monitor HR and BP   -- SBP goal < 160  -- 06/07/18 2D echo:    1 - Technically difficult  study.     2 - Severely depressed left ventricular systolic function (EF 15-20%).     3 - Mildly to moderately depressed right ventricular systolic function .     4 - Impaired LV relaxation, normal LAP (grade 1 diastolic dysfunction).     5 - Mild aortic stenosis, WIL = 1.45 cm2, AVAi = 0.67 cm2/m2, peak velocity = 2.8 m/s, mean gradient = 18 mmHg.     6 - Mild mitral regurgitation.   -- Labetalol Prn  -- Cardene Prn        Hematology   Warfarin-induced coagulopathy    Adequately reversed at present        Endocrine   Type 2 diabetes mellitus without complication, with long-term current use of insulin    -- Continue sliding scale  -- POCT q 6  Sugars controlled on detmir at goal tube feed rate            Prophylaxis:  Venous Thromboembolism: mechanical  Stress Ulcer: H2B  Ventilator Pneumonia: no     Activity Orders          None      CRC> 30 min  DNR    Nick Lay MD  Neurocritical Care  [facility]

## 2018-07-03 NOTE — PT/OT/SLP PROGRESS
"Occupational Therapy   Treatment    Name: Mendez Guthrie  MRN: [id number]  Admitting Diagnosis:  Right-sided nontraumatic intracerebral hemorrhage       Recommendations:     Discharge Recommendations:  (pending extubation; patient transferred here from SNF ([facility]  Discharge Equipment Recommendations:  hospital bed  Barriers to discharge:  Inaccessible home environment, Decreased caregiver support    Subjective   Patient:  Nonverbal; intubated  Wife:  "His arm is swollen."  Communicated with: Nurse prior to session.  Pain/Comfort:  · Pain Rating 1: 0/10  · Pain Rating Post-Intervention 1: 0/10    Patients cultural, spiritual, Uatsdin conflicts given the current situation: Anglican    Objective:     Patient found with: peripheral IV, arterial line, bed alarm, blood pressure cuff, central line, pressure relief boots, SCD, ventilator, telemetry, ford catheter, pulse ox (continuous), PEG Tube  Wife and dtg present.  General Precautions: Standard, aspiration, fall, NPO   Orthopedic Precautions:N/A   Braces: N/A     Occupational Performance:    Bed Mobility:    · Patient completed Rolling/Turning to Left with  total assistance  · Patient completed Rolling/Turning to Right with total assistance     Functional Mobility/Transfers:  · Dependent drawsheet transfers     Activities of Daily Living:  · Feeding:  NPO    · Grooming: total assistance while supine    Patient left supine with all lines intact, call button in reach and bed alarm on    AMPA 6 Click:  [facility] Total Score: 6    Treatment & Education:  Patient /family education provided on role of OT, daily orientation, ROM and positioning.  Continued education, patient/ family training recommended.  Daily orientation provided.  PROM performed bilateral UE/LEs one set x 10 rep in all planes of motion with stretches provided at end range; sustained stretch provided for ankle dorsiflexion.  Assistance and facilitation provided for upward rotation of the scapula " during shoulder flexion and abduction.  Patient kept eyes closed 100% of the session.  Patient unable to follow commands.  Minimal edema noted left UE; positioning provided for elevation.  Positioning provided for midline orientation with bilateral UEs elevated and heels lifted off mattress.  Gentle cervical rotation provided.   Family present during the session.  White board updated in patient's room.  Education:    Assessment:     Mendez Guthrie is a 68 y.o. male with a medical diagnosis of Right-sided nontraumatic intracerebral hemorrhage.  He presents with performance deficits affecting function are weakness, impaired self care skills, impaired balance, impaired endurance, impaired functional mobilty, impaired sensation, gait instability, impaired cognition, decreased lower extremity function, decreased upper extremity function, decreased coordination, decreased safety awareness, abnormal tone, impaired fine motor, impaired coordination, impaired cardiopulmonary response to activity, decreased ROM.    These performance deficits have resulted in activity limitations including but not limited to:   bed mobility, transfers, ascending/ descending stairs, walking short and long distances, walking around obstacles, transitional movement patterns (kneeling, bending); eating, upper body dressing, lower body dressing, brushing teeth, toileting, bathing, and carrying objects.   Patient's role as , father, grandfather and independent caretaker for self has been affected. Patient will benefit from skilled OT services to maximize level of independence with self-care skills and functional mobility.       Rehab Prognosis:  Fair; patient would benefit from acute skilled OT services to address these deficits and reach maximum level of function.        Plan:     Patient to be seen 3 x/week to address the above listed problems via therapeutic activities, neuromuscular re-education, sensory integration, cognitive  retraining, therapeutic exercises, self-care/home management  · Plan of Care Expires: 07/29/18  · Plan of Care Reviewed with: patient, spouse    This Plan of care has been discussed with the patient who was involved in its development and understands and is in agreement with the identified goals and treatment plan    GOALS:    Occupational Therapy Goals        Problem: Occupational Therapy Goal    Goal Priority Disciplines Outcome Interventions   Occupational Therapy Goal     OT, PT/OT     Description:  Goals set 7/2 to be addressed for 14 days with expiration date, 7/16:  Patient will increase functional independence with ADLs by performing:    Patient will demonstrate rolling to the right with max assist.  Not met   Patient will demonstrate rolling to the left with max assist.   Not met  Patient will demonstrate supine -sit with max assist.   Not met  Patient will demonstrate grooming while seated with max assist.   Not met  Patient will demonstrate upper body dressing with max assist while seated EOB.   Not met  Patient will demonstrate lower body dressing with max assist while seated EOB.   Not met  Patient will demonstrate ability to follow 1/3 commands.   Not met  Patient's family / caregiver will demonstrate independence and safety with assisting patient with self-care skills and functional mobility.     Not met  Patient's family / caregiver will demonstrate independence with providing ROM and changes in bed positioning.   Not met  Patient and/or patient's family will verbalize understanding of stroke prevention guidelines, personal risk factors and stroke warning signs via teachback method.  Not met                           Time Tracking:     OT Date of Treatment: 07/03/18  OT Start Time: 0516  OT Stop Time: 0539  OT Total Time (min): 23 min    Billable Minutes:Therapeutic Exercise 23    YOSEPH Gonsalves  7/3/2018

## 2018-07-03 NOTE — PLAN OF CARE
Problem: Patient Care Overview  Goal: Plan of Care Review  Outcome: Ongoing (interventions implemented as appropriate)  POC reviewed with pt and family at 0500. Pt unable to verbalize understanding, family verbalized understanding. Questions and concerns addressed. CT head complete over night. No acute events overnight. GCS of 5, pupils remain fixed per pupilometer. Will continue to monitor. See flowsheets for full assessment and VS info

## 2018-07-03 NOTE — PLAN OF CARE
Problem: Occupational Therapy Goal  Goal: Occupational Therapy Goal  Goals set 7/2 to be addressed for 14 days with expiration date, 7/16:  Patient will increase functional independence with ADLs by performing:    Patient will demonstrate rolling to the right with max assist.  Not met   Patient will demonstrate rolling to the left with max assist.   Not met  Patient will demonstrate supine -sit with max assist.   Not met  Patient will demonstrate grooming while seated with max assist.   Not met  Patient will demonstrate upper body dressing with max assist while seated EOB.   Not met  Patient will demonstrate lower body dressing with max assist while seated EOB.   Not met  Patient will demonstrate ability to follow 1/3 commands.   Not met  Patient's family / caregiver will demonstrate independence and safety with assisting patient with self-care skills and functional mobility.     Not met  Patient's family / caregiver will demonstrate independence with providing ROM and changes in bed positioning.   Not met  Patient and/or patient's family will verbalize understanding of stroke prevention guidelines, personal risk factors and stroke warning signs via teachback method.  Not met          Goals remain appropriate.  YOSEPH Gonsalves  7/3/2018

## 2018-07-03 NOTE — PLAN OF CARE
Problem: Patient Care Overview  Goal: Plan of Care Review  Outcome: Ongoing (interventions implemented as appropriate)  POC reviewed with pt spouse and daughter at 1000 and verbalized understanding of the POC the patient is unconscious and intubated. Mannitol 100mg administered this AM. Na checks q6h. Started on 2% this afternoon. Goal Na is > 155. Still on levophed gtt. Electrolytes replaced and rechecked. Pupils still fixed, pupillometer used. Questions and concerns addressed. Please see flow sheet for detailed nursing assessment and VS. Will continue to monitor.

## 2018-07-03 NOTE — ASSESSMENT & PLAN NOTE
-- Neurosurgery consulted  -- 06/30: CTH pending  -- SBP goal < 160  -- PT/OT/Speech  -- Continue Neuro checks q 1hr   -- CXR pending  -- Keppra 500 Bid for prophylaxis   -- Vascular Neurology consulted  -- Daily CXR  -- Daily ABGs   Mass effect worsening despite optimized osmotic tx, possibility of progression to brain death discussed

## 2018-07-03 NOTE — SUBJECTIVE & OBJECTIVE
Review of Systems   Unable to perform ROS: Intubated       Objective:     Vitals:  Temp: 98.6 °F (37 °C)  Pulse: 76  Rhythm: normal sinus rhythm  BP: (!) 108/56  MAP (mmHg): 76  Resp: 18  SpO2: 100 %  Oxygen Concentration (%): 41  O2 Device (Oxygen Therapy): ventilator  Vent Mode: A/C  Set Rate: 18 bmp  Vt Set: 500 mL  PEEP/CPAP: 5 cmH20  Peak Airway Pressure: 39 cmH2O  Mean Airway Pressure: 13 cmH20  Plateau Pressure: 0 cmH20    Temp  Min: 98.5 °F (36.9 °C)  Max: 99.2 °F (37.3 °C)  Pulse  Min: 69  Max: 98  BP  Min: 106/63  Max: 149/72  MAP (mmHg)  Min: 75  Max: 104  Resp  Min: 6  Max: 19  SpO2  Min: 100 %  Max: 100 %  Oxygen Concentration (%)  Min: 40  Max: 100    07/02 0701 - 07/03 0700  In: 1345.4 [I.V.:565.4]  Out: 1570 [Urine:1570]           Physical Exam   Constitutional: He appears well-developed.   HENT:   Head: Normocephalic.   Eyes:   Pupils 5mm and fixed   Neck: No JVD present.   Cardiovascular: An irregularly irregular rhythm present.   Pulmonary/Chest: Tachypnea noted.   Abdominal: Soft. Bowel sounds are normal.   Musculoskeletal: He exhibits no edema.   Neurological:   Coma,GCS 3T,  Bilateral extension posturing arms and legs   Skin: Skin is warm.         Medications:  Continuous  nicardipine    norepinephrine bitartrate-D5W Last Rate: 0.02 mcg/kg/min (07/03/18 1200)   Sodium Chloride 2%    Scheduled  chlorhexidine 15 mL QID   famotidine 20 mg BID   insulin detemir U-100 10 Units BID   levetiracetam oral soln 500 mg BID   miconazole NITRATE 2 %  BID   [START ON 7/4/2018] senna-docusate 8.6-50 mg 1 tablet Daily   sodium chloride 0.9% 3 mL Q8H   PRN  acetaminophen 650 mg Q6H PRN   dextrose 50% 12.5 g PRN   glucagon (human recombinant) 1 mg PRN   insulin aspart U-100 1-10 Units Q6H PRN   labetalol 10 mg Q4H PRN   magnesium oxide 800 mg PRN   magnesium oxide 800 mg PRN   ondansetron 4 mg Q6H PRN   potassium chloride 10% 40 mEq PRN   potassium chloride 10% 40 mEq PRN   potassium chloride 10% 60 mEq PRN    potassium, sodium phosphates 2 packet PRN   potassium, sodium phosphates 2 packet PRN   potassium, sodium phosphates 2 packet PRN     Today I personally reviewed pertinent medications, lines/drains/airways, imaging, lab results, notably:    Diet  Diet NPO  Diet NPO

## 2018-07-04 NOTE — PLAN OF CARE
Problem: Patient Care Overview  Goal: Plan of Care Review  Outcome: Ongoing (interventions implemented as appropriate)  No acute events throughout the day, VS and assessment per flow sheet, patient progressing towards goal as tolerated. Potassium replaced per MAR parameters. Levophed titrated to maintain MAPs >65.  Pupils still fixed per pupillometer. Plan of care reviewed with Mendez Guthrie and family at 0400, pt unable to verbalize understanding, pt family verbalized understanding. Will continue to monitor.

## 2018-07-04 NOTE — PLAN OF CARE
Problem: Patient Care Overview  Goal: Plan of Care Review  Outcome: Ongoing (interventions implemented as appropriate)  POC reviewed with pt spouse and daughter and verbalized understanding of the POC at 1330. Pt is intubated and unconscious. 2% Sodium discontinued. Na checks q6h. Na to keep > 155. Still on Levophed gtt to keep MAP >65. Pt pupils still fixed with pupillometer checks q2h. Seen by NCC /Dr Gilmore with family at bedside regarding prognosis. Please see flow sheet for detailed nursing assessment and VS. Will continue to monitor.

## 2018-07-04 NOTE — PLAN OF CARE
Progressive worsening of cerebral edema   Large left hemispheric ICH  Secondary strokes on left pca, kathy territories likely from herniation and vessel compression.   CXR- Right basal atelectasis/effusion (chronic)   Hold hypertonic saline; sodium at goal  Cont mech vent  On levophed for hypotension

## 2018-07-04 NOTE — PLAN OF CARE
Wife and daughter wanted to discuss recent discharge planning prior to hemorrhage and placement on coumadin.  Patient was discharged on aspirin and coumadin due to large ischemic stroke, history of coronary artery disease and cerebral atherosclerosis.  Plavix was discontinued at discharge and on nursing home orders.  Wife said patient hit head while in nursing home while care techs were changing him.  He then became more somnolent and EMS finally called.    Sherri Burton PA-C  Vascular Neurology  [phone]

## 2018-07-05 NOTE — PROGRESS NOTES
[facility]  Neurocritical Care  Progress Note    Admit Date: 6/30/2018  Service Date: 07/04/2018  Length of Stay: 4    Subjective:     Chief Complaint: Right-sided nontraumatic intracerebral hemorrhage    History of Present Illness: Mr Guthrie is a 68 y.o. male with signification pmhx of Aspiration PNA(06/16/18), Acute on Chronic resp fail with hypoxia(06/14/18), Asthma, COPD, CAD, DM, RMCA(06/06/18), High Cholesterol, Lung CA, HTN, and Long term Antithrombotic/Antiplatelet therapy who presents to [facility] for a higher level of care s/p Large Hemorrhagic conversion of ischemic stroke. The patient was initially seen at [facility] ED after being found unresponsive at [facility]. MAR indicates pt to be on Coumadin (7.5 mg qD), Lovenox (100mg bid) and ASA (81mg qD). Received KCentra and protamine at OSH.     Hospital Course: 06/30: Admit to [facility], [facility], [facility]  07/03: worsening mass effect, volume of blood stable,new ROOSEVELT and PCA imfarcts, prognosis communicated to family  7/4 Continues on levophed gtt to keep MAP >65. Na 160 2% decreased. To 35% then weaned to off as Na stayed elevated. Blood glucoses > 200 shceduled aspar 2 units q4h    Interval History: Continues on levophed gtt to keep MAP >65. Na 160 2% decreased. To 35% then weaned to off as Na stayed elevated. Blood glucoses > 200 shceduled aspar 2 units q4h      Review of Systems: Unable to obtain a complete ROS due to level of consciousness.     Vitals:   Temp: 99.4 °F (37.4 °C)  Pulse: 75  Rhythm: normal sinus rhythm  BP: (!) 120/59  MAP (mmHg): 83  Resp: 18  SpO2: 100 %  Oxygen Concentration (%): 41  O2 Device (Oxygen Therapy): ventilator  Vent Mode: A/C  Set Rate: 18 bmp  Vt Set: 500 mL  PEEP/CPAP: 5 cmH20  Peak Airway Pressure: 38 cmH2O  Mean Airway Pressure: 13 cmH20  Plateau Pressure: 0 cmH20    Temp  Min: 98.7 °F (37.1 °C)  Max: 99.7 °F (37.6 °C)  Pulse  Min: 68  Max: 94  BP  Min: 100/58  Max: 142/65  MAP (mmHg)   Min: 74  Max: 101  Resp  Min: 12  Max: 19  SpO2  Min: 99 %  Max: 100 %  Oxygen Concentration (%)  Min: 40  Max: 42    07/03 0701 - 07/04 0700  In: 3328.4 [I.V.:1838.4]  Out: 2390 [Urine:2390]   Unmeasured Output  Stool Occurrence: 0     Examination:   Constitutional: Well-nourished and -developed. No apparent distress.   Eyes: Conjunctiva clear, anicteric. Lids no lesions.  Head/Ears/Nose/Mouth/Throat/Neck: Moist mucous membranes. External ears, nose atraumatic.   Cardiovascular: Regular rhythm. No murmurs. No leg edema.  Respiratory: Mechanical ventilation. Comfortable respirations. Bibasilar coarse breath sounds.  Gastrointestinal: No hernia. Soft, nondistended, nontender. + bowel sounds.    Neurologic:  -GCS E1VTM1  -obtunded. L pupil 3 fixed R pupil 4 fixed  -Motor to noxious stimuli has LUE extensor posturing RUE flexor posturing, bilat LE triple flexion  Unable to test orientation, language, memory, judgment, insight, fund of knowledge, hearing, shoulder shrug, tongue protrusion, coordination, gait due to level of consciousness.    Medications:   Continuous  norepinephrine bitartrate-D5W Last Rate: 0.02 mcg/kg/min (07/04/18 1826)   Scheduled  chlorhexidine 15 mL QID   famotidine 20 mg BID   insulin aspart U-100 2 Units Q4H   insulin detemir U-100 10 Units BID   levetiracetam oral soln 500 mg BID   miconazole NITRATE 2 %  BID   polyethylene glycol 17 g Daily   senna-docusate 8.6-50 mg 1 tablet Daily   sodium chloride 0.9% 3 mL Q8H   PRN  acetaminophen 650 mg Q6H PRN   dextrose 50% 12.5 g PRN   glucagon (human recombinant) 1 mg PRN   insulin aspart U-100 1-10 Units Q6H PRN   labetalol 10 mg Q4H PRN   magnesium oxide 800 mg PRN   magnesium oxide 800 mg PRN   ondansetron 4 mg Q6H PRN   potassium chloride 10% 40 mEq PRN   potassium chloride 10% 40 mEq PRN   potassium chloride 10% 60 mEq PRN   potassium, sodium phosphates 2 packet PRN   potassium, sodium phosphates 2 packet PRN   potassium, sodium phosphates 2  packet PRN      Today I independently reviewed pertinent medications, lines/drains/airways, imaging, lab results, microbiology results, notably:   Assessment/Plan:     Neuro   * Right-sided nontraumatic intracerebral hemorrhage    -- Neurosurgery signed off  -- 06/30: CTH pending  -- SBP goal < 160  -- PT/OT/Speech  -- Continue Neuro checks q 1hr   -- Vascular Neurology following  keppra 500mg bid  Mass effect worsening despite optimized osmotic tx, possibility of progression to brain death discussed        Brain compression    -- See Cerebral parenchymal hemorrhage        Cardiac/Vascular   Essential hypertension    -- Continue to monitor HR and BP   -- SBP goal < 160  -- currently requiring levophed to keep MAPS >65.  -- 06/07/18 2D echo:    1 - Technically difficult study.     2 - Severely depressed left ventricular systolic function (EF 15-20%).     3 - Mildly to moderately depressed right ventricular systolic function .     4 - Impaired LV relaxation, normal LAP (grade 1 diastolic dysfunction).     5 - Mild aortic stenosis, WIL = 1.45 cm2, AVAi = 0.67 cm2/m2, peak velocity = 2.8 m/s, mean gradient = 18 mmHg.     6 - Mild mitral regurgitation.   -- Labetalol , cardene Prn, not requiring           Endocrine   Type 2 diabetes mellitus without complication, with long-term current use of insulin    -- Continue sliding scale  -- POCT q 6  Sugars >200  detmir 10 units bid added 2 units aspar q4h            Prophylaxis:  Venous Thromboembolism: mechanical chemical  Stress Ulcer: H2B  Ventilator Pneumonia: yes     Activity Orders          None        DNR     Critical care time 30 minutes    Rita Clark NP  Neurocritical Care  [facility]

## 2018-07-05 NOTE — PLAN OF CARE
Problem: Patient Care Overview  Goal: Plan of Care Review  Outcome: Ongoing (interventions implemented as appropriate)  POC reviewed with pt and family at 0500. Pt unable to verbalize understanding. Questions and concerns addressed via family.  Levo at 0.02 mcg/kg/min.  Tylenol administered for elevated temperature.  No acute events overnight.  Will continue to monitor. See flowsheets for full assessment and VS info

## 2018-07-05 NOTE — PROGRESS NOTES
"  [facility]  Adult Nutrition  Consult Note    SUMMARY     Recommendations    1. Continue current TF regimen of Glucerna 1.5 @ 55 mL/hr. Meeting 103% EEN, 99% EPN.    - Hold for residuals >500 mL; additional fluid per MD.   2. RD to monitor & follow-up.    Goals: Meet % EEN, EPN  Nutrition Goal Status: goal met  Communication of RD Recs: reviewed with RN    Reason for Assessment    Reason for Assessment: RD follow-up  Diagnosis: other (see comments) (R sided nontraumatic ICH)  Relevant Medical History: DM, HTN, Ca, PEG placement  Interdisciplinary Rounds: did not attend    General Information Comments: Pt remains intubated, tolerating current TF regimen via PEG.  Nutrition Discharge Planning: Tolerance of TF    Nutrition/Diet History    Patient Reported Diet/Restrictions/Preferences: other (see comments) ([place])  Do you have any cultural, spiritual, Latter-day conflicts, given your current situation?: Yarsani  Factors Affecting Nutritional Intake: NPO, on mechanical ventilation    Anthropometrics    Temp: 99 °F (37.2 °C)  Height: 5' 9.6" (176.8 cm)  Height (inches): 69.6 in  Weight Method: Bed Scale  Weight: 100.6 kg (221 lb 12.5 oz)  Weight (lb): 221.78 lb  Ideal Body Weight (IBW), Male: 163.6 lb  % Ideal Body Weight, Male (lb): 135.56 lb  BMI (Calculated): 32.3  BMI Grade: 30 - 34.9- obesity - grade I  Usual Body Weight (UBW), k kg (Per chart review in 2018)  % Usual Body Weight: 90.59  % Weight Change From Usual Weight: -9.6 %    Lab/Procedures/Meds    Pertinent Labs Reviewed: reviewed  Pertinent Labs Comments: Na 158, Gluc 220, A1C 9.3  Pertinent Medications Reviewed: reviewed  Pertinent Medications Comments: Levophed, IVF    Physical Findings/Assessment    Overall Physical Appearance: loss of subcutaneous fat, loss of muscle mass, nourished, on ventilator support  Tubes: gastrostomy tube  Oral/Mouth Cavity: WDL  Skin: intact    Estimated/Assessed Needs    Weight Used For Calorie " Calculations: 91.3 kg (201 lb 4.5 oz) (Dosing wt)     Energy Calorie Requirements (kcal): 1926 kcal/d  Energy Need Method: [facility]     Protein Requirements: 110-137 g/d (1.2-1.5 g/kg)  Weight Used For Protein Calculations: 91.3 kg (201 lb 4.5 oz)     Fluid Need Method: other (see comments) (Per MD or 1 mL/kcal)     CHO Requirement: 50% total kcals    Nutrition Prescription Ordered    Current Diet Order: NPO  Current Nutrition Support Formula Ordered: Glucerna 1.5  Current Nutrition Support Rate Ordered: 55 mL/hr    Evaluation of Received Nutrient/Fluid Intake    Enteral Calories (kcal): 1980  Enteral Protein (gm): 109  Enteral (Free Water) Fluid (mL): 1002    % Kcal Needs: 103%   % Protein Needs: 99%    IV Fluid (mL): 120    Energy Calories Required: meeting needs  Protein Required: meeting needs  Fluid Required: other (see comments) (Per MD or 1 mL/kcal)    Comments: LBM: 7/3    Tolerance: tolerating    Nutrition Risk    Level of Risk/Frequency of Follow-up:  (1x/week)     Assessment and Plan    Nutrition Problem  Inadequate energy intake     Related to (etiology):   Inability to consume sufficient energy     Signs and Symptoms (as evidenced by):   NPO with no alternate means of nutrition      Nutrition Diagnosis Status:   Resolved      Monitor and Evaluation    Food and Nutrient Intake: enteral nutrition intake  Food and Nutrient Adminstration: enteral and parenteral nutrition administration  Physical Activity and Function: nutrition-related ADLs and IADLs  Anthropometric Measurements: weight, weight change  Biochemical Data, Medical Tests and Procedures: lipid profile, inflammatory profile, glucose/endocrine profile, gastrointestinal profile, electrolyte and renal panel  Nutrition-Focused Physical Findings: overall appearance     Nutrition Follow-Up    RD Follow-up?: Yes

## 2018-07-05 NOTE — ASSESSMENT & PLAN NOTE
-- Continue to monitor HR and BP   -- SBP goal < 160  -- currently requiring levophed to keep MAPS >65.  -- 06/07/18 2D echo:    1 - Technically difficult study.     2 - Severely depressed left ventricular systolic function (EF 15-20%).     3 - Mildly to moderately depressed right ventricular systolic function .     4 - Impaired LV relaxation, normal LAP (grade 1 diastolic dysfunction).     5 - Mild aortic stenosis, WIL = 1.45 cm2, AVAi = 0.67 cm2/m2, peak velocity = 2.8 m/s, mean gradient = 18 mmHg.     6 - Mild mitral regurgitation.   -- Labetalol , cardene Prn, not requiring

## 2018-07-05 NOTE — PLAN OF CARE
Intubated, levophed gtt.  Discharge plan: to be determined when medically appropriate. Patient was in [facility] ([facility]       07/05/18 1127   Discharge Reassessment   Assessment Type Discharge Planning Reassessment   Provided patient/caregiver education on the expected discharge date and the discharge plan No   Do you have any problems affording any of your prescribed medications? No   Discharge Plan A Other  (TBD)   Patient choice form signed by patient/caregiver No   Can the patient answer the patient profile reliably? No, cognitively impaired   How does the patient rate their overall health at the present time? (sheba)   Describe the patient's ability to walk at the present time. Does not walk or unable to take any steps at all   How often would a person be available to care for the patient? Whenever needed   Number of comorbid conditions (as recorded on the chart) Five or more   During the past month, has the patient often been bothered by feeling down, depressed or hopeless? (sheba)   During the past month, has the patient often been bothered by little interest or pleasure in doing things? (sheba)     Lorelei Mccarthy RN/BSN/SALAZAR  [phone]  [facility]

## 2018-07-05 NOTE — ASSESSMENT & PLAN NOTE
-- Neurosurgery signed off  -- 06/30: CTH pending  -- SBP goal < 160  -- PT/OT/Speech  -- Continue Neuro checks q 1hr   -- Vascular Neurology following  keppra 500mg bid  Mass effect worsening despite optimized osmotic tx, possibility of progression to brain death discussed

## 2018-07-05 NOTE — PLAN OF CARE
Problem: Patient Care Overview  Goal: Plan of Care Review  POC reviewed with pt and family at 1400. Pt unable to verbalize understanding due to underlying condition.  Spouse verbalized understanding. Questions and concerns addressed. Attempted to wean off levophed unsuccessfully.  Pending CT in the am. Pt progressing toward goals. Will continue to monitor. See flowsheets for full assessment and VS info.

## 2018-07-06 NOTE — SIGNIFICANT EVENT
The patient is unable to participate due to coma.    I met with Mr. euceda's family, including wife and daughter at bedside and the son over facetime.    This meeting included discussion of the diagnosis, prognosis, and goals of care, was absolutely necessary for treatment decisions, and bore directly on the management of the patient.    I explained to them the poor prognosis and the expected minimal neurological improvement giving his CTH findings, neurological exam and multiple co morbidities. The family was so clear about the patient wishes and that he never wanted to live in a nursing home for long time or to be dependant on artificial life support.   The family would like to proceed with comfort care measures but would like to wait for his son to come from [facility] to see him.   DNR/DNI    Uninterrupted Critical Care/Counseling Time (not including procedures): 60 minutes exclusive of the CCT

## 2018-07-06 NOTE — SUBJECTIVE & OBJECTIVE
Interval History:  >4 elements OR status of 3 inpatient conditions  Large R -MCA stroke with hemorrhagic conversion after anticoagulation for DVTs. No events overnight. Decerebrate posture. HTS  cc q 6hrs for serum NA < 155. Last CT head with significant R to L sub falcine shift, uncal herniation and brain stem ischemia.   Patient has a severe cardiomyopathy. Stroke team and I had a nice conversation with his family about his state and prognosis. Planing to wean Levophed off. SC Heparin and ASA were started. Plan discussed with stroke team and the patient's family.   Review of Systems   Unable to perform ROS: Patient unresponsive     2 systems OR Unable to obtain a complete ROS due to level of consciousness.  Objective:     Vitals:  Temp: 100 °F (37.8 °C)  Pulse: 86  Rhythm: normal sinus rhythm  BP: 116/68  MAP (mmHg): 86  Resp: (!) 25  SpO2: 100 %  Oxygen Concentration (%): 40  O2 Device (Oxygen Therapy): ventilator  Vent Mode: A/C  Set Rate: 18 bmp  Vt Set: 450 mL  PEEP/CPAP: 8 cmH20  Peak Airway Pressure: 24 cmH2O  Mean Airway Pressure: 17 cmH20  Plateau Pressure: 35 cmH20    Temp  Min: 98.8 °F (37.1 °C)  Max: 100 °F (37.8 °C)  Pulse  Min: 68  Max: 108  BP  Min: 107/57  Max: 166/83  MAP (mmHg)  Min: 76  Max: 117  Resp  Min: 15  Max: 26  SpO2  Min: 92 %  Max: 100 %  Oxygen Concentration (%)  Min: 40  Max: 42    07/04 0701 - 07/05 0700  In: 2131.1 [I.V.:641.1]  Out: 2123 [Urine:2123]   Unmeasured Output  Stool Occurrence: 0       Physical Exam  Unable to test orientation, language, memory, judgment, insight, fund of knowledge, hearing, shoulder shrug, tongue protrusion, coordination, gait due to level of consciousness.  General   HEENT: ETT  Chest Heart RRR / Lungs Clear to auscultation  Abdomen: Soft nontender + BS  Extremities: OK distal pulses.  Skin: UK  Neurological Exam:  MS; Coma.  CN: II-XII UK  Motor: LUE  0 /5 / RUE  0/5  Increased tone Extensor posturing bilaterally.             LLE  0 /5 /  RLE 0  /5  Increased tone. Extensor posturing bilaterally.  Sensory: LT/PP/T/ Vibration UK                 Complex sensory modalities: not tested  DTR:  normal throughout.  Coordination /Fine motor:   Gait: Not tested.  Meningeal signs: Absent  Medications:  Continuous  DOPamine Last Rate: 5 mcg/kg/min (07/05/18 1639)   norepinephrine bitartrate-D5W Last Rate: 0.02 mcg/kg/min (07/05/18 2000)   Scheduled  albuterol-ipratropium 3 mL Q6H   aspirin 81 mg Daily   chlorhexidine 15 mL QID   enoxaparin 40 mg Daily   famotidine 20 mg BID   insulin aspart U-100 2 Units Q4H   insulin detemir U-100 10 Units BID   levetiracetam oral soln 500 mg BID   miconazole NITRATE 2 %  BID   polyethylene glycol 17 g Daily   rosuvastatin 40 mg QHS   senna-docusate 8.6-50 mg 1 tablet Daily   sodium chloride 0.9% 3 mL Q8H   PRN  acetaminophen 650 mg Q6H PRN   dextrose 50% 12.5 g PRN   glucagon (human recombinant) 1 mg PRN   insulin aspart U-100 1-10 Units Q6H PRN   labetalol 10 mg Q4H PRN   magnesium oxide 800 mg PRN   magnesium oxide 800 mg PRN   ondansetron 4 mg Q6H PRN   potassium chloride 10% 40 mEq PRN   potassium chloride 10% 40 mEq PRN   potassium chloride 10% 60 mEq PRN   potassium, sodium phosphates 2 packet PRN   potassium, sodium phosphates 2 packet PRN   potassium, sodium phosphates 2 packet PRN   sodium chloride 3% 300 mL/hr Q6H PRN     Today I personally reviewed pertinent medications, lines/drains/airways, imaging, cardiology, lab results, microbiology results, notably:    Diet  Diet NPO  CMP:   Recent Labs  Lab 07/05/18 0215   CALCIUM 9.1   ALBUMIN 2.1*   PROT 5.9*   *  158*   K 3.7   CO2 26   *   BUN 17   CREATININE 0.8   ALKPHOS 94   ALT 37   AST 24   BILITOT 0.2      BMP:   Recent Labs  Lab 07/05/18 0215   *  158*   K 3.7   *   CO2 26   BUN 17   CREATININE 0.8   CALCIUM 9.1      CBC:   Recent Labs  Lab 07/05/18 0215   WBC 6.69   RBC 3.02*   HGB 8.8*   HCT 30.3*      *   MCH 29.1   MCHC  29.0*      Lipid Panel:   Recent Labs  Lab 06/30/18  1829   CHOL 127   LDLCALC 59.2*   HDL 53   TRIG 74      Coagulation:   Recent Labs  Lab 07/02/18  0558   07/05/18  0215   INR 2.3*  < > 1.2   APTT 23.1  --   --    < > = values in this interval not displayed.  Platelet Aggregation Study: No results for input(s): PLTAGG, PLTAGINTERP, PLTAGREGLACO, ADPPLTAGGREG in the last 168 hours.  Hgb A1C:   Recent Labs  Lab 06/30/18 1829   HGBA1C 9.3*      TSH:   Recent Labs  Lab 06/30/18 1829   TSH 0.962         Recent Labs  Lab 07/05/18  0432   PH 7.473*   PCO2 39.7   PO2 135*   HCO3 29.1*   POCSATURATED 99   BE 5

## 2018-07-06 NOTE — PLAN OF CARE
Problem: Occupational Therapy Goal  Goal: Occupational Therapy Goal  Goals set 7/2 to be addressed for 14 days with expiration date, 7/16:  Patient will increase functional independence with ADLs by performing:    Patient will demonstrate rolling to the right with max assist.  Not met   Patient will demonstrate rolling to the left with max assist.   Not met  Patient will demonstrate supine -sit with max assist.   Not met  Patient will demonstrate grooming while seated with max assist.   Not met  Patient will demonstrate upper body dressing with max assist while seated EOB.   Not met  Patient will demonstrate lower body dressing with max assist while seated EOB.   Not met  Patient will demonstrate ability to follow 1/3 commands.   Not met  Patient's family / caregiver will demonstrate independence and safety with assisting patient with self-care skills and functional mobility.     Not met  Patient's family / caregiver will demonstrate independence with providing ROM and changes in bed positioning.   Not met  Patient and/or patient's family will verbalize understanding of stroke prevention guidelines, personal risk factors and stroke warning signs via teachback method.  Not met          Goals remain appropriate.  YOSEPH Gonsalves  7/6/2018

## 2018-07-06 NOTE — PT/OT/SLP PROGRESS
Occupational Therapy   Treatment    Name: Mendez Guthrie  MRN: [id number]  Admitting Diagnosis:  Right-sided nontraumatic intracerebral hemorrhage       Recommendations:     Discharge Recommendations:  (pending extubation; previously at [facility]) in [place])  Discharge Equipment Recommendations:  none  Barriers to discharge:  Inaccessible home environment, Decreased caregiver support    Subjective   Patient:  Nonverbal; intubated  Communicated with: Nurse prior to session.  Pain/Comfort:  · Pain Rating 1: 0/10  · Pain Rating Post-Intervention 1: 0/10    Patients cultural, spiritual, Denominational conflicts given the current situation: Gnosticism    Objective:     Patient found with: peripheral IV, arterial line, bed alarm, blood pressure cuff, central line, pressure relief boots, SCD, telemetry, restraints, ventilator, ford catheter, pulse ox (continuous), PEG Tube  Family present.  General Precautions: Standard, aspiration, NPO, fall   Orthopedic Precautions:N/A   Braces: N/A     Occupational Performance:    Bed Mobility:    · Patient completed Rolling/Turning to Left with  total assistance  · Patient completed Rolling/Turning to Right with total assistance     Functional Mobility/Transfers:  · Dependent drawsheet transfers     Activities of Daily Living:  · Feeding:  NPO    · Grooming: total assistance while supine     Patient left left sidelying with all lines intact, call button in reach and bed alarm on    AMPA 6 Click:  [facility] Total Score: 6    Treatment & Education:  Patient /family education provided on role of OT, daily orientation, ROM and positioning.  Continued education, patient/ family training recommended.  Daily orientation provided.  PROM performed bilateral UE/LEs one set x 10 rep in all planes of motion with stretches provided at end range; sustained stretch provided for ankle dorsiflexion.  Assistance and facilitation provided for upward rotation of the scapula during shoulder flexion and abduction.   Patient kept eyes closed 100% of the session.  Patient unable to follow commands.  Minimal edema noted left UE; positioning provided for elevation.  Positioning provided for midline orientation with bilateral UEs elevated and heels lifted off mattress.  Gentle cervical rotation provided.   Family present during the session.  White board updated in patient's room.  Education:    Assessment:     Mendez Guthrie is a 68 y.o. male with a medical diagnosis of Right-sided nontraumatic intracerebral hemorrhage.  He presents with performance deficits affecting function are weakness, impaired self care skills, impaired balance, impaired endurance, impaired functional mobilty, impaired sensation, gait instability, impaired cognition, decreased lower extremity function, decreased upper extremity function, decreased coordination, decreased safety awareness, abnormal tone, impaired fine motor, impaired coordination, impaired cardiopulmonary response to activity, decreased ROM.    These performance deficits have resulted in activity limitations including but not limited to:   bed mobility, transfers, ascending/ descending stairs, walking short and long distances, walking around obstacles, transitional movement patterns (kneeling, bending); eating, upper body dressing, lower body dressing, brushing teeth, toileting, bathing, and carrying objects.   Patient's role as , father, grandfather and independent caretaker for self has been affected. Patient will benefit from skilled OT services to maximize level of independence with self-care skills and functional mobility.       Rehab Prognosis:  Fair; patient would benefit from acute skilled OT services to address these deficits and reach maximum level of function.        Plan:     Patient to be seen 3 x/week to address the above listed problems via self-care/home management, neuromuscular re-education, cognitive retraining, sensory integration, therapeutic activities, wheelchair  management/training, therapeutic exercises  · Plan of Care Expires: 07/29/18  · Plan of Care Reviewed with: patient, spouse    This Plan of care has been discussed with the patient who was involved in its development and understands and is in agreement with the identified goals and treatment plan    GOALS:    Occupational Therapy Goals        Problem: Occupational Therapy Goal    Goal Priority Disciplines Outcome Interventions   Occupational Therapy Goal     OT, PT/OT     Description:  Goals set 7/2 to be addressed for 14 days with expiration date, 7/16:  Patient will increase functional independence with ADLs by performing:    Patient will demonstrate rolling to the right with max assist.  Not met   Patient will demonstrate rolling to the left with max assist.   Not met  Patient will demonstrate supine -sit with max assist.   Not met  Patient will demonstrate grooming while seated with max assist.   Not met  Patient will demonstrate upper body dressing with max assist while seated EOB.   Not met  Patient will demonstrate lower body dressing with max assist while seated EOB.   Not met  Patient will demonstrate ability to follow 1/3 commands.   Not met  Patient's family / caregiver will demonstrate independence and safety with assisting patient with self-care skills and functional mobility.     Not met  Patient's family / caregiver will demonstrate independence with providing ROM and changes in bed positioning.   Not met  Patient and/or patient's family will verbalize understanding of stroke prevention guidelines, personal risk factors and stroke warning signs via teachback method.  Not met                           Time Tracking:     OT Date of Treatment: 07/06/18  OT Start Time: 0513  OT Stop Time: 0536  OT Total Time (min): 23 min    Billable Minutes:Therapeutic Exercise 23    YOSEPH Gonsalves  7/6/2018

## 2018-07-06 NOTE — PHYSICIAN QUERY
PT Name: Mendez Guthrie  MR #: [id number]     CDS: Soila Shea RN, CCDS       Contact information: [email or web address]    This form is a permanent document in the medical record.     Query Date: July 6, 2018    By submitting this query, we are merely seeking further clarification of documentation. Please utilize your independent clinical judgment when addressing the question(s) below.    The Medical record reflects the following:    Supporting Clinical Findings Location in Medical Record     Phosphorus=1.7     7/2-Labs     potassium, sodium phosphates 280-160-250 mg packet 2 packet     Ordered Dose: 2 packet  Route: Oral  Frequency: As needed (PRN) for if phosphorous level is 1.6-2.2 mg/dL give 2 packets every 4 hours x 3 doses  Administration Dose: 2 packet     Start: 06/30/18 1905 End: 07/03/18 1046  Given: 07/02/18 @ 0959 and 1405       7/2-MAR                                                                            Doctor, Please specify diagnosis or diagnoses associated with above clinical findings.    Provider Use Only     [ x  ] Hypophosphatemia     [   ] Other diagnosis (please specify)___________________________                                                                                                          [   ] Clinically undetermined

## 2018-07-06 NOTE — PROGRESS NOTES
ICU Progress Note  Neurocritical Care    Admit Date: 6/30/2018  LOS: 6    CC: Right-sided nontraumatic intracerebral hemorrhage    Code Status: DNR     SUBJECTIVE:     Interval History/Significant Events:   No acute events overnight        Medications:  Continuous Infusions:   norepinephrine bitartrate-D5W Stopped (07/06/18 0805)     Scheduled Meds:   albuterol-ipratropium  3 mL Nebulization Q6H    aspirin  81 mg Per G Tube Daily    chlorhexidine  15 mL Mouth/Throat QID    enoxaparin  40 mg Subcutaneous Daily    famotidine  20 mg Per G Tube BID    insulin aspart U-100  3 Units Subcutaneous Q4H    insulin detemir U-100  13 Units Subcutaneous BID    levetiracetam oral soln  500 mg Per G Tube BID    miconazole NITRATE 2 %   Topical (Top) BID    phenylephrine HCl in 0.9% NaCl        polyethylene glycol  17 g Per NG tube Daily    rosuvastatin  40 mg Per G Tube QHS    senna-docusate 8.6-50 mg  1 tablet Per G Tube Daily    sodium chloride 0.9%  3 mL Intravenous Q8H     PRN Meds:.acetaminophen, dextrose 50%, glucagon (human recombinant), insulin aspart U-100, labetalol, magnesium oxide, magnesium oxide, ondansetron, potassium chloride 10%, potassium chloride 10%, potassium chloride 10%, potassium, sodium phosphates, potassium, sodium phosphates, potassium, sodium phosphates    OBJECTIVE:   Vital Signs (Most Recent):   Temp: 99.4 °F (37.4 °C) (07/06/18 0705)  Pulse: 87 (07/06/18 1208)  Resp: (!) 22 (07/06/18 1208)  BP: (!) 109/56 (07/06/18 1105)  SpO2: 100 % (07/06/18 1208)    Vital Signs (24h Range):   Temp:  [98.6 °F (37 °C)-100 °F (37.8 °C)] 99.4 °F (37.4 °C)  Pulse:  [] 87  Resp:  [0-26] 22  SpO2:  [92 %-100 %] 100 %  BP: (106-142)/(56-84) 109/56  Arterial Line BP: (106-134)/(43-56) 110/49    ICP/CPP (Last 24h):        I & O (Last 24h):     Intake/Output Summary (Last 24 hours) at 07/06/18 1233  Last data filed at 07/06/18 1105   Gross per 24 hour   Intake          1785.64 ml   Output              2690 ml   Net          -904.36 ml     Physical Exam:  Comatose  No jaundice  Lungs are clear to auscultation, good air entry bilateral  Normal S1/S2, no murmurs.  Abdomen is soft, lax, +ve BS.      Neuro:  Comatose  No spontaneous movements  Doesn't follow commands  Pupils are fixed bilateral  Intact corneal reflexes bilateral  Intact cough reflex  Extensor posturing of RUE and flexor of LUE to noxious stimuli  Triple flexions in LEs bilateral         Vent Data:   Vent Mode: A/C  Oxygen Concentration (%):  [40-42] 40  Resp Rate Total:  [18 br/min-26 br/min] 22 br/min  Vt Set:  [450 mL-500 mL] 450 mL  PEEP/CPAP:  [5 cmH20-8 cmH20] 8 cmH20  Mean Airway Pressure:  [13 lhM97-21 cmH20] 16 cmH20    Lines/Drains/Airway:        Airway - Non-Surgical 06/30/18 1342 Endotracheal Tube (Active)   Secured at 26 cm 7/2/2018 11:17 AM   Measured At Lips 7/2/2018 11:17 AM   Secured Location Right 7/2/2018 11:17 AM   Secured by Commercial tube jack 7/2/2018 11:17 AM   Bite Block right;secure and patent 7/2/2018 11:17 AM   Site Condition Cool;Dry 7/2/2018 11:17 AM   Status Intact;Secured;Patent 7/2/2018 11:17 AM   Site Assessment Clean;Dry 7/2/2018 11:17 AM   Cuff Pressure 28 cm H2O 7/1/2018 11:24 AM           Percutaneous Central Line Insertion/Assessment - triple lumen  06/30/18 2115 left femoral vein (Active)   Dressing biopatch in place;dressing dry and intact 7/2/2018 11:01 AM   Securement secured w/ sutures 7/2/2018 11:01 AM   Distal Patency/Care flushed w/o difficulty 7/2/2018 11:01 AM   Medial Patency/Care flushed w/o difficulty 7/2/2018 11:01 AM   Proximal Patency/Care flushed w/o difficulty 7/2/2018 11:01 AM   Waveform normal 7/2/2018  3:00 AM   Dressing Change Due 07/07/18 7/2/2018 11:01 AM   Daily Line Review Performed 7/2/2018 11:01 AM           Arterial Line 06/30/18 2030 Right Radial (Active)   Site Assessment Clean;Dry;Intact;No redness;No swelling 7/2/2018 11:01 AM   Line Status Pulsatile blood flow 7/2/2018 11:01 AM  "  Art Line Waveform Appropriate;Square wave test performed 7/2/2018 11:01 AM   Arterial Line Interventions Zeroed and calibrated;Leveled 7/2/2018 11:01 AM   Color/Movement/Sensation Capillary refill less than 3 sec 7/2/2018 11:01 AM   Dressing Type [place] 7/2/2018 11:01 AM   Dressing Status Biopatch in place;Clean;Dry;Intact 7/2/2018 11:01 AM   Dressing Intervention New dressing 7/2/2018 11:01 AM   Dressing Change Due 07/08/18 7/2/2018 11:01 AM           Gastrostomy/Enterostomy 06/19/18 0940 Gastrostomy tube w/ balloon LUQ feeding (Active)   Securement taped to abdomen 7/2/2018 11:01 AM   Interventions Prior to Feeding patency checked;residual checked;residual returned 7/2/2018 11:01 AM   Clamp Status/Tolerance clamped 7/2/2018 11:01 AM   Dressing dry and intact 7/2/2018 11:01 AM   Insertion Site dry 7/2/2018 11:01 AM   Site Care device rotatated 7/2/2018 11:01 AM   Flush/Irrigation flushed w/;water;no resistance met 7/2/2018 11:01 AM   Intake (mL) 60 mL 7/2/2018  7:01 AM            Urethral Catheter 07/01/18 0116 (Active)   Site Assessment Clean;Intact 7/2/2018 11:01 AM   Collection Container Urimeter 7/2/2018 11:01 AM   Securement Method secured to top of thigh w/ adhesive device 7/2/2018 11:01 AM   Catheter Care Performed yes 7/2/2018 11:01 AM   Reason for Continuing Urinary Catheterization Urinary retention 7/2/2018 11:01 AM   CAUTI Prevention Bundle StatLock in place w 1" slack;Intact seal between catheter & drainage tubing;Green sheeting clip in use;Drainage bag off the floor;No dependent loops or kinks;Drainage bag not overfilled (<2/3 full);Drainage bag below bladder 7/2/2018  7:01 AM   Output (mL) 60 mL 7/2/2018 12:06 PM   Labs:  ABG:     Recent Labs  Lab 07/06/18  0545   PH 7.461*   PO2 93   PCO2 40.7   HCO3 29.0*   POCSATURATED 98   BE 5     BMP:    Recent Labs  Lab 07/06/18 0222 07/06/18  0540   * 157*   K 3.9  --    *  --    CO2 25  --    BUN 16  --    CREATININE 0.9  --    *  " --    MG 1.7  --    PHOS 3.6  --      LFT:   Lab Results   Component Value Date    AST 21 07/06/2018    ALT 30 07/06/2018    ALKPHOS 102 07/06/2018    BILITOT 0.2 07/06/2018    ALBUMIN 2.1 (L) 07/06/2018    PROT 6.1 07/06/2018     CBC:   Lab Results   Component Value Date    WBC 8.27 07/06/2018    HGB 9.7 (L) 07/06/2018    HCT 32.6 (L) 07/06/2018    MCV 99 (H) 07/06/2018     07/06/2018     Microbiology x 7d:   Microbiology Results (last 7 days)     ** No results found for the last 168 hours. **            ASSESSMENT/PLAN:     Patient Active Problem List   Diagnosis    Type 2 diabetes mellitus without complication, with long-term current use of insulin    History of lung cancer    COPD (chronic obstructive pulmonary disease)    Essential hypertension    Coronary artery disease involving native coronary artery of native heart without angina pectoris    Vitamin D deficiency    [place] (dyspnea on exertion)    Osteoarthritis of both knees    GENIE on CPAP    Abnormal chest CT    Embolic stroke involving right middle cerebral artery    Left spastic hemiparesis    Cytotoxic cerebral edema    Decreased cardiac ejection fraction    Dysarthria    Chronic systolic heart failure    DVT of axillary vein, acute left    Abnormal ventricular wall motion    Alteration in skin integrity    Acute encephalopathy    Delirium due to another medical condition    Reactive depression    Right-sided nontraumatic intracerebral hemorrhage    Brain compression    Mixed hyperlipidemia    Brain herniation    Beni coma scale total score 3-8    Warfarin-induced coagulopathy     A/P:  - Poor neurological exam, unchanged   - Not a surgical candidate  - Repeated CTH without interval changes   - Continue current vent settings and f/u ABG and CXR  - MAP>65 and SBP<160  - Na goal > 155  - TF at goal  - Increase scheduled insuline  - SCDs and lovenox prophylaxis   - DNR/DNI  - Family meeting today     Uninterrupted  Critical Care/Counseling Time (not including procedures): 35 minutes

## 2018-07-06 NOTE — PHYSICIAN QUERY
PT Name: Mendez Guthrie  MR #: [id number]     Physician Query Form - Documentation Clarification      CDS: Soila Shea RN, CCDS       Contact information: [email or web address]    This form is a permanent document in the medical record.     Query Date: July 6, 2018    By submitting this query, we are merely seeking further clarification of documentation. Please utilize your independent clinical judgment when addressing the question(s) below.    The Medical record reflects the following:    Supporting Clinical Findings Location in Medical Record   Patient has a severe cardiomyopathy.  CV: Keep MAPs   > 65 mmHg. TTE severe cardiomyopathy. LVEF 25%.   7/5-NCC PN ([place])     CAD, HTN, Chronic systolic CHF  Essential hypertension  -- Continue to monitor HR and BP   -- SBP goal < 160  -- 06/07/18 2D echo:    1 - Technically difficult study.     2 - Severely depressed left ventricular systolic function (EF 15-20%).     3 - Mildly to moderately depressed right ventricular systolic function .     4 - Impaired LV relaxation, normal LAP (grade 1 diastolic dysfunction).     5 - Mild aortic stenosis, WIL = 1.45 cm2, AVAi = 0.67 cm2/m2, peak velocity = 2.8 m/s, mean gradient = 18 mmHg.     6 - Mild mitral regurgitation.   -- Labetalol Prn  -- Cardene Prn     6/30-H&P                                                                          Doctor, Please specify diagnosis or diagnoses associated with above clinical findings.    Please provide a more specific Cardiomyopathy diagnosis.    Provider Use Only     [   ] Arteriosclerotic   [   ] Hypertensive   [   ] Ischemic   [   ] Dilated   [   ] Non-Ischemic   [   ] Other type (please specify)___________________________________     [ x  ] Clinically undetermined

## 2018-07-06 NOTE — PROGRESS NOTES
[facility]  Neurocritical Care  Progress Note    Admit Date: 6/30/2018  Service Date: 07/05/2018  Length of Stay: 5    Subjective:     Chief Complaint: Right-sided nontraumatic intracerebral hemorrhage    History of Present Illness: Mr Guthrie is a 68 y.o. male with signification pmhx of Aspiration PNA(06/16/18), Acute on Chronic resp fail with hypoxia(06/14/18), Asthma, COPD, CAD, DM, RMCA(06/06/18), High Cholesterol, Lung CA, HTN, and Long term Antithrombotic/Antiplatelet therapy who presents to [facility] for a higher level of care s/p Large Hemorrhagic conversion of ischemic stroke. The patient was initially seen at [facility] ED after being found unresponsive at [facility]. MAR indicates pt to be on Coumadin (7.5 mg qD), Lovenox (100mg bid) and ASA (81mg qD). Received KCentra and protamine at OSH.     Hospital Course: 06/30: Admit to [facility], [facility], [facility]  07/03: worsening mass effect, volume of blood stable,new ROOSEVELT and PCA imfarcts, prognosis communicated to family  7/4 Continues on levophed gtt to keep MAP >65. Na 160 2% decreased. To 35% then weaned to off as Na stayed elevated. Blood glucoses > 200 shceduled aspar 2 units q4h  7/5: Large R -MCA stroke with hemorrhagic conversion after anticoagulation for DVTs. No events overnight. Decerebrate posture. HTS  cc q 6hrs for serum NA < 155. Last CT head with significant R to L sub falcine shift, uncal herniation and brain stem ischemia.   Patient has a severe cardiomyopathy. Stroke team and I had a nice conversation with his family about his state and prognosis. Planing to wean Levophed off. SC Heparin and ASA were started. Plan discussed with stroke team and the patient's family.     Interval History:  >4 elements OR status of 3 inpatient conditions  Large R -MCA stroke with hemorrhagic conversion after anticoagulation for DVTs. No events overnight. Decerebrate posture. HTS  cc q 6hrs for serum NA < 155. Last  CT head with significant R to L sub falcine shift, uncal herniation and brain stem ischemia.   Patient has a severe cardiomyopathy. Stroke team and I had a nice conversation with his family about his state and prognosis. Planing to wean Levophed off. SC Heparin and ASA were started. Plan discussed with stroke team and the patient's family.   Review of Systems   Unable to perform ROS: Patient unresponsive     2 systems OR Unable to obtain a complete ROS due to level of consciousness.  Objective:     Vitals:  Temp: 100 °F (37.8 °C)  Pulse: 86  Rhythm: normal sinus rhythm  BP: 116/68  MAP (mmHg): 86  Resp: (!) 25  SpO2: 100 %  Oxygen Concentration (%): 40  O2 Device (Oxygen Therapy): ventilator  Vent Mode: A/C  Set Rate: 18 bmp  Vt Set: 450 mL  PEEP/CPAP: 8 cmH20  Peak Airway Pressure: 24 cmH2O  Mean Airway Pressure: 17 cmH20  Plateau Pressure: 35 cmH20    Temp  Min: 98.8 °F (37.1 °C)  Max: 100 °F (37.8 °C)  Pulse  Min: 68  Max: 108  BP  Min: 107/57  Max: 166/83  MAP (mmHg)  Min: 76  Max: 117  Resp  Min: 15  Max: 26  SpO2  Min: 92 %  Max: 100 %  Oxygen Concentration (%)  Min: 40  Max: 42    07/04 0701 - 07/05 0700  In: 2131.1 [I.V.:641.1]  Out: 2123 [Urine:2123]   Unmeasured Output  Stool Occurrence: 0       Physical Exam  Unable to test orientation, language, memory, judgment, insight, fund of knowledge, hearing, shoulder shrug, tongue protrusion, coordination, gait due to level of consciousness.  General   HEENT: ETT  Chest Heart RRR / Lungs Clear to auscultation  Abdomen: Soft nontender + BS  Extremities: OK distal pulses.  Skin: UK  Neurological Exam:  MS; Coma.  CN: II-XII UK  Motor: LUE  0 /5 / RUE  0/5  Increased tone Extensor posturing bilaterally.             LLE  0 /5 /  RLE 0 /5  Increased tone. Extensor posturing bilaterally.  Sensory: LT/PP/T/ Vibration UK                 Complex sensory modalities: not tested  DTR:  normal throughout.  Coordination /Fine motor: UK  Gait: Not tested.  Meningeal signs:  Absent  Medications:  Continuous  DOPamine Last Rate: 5 mcg/kg/min (07/05/18 1639)   norepinephrine bitartrate-D5W Last Rate: 0.02 mcg/kg/min (07/05/18 2000)   Scheduled  albuterol-ipratropium 3 mL Q6H   aspirin 81 mg Daily   chlorhexidine 15 mL QID   enoxaparin 40 mg Daily   famotidine 20 mg BID   insulin aspart U-100 2 Units Q4H   insulin detemir U-100 10 Units BID   levetiracetam oral soln 500 mg BID   miconazole NITRATE 2 %  BID   polyethylene glycol 17 g Daily   rosuvastatin 40 mg QHS   senna-docusate 8.6-50 mg 1 tablet Daily   sodium chloride 0.9% 3 mL Q8H   PRN  acetaminophen 650 mg Q6H PRN   dextrose 50% 12.5 g PRN   glucagon (human recombinant) 1 mg PRN   insulin aspart U-100 1-10 Units Q6H PRN   labetalol 10 mg Q4H PRN   magnesium oxide 800 mg PRN   magnesium oxide 800 mg PRN   ondansetron 4 mg Q6H PRN   potassium chloride 10% 40 mEq PRN   potassium chloride 10% 40 mEq PRN   potassium chloride 10% 60 mEq PRN   potassium, sodium phosphates 2 packet PRN   potassium, sodium phosphates 2 packet PRN   potassium, sodium phosphates 2 packet PRN   sodium chloride 3% 300 mL/hr Q6H PRN     Today I personally reviewed pertinent medications, lines/drains/airways, imaging, cardiology, lab results, microbiology results, notably:    Diet  Diet NPO  CMP:   Recent Labs  Lab 07/05/18 0215   CALCIUM 9.1   ALBUMIN 2.1*   PROT 5.9*   *  158*   K 3.7   CO2 26   *   BUN 17   CREATININE 0.8   ALKPHOS 94   ALT 37   AST 24   BILITOT 0.2      BMP:   Recent Labs  Lab 07/05/18 0215   *  158*   K 3.7   *   CO2 26   BUN 17   CREATININE 0.8   CALCIUM 9.1      CBC:   Recent Labs  Lab 07/05/18 0215   WBC 6.69   RBC 3.02*   HGB 8.8*   HCT 30.3*      *   MCH 29.1   MCHC 29.0*      Lipid Panel:   Recent Labs  Lab 06/30/18  1829   CHOL 127   LDLCALC 59.2*   HDL 53   TRIG 74      Coagulation:   Recent Labs  Lab 07/02/18  0558   07/05/18  0215   INR 2.3*  < > 1.2   APTT 23.1  --   --    < > = values in  this interval not displayed.  Platelet Aggregation Study: No results for input(s): PLTAGG, PLTAGINTERP, PLTAGREGLACO, ADPPLTAGGREG in the last 168 hours.  Hgb A1C:   Recent Labs  Lab 06/30/18  1829   HGBA1C 9.3*      TSH:   Recent Labs  Lab 06/30/18  1829   TSH 0.962         Recent Labs  Lab 07/05/18  0432   PH 7.473*   PCO2 39.7   PO2 135*   HCO3 29.1*   POCSATURATED 99   BE 5                  Assessment/Plan:     No new Assessment & Plan notes have been filed under this hospital service since the last note was generated.  Service: Neuro Critical Care      Active Problem List:   1.R large R -MCA stroke with hemorrhagic conversion. New R ROOSEVELT and PCA infarctions. Most likely embolic. (06/16 2018), Was on long term antithrombotic therapy.  2. Secondary cytotoxic edema and mass effect.    3. Sub falcine shift.  4. Aspiration PNA(06/16/18), Acute on Chronic resp fail with hypoxia(06/14/18), Asthma, COPD,   5. DM II  6. CAD, DM, RMCA(06/06/18), High Cholesterol, Lung CA, HTN,    7. HLD  8. HTN  9. Hx Lung cancer.   10.DVT on left axillary vein and left brachial resolved.        Assessment / Plan:     Neuro:  -3% HTS 300cc q 6hrs PRN for serum Na < 155.  -Serum Na q 6hrs  -Keep normothermic and nromoglycemic.  -Rosuvastatin at 40 mg qd.  -Restart ASA 81 mg qd  -CT head tomorrow AM.  Resp: CXR hx of lung CA and s.p R partial lobectomy.  Vent Mode: A/C  Oxygen Concentration (%):  [40-42] 40  Resp Rate Total:  [18 br/min-19 br/min] 18 br/min  Vt Set:  [500 mL] 500 mL  PEEP/CPAP:  [5 cmH20] 5 cmH20  Mean Airway Pressure:  [13 btE28-90 cmH20] 14 cmH20  -Dual nebs q 6hrs.  CV: Keep MAPs   > 65 mmHg. TTE severe cardiomyopathy  LVEF 25%  -Midodrine D/C  -Wean Levophed to off.   -Triple  lumen femoral D/C when PICC line confirmed.  -PICC line consult placement.  -Flow trach.   -Flowtrach. Keep SVV < 10%.   -Dopamine IVD at 5 mcg/kg /min.  -Troponin I q 8hrs  -BNP  -12 lead ECG  IVF/nutrition/Renal/GI:  -TF at goal.  -Free water  flushes 150 cc q 4hrs.  --3% HTS 300cc q 6hrs PRN for serum Na < 155.  -Serum Na q 6hrs  -BR  Hem / ID:  -SC Heparin 5000 U q 8hrs Pending to start  -4 limb US for DVTs.  -ASA 81mg qd. Start.   Endo:  -Random cortisol  -Free T4.   Prophylaxis:  -SCDs  -SC Heparin 5000 U q 8hrs Start.  Advance Directives and Disposition:    DNR. Keep in the NICU.           Uninterrupted Critical Care/Counseling Time (directly spent today by me not including procedures 30-74 min (81227)): = 35  min    Activity Orders          None        DNR    Gustavo Alcala MD  Neurocritical Care  [facility]

## 2018-07-06 NOTE — PROGRESS NOTES
Patient's chart was reviewed by a stroke team provider.  Patient on pressors and intubated. Family planning to withdraw care on 7/9/18 when patient's son arrives.   There is no new imaging to review.  Pending diagnostics to follow up on include: none.  For other recommendations please see our previous note completed on: 07/04/18.      There are no new recommendations at this time. Will continue to follow. Discussed patient with staff. Please contact stroke team for any questions or concerns.                 Neisha Ch PA-C  Vascular Neurology  [phone]

## 2018-07-06 NOTE — PLAN OF CARE
Problem: Patient Care Overview  Goal: Plan of Care Review  Outcome: Ongoing (interventions implemented as appropriate)  POC reviewed with pt's family at 1130. Pt's family verbalized understanding. Questions and concerns addressed. No acute events today. Pt was given lasix to try and promote higher BP. BP is now to be intervened only if maintaining MAP's less than 60. Family planning on withdrawing care once the son comes back to the states which is going to be tomorrow night. Pt progressing toward goals. Will continue to monitor. See flowsheets for full assessment and VS info.

## 2018-07-06 NOTE — PHYSICIAN QUERY
PT Name: Mendez Guthrie  MR #: [id number]     Physician Query Form - Documentation Clarification      CDS: Soila Shea RN, CCDS       Contact information: [email or web address]    This form is a permanent document in the medical record.     Query Date: July 6, 2018    By submitting this query, we are merely seeking further clarification of documentation. Please utilize your independent clinical judgment when addressing the question(s) below.    The Medical record reflects the following:    Supporting Clinical Findings Location in Medical Record     Potassium=3.4     7/3-Labs     potassium chloride 10% oral solution 40 mEq     Ordered Dose: 40 mEq  Route: Oral  Frequency: As needed (PRN) for if potassium level is 3.1-3.4 mmol/L give 40 mEq every 2 hours x 2 doses  Administration Dose: 40 mEq     Start: 06/30/18 1905 End: 07/03/18 1046    Given: 07/03/18 @ 0509     7/3-MAR                                                                            Doctor, Please specify diagnosis or diagnoses associated with above clinical findings.    Provider Use Only     [ x  ] Hypokalemia     [   ] Other diagnosis (please specify)_________________________                                                                                                          [   ] Clinically undetermined

## 2018-07-06 NOTE — PLAN OF CARE
Problem: Patient Care Overview  Goal: Plan of Care Review  Outcome: Ongoing (interventions implemented as appropriate)  POC reviewed with pt and family at 0500. Pt unable to verbalize understanding, family verbalized understanding. Questions and concerns addressed. No acute events overnight. CT head complete. Unable to wean off of levo gtt. Will continue to monitor. See flowsheets for full assessment and VS info

## 2018-07-07 NOTE — PROGRESS NOTES
[facility]  Neurocritical Care  Progress Note    Admit Date: 6/30/2018  Service Date: 07/07/2018  Length of Stay: 7    Subjective:     Chief Complaint: Right-sided nontraumatic intracerebral hemorrhage    History of Present Illness: Mr Guthrie is a 68 y.o. male with signification pmhx of Aspiration PNA(06/16/18), Acute on Chronic resp fail with hypoxia(06/14/18), Asthma, COPD, CAD, DM, RMCA(06/06/18), High Cholesterol, Lung CA, HTN, and Long term Antithrombotic/Antiplatelet therapy who presents to [facility] for a higher level of care s/p Large Hemorrhagic conversion of ischemic stroke. The patient was initially seen at [facility] ED after being found unresponsive at [facility]. MAR indicates pt to be on Coumadin (7.5 mg qD), Lovenox (100mg bid) and ASA (81mg qD). Received KCentra and protamine at OSH.     Hospital Course: 06/30: Admit to [facility], [facility], [facility]  07/03: worsening mass effect, volume of blood stable,new ROOSEVELT and PCA imfarcts, prognosis communicated to family  7/4 Continues on levophed gtt to keep MAP >65. Na 160 2% decreased. To 35% then weaned to off as Na stayed elevated. Blood glucoses > 200 shceduled aspar 2 units q4h  7/5: Large R -MCA stroke with hemorrhagic conversion after anticoagulation for DVTs. No events overnight. Decerebrate posture. HTS  cc q 6hrs for serum NA < 155. Last CT head with significant R to L sub falcine shift, uncal herniation and brain stem ischemia.   Patient has a severe cardiomyopathy. Stroke team and I had a nice conversation with his family about his state and prognosis. Planing to wean Levophed off. SC Heparin and ASA were started. Plan discussed with stroke team and the patient's family.   7/6: Discussion with family at bedside. Decision made to withdraw care once son arrives. We will continue supportive care up until that point.   7/7: No acute change in patent status. Waiting for patients son to arrive. Family voices no  needs or concerns at this time,.     Interval History:    -Waiting in patients son to arrive.     Review of Systems   Unable to perform ROS: Intubated       Objective:     Vitals:  Temp: (!) 102 °F (38.9 °C)  Pulse: (!) 111  Rhythm: normal sinus rhythm  BP: 111/65  MAP (mmHg): 81  Resp: 18  SpO2: 100 %  Oxygen Concentration (%): 41  O2 Device (Oxygen Therapy): ventilator  Vent Mode: A/C  Set Rate: 18 bmp  Vt Set: 450 mL  PEEP/CPAP: 8 cmH20  Peak Airway Pressure: 24 cmH2O  Mean Airway Pressure: 14 cmH20  Plateau Pressure: 0 cmH20    Temp  Min: 98.9 °F (37.2 °C)  Max: 102 °F (38.9 °C)  Pulse  Min: 100  Max: 120  BP  Min: 101/66  Max: 134/56  MAP (mmHg)  Min: 72  Max: 101  Resp  Min: 14  Max: 27  SpO2  Min: 96 %  Max: 100 %  Oxygen Concentration (%)  Min: 40  Max: 42    07/06 0701 - 07/07 0700  In: 1549.2 [I.V.:24.2]  Out: 3410 [Urine:3410]   Unmeasured Output  Stool Occurrence: 0       Physical Exam   Constitutional: He appears well-developed.   Cardiovascular: Normal rate, regular rhythm, normal heart sounds and intact distal pulses.    Pulmonary/Chest: Effort normal and breath sounds normal.   Abdominal: Soft. Bowel sounds are normal.   Neurological:   E1 V1t M2  Pupils fixed midline  Weak coughx  Extensor posturing of RUE/LUE to noxious  Triple flexions in LEs bilateral         Skin: Skin is warm. Capillary refill takes less than 2 seconds.   Vitals reviewed.      Medications:  Continuous  norepinephrine bitartrate-D5W   Scheduled  albuterol-ipratropium 3 mL Q6H   aspirin 81 mg Daily   chlorhexidine 15 mL QID   enoxaparin 40 mg Daily   famotidine 20 mg BID   insulin aspart U-100 3 Units Q4H   insulin detemir U-100 13 Units BID   levetiracetam oral soln 500 mg BID   miconazole NITRATE 2 %  BID   polyethylene glycol 17 g Daily   rosuvastatin 40 mg QHS   senna-docusate 8.6-50 mg 1 tablet Daily   sodium chloride 0.9% 3 mL Q8H   PRN  acetaminophen 650 mg Q6H PRN   dextrose 50% 12.5 g PRN   glucagon (human recombinant) 1  mg PRN   insulin aspart U-100 1-10 Units Q4H PRN   labetalol 10 mg Q4H PRN   magnesium oxide 800 mg PRN   magnesium oxide 800 mg PRN   ondansetron 4 mg Q6H PRN   potassium chloride 10% 40 mEq PRN   potassium chloride 10% 40 mEq PRN   potassium chloride 10% 60 mEq PRN   potassium, sodium phosphates 2 packet PRN   potassium, sodium phosphates 2 packet PRN   potassium, sodium phosphates 2 packet PRN     Today I personally reviewed pertinent medications, lines/drains/airways, imaging, cardiology, lab results, microbiology results, notably:    Diet  Diet NPO  Diet NPO        Assessment/Plan:     Neuro   * Right-sided nontraumatic intracerebral hemorrhage    -- Neurosurgery signed off  -- 06/30: CTH pending  -- SBP goal < 160  -- PT/OT/Speech  -- Continue Neuro checks q 1hr   -- Vascular Neurology following  keppra 500mg bid  Mass effect worsening despite optimized osmotic tx, possibility of progression to brain death discussed.  Family discussion held 7/6, and the decision was to head toward comfort care once the patients son son arrived. We will continue supportive care up until that time, unless he has an arrest.         Brain compression    -- See Cerebral parenchymal hemorrhage        Cardiac/Vascular   Decreased cardiac ejection fraction    -- See Essential Htn        Essential hypertension    -- Continue to monitor HR and BP   -- SBP goal < 160  -- currently requiring levophed to keep MAPS >65.  -- 06/07/18 2D echo:    1 - Technically difficult study.     2 - Severely depressed left ventricular systolic function (EF 15-20%).     3 - Mildly to moderately depressed right ventricular systolic function .     4 - Impaired LV relaxation, normal LAP (grade 1 diastolic dysfunction).     5 - Mild aortic stenosis, WIL = 1.45 cm2, AVAi = 0.67 cm2/m2, peak velocity = 2.8 m/s, mean gradient = 18 mmHg.     6 - Mild mitral regurgitation.   -- Labetalol , cardene Prn, not requiring           Endocrine   Type 2 diabetes mellitus  without complication, with long-term current use of insulin    -- Continue sliding scale  -- POCT q 6  --Continue aspart and detemir            Prophylaxis:  Venous Thromboembolism: mechanical chemical  Stress Ulcer: H2B  Ventilator Pneumonia: yes     Activity Orders          None        DNR  Level 3  I spent >35 minutes reviewing patient records, examining, and counseling the patient with greater than 50% of the time spent with direct patient care and coordination.     Joshua Hagen NP  Neurocritical Care  [facility]

## 2018-07-07 NOTE — ASSESSMENT & PLAN NOTE
-- Neurosurgery signed off  -- 06/30: CTH pending  -- SBP goal < 160  -- PT/OT/Speech  -- Continue Neuro checks q 1hr   -- Vascular Neurology following  keppra 500mg bid  Mass effect worsening despite optimized osmotic tx, possibility of progression to brain death discussed.  Family discussion held 7/6, and the decision was to head toward comfort care once the patients son son arrived. We will continue supportive care up until that time, unless he has an arrest.

## 2018-07-07 NOTE — SUBJECTIVE & OBJECTIVE
Interval History:    -Waiting in patients son to arrive.     Review of Systems   Unable to perform ROS: Intubated       Objective:     Vitals:  Temp: (!) 102 °F (38.9 °C)  Pulse: (!) 111  Rhythm: normal sinus rhythm  BP: 111/65  MAP (mmHg): 81  Resp: 18  SpO2: 100 %  Oxygen Concentration (%): 41  O2 Device (Oxygen Therapy): ventilator  Vent Mode: A/C  Set Rate: 18 bmp  Vt Set: 450 mL  PEEP/CPAP: 8 cmH20  Peak Airway Pressure: 24 cmH2O  Mean Airway Pressure: 14 cmH20  Plateau Pressure: 0 cmH20    Temp  Min: 98.9 °F (37.2 °C)  Max: 102 °F (38.9 °C)  Pulse  Min: 100  Max: 120  BP  Min: 101/66  Max: 134/56  MAP (mmHg)  Min: 72  Max: 101  Resp  Min: 14  Max: 27  SpO2  Min: 96 %  Max: 100 %  Oxygen Concentration (%)  Min: 40  Max: 42    07/06 0701 - 07/07 0700  In: 1549.2 [I.V.:24.2]  Out: 3410 [Urine:3410]   Unmeasured Output  Stool Occurrence: 0       Physical Exam   Constitutional: He appears well-developed.   Cardiovascular: Normal rate, regular rhythm, normal heart sounds and intact distal pulses.    Pulmonary/Chest: Effort normal and breath sounds normal.   Abdominal: Soft. Bowel sounds are normal.   Neurological:   E V M   Skin: Skin is warm. Capillary refill takes less than 2 seconds.   Vitals reviewed.      Medications:  Continuous  norepinephrine bitartrate-D5W   Scheduled  albuterol-ipratropium 3 mL Q6H   aspirin 81 mg Daily   chlorhexidine 15 mL QID   enoxaparin 40 mg Daily   famotidine 20 mg BID   insulin aspart U-100 3 Units Q4H   insulin detemir U-100 13 Units BID   levetiracetam oral soln 500 mg BID   miconazole NITRATE 2 %  BID   polyethylene glycol 17 g Daily   rosuvastatin 40 mg QHS   senna-docusate 8.6-50 mg 1 tablet Daily   sodium chloride 0.9% 3 mL Q8H   PRN  acetaminophen 650 mg Q6H PRN   dextrose 50% 12.5 g PRN   glucagon (human recombinant) 1 mg PRN   insulin aspart U-100 1-10 Units Q4H PRN   labetalol 10 mg Q4H PRN   magnesium oxide 800 mg PRN   magnesium oxide 800 mg PRN   ondansetron 4 mg Q6H  PRN   potassium chloride 10% 40 mEq PRN   potassium chloride 10% 40 mEq PRN   potassium chloride 10% 60 mEq PRN   potassium, sodium phosphates 2 packet PRN   potassium, sodium phosphates 2 packet PRN   potassium, sodium phosphates 2 packet PRN     Today I personally reviewed pertinent medications, lines/drains/airways, imaging, cardiology, lab results, microbiology results, notably:    Diet  Diet NPO  Diet NPO

## 2018-07-07 NOTE — PLAN OF CARE
Problem: Patient Care Overview  Goal: Plan of Care Review  Outcome: Ongoing (interventions implemented as appropriate)  POC reviewed with pt and family at 0530. Pt unable to verbalize understanding, family verbalized understanding. Questions and concerns addressed. No acute events overnight. Will continue to monitor. See flowsheets for full assessment and VS info.

## 2018-07-08 NOTE — PROGRESS NOTES
Pt POCT still elevated inspite of medication given. Insulin drip to be ordered by [facility]. Nurse awaiting order.

## 2018-07-08 NOTE — PLAN OF CARE
Problem: Patient Care Overview  Goal: Plan of Care Review  Outcome: Ongoing (interventions implemented as appropriate)  POC reviewed with pt at 0300. Pt unable to verbalize understanding; daughter and spouse at bedside able to verbalize understanding. Questions and concerns addressed. No acute events overnight. Pt progressing toward goals. Will continue to monitor. See flowsheets for full assessment and VS info.     DELON Bentley made aware of pt neuro change from triple flexion to no movement. No interventions at the time. PA made aware of elevated POCT. Nurse told to treat with prn insulin due to plan to withdraw care.

## 2018-07-08 NOTE — PLAN OF CARE
Plan for withdrawal of care today  Awaiting patient's son to arrive at bedside.  Discussed comfort care strategy.

## 2018-07-08 NOTE — PROGRESS NOTES
NCC team notified of pt . Phone order received to administer scheduled insulin and to follow MAR at current time. No further orders received.

## 2018-07-08 NOTE — PROGRESS NOTES
Spoke with wife, Jeniffer, via phone, who provided preferred  home information for paperwork.     1730-spoke with toney, from [facility], who stated that [place] can be [place] [facility] [place].    1530-provided pt family with paperwork left by  on RN computer

## 2018-07-08 NOTE — PROGRESS NOTES
Notified Dr. Castillo of pt gastric residuals of 575ml from this AM and tube feeds currently being held. Order received to continue holding tube feeds and still administer meds.

## 2018-07-08 NOTE — PROGRESS NOTES
DELON Reynolds notified because of labile B, increased HR due to levophed. No changes/interventions at this time. Nurse will continue to monitor.

## 2018-07-08 NOTE — PROGRESS NOTES
Pt tagged and placed in body bag, transport called for pickup, awaiting pt to be picked up and transported to [facility].

## 2018-07-08 NOTE — PROGRESS NOTES
Phone call made to chaplain Concepción, letting her know of POC per pt family. Informed her that pt family does not request her services at the present time.

## 2018-07-08 NOTE — PROGRESS NOTES
Pt blood sugar 366. DELON Martin [facility] notified. Aspart per protocol and scheduled detemir given. Nurse to recheck in two hours sreekanth Hagen NP.

## 2018-07-08 NOTE — PROGRESS NOTES
Notified NCC team of pt sbp in the 70s/80s with noted MAP in the 50s and current levophed rate of 65cc/hr.     09Sana- EASTON Connell, with NCC at bedside. Bedside order received to order higher concentration of levophed  (32mg/250mL) from pharmacy. Order placed and spoke with pharmacist via phone to verify correct order.

## 2018-07-08 NOTE — SIGNIFICANT EVENT
Vascular Neurology Attendning    Family decision to withdraw care on Monday    VN to sign off  Please call if plan changes or for questions    Pavel Lucia MD MPH  NeuroCritical Care & Vascular Neurology

## 2018-07-08 NOTE — PROGRESS NOTES
Phone call made to [facility] to inform them of potentially withdrawing care today. Per conversation with Vianca from [facility], only call back after cardiac death has occurred due to pt being ruled out for organ donation already.

## 2018-07-08 NOTE — PROGRESS NOTES
Pt family denies wanting to speak with  at present time. Informed family to let me know if anything changes.

## 2018-07-08 NOTE — DISCHARGE SUMMARY
Death Note  Critical Care Medicine      Admit Date: 2018    Date of Death: 2018    Time of Death: 15:21 PM    Attending Physician: Paul Castillo MD    Principal Diagnoses: Right-sided nontraumatic intracerebral hemorrhage    Preliminary Cause of Death: Right-sided nontraumatic intracerebral hemorrhage    Secondary Diagnoses:   Active Hospital Problems    Diagnosis  POA    *Right-sided nontraumatic intracerebral hemorrhage [I61.9]  Yes    Brain compression [G93.5]  Yes    Mixed hyperlipidemia [E78.2]  Yes    Brain herniation [G93.5]  Yes    [place] coma scale total score 3-8 [R40.2430]  Yes    Warfarin-induced coagulopathy [D68.32, T45.515A]  Yes    DVT of axillary vein, acute left [I82.A12]  Yes    Decreased cardiac ejection fraction [R93.1]  Yes    Essential hypertension [I10]  Yes    Type 2 diabetes mellitus without complication, with long-term current use of insulin [E11.9, Z79.4]  Not Applicable      Resolved Hospital Problems    Diagnosis Date Resolved POA    Neurogenic shock [F43.0] 2018 Yes        Discharged Condition:     HPI:  Mr Guthrie is a 68 y.o. male with signification pmhx of Aspiration PNA(18), Acute on Chronic resp fail with hypoxia(18), Asthma, COPD, CAD, DM, RMCA(18), High Cholesterol, Lung CA, HTN, and Long term Antithrombotic/Antiplatelet therapy who presents to [facility] for a higher level of care s/p Large Hemorrhagic conversion of ischemic stroke. The patient was initially seen at [facility] ED after being found unresponsive at [facility]. MAR indicates pt to be on Coumadin (7.5 mg qD), Lovenox (100mg bid) and ASA (81mg qD). Received KCentra and protamine at OSH.     Hospital/ICU Course:  : Admit to [facility], [facility], [facility]  : worsening mass effect, volume of blood stable,new ROOSEVELT and PCA imfarcts, prognosis communicated to family   Continues on levophed gtt to keep MAP >65. Na 160 2% decreased. To 35%  then weaned to off as Na stayed elevated. Blood glucoses > 200 shceduled aspar 2 units q4h  : Large R -MCA stroke with hemorrhagic conversion after anticoagulation for DVTs. No events overnight. Decerebrate posture. HTS  cc q 6hrs for serum NA < 155. Last CT head with significant R to L sub falcine shift, uncal herniation and brain stem ischemia.   Patient has a severe cardiomyopathy. Stroke team and I had a nice conversation with his family about his state and prognosis. Planing to wean Levophed off. SC Heparin and ASA were started. Plan discussed with stroke team and the patient's family.   : Discussion with family at bedside. Decision made to withdraw care once son arrives. We will continue supportive care up until that point.   : No acute change in patent status. Waiting for patients son to arrive. Family voices no needs or concerns at this time,.    : Comfort care orders initiated per family request at bedside, patient  at 1521 PM      Consultations were held with the family regarding the patient's expected poor prognosis. At the direction of the family, the patient was extubated  and measures to ensure the comfort of the patient including, but not limited to, morphine as needed for pain and air hunger as well as benzodiazepines as needed for agitation. The patient was subsequently declared dead.

## 2018-07-09 NOTE — PT/OT/SLP DISCHARGE
Occupational Therapy Discharge Summary    Mendez Guthrie  MRN: [id number]   Principal Problem: Right-sided nontraumatic intracerebral hemorrhage      Patient Discharged from acute Occupational Therapy on   Please refer to prior OT note dated  for functional status.    Assessment:      Patient appropriate for care in another setting.    Objective:     GOALS:    Occupational Therapy Goals        Problem: Occupational Therapy Goal    Goal Priority Disciplines Outcome Interventions   Occupational Therapy Goal     OT, PT/OT     Description:  Goals set  to be addressed for 14 days with expiration date, :  Patient will increase functional independence with ADLs by performing:    Patient will demonstrate rolling to the right with max assist.  Not met   Patient will demonstrate rolling to the left with max assist.   Not met  Patient will demonstrate supine -sit with max assist.   Not met  Patient will demonstrate grooming while seated with max assist.   Not met  Patient will demonstrate upper body dressing with max assist while seated EOB.   Not met  Patient will demonstrate lower body dressing with max assist while seated EOB.   Not met  Patient will demonstrate ability to follow 1/3 commands.   Not met  Patient's family / caregiver will demonstrate independence and safety with assisting patient with self-care skills and functional mobility.     Not met  Patient's family / caregiver will demonstrate independence with providing ROM and changes in bed positioning.   Not met  Patient and/or patient's family will verbalize understanding of stroke prevention guidelines, personal risk factors and stroke warning signs via teachback method.  Not met                           Reasons for Discontinuation of Therapy Services  Transfer to alternate level of care.      Plan:     Patient Discharged to: Patient     YOSEPH Gonsalves  2018

## 2018-12-01 NOTE — PT/OT/SLP PROGRESS
Occupational Therapy   Treatment    Name: Mendez Guthrie  MRN: [id number]  Admitting Diagnosis:  Embolic stroke involving right middle cerebral artery       Recommendations:     Discharge Recommendations: nursing facility, skilled  Discharge Equipment Recommendations:   (TBD)  Barriers to discharge:  None    Subjective     Communicated with: RN prior to session.  Pt lethargic; requires max verbal/tactile cues to arouse    Pain/Comfort:  · Pain Rating 1: 0/10  · Pain Rating Post-Intervention 1: 0/10    Objective:     Patient found with: telemetry, peripheral IV, PEG Tube, SCD, pressure relief boots    General Precautions: Standard, aspiration, fall, aphasia, NPO   Orthopedic Precautions:N/A   Braces: N/A     Occupational Performance:    Bed Mobility:    · Patient completed Rolling/Turning to Left with  total assistance  · Patient completed Supine to Sit with total assistance  · Patient completed Sit to Supine with total assistance     Sitting EOB:   Pt sat EOB ~12 minutes with Max A for postural control; Challenging core control, static/dynamic sitting balance, and midline orientation.  ** Poor participation during EOB activity 2/2 lethargy ( eyes closed 100% of time while seated EOB, despite max tactile/verbal cues)    Activities of Daily Living:  · Grooming: total assistance to wash face  · LB Dressing: dependence to sal B socks while seated EOB    Patient left right sidelying with all lines intact, call button in reach and RN notified    [facility] 6 Click:  [facility] Total Score: 6    Treatment & Education:  -Pt edu on OT role/POC  -Importance of OOB activity with staff assistance  -Safety during functional t/f and mobility  - White board updated  - Multiple self care tasks completed-- as noted above  - Provided PROM exercises while seated UIC including: 1x15 reps in shoulder flexion, elbow flexion/extension, shoulder IR/ER, supination/pronation, wrist and digit flexion/extension.  - Pt tolerated sitting EOB for ~12  Reason For Visit  RFV: JASWINDER PEREZ is a new patient here today for medical nutrition therapy for Type 2 diabetes . :  services not used.   JASWINDER PEREZ accepted a chaperone. She is accompanied by her adult child .      Referred By    Primary Care Physician: Dr. Shaista Meek       Nutirition Goals  Improved blood sugar control + weight loss.      Vitals  Vital Signs    Recorded: 04Sep2018 11:58AM   Height 5 ft 5 in   Weight 182 lb 3 oz   BMI Calculated 30.32   BSA Calculated 1.9     Estimated  Estimated Daily Calorie Need: Using MSJ: 1380 x 1.3 - 771=1455 calories/day   Estimated Daily Protein Need (Gram): 1.5 g/kg IBW=85 g/day .      Education  Discussed/Instructed on the Following: carb counting, sources of carbohydrates in diet, sources of fat in diet, sources of sodium in diet, sources of protein in diet, healthy weight and increasing activity in daily routine.   Handouts/resources provided/introduced: Type 2 diabetes nutrition therapy .      Nutrition Assessment  Support structure is present.   Current Activity Level: sedentary.   Primary Meal Preparer: self.   Pt presents to RD for diabetes diet education. Pt takes metformin and glipizide daily. Pt admits to not checking BG levels regularly as directed by MD. Reviewed daily eating habits. Pt eats 1-2 meals/day. Breakfast may include grits + toast or eggs with toast. Lunch usually includes nuts. Dinner may be skipped or may include chicken and greens with white rice. Pt drinks water and sweetened tea daily. Pt completes ADL, but, does not include structured exercise into daily routine.      Discussion/Summary  Impression: Discussed 1300 calorie/carb controlled, low sodium, weight loss meal plan. Taught basic carb counting. Discussed portion control, label reading, meal planning tips, and the importance of physical activity. Discussed the importance of not skipping meals. Pt receptive to education.      Plan  Treatment Plan: Goals:   Increase  minutes with max A for postural control; weight bearing through BUE's; attempted to engage pt in multiple self care tasks while seated EOB however pt falling asleep   - Pt positioning in R side lying with wedge placed behind back; BUE's elevated and supported using pillows; cervical sine in neutral   - All questions/concerns answered within OT scope of practice    Cognition:   Eyes open 5% of session  Followed 0% of simple one-step commands ( 2/2 lethargy)  Education:    Assessment:     Mendez Guthrie is a 68 y.o. male with a medical diagnosis of Embolic stroke involving right middle cerebral artery.  He presents lethargic with max difficulty to arouse, limiting overall session.  Performance deficits affecting function are weakness, impaired endurance, impaired self care skills, impaired functional mobilty, decreased upper extremity function, decreased lower extremity function, impaired cognition, impaired balance, abnormal tone, impaired fine motor, decreased ROM, decreased safety awareness, impaired sensation.    Pt would benefit from SNF following d/c to continue to progress towards goals and improve quality of life.   Rehab Prognosis:  Good; patient would benefit from acute skilled OT services to address these deficits and reach maximum level of function.       Plan:     Patient to be seen 4 x/week to address the above listed problems via self-care/home management, therapeutic activities, therapeutic exercises, neuromuscular re-education  · Plan of Care Expires: 07/06/18  · Plan of Care Reviewed with: spouse    This Plan of care has been discussed with the patient who was involved in its development and understands and is in agreement with the identified goals and treatment plan    GOALS:    Occupational Therapy Goals        Problem: Occupational Therapy Goal    Goal Priority Disciplines Outcome Interventions   Occupational Therapy Goal     OT, PT/OT Ongoing (interventions implemented as appropriate)   normal activities.   Continue working on meal plan.   Count Carbs at most meals.   Eat 3 meals per day.   Eat 2 snacks per day.   Aim for 30-45 g carbs per meal and aim for 0-15 g carbs per snack.     Read food labels to determine serving size, sat fat/trans fat, carbs, fiber, and protein. Use measuring tools for portion control. Choose low sodium foods with <300 mg/serving. Increase non starchy vegetables. Include lean protein at all meals and snacks. Eliminate sources of simple sugars such as tea, honey, etc. Include walking daily about 30 min. Follow up with RD in1 month. Call with questions.      Time    Total face to face time spent with patient 60 minutes.      Signatures   Electronically signed by : KEYSHAWN HENAO RD; Sep  4 2018 12:04PM CST       Description:  Goals to be met by: 6/17/2018     Patient will increase functional independence with ADLs by performing:    UE Dressing with Moderate Assistance.  LE Dressing with Maximum Assistance.  Grooming while seated with Contact Guard Assistance.  Toileting from bedside commode with Moderate Assistance for hygiene and clothing management.   Sitting at edge of bed x 15 minutes with Minimal Assistance.  Supine to sit with Moderate Assistance.  Stand pivot transfers with Moderate Assistance.  Toilet transfer to bedside commode with Moderate Assistance.  Pt will initiate movement in LUE.  Pt will follow 50% of one step commands.  Pt will keep eyes open 100% of session.                      Time Tracking:     OT Date of Treatment: 06/13/18  OT Start Time: 0906  OT Stop Time: 0939  OT Total Time (min): 33 min    Billable Minutes:Therapeutic Activity 15  Neuromuscular Re-education 18    Anita vaughan OT  6/13/2018

## 2019-05-03 NOTE — ASSESSMENT & PLAN NOTE
1915: assumed care of pt at this time, declines N/V, headache, blurred vision, DTRs +2, and pain 0/10. 
 
0030: rt repositioned to right lateral, EFM/TOCO adjusted. 0140: NADIYA Robles CNM called for a status update, gave orders to pull the cook balloon and do a SVE at 0500, start pitocin at 0600, and start antibiotics for unknown GBS within 1-2 hours of starting pitocin. Pt reports feeling contractions with 6/10 pain intermittently, palpating/adjusting toco, mild CTXs noted, unable to trace 0230: pt c/o contraction pain 6/10, declines IV pain meds, aware of her options for pain control 0224: pt repositioned to right lateral, toco/EFM adjusted 56: RN at bedside, pt turned to left tilt, EFM/toco adjusted 2[address] [place] balloon removed, 80ml/80ml out of each. 0510: elvis-care, pads replaced, pt aware of plan of care and encouraged to eat a small bland meal before pitocin is started at 0615 
 
0550: minimal variability noted, pt turned to left lateral. [place]/EFM adjusted 12Sissy Emerson CNM at nurses station for a status update, gave orders to allow the pt a small meal and to start pitocin at 0615. CNM aware of SVE 
 
0710: Report given to [address] [place] [address] and Alba Nicholson RN, magnesium sulfate and oxytocin double verified Large area of cytotoxic cerebral edema identified when reviewing brain imaging in the territory of the R middle cerebral artery. There is not mass effect associated with it. We will continue to monitor the patients clinical exam for any worsening of symptoms which may indicate expansion of the stroke or the area of the edema resulting in the clinical change. The pattern is suggestive of atheroembolic etiology.

## 2020-03-04 NOTE — PT/OT/SLP PROGRESS
Occupational Therapy   Treatment    Name: Mendez Guthrie  MRN: [id number]  Admitting Diagnosis:  Cerebrovascular accident (CVA)       Recommendations:     Discharge Recommendations: nursing facility, skilled  Discharge Equipment Recommendations:  hospital bed, lift device  Barriers to discharge:  None    Subjective     Communicated with: RN prior to session.  Pain/Comfort:  · Pain Rating 1: 0/10  · Pain Rating Post-Intervention 1: 0/10    Patients cultural, spiritual, [employer]ology conflicts given the current situation:      Objective:     Patient found with: bed alarm, PEG Tube, pressure relief boots, SCD, telemetry, oxygen, peripheral IV.  Therapy tech (Era) assisted with session. Wife present.    General Precautions: Standard, aspiration, fall, NPO   Orthopedic Precautions:N/A   Braces: N/A     Occupational Performance:    Bed Mobility:    · Patient completed Rolling/Turning to Left with  maximal assistance  · Patient completed Scooting/Bridging with total assistance  · Patient completed Supine to Sit with total assistance  · Patient completed Sit to Supine with total assistance     Functional Mobility/Transfers:  · Not assessed 2* pt keeping eyes closed and not following commands majority of session    Activities of Daily Living:  · Grooming: moderate assistance for balance while wiping mouth with RUE.  On first trial pt required Total A.  Later in session pt able to perform task with assist for balance only.    Patient left HOB elevated with all lines intact, call button in reach and wife and PCT present    [facility] 6 Click:  [facility] Total Score: 7    Treatment & Education:  *Pt sat EOB for ~28 minutes with Max A required to maintain upright position.  Pt pushed intermittently with RUE requiring cues and physical assist to correct.  *Pt performed cervical spine stretch to promote increased flexion/extension: 1 set x 5 reps with Max cues required.  Poor follow through and command following noted.    *PROM performed on  "LUE to provide stretch: 4 sets x 10 reps incorporating all joints.    *Pt performed 3 bilateral UE exercises to promote increased coordination and functional use of LUE: 1 set x 10 reps per exercise.  OT attempted to help pt grasp L hand with R hand, but pt did not hold onto hand.  -Bicep curls  -Chest press  -Shoulder flexion  *POC reviewed with pt and education provided on importance of participating in therapy.    Other:  *Pt closed eyes majority of session.  When encouragement and education provided on importance of participating, elevating head, and keeping eyes open provided pt kept eyes closed and shook head "No."  *3-4 times pt opened eyes, lifted head, and maintained head in neutral position for ~30 seconds; however, once therapeutic task began again pt closed eyes and maintained head in forward flexed position.  Education:    Assessment:     Mendez Guthrie is a 68 y.o. male with a medical diagnosis of Cerebrovascular accident (CVA).  He presents with the following performance deficits affecting function: weakness, impaired endurance, impaired functional mobilty, impaired self care skills, impaired balance, decreased safety awareness, decreased coordination, decreased ROM, impaired fine motor, decreased lower extremity function, decreased upper extremity function, impaired cognition, gait instability.  Pt demonstrated decreased command following during session and kept eyes closed majority of time despite max encouragement and education regarding importance of keeping them open during therapy.  Pt pushed with RUE requiring consistent cues and repositioning to help maintain upright position.  Max A required to maintain balance during therapy.  Pt is not at PLOF and would continue to benefit from skilled OT services to address problems listed below and increase independence with ADLs.    Rehab Prognosis:  Good; patient would benefit from acute skilled OT services to address these deficits and reach maximum " level of function.       Plan:     Patient to be seen 4 x/week to address the above listed problems via self-care/home management, therapeutic activities, therapeutic exercises, neuromuscular re-education  · Plan of Care Expires: 07/06/18  · Plan of Care Reviewed with: patient, spouse    This Plan of care has been discussed with the patient who was involved in its development and understands and is in agreement with the identified goals and treatment plan    GOALS:    Occupational Therapy Goals        Problem: Occupational Therapy Goal    Goal Priority Disciplines Outcome Interventions   Occupational Therapy Goal     OT, PT/OT Ongoing (interventions implemented as appropriate)    Description:  Goals to be met by: 6/25/2018     Patient will increase functional independence with ADLs by performing:    UE Dressing with Moderate Assistance.  LE Dressing with Maximum Assistance.  Grooming while seated with Contact Guard Assistance.  Toileting from bedside commode with Moderate Assistance for hygiene and clothing management.   Sitting at edge of bed x 15 minutes with Minimal Assistance.  (keep for consistency)  Supine to sit with Moderate Assistance.  Stand pivot transfers with Moderate Assistance.  Toilet transfer to bedside commode with Moderate Assistance.  Pt will initiate movement in LUE.  Pt will follow 50% of one step commands.  Pt will keep eyes open 100% of session.                         Time Tracking:     OT Date of Treatment: 06/18/18  OT Start Time: 0825  OT Stop Time: 0903  OT Total Time (min): 38 min    Billable Minutes:Therapeutic Activity 38    YOSEPH Magana  6/18/2018     Ambulatory

## 2020-08-14 NOTE — SUBJECTIVE & OBJECTIVE
Neurologic Chief Complaint: R MCA infarct     Subjective:     Interval History: Patient is seen for follow-up neurological assessment and treatment recommendations:   No issues overnight, tolerating heparin drip, more lethargic this am, family in agreement for PEG so need to call IR in am    HPI, Past Medical, Family, and Social History remains the same as documented in the initial encounter.     Review of Systems   Constitutional: Positive for fever.   HENT: Positive for trouble swallowing.    Respiratory: Positive for wheezing.    Cardiovascular: Negative for chest pain.   Gastrointestinal: Negative for nausea and vomiting.   Neurological: Positive for facial asymmetry, speech difficulty and weakness.   Psychiatric/Behavioral: Positive for agitation and confusion.     Scheduled Meds:   albuterol-ipratropium  3 mL Nebulization Q6H    carvedilol  12.5 mg Per NG tube BID WM    citalopram  10 mg Per NG tube Daily    furosemide  40 mg Oral Daily    losartan  50 mg Per NG tube Daily    rosuvastatin  40 mg Per NG tube QHS    senna  8.6 mg Per NG tube Daily    sodium chloride 0.9%  3 mL Intravenous Q8H     Continuous Infusions:   heparin (porcine) in D5W 17 Units/kg/hr (06/10/18 0609)    sodium chloride 0.9%       PRN Meds:acetaminophen, dextrose 50%, glucagon (human recombinant), HYDROcodone-acetaminophen, insulin aspart U-100, labetalol, ondansetron, polyethylene glycol, ramelteon, sodium chloride 0.9%    Objective:     Vital Signs (Most Recent):  Temp: 98.4 °F (36.9 °C) (06/10/18 0750)  Pulse: 75 (06/10/18 1234)  Resp: 20 (06/10/18 1234)  BP: 132/74 (06/10/18 0750)  SpO2: 95 % (06/10/18 1234)  BP Location: Right arm    Vital Signs Range (Last 24H):  Temp:  [97.9 °F (36.6 °C)-99.8 °F (37.7 °C)]   Pulse:  []   Resp:  [16-20]   BP: (120-152)/(63-76)   SpO2:  [93 %-98 %]   BP Location: Right arm    Physical Exam   Constitutional: He appears well-developed.   HENT:   Head: Normocephalic and atraumatic.   NG  COLONOSCOPY  08/10/2018    Polyps x5, Int hemorrhoids, diverticulosis    ENDOSCOPY, COLON, DIAGNOSTIC  10/6/2015    mild erosive esophagitis    PROSTATECTOMY  2011       FAMILY HISTORY:   History reviewed. No pertinent family history. SOCIAL HISTORY:   Social History     Socioeconomic History    Marital status:      Spouse name: Not on file    Number of children: Not on file    Years of education: Not on file    Highest education level: Not on file   Occupational History    Not on file   Social Needs    Financial resource strain: Not on file    Food insecurity     Worry: Not on file     Inability: Not on file    Transportation needs     Medical: Not on file     Non-medical: Not on file   Tobacco Use    Smoking status: Never Smoker    Smokeless tobacco: Never Used   Substance and Sexual Activity    Alcohol use: Yes     Comment: rarely    Drug use: No    Sexual activity: Not on file   Lifestyle    Physical activity     Days per week: Not on file     Minutes per session: Not on file    Stress: Not on file   Relationships    Social connections     Talks on phone: Not on file     Gets together: Not on file     Attends Rastafari service: Not on file     Active member of club or organization: Not on file     Attends meetings of clubs or organizations: Not on file     Relationship status: Not on file    Intimate partner violence     Fear of current or ex partner: Not on file     Emotionally abused: Not on file     Physically abused: Not on file     Forced sexual activity: Not on file   Other Topics Concern    Not on file   Social History Narrative    Not on file       ALLERGIES: Patient has no known allergies.     PHYSICAL EXAM:  VITAL SIGNS:   ED Triage Vitals [08/14/20 1759]   Enc Vitals Group      BP (!) 148/86      Pulse 80      Resp 16      Temp 98.9 °F (37.2 °C)      Temp Source Oral      SpO2 97 %      Weight 161 lb (73 kg)      Height 6' 1\" (1.854 m)      Head Circumference       Peak present    Eyes: EOM are normal. Pupils are equal, round, and reactive to light.   Cardiovascular: Normal rate.    Pulmonary/Chest: Effort normal.   Abdominal: Soft. There is no tenderness.   Musculoskeletal:   LSW (LUE>LLE)   Neurological: He is alert.   Skin: Skin is warm.   Psychiatric:   Agitated    Vitals reviewed.      Neurological Exam:   LOC: drowsy  Attention Span: very poor  Language: Global aphasia  Articulation: Dysarthria  Orientation: Untestable due to severe aphasia   Facial Movement (CN VII): Lower facial weakness on the Left  Motor: Arm left  0/5  Leg left  Paresis: 3/5  Arm right  Normal 5/5  Leg right Paresis: 4/5  Cebellar: No evidence of appendicular or axial ataxia  Sensation: Intact to light touch, temperature and vibration    Laboratory:  CMP:     Recent Labs  Lab 06/10/18  0431   CALCIUM 9.2   ALBUMIN 2.6*   PROT 6.3   *   K 4.4   CO2 26   CL 93*   BUN 17   CREATININE 1.0   ALKPHOS 66   ALT 17   AST 27   BILITOT 0.3     BMP:     Recent Labs  Lab 06/10/18  0431   *   K 4.4   CL 93*   CO2 26   BUN 17   CREATININE 1.0   CALCIUM 9.2     CBC:     Recent Labs  Lab 06/10/18  0431   WBC 8.36   RBC 3.79*   HGB 11.3*   HCT 35.0*      MCV 92   MCH 29.8   MCHC 32.3     Lipid Panel:     Recent Labs  Lab 06/04/18  2106   CHOL 178   LDLCALC 81.2   HDL 52   TRIG 224*     Coagulation:     Recent Labs  Lab 06/07/18  0540   INR 1.0   APTT 21.4     Platelet Aggregation Study: No results for input(s): PLTAGG, PLTAGINTERP, PLTAGREGLACO, ADPPLTAGGREG in the last 168 hours.  Hgb A1C:     Recent Labs  Lab 06/05/18  0042   HGBA1C 9.4*     TSH:     Recent Labs  Lab 06/04/18  2106   TSH 2.274       Diagnostic Results     Brain Imaging   CT 6/6   Large right MCA distribution infarct measuring approximately 7 x 3 cm including the right perisylvian region and extending to the frontal and parietal operculum with associated localized mass effect.    Vessel Imaging   CTA 6/8  CTA head: Occlusion right M2  Flow       Pain Score       Pain Loc       Pain Edu? Excl. in [address]? Constitutional:  Non-toxic appearance  HENT: Normocephalic, Atraumatic, Bilateral external ears normal, Oropharynx moist, No oral exudates, Nose normal.  Eyes: PERRL, conjunctiva normal   Neck: Normal range of motion, No tenderness, Supple, No stridor,No lymphadenopathy   Cardiovascular:  Normal heart rate, Normal rhythm  Pulmonary/Chest:  Normal breath sounds, No respiratory distress, No wheezing  Abdomen: Bowel sounds normal, Soft, No tenderness, No masses, No pulsatile masses  Back:  No tenderness, No CVA tenderness  Extremities:  Normal range of motion, Intact distal pulses, No edema, No tenderness  Skin:  Warm, Dry, No erythema, No rash      EKG:    None    Radiology / Procedures:     CT ABDOMEN PELVIS WO CONTRAST Additional Contrast? None (Preliminary result)   Result time 08/14/20 22:45:46   Procedure changed from [facility] Additional Contrast? None   Preliminary result by Drake Reagan MD (08/14/20 22:45:46)                 Impression:     Right pelvic mass.  Differential diagnosis includes subacute hematoma or   infiltrative malignancy. Mild right hydronephrosis due to compression of the ureter by the mass. Bibasilar pulmonary consolidation or atelectasis. Narrative:     EXAMINATION:   CT OF THE ABDOMEN AND PELVIS WITHOUT CONTRAST 8/14/2020 9:52 pm     TECHNIQUE:   CT of the abdomen and pelvis was performed without the administration of   intravenous contrast. Multiplanar reformatted images are provided for review. Dose modulation, iterative reconstruction, and/or weight based adjustment of   the mA/kV was utilized to reduce the radiation dose to as low as reasonably   achievable. COMPARISON:   None.      HISTORY:   ORDERING SYSTEM PROVIDED HISTORY: nausea, vomiting, abdominal pain   TECHNOLOGIST PROVIDED HISTORY:   Reason for exam:->nausea, vomiting, abdominal pain   Additional segment of the MCA within the proximal sylvian fissure..  Heavy atherosclerotic plaquing of the cavernous and supraclinoid ICAs with mild moderate right and mild left cavernous ICA stenosis.  There is diffuse small caliber right M1 segment of the MCA.  Irregularity and narrowing of the right distal vertebral artery concerning for atherosclerotic disease with mild moderate stenosis.  CTA neck: Atherosclerotic plaquing of the carotid bifurcations and proximal ICAs with less than 50% proximal ICA stenosis by NASCET criteria.  CT head: Evolving large right MCA distribution infarction.  Localized mass effect without significant midline shift or hydrocephalus.  No evidence for hemorrhagic conversion.  Clinical correlation and follow-up advised.      Cardiac Imaging   Echo 6/7  CONCLUSIONS     1 - Technically difficult study.     2 - Severely depressed left ventricular systolic function (EF 15-20%).     3 - Mildly to moderately depressed right ventricular systolic function .     4 - Impaired LV relaxation, normal LAP (grade 1 diastolic dysfunction).     5 - Mild aortic stenosis, WIL = 1.45 cm2, AVAi = 0.67 cm2/m2, peak velocity = 2.8 m/s, mean gradient = 18 mmHg.     6 - Mild mitral regurgitation.    Contrast?->None   Reason for Exam: nausea, vomiting, abdominal pain   Acuity: Acute   Type of Exam: Initial     FINDINGS:   Lower Chest: There is dependent consolidation in the lungs. Organs: There is mild right hydronephrosis. [place]oat is no obstructing stone. No acute abnormality of the liver, spleen, pancreas, adrenals, gallbladder,   or left kidney. GI/Bowel: Bowel caliber is normal.  There is no evidence of active bowel   inflammation. There is no evidence of acute appendicitis. Pelvis: In the right pelvic sidewall is a 56 x 67 x 66 mm (approximately)   ill-defined mass adjacent to the iliac vessels.  Borders of the mass are   ill-defined.  It medially displaces the right ureter.  The mass appears   somewhat elongated along the course of the iliac vein.  The mass has an   average density of 35 Hounsfield units. The urinary bladder is unremarkable.  There are clips from prostatectomy. Peritoneum/Retroperitoneum: See above.  No free air or free fluid. Bones/Soft Tissues: No acute osseous abnormality.                  Preliminary result by Rita Kaiser MD (08/14/20 22:42:42)                 Impression:     Right pelvic mass.  Differential diagnosis includes subacute hematoma or   infiltrative malignancy. Mild right hydronephrosis due to compression the ureter by the mass. Bibasilar pulmonary consolidation or atelectasis.                Labs Reviewed   CBC WITH AUTO DIFFERENTIAL - Abnormal; Notable for the following components:       Result Value    WBC 12.8 (*)     RBC 4.19 (*)     Hemoglobin 12.7 (*)     Hematocrit 40.2 (*)     MCHC 31.6 (*)     Segs Relative 87.2 (*)     Lymphocytes % 3.9 (*)     Monocytes % 8.1 (*)     Immature Neutrophil % 0.5 (*)     All other components within normal limits   COMPREHENSIVE METABOLIC PANEL W/ REFLEX TO MG FOR LOW K - Abnormal; Notable for the following components:    Sodium 133 (*)     Chloride 98 (*)     CREATININE 1.7 (*)     Glucose 104 (*)     Alb 3.1 (*)     ALT <5 (*)     AST 11 (*)     GFR Non- 39 (*)     GFR  47 (*)     All other components within normal limits   URINE RT REFLEX TO CULTURE - Abnormal; Notable for the following components:    Color, UA LICO (*)     Clarity, UA HAZY (*)     Ketones, Urine SMALL (*)     Blood, Urine SMALL (*)     Protein,  (*)     RBC, UA 4 (*)     WBC, UA 4 (*)     Mucus, UA RARE (*)     All other components within normal limits   LACTIC ACID, PLASMA   LIPASE   PSA, TOTAL AND FREE   BASIC METABOLIC PANEL W/ REFLEX TO MG FOR LOW K       ED COURSE & MEDICAL DECISION MAKING:  Pertinent Labs & Imaging studies reviewed. (See chart for details)  On exam, the patient is afebrile and nontoxic appearing. He is hemodynamically stable and neurologically intact. Labs are obtained and are significant for mild leukocytosis and an acute kidney injury with no other clinically significant lab abnormalities. Bladder scan showed a very small amount of urine in the bladder. The abdomen and pelvis was obtained and shows a right pelvic mass. This could be a subacute hematoma versus an infiltrative malignancy. There is mild right hydronephrosis due to compression of the ureter by the mass. There is bibasilar pulmonary consolidation or atelectasis. Constipation is noted. The patient was treated with 1 L of IV normal saline and felt the need to urinate. He was able to urinate without difficulty. There is no evidence of urinary infection. I suspect that the patient has constipation and a pelvic mass as well as an acute kidney injury and hydronephrosis. There is no bowel obstruction evident. I have a low suspicion for acute appendicitis, intraabdominal abscess, perforation, bowel obstruction, AAA, dissection, ischemic bowel, or acute surgical abdomen. I recommended admission to the hospital and the patient was agreeable.  I consulted the general surgeon on-call, Dr. Anneliese Snyder, who will see the patient in consult. I discussed the case with the hospitalist who will admit the patient for further treatment and care. The patient is currently in stable condition awaiting admission. Clinical Impression:  1. Pelvic mass    2. Constipation, unspecified constipation type    3. SLIM (acute kidney injury) ([facility] [address]    4. Lower abdominal pain    5. Other hydronephrosis        Comment: Please note this report has been produced using speech recognition software and may contain errors related to that system including errors in grammar, punctuation, and spelling, as well as words and phrases that may be inappropriate. If there are any questions or concerns please feel free to contact the dictating provider for clarification.         Rekha Vergara MD  08/15/20 0050

## 2020-10-28 NOTE — PLAN OF CARE
Addended by: NOAH DOBSON on: 10/28/2020 08:10 AM     Modules accepted: Orders     Hospital Medicine Co-management Care Update:      Chart reviewed from last 24 hours- SpO2 >94% on 2L LIAN MORRIS.     Recommendations:     - Goal oxygen saturation 88-92% in patient with COPD; please titrate to this (oxygen order adjusted in Epic)  -recommend continuing insulin regimen 13 U detemir BID with SSI, BG well controlled on current regimen  -continue current meds    Will sign off.    Above discussed with staff.

## 2021-09-06 NOTE — ASSESSMENT & PLAN NOTE
68 year old male with past medical history HLD, HTN, hx DVTs, CHF, DM, and previous RMCA stroke with residual left sided weakness presented to [facility] today after family found him unresponsive at [facility]. CT revealed large amount of parenchymal hemorrhage throughout the right cerebral hemisphere with diffuse mass effect and 1.5 cm of right to left midline shift.     Patient was recently admitted to [facility] due to R MCA infarct. Infarct thought likely to be cardioembolic due to EF 15-20%.  Cardiology was consulted and recommended patient to be placed on AC. During his hospital stay patient also had Bilateral upper extremity DVTs. Patient was discharged to skilled nursing facility on Lovenox with Coumadin bridge. INR from today is pending. Kcentra was administered at outside facility. Patient was intubated at outside facility. Transferred to [facility] for higher level of care and Neurosurgery evaluation. Etiology possibly hemorrhagic transformation of ischemic stroke. Patient made DNR on 7/1/18.     Antithrombotics for secondary stroke prevention:  On hold for ICH    Statins for secondary stroke prevention and hyperlipidemia, if present:   Statins:  Crestor- 40 mg daily    Aggressive risk factor modification: HTN, DM, HLD, Diet     Rehab efforts: PT/OT/SLP to evaluate and treat    Diagnostics ordered/pending: CT scan of head without contrast to asses brain parenchyma, MRI head without contrast to assess brain parenchyma; NS consulted and signed off as there is no surgical intervention they can offer.    VTE prophylaxis: Holding due to ICH    BP parameters: ICH: SBP <140       Postpartum Vaginal Delivery Instructions    Activity       Ask family and friends for help when you need it.    Do not place anything in your vagina for 6 weeks.    You are not restricted on other activities, but take it easy for a few weeks to allow your body to recover from delivery.  You are able to do any activities you feel up to that point.    No driving until you have stopped taking your pain medications (usually two weeks after delivery).     Call your health care provider if you have any of these symptoms:       Increased pain, swelling, redness, or fluid around your stiches from an episiotomy or perineal tear.    A fever above 100.4 F (38 C) with or without chills when placing a thermometer under your tongue.    You soak a sanitary pad with blood within 1 hour, or you see blood clots larger than a golf ball.    Bleeding that lasts more than 6 weeks.    Vaginal discharge that smells bad.    Severe pain, cramping or tenderness in your lower belly area.    A need to urinate more frequently (use the toilet more often), more urgently (use the toilet very quickly), or it burns when you urinate.    Nausea and vomiting.    Redness, swelling or pain around a vein in your leg.    Problems breastfeeding or a red or painful area on your breast.    Chest pain and cough or are gasping for air.    Problems coping with sadness, anxiety, or depression.  If you have any concerns about hurting yourself or the baby, call your provider immediately.     You have questions or concerns after you return home.     Keep your hands clean:  Always wash your hands before touching your perineal area and stitches.  This helps reduce your risk of infection.  If your hands aren't dirty, you may use an alcohol hand-rub to clean your hands. Keep your nails clean and short.

## 2021-12-16 NOTE — HPI
68 year old male with past medical history HLD, HTN, hx DVTs, CHF, DM, and previous RMCA stroke with residual left sided weakness transferred to [facility] [place] [place] today after family found him unresponsive at [facility]. Patient was seen today at [place] [facility] and CT revealed large amount of parenchymal hemorrhage throughout the right cerebral hemisphere with diffuse mass effect and 1.5 cm of right to left midline shift. Patient was recently admitted to [facility] [place] [place] Vascular Neurology due to R MCA infarct. Infarct thought likely to be cardioembolic due to EF 15-20%.  Cardiology was consulted and recommended patient to be placed on AC. During his hospital stay patient also had Bilateral upper extremity DVTs. Patient was discharged to skilled nursing facility on Lovenox with Coumadin bridge. INR from today is pending. Kcentra was administered. Patient was intubated at outside facility. Transferred to [facility] [place] [place] for higher level of care.  
Mr Guthrie is a 68 y.o. male with signification pmhx of Aspiration PNA(06/16/18), Acute on Chronic resp fail with hypoxia(06/14/18), Asthma, COPD, CAD, DM, RMCA(06/06/18), High Cholesterol, Lung CA, HTN, and Long term Antithrombotic/Antiplatelet therapy who presents to [facility] for a higher level of care s/p Large Hemorrhagic conversion of ischemic stroke. The patient was initially seen at [facility] ED after being found unresponsive at [facility]. MAR indicates pt to be on Coumadin (7.5 mg qD), Lovenox (100mg bid) and ASA (81mg qD). Received KCentra and protamine at OSH.   
Patient/Caregiver provided printed discharge information.